# Patient Record
Sex: MALE | Race: WHITE | NOT HISPANIC OR LATINO | Employment: OTHER | URBAN - METROPOLITAN AREA
[De-identification: names, ages, dates, MRNs, and addresses within clinical notes are randomized per-mention and may not be internally consistent; named-entity substitution may affect disease eponyms.]

---

## 2017-01-03 RX ORDER — LEVOTHYROXINE SODIUM 0.05 MG/1
50 TABLET ORAL DAILY
Qty: 30 TABLET | Refills: 0 | Status: SHIPPED | OUTPATIENT
Start: 2017-01-03 | End: 2017-02-01 | Stop reason: SDUPTHER

## 2017-01-24 ENCOUNTER — HOSPITAL ENCOUNTER (OUTPATIENT)
Dept: CARDIOLOGY | Facility: HOSPITAL | Age: 82
Setting detail: RECURRING SERIES
Discharge: HOME | End: 2017-01-24

## 2017-01-24 PROCEDURE — 85610 PROTHROMBIN TIME: CPT | Performed by: INTERNAL MEDICINE

## 2017-01-24 PROCEDURE — 36416 COLLJ CAPILLARY BLOOD SPEC: CPT | Performed by: INTERNAL MEDICINE

## 2017-01-31 RX ORDER — LEVOTHYROXINE SODIUM 0.05 MG/1
TABLET ORAL
Qty: 30 TABLET | Refills: 0 | OUTPATIENT
Start: 2017-01-31

## 2017-02-01 ENCOUNTER — TELEPHONE (OUTPATIENT)
Dept: FAMILY MEDICINE CLINIC | Facility: CLINIC | Age: 82
End: 2017-02-01

## 2017-02-01 RX ORDER — LEVOTHYROXINE SODIUM 0.05 MG/1
50 TABLET ORAL DAILY
Qty: 30 TABLET | Refills: 0 | Status: SHIPPED | OUTPATIENT
Start: 2017-02-01 | End: 2017-02-28 | Stop reason: SDUPTHER

## 2017-02-01 NOTE — TELEPHONE ENCOUNTER
----- Message from Lynsey Wang sent at 2/1/2017  1:01 PM EST -----  Contact: pt  PT NEEDS A NEW RX LEVOTHYROXINE 0.05 MG 1 DAILY     PT HAS AN APPT 2/7/17    COULD YOU PLEASE CALL IN A 30 DAY SUPPLY FOR HIM      MERLE CHANEY [phone]

## 2017-02-07 ENCOUNTER — OFFICE VISIT (OUTPATIENT)
Dept: FAMILY MEDICINE CLINIC | Facility: CLINIC | Age: 82
End: 2017-02-07

## 2017-02-07 VITALS
HEIGHT: 72 IN | BODY MASS INDEX: 23.7 KG/M2 | TEMPERATURE: 98.4 F | SYSTOLIC BLOOD PRESSURE: 104 MMHG | OXYGEN SATURATION: 99 % | HEART RATE: 77 BPM | DIASTOLIC BLOOD PRESSURE: 68 MMHG | WEIGHT: 175 LBS

## 2017-02-07 DIAGNOSIS — I10 ESSENTIAL HYPERTENSION: ICD-10-CM

## 2017-02-07 DIAGNOSIS — R73.01 ELEVATED FASTING BLOOD SUGAR: ICD-10-CM

## 2017-02-07 DIAGNOSIS — I48.20 CHRONIC ATRIAL FIBRILLATION (HCC): Primary | ICD-10-CM

## 2017-02-07 DIAGNOSIS — R74.8 ELEVATED CPK: ICD-10-CM

## 2017-02-07 DIAGNOSIS — E03.9 HYPOTHYROIDISM, UNSPECIFIED TYPE: ICD-10-CM

## 2017-02-07 DIAGNOSIS — J06.9 UPPER RESPIRATORY TRACT INFECTION, UNSPECIFIED TYPE: ICD-10-CM

## 2017-02-07 LAB
ALBUMIN SERPL-MCNC: 4.4 G/DL (ref 3.5–5.2)
ALBUMIN/GLOB SERPL: 1.6 G/DL
ALP SERPL-CCNC: 63 U/L (ref 39–117)
ALT SERPL-CCNC: 26 U/L (ref 1–41)
APPEARANCE UR: CLEAR
AST SERPL-CCNC: 25 U/L (ref 1–40)
BACTERIA #/AREA URNS HPF: ABNORMAL /HPF
BASOPHILS # BLD AUTO: 0.03 10*3/MM3 (ref 0–0.2)
BASOPHILS NFR BLD AUTO: 0.3 % (ref 0–1.5)
BILIRUB SERPL-MCNC: 0.5 MG/DL (ref 0.1–1.2)
BILIRUB UR QL STRIP: NEGATIVE
BUN SERPL-MCNC: 13 MG/DL (ref 8–23)
BUN/CREAT SERPL: 11.9 (ref 7–25)
CALCIUM SERPL-MCNC: 9.3 MG/DL (ref 8.6–10.5)
CASTS URNS MICRO: ABNORMAL
CHLORIDE SERPL-SCNC: 100 MMOL/L (ref 98–107)
CO2 SERPL-SCNC: 26.7 MMOL/L (ref 22–29)
COLOR UR: YELLOW
CREAT SERPL-MCNC: 1.09 MG/DL (ref 0.76–1.27)
EOSINOPHIL # BLD AUTO: 0.17 10*3/MM3 (ref 0–0.7)
EOSINOPHIL NFR BLD AUTO: 2 % (ref 0.3–6.2)
EPI CELLS #/AREA URNS HPF: ABNORMAL /HPF
ERYTHROCYTE [DISTWIDTH] IN BLOOD BY AUTOMATED COUNT: 13.2 % (ref 11.5–14.5)
EXPIRATION DATE: NORMAL
FLUAV AG NPH QL: NEGATIVE
FLUBV AG NPH QL: NEGATIVE
GLOBULIN SER CALC-MCNC: 2.8 GM/DL
GLUCOSE SERPL-MCNC: 128 MG/DL (ref 65–99)
GLUCOSE UR QL: NEGATIVE
HBA1C MFR BLD: 5.87 % (ref 4.8–5.6)
HCT VFR BLD AUTO: 46.5 % (ref 40.4–52.2)
HGB BLD-MCNC: 15.8 G/DL (ref 13.7–17.6)
HGB UR QL STRIP: NEGATIVE
IMM GRANULOCYTES # BLD: 0.04 10*3/MM3 (ref 0–0.03)
IMM GRANULOCYTES NFR BLD: 0.5 % (ref 0–0.5)
INTERNAL CONTROL: NORMAL
KETONES UR QL STRIP: NEGATIVE
LEUKOCYTE ESTERASE UR QL STRIP: NEGATIVE
LYMPHOCYTES # BLD AUTO: 2.13 10*3/MM3 (ref 0.9–4.8)
LYMPHOCYTES NFR BLD AUTO: 24.5 % (ref 19.6–45.3)
Lab: NORMAL
MCH RBC QN AUTO: 31.2 PG (ref 27–32.7)
MCHC RBC AUTO-ENTMCNC: 34 G/DL (ref 32.6–36.4)
MCV RBC AUTO: 91.7 FL (ref 79.8–96.2)
MONOCYTES # BLD AUTO: 0.72 10*3/MM3 (ref 0.2–1.2)
MONOCYTES NFR BLD AUTO: 8.3 % (ref 5–12)
NEUTROPHILS # BLD AUTO: 5.6 10*3/MM3 (ref 1.9–8.1)
NEUTROPHILS NFR BLD AUTO: 64.4 % (ref 42.7–76)
NITRITE UR QL STRIP: NEGATIVE
PH UR STRIP: 7 [PH] (ref 5–8)
PLATELET # BLD AUTO: 225 10*3/MM3 (ref 140–500)
POTASSIUM SERPL-SCNC: 3.8 MMOL/L (ref 3.5–5.2)
PROT SERPL-MCNC: 7.2 G/DL (ref 6–8.5)
PROT UR QL STRIP: (no result)
RBC # BLD AUTO: 5.07 10*6/MM3 (ref 4.6–6)
RBC #/AREA URNS HPF: ABNORMAL /HPF
SODIUM SERPL-SCNC: 140 MMOL/L (ref 136–145)
SP GR UR: 1.02 (ref 1–1.03)
T4 FREE SERPL-MCNC: 1.67 NG/DL (ref 0.93–1.7)
TSH SERPL DL<=0.005 MIU/L-ACNC: 3.41 MIU/ML (ref 0.27–4.2)
UROBILINOGEN UR STRIP-MCNC: (no result) MG/DL
WBC # BLD AUTO: 8.69 10*3/MM3 (ref 4.5–10.7)
WBC #/AREA URNS HPF: ABNORMAL /HPF

## 2017-02-07 PROCEDURE — 99213 OFFICE O/P EST LOW 20 MIN: CPT | Performed by: GENERAL PRACTICE

## 2017-02-07 PROCEDURE — 87804 INFLUENZA ASSAY W/OPTIC: CPT | Performed by: GENERAL PRACTICE

## 2017-02-07 RX ORDER — ERYTHROMYCIN 250 MG/1
TABLET, COATED ORAL
Refills: 0 | COMMUNITY
Start: 2017-01-31 | End: 2017-10-27

## 2017-02-07 NOTE — PROGRESS NOTES
Subjective   Vish Morin is a 85 y.o. male.     Chief Complaint   Patient presents with   • Follow-up     Immediate care center    • URI       History of Present Illness patient is a 85 y.o. very nice  gentleman who is here today with a upper respiratory symptoms seen in immediate care center placed on Zithromax and Robitussin and over-the-counter cough medicine.  Patient states he is just really is dragging denies fill chills and fever no rash nonproductive cough this patient has atrial fibrillation followed by Dr. Kurtz.  gets his pro times done at their office                        The following portions of the patient's history were reviewed and updated as appropriate: allergies, current medications, past family history, past medical history, past social history, past surgical history and problem list.      Review of Systems   Respiratory:        Nonproductive cough just feels tired not much congestion but some liver no chills atrial fibrillation slow   Cardiovascular:         states his atrial fib has been slow and not a problem   Genitourinary:        No polyuria or dysuria   Musculoskeletal:        Am mild muscle aching and fatigue         Objective   Physical Exam   Constitutional:   Weight is stable at 175   HENT:   No sore throat and on exam posterior pharynx is negative   Eyes: EOM are normal. Pupils are equal, round, and reactive to light.   Neck:   No cervical adenopathy   Cardiovascular:   Atrial fibrillation 77 beats a minute   Pulmonary/Chest: Effort normal and breath sounds normal.   Could not hear any pneumonia   Musculoskeletal:   Some generalized muscle aching   Skin:   No rash Is 99         Assessment/Plan   Vish was seen today for follow-up and uri.    Diagnoses and all orders for this visit:    Chronic atrial fibrillation  -     CBC & AUTO Differential  -     Comprehensive Metabolic Panel  -     Hemoglobin A1c  -     TSH+Free T4  -     Urinalysis With Microscopic  -     POC  Influenza A / B    Hypothyroidism, unspecified type  -     CBC & AUTO Differential  -     Comprehensive Metabolic Panel  -     Hemoglobin A1c  -     TSH+Free T4  -     Urinalysis With Microscopic  -     POC Influenza A / B    Essential hypertension  -     CBC & AUTO Differential  -     Comprehensive Metabolic Panel  -     Hemoglobin A1c  -     TSH+Free T4  -     Urinalysis With Microscopic  -     POC Influenza A / B    Upper respiratory tract infection, unspecified type  -     CBC & AUTO Differential  -     Comprehensive Metabolic Panel  -     Hemoglobin A1c  -     TSH+Free T4  -     Urinalysis With Microscopic  -     POC Influenza A / B    Elevated CPK  -     CBC & AUTO Differential  -     Comprehensive Metabolic Panel  -     Hemoglobin A1c  -     TSH+Free T4  -     Urinalysis With Microscopic  -     POC Influenza A / B    Elevated fasting blood sugar  -     CBC & AUTO Differential  -     Comprehensive Metabolic Panel  -     Hemoglobin A1c  -     TSH+Free T4  -     Urinalysis With Microscopic  -     POC Influenza A / B            Clarke Mcgarry MD  2/7/2017       Very nice 85-year-old white male who is here following up on a visit to [facility] where he was given Zithromax which he has finished he was put on Mucinex and an over-the-counter cough medicine patient came in because he just was not feeling better I did explain to him that we have been seeing a virus is been lasting up to 4-5 weeks with patient's denies any fever and chills lungs were clear throat was negative is no rash no adenopathy I have drawn some labs on this patient specifically CBC and a follow-up on the thyroid which he has not had for a good while and he is on thyroid medication I told him and hopefully I would be back in the office on Thursday but I have positive we would be out until next Monday told him to go home and rest drink some fluids if she suddenly spiked a high fever and shaking chills to go to the emergency room not  immediate care center he may need a chest x-ray prior heard no pneumonia or test was negative

## 2017-02-09 ENCOUNTER — TELEPHONE (OUTPATIENT)
Dept: FAMILY MEDICINE CLINIC | Facility: CLINIC | Age: 82
End: 2017-02-09

## 2017-02-09 NOTE — TELEPHONE ENCOUNTER
Spoke with the patient his general chemistries looked good his thyroids are normal he states he has some numbness in his finger besides a little bit of fatigue he was having this may be his beta blocker I told him to take his Lopressor 50 mg 1 in the morning and a half in the evening reduce that to a half in the evening not a whole let me know if that helps any his blood sugars 128 but his hemoglobin A1c is normal I told him to

## 2017-02-21 ENCOUNTER — HOSPITAL ENCOUNTER (OUTPATIENT)
Dept: CARDIOLOGY | Facility: HOSPITAL | Age: 82
Setting detail: RECURRING SERIES
Discharge: HOME | End: 2017-02-21

## 2017-02-21 PROCEDURE — 85610 PROTHROMBIN TIME: CPT

## 2017-02-21 PROCEDURE — 36416 COLLJ CAPILLARY BLOOD SPEC: CPT

## 2017-02-27 RX ORDER — METOPROLOL TARTRATE 50 MG/1
TABLET, FILM COATED ORAL
Qty: 60 TABLET | Refills: 0 | Status: SHIPPED | OUTPATIENT
Start: 2017-02-27 | End: 2017-03-26 | Stop reason: SDUPTHER

## 2017-02-28 ENCOUNTER — HOSPITAL ENCOUNTER (OUTPATIENT)
Dept: CARDIOLOGY | Facility: HOSPITAL | Age: 82
Setting detail: RECURRING SERIES
Discharge: HOME | End: 2017-02-28

## 2017-02-28 PROCEDURE — 36416 COLLJ CAPILLARY BLOOD SPEC: CPT

## 2017-02-28 PROCEDURE — 85610 PROTHROMBIN TIME: CPT

## 2017-02-28 RX ORDER — LEVOTHYROXINE SODIUM 0.05 MG/1
TABLET ORAL
Qty: 30 TABLET | Refills: 0 | Status: SHIPPED | OUTPATIENT
Start: 2017-02-28 | End: 2017-03-29 | Stop reason: SDUPTHER

## 2017-03-14 ENCOUNTER — HOSPITAL ENCOUNTER (OUTPATIENT)
Dept: CARDIOLOGY | Facility: HOSPITAL | Age: 82
Setting detail: RECURRING SERIES
Discharge: HOME | End: 2017-03-14

## 2017-03-14 PROCEDURE — 36416 COLLJ CAPILLARY BLOOD SPEC: CPT

## 2017-03-14 PROCEDURE — 85610 PROTHROMBIN TIME: CPT

## 2017-03-17 RX ORDER — WARFARIN SODIUM 5 MG/1
TABLET ORAL
Qty: 90 TABLET | Refills: 0 | Status: SHIPPED | OUTPATIENT
Start: 2017-03-17 | End: 2017-06-13 | Stop reason: SDUPTHER

## 2017-03-27 RX ORDER — METOPROLOL TARTRATE 50 MG/1
TABLET, FILM COATED ORAL
Qty: 60 TABLET | Refills: 0 | Status: SHIPPED | OUTPATIENT
Start: 2017-03-27 | End: 2017-04-23 | Stop reason: SDUPTHER

## 2017-03-29 RX ORDER — LEVOTHYROXINE SODIUM 0.05 MG/1
TABLET ORAL
Qty: 30 TABLET | Refills: 0 | Status: SHIPPED | OUTPATIENT
Start: 2017-03-29 | End: 2017-04-24 | Stop reason: SDUPTHER

## 2017-04-11 ENCOUNTER — HOSPITAL ENCOUNTER (OUTPATIENT)
Dept: CARDIOLOGY | Facility: HOSPITAL | Age: 82
Setting detail: RECURRING SERIES
Discharge: HOME | End: 2017-04-11

## 2017-04-11 PROCEDURE — 36416 COLLJ CAPILLARY BLOOD SPEC: CPT

## 2017-04-11 PROCEDURE — 85610 PROTHROMBIN TIME: CPT

## 2017-04-24 RX ORDER — LEVOTHYROXINE SODIUM 0.05 MG/1
TABLET ORAL
Qty: 30 TABLET | Refills: 5 | Status: SHIPPED | OUTPATIENT
Start: 2017-04-24 | End: 2017-10-27 | Stop reason: SDUPTHER

## 2017-04-24 RX ORDER — METOPROLOL TARTRATE 50 MG/1
TABLET, FILM COATED ORAL
Qty: 60 TABLET | Refills: 0 | Status: SHIPPED | OUTPATIENT
Start: 2017-04-24 | End: 2017-05-22 | Stop reason: SDUPTHER

## 2017-05-09 ENCOUNTER — HOSPITAL ENCOUNTER (OUTPATIENT)
Dept: CARDIOLOGY | Facility: HOSPITAL | Age: 82
Setting detail: RECURRING SERIES
Discharge: HOME | End: 2017-05-09

## 2017-05-09 PROCEDURE — 85610 PROTHROMBIN TIME: CPT

## 2017-05-09 PROCEDURE — 36416 COLLJ CAPILLARY BLOOD SPEC: CPT

## 2017-05-23 RX ORDER — METOPROLOL TARTRATE 50 MG/1
TABLET, FILM COATED ORAL
Qty: 60 TABLET | Refills: 0 | Status: SHIPPED | OUTPATIENT
Start: 2017-05-23 | End: 2017-06-19 | Stop reason: SDUPTHER

## 2017-06-06 ENCOUNTER — HOSPITAL ENCOUNTER (OUTPATIENT)
Dept: CARDIOLOGY | Facility: HOSPITAL | Age: 82
Setting detail: RECURRING SERIES
Discharge: HOME | End: 2017-06-06

## 2017-06-06 PROCEDURE — 85610 PROTHROMBIN TIME: CPT

## 2017-06-06 PROCEDURE — 36416 COLLJ CAPILLARY BLOOD SPEC: CPT

## 2017-06-06 RX ORDER — BENAZEPRIL HYDROCHLORIDE 40 MG/1
TABLET, FILM COATED ORAL
Qty: 30 TABLET | Refills: 0 | Status: SHIPPED | OUTPATIENT
Start: 2017-06-06 | End: 2017-07-06 | Stop reason: SDUPTHER

## 2017-06-09 ENCOUNTER — OFFICE VISIT (OUTPATIENT)
Dept: CARDIOLOGY | Facility: CLINIC | Age: 82
End: 2017-06-09

## 2017-06-09 VITALS
BODY MASS INDEX: 23.84 KG/M2 | HEIGHT: 72 IN | HEART RATE: 85 BPM | WEIGHT: 176 LBS | DIASTOLIC BLOOD PRESSURE: 92 MMHG | SYSTOLIC BLOOD PRESSURE: 126 MMHG

## 2017-06-09 DIAGNOSIS — I10 ESSENTIAL (PRIMARY) HYPERTENSION: ICD-10-CM

## 2017-06-09 DIAGNOSIS — I48.20 CHRONIC ATRIAL FIBRILLATION (HCC): Primary | ICD-10-CM

## 2017-06-09 PROCEDURE — 93000 ELECTROCARDIOGRAM COMPLETE: CPT | Performed by: INTERNAL MEDICINE

## 2017-06-09 PROCEDURE — 99213 OFFICE O/P EST LOW 20 MIN: CPT | Performed by: INTERNAL MEDICINE

## 2017-06-09 NOTE — PROGRESS NOTES
Date of Office Visit: 2017  Encounter Provider: Gopal Kurtz MD  Place of Service: [facility] CARDIOLOGY  Patient Name: Vish Morin  : 1931    Subjective:     Encounter Date:2017      Patient ID: Vish Morin is a 86 y.o. male who has a cc of  pers AF.  Carrying on. 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     The patient had a good year.   No anginal chest pain,   No sig moura,   No soa,   No fainting,  No orthostasis.   No edema.   Exercise tolerance: stable. No trouble walking.     There have been no hospital admission since the last visit.     There have been no bleeding events.       Past Medical History:   Diagnosis Date   • Atrial fibrillation    • CAD (coronary artery disease)    • Constipation    • Diabetes mellitus    • MOURA (dyspnea on exertion)    • GERD (gastroesophageal reflux disease)    • High risk medication use    • Hyperlipidemia    • Hypertension    • Hypothyroidism    • IFG (impaired fasting glucose)    • Lower back pain    • Muscle pain, thigh    • Muscle weakness    • Nausea    • KENNETH (obstructive sleep apnea)    • Postural imbalance    • Sick sinus syndrome    • Sinus bradycardia    • Syncope        Social History     Social History   • Marital status: Single     Spouse name: N/A   • Number of children: N/A   • Years of education: N/A     Occupational History   • Not on file.     Social History Main Topics   • Smoking status: Former Smoker     Types: Cigars   • Smokeless tobacco: Not on file   • Alcohol use No      Comment: CAFFEINE USE   • Drug use: No   • Sexual activity: Not on file     Other Topics Concern   • Not on file     Social History Narrative       Review of Systems   Constitution: Negative for fever and night sweats.   HENT: Negative for ear pain and stridor.    Eyes: Negative for discharge and visual halos.   Cardiovascular: Negative for cyanosis.   Respiratory: Negative for hemoptysis and sputum production.   "  Hematologic/Lymphatic: Negative for adenopathy.   Skin: Negative for nail changes and unusual hair distribution.   Musculoskeletal: Negative for gout and joint swelling.   Gastrointestinal: Negative for bowel incontinence and flatus.   Genitourinary: Negative for dysuria and flank pain.   Neurological: Negative for seizures and tremors.   Psychiatric/Behavioral: Negative for altered mental status. The patient is not nervous/anxious.             Objective:     Vitals:    06/09/17 1102   BP: 126/92   Pulse: 85   Weight: 176 lb (79.8 kg)   Height: 72\" (182.9 cm)         Physical Exam   Constitutional: He is oriented to person, place, and time.   HENT:   Head: Normocephalic and atraumatic.   Eyes: Right eye exhibits no discharge. Left eye exhibits no discharge.   Neck: No JVD present. No thyromegaly present.   Cardiovascular: Normal rate.  An irregularly irregular rhythm present. Exam reveals no gallop and no friction rub.    No murmur heard.  Pulmonary/Chest: Effort normal and breath sounds normal. He has no rales.   Abdominal: Soft. Bowel sounds are normal. There is no tenderness.   Musculoskeletal: Normal range of motion. He exhibits no edema or deformity.   Neurological: He is alert and oriented to person, place, and time. He exhibits normal muscle tone.   Skin: Skin is warm and dry. No erythema.   Psychiatric: He has a normal mood and affect. His behavior is normal. Thought content normal.         ECG 12 Lead  Date/Time: 6/9/2017 12:02 PM  Performed by: LATOYA NEGRON  Authorized by: LATOYA NEGRON   Comparison: compared with previous ECG   Similar to previous ECG  Rhythm: atrial fibrillation  Clinical impression: abnormal ECG            Lab Review:       Assessment:          Diagnosis Plan   1. Chronic atrial fibrillation     2. Essential (primary) hypertension            Plan:       The CHADSVASC score is 3   Anticoagulation warf   Heart rate control bb      He is doing fine   No symptoms   Exam Ok     ECG " without change

## 2017-06-14 RX ORDER — WARFARIN SODIUM 5 MG/1
TABLET ORAL
Qty: 90 TABLET | Refills: 0 | Status: SHIPPED | OUTPATIENT
Start: 2017-06-14 | End: 2017-09-11 | Stop reason: SDUPTHER

## 2017-06-19 RX ORDER — METOPROLOL TARTRATE 50 MG/1
TABLET, FILM COATED ORAL
Qty: 60 TABLET | Refills: 5 | Status: SHIPPED | OUTPATIENT
Start: 2017-06-19 | End: 2017-12-08 | Stop reason: SDUPTHER

## 2017-07-05 ENCOUNTER — HOSPITAL ENCOUNTER (OUTPATIENT)
Dept: CARDIOLOGY | Facility: HOSPITAL | Age: 82
Setting detail: RECURRING SERIES
Discharge: HOME | End: 2017-07-05

## 2017-07-05 PROCEDURE — 85610 PROTHROMBIN TIME: CPT

## 2017-07-05 PROCEDURE — 36416 COLLJ CAPILLARY BLOOD SPEC: CPT

## 2017-07-06 RX ORDER — BENAZEPRIL HYDROCHLORIDE 40 MG/1
TABLET, FILM COATED ORAL
Qty: 30 TABLET | Refills: 2 | Status: SHIPPED | OUTPATIENT
Start: 2017-07-06 | End: 2017-09-27 | Stop reason: SDUPTHER

## 2017-07-20 RX ORDER — AMLODIPINE BESYLATE 5 MG/1
TABLET ORAL
Qty: 90 TABLET | Refills: 0 | Status: SHIPPED | OUTPATIENT
Start: 2017-07-20 | End: 2017-10-18 | Stop reason: SDUPTHER

## 2017-08-01 ENCOUNTER — HOSPITAL ENCOUNTER (OUTPATIENT)
Dept: CARDIOLOGY | Facility: HOSPITAL | Age: 82
Setting detail: RECURRING SERIES
Discharge: HOME | End: 2017-08-01

## 2017-08-01 PROCEDURE — 36416 COLLJ CAPILLARY BLOOD SPEC: CPT

## 2017-08-01 PROCEDURE — 85610 PROTHROMBIN TIME: CPT

## 2017-08-29 ENCOUNTER — HOSPITAL ENCOUNTER (OUTPATIENT)
Dept: CARDIOLOGY | Facility: HOSPITAL | Age: 82
Setting detail: RECURRING SERIES
Discharge: HOME | End: 2017-08-29

## 2017-08-29 PROCEDURE — 36416 COLLJ CAPILLARY BLOOD SPEC: CPT

## 2017-08-29 PROCEDURE — 85610 PROTHROMBIN TIME: CPT

## 2017-09-13 RX ORDER — WARFARIN SODIUM 5 MG/1
TABLET ORAL
Qty: 90 TABLET | Refills: 0 | Status: SHIPPED | OUTPATIENT
Start: 2017-09-13 | End: 2017-12-29 | Stop reason: SDUPTHER

## 2017-09-26 ENCOUNTER — HOSPITAL ENCOUNTER (OUTPATIENT)
Dept: CARDIOLOGY | Facility: HOSPITAL | Age: 82
Setting detail: RECURRING SERIES
Discharge: HOME | End: 2017-09-26

## 2017-09-26 PROCEDURE — 36416 COLLJ CAPILLARY BLOOD SPEC: CPT

## 2017-09-26 PROCEDURE — 85610 PROTHROMBIN TIME: CPT

## 2017-09-28 RX ORDER — BENAZEPRIL HYDROCHLORIDE 40 MG/1
TABLET, FILM COATED ORAL
Qty: 30 TABLET | Refills: 0 | Status: SHIPPED | OUTPATIENT
Start: 2017-09-28 | End: 2017-10-25 | Stop reason: SDUPTHER

## 2017-10-18 RX ORDER — AMLODIPINE BESYLATE 5 MG/1
TABLET ORAL
Qty: 90 TABLET | Refills: 1 | Status: SHIPPED | OUTPATIENT
Start: 2017-10-18 | End: 2018-04-23 | Stop reason: SDUPTHER

## 2017-10-24 ENCOUNTER — HOSPITAL ENCOUNTER (OUTPATIENT)
Dept: CARDIOLOGY | Facility: HOSPITAL | Age: 82
Setting detail: RECURRING SERIES
Discharge: HOME | End: 2017-10-24

## 2017-10-24 PROCEDURE — 36416 COLLJ CAPILLARY BLOOD SPEC: CPT

## 2017-10-24 PROCEDURE — 85610 PROTHROMBIN TIME: CPT

## 2017-10-25 RX ORDER — BENAZEPRIL HYDROCHLORIDE 40 MG/1
TABLET, FILM COATED ORAL
Qty: 30 TABLET | Refills: 0 | Status: SHIPPED | OUTPATIENT
Start: 2017-10-25 | End: 2017-10-27 | Stop reason: SDUPTHER

## 2017-10-27 ENCOUNTER — OFFICE VISIT (OUTPATIENT)
Dept: FAMILY MEDICINE CLINIC | Facility: CLINIC | Age: 82
End: 2017-10-27

## 2017-10-27 VITALS
DIASTOLIC BLOOD PRESSURE: 70 MMHG | WEIGHT: 176 LBS | SYSTOLIC BLOOD PRESSURE: 120 MMHG | OXYGEN SATURATION: 99 % | BODY MASS INDEX: 23.84 KG/M2 | HEIGHT: 72 IN | RESPIRATION RATE: 16 BRPM | TEMPERATURE: 97.8 F | HEART RATE: 85 BPM

## 2017-10-27 DIAGNOSIS — E78.5 HYPERLIPIDEMIA, UNSPECIFIED HYPERLIPIDEMIA TYPE: ICD-10-CM

## 2017-10-27 DIAGNOSIS — I48.20 CHRONIC ATRIAL FIBRILLATION (HCC): ICD-10-CM

## 2017-10-27 DIAGNOSIS — E03.9 ACQUIRED HYPOTHYROIDISM: Primary | ICD-10-CM

## 2017-10-27 DIAGNOSIS — R73.01 IMPAIRED FASTING GLUCOSE: ICD-10-CM

## 2017-10-27 PROCEDURE — 99214 OFFICE O/P EST MOD 30 MIN: CPT | Performed by: INTERNAL MEDICINE

## 2017-10-27 RX ORDER — BENAZEPRIL HYDROCHLORIDE 40 MG/1
40 TABLET, FILM COATED ORAL DAILY
Qty: 90 TABLET | Refills: 1 | Status: SHIPPED | OUTPATIENT
Start: 2017-10-27 | End: 2018-06-04 | Stop reason: SDUPTHER

## 2017-10-27 RX ORDER — LEVOTHYROXINE SODIUM 0.05 MG/1
50 TABLET ORAL DAILY
Qty: 90 TABLET | Refills: 3 | Status: SHIPPED | OUTPATIENT
Start: 2017-10-27 | End: 2018-10-18 | Stop reason: SDUPTHER

## 2017-10-28 LAB
ALBUMIN SERPL-MCNC: 4.2 G/DL (ref 3.5–5.2)
ALBUMIN/GLOB SERPL: 1.5 G/DL
ALP SERPL-CCNC: 64 U/L (ref 39–117)
ALT SERPL-CCNC: 21 U/L (ref 1–41)
AST SERPL-CCNC: 19 U/L (ref 1–40)
BILIRUB SERPL-MCNC: 0.6 MG/DL (ref 0.1–1.2)
BUN SERPL-MCNC: 13 MG/DL (ref 8–23)
BUN/CREAT SERPL: 12.5 (ref 7–25)
CALCIUM SERPL-MCNC: 9.5 MG/DL (ref 8.6–10.5)
CHLORIDE SERPL-SCNC: 102 MMOL/L (ref 98–107)
CHOLEST SERPL-MCNC: 115 MG/DL (ref 0–200)
CO2 SERPL-SCNC: 26 MMOL/L (ref 22–29)
CREAT SERPL-MCNC: 1.04 MG/DL (ref 0.76–1.27)
GFR SERPLBLD CREATININE-BSD FMLA CKD-EPI: 68 ML/MIN/1.73
GFR SERPLBLD CREATININE-BSD FMLA CKD-EPI: 82 ML/MIN/1.73
GLOBULIN SER CALC-MCNC: 2.8 GM/DL
GLUCOSE SERPL-MCNC: 163 MG/DL (ref 65–99)
HBA1C MFR BLD: 6 % (ref 4.8–5.6)
HDLC SERPL-MCNC: 43 MG/DL (ref 40–60)
INTERPRETATION: NORMAL
LDLC SERPL CALC-MCNC: 40 MG/DL (ref 0–100)
POTASSIUM SERPL-SCNC: 3.7 MMOL/L (ref 3.5–5.2)
PROT SERPL-MCNC: 7 G/DL (ref 6–8.5)
SODIUM SERPL-SCNC: 142 MMOL/L (ref 136–145)
T4 FREE SERPL-MCNC: 1.48 NG/DL (ref 0.93–1.7)
TRIGL SERPL-MCNC: 160 MG/DL (ref 0–150)
TSH SERPL DL<=0.005 MIU/L-ACNC: 3.29 MIU/ML (ref 0.27–4.2)
VLDLC SERPL CALC-MCNC: 32 MG/DL (ref 5–40)

## 2017-11-21 ENCOUNTER — HOSPITAL ENCOUNTER (OUTPATIENT)
Dept: CARDIOLOGY | Facility: HOSPITAL | Age: 82
Setting detail: RECURRING SERIES
Discharge: HOME | End: 2017-11-21

## 2017-11-21 PROCEDURE — 85610 PROTHROMBIN TIME: CPT

## 2017-11-21 PROCEDURE — 36416 COLLJ CAPILLARY BLOOD SPEC: CPT

## 2017-12-11 RX ORDER — METOPROLOL TARTRATE 50 MG/1
TABLET, FILM COATED ORAL
Qty: 60 TABLET | Refills: 0 | Status: SHIPPED | OUTPATIENT
Start: 2017-12-11 | End: 2017-12-29 | Stop reason: SDUPTHER

## 2017-12-19 ENCOUNTER — HOSPITAL ENCOUNTER (OUTPATIENT)
Dept: CARDIOLOGY | Facility: HOSPITAL | Age: 82
Setting detail: RECURRING SERIES
Discharge: HOME | End: 2017-12-19

## 2017-12-19 PROCEDURE — 36416 COLLJ CAPILLARY BLOOD SPEC: CPT

## 2017-12-19 PROCEDURE — 85610 PROTHROMBIN TIME: CPT

## 2017-12-29 RX ORDER — WARFARIN SODIUM 5 MG/1
TABLET ORAL
Qty: 90 TABLET | Refills: 0 | Status: SHIPPED | OUTPATIENT
Start: 2017-12-29 | End: 2018-04-09 | Stop reason: SDUPTHER

## 2017-12-29 RX ORDER — METOPROLOL TARTRATE 50 MG/1
TABLET, FILM COATED ORAL
Qty: 60 TABLET | Refills: 0 | Status: SHIPPED | OUTPATIENT
Start: 2017-12-29 | End: 2018-02-10 | Stop reason: SDUPTHER

## 2018-01-18 ENCOUNTER — HOSPITAL ENCOUNTER (OUTPATIENT)
Dept: CARDIOLOGY | Facility: HOSPITAL | Age: 83
Setting detail: RECURRING SERIES
Discharge: HOME | End: 2018-01-18

## 2018-01-18 PROCEDURE — 85610 PROTHROMBIN TIME: CPT

## 2018-01-18 PROCEDURE — 36416 COLLJ CAPILLARY BLOOD SPEC: CPT

## 2018-01-30 ENCOUNTER — HOSPITAL ENCOUNTER (OUTPATIENT)
Dept: CARDIOLOGY | Facility: HOSPITAL | Age: 83
Setting detail: RECURRING SERIES
Discharge: HOME | End: 2018-01-30

## 2018-01-30 PROCEDURE — 36416 COLLJ CAPILLARY BLOOD SPEC: CPT

## 2018-01-30 PROCEDURE — 85610 PROTHROMBIN TIME: CPT

## 2018-02-12 RX ORDER — METOPROLOL TARTRATE 50 MG/1
TABLET, FILM COATED ORAL
Qty: 60 TABLET | Refills: 0 | Status: SHIPPED | OUTPATIENT
Start: 2018-02-12 | End: 2018-03-10 | Stop reason: SDUPTHER

## 2018-02-27 ENCOUNTER — HOSPITAL ENCOUNTER (OUTPATIENT)
Dept: CARDIOLOGY | Facility: HOSPITAL | Age: 83
Setting detail: RECURRING SERIES
Discharge: HOME | End: 2018-02-27

## 2018-02-27 PROCEDURE — 36416 COLLJ CAPILLARY BLOOD SPEC: CPT

## 2018-02-27 PROCEDURE — 85610 PROTHROMBIN TIME: CPT

## 2018-03-12 RX ORDER — METOPROLOL TARTRATE 50 MG/1
TABLET, FILM COATED ORAL
Qty: 60 TABLET | Refills: 0 | Status: SHIPPED | OUTPATIENT
Start: 2018-03-12 | End: 2018-04-09 | Stop reason: SDUPTHER

## 2018-03-27 ENCOUNTER — HOSPITAL ENCOUNTER (OUTPATIENT)
Dept: CARDIOLOGY | Facility: HOSPITAL | Age: 83
Setting detail: RECURRING SERIES
Discharge: HOME | End: 2018-03-27

## 2018-03-27 PROCEDURE — 85610 PROTHROMBIN TIME: CPT

## 2018-03-27 PROCEDURE — 36416 COLLJ CAPILLARY BLOOD SPEC: CPT

## 2018-04-09 RX ORDER — WARFARIN SODIUM 5 MG/1
TABLET ORAL
Qty: 90 TABLET | Refills: 0 | Status: SHIPPED | OUTPATIENT
Start: 2018-04-09 | End: 2018-07-05 | Stop reason: SDUPTHER

## 2018-04-10 RX ORDER — METOPROLOL TARTRATE 50 MG/1
TABLET, FILM COATED ORAL
Qty: 60 TABLET | Refills: 0 | Status: SHIPPED | OUTPATIENT
Start: 2018-04-10 | End: 2018-05-13 | Stop reason: SDUPTHER

## 2018-04-23 RX ORDER — AMLODIPINE BESYLATE 5 MG/1
TABLET ORAL
Qty: 90 TABLET | Refills: 0 | Status: SHIPPED | OUTPATIENT
Start: 2018-04-23 | End: 2018-07-23 | Stop reason: SDUPTHER

## 2018-04-24 ENCOUNTER — HOSPITAL ENCOUNTER (OUTPATIENT)
Dept: CARDIOLOGY | Facility: HOSPITAL | Age: 83
Setting detail: RECURRING SERIES
Discharge: HOME | End: 2018-04-24

## 2018-04-24 PROCEDURE — 36416 COLLJ CAPILLARY BLOOD SPEC: CPT

## 2018-04-24 PROCEDURE — 85610 PROTHROMBIN TIME: CPT

## 2018-05-08 ENCOUNTER — HOSPITAL ENCOUNTER (OUTPATIENT)
Dept: CARDIOLOGY | Facility: HOSPITAL | Age: 83
Setting detail: RECURRING SERIES
Discharge: HOME | End: 2018-05-08

## 2018-05-08 PROCEDURE — 36416 COLLJ CAPILLARY BLOOD SPEC: CPT

## 2018-05-08 PROCEDURE — 85610 PROTHROMBIN TIME: CPT

## 2018-05-14 RX ORDER — METOPROLOL TARTRATE 50 MG/1
TABLET, FILM COATED ORAL
Qty: 60 TABLET | Refills: 0 | Status: SHIPPED | OUTPATIENT
Start: 2018-05-14 | End: 2018-06-25 | Stop reason: SDUPTHER

## 2018-05-16 ENCOUNTER — OFFICE VISIT (OUTPATIENT)
Dept: CARDIOLOGY | Facility: CLINIC | Age: 83
End: 2018-05-16

## 2018-05-16 VITALS
WEIGHT: 173 LBS | DIASTOLIC BLOOD PRESSURE: 88 MMHG | SYSTOLIC BLOOD PRESSURE: 122 MMHG | HEIGHT: 72 IN | BODY MASS INDEX: 23.43 KG/M2 | HEART RATE: 78 BPM

## 2018-05-16 DIAGNOSIS — I10 ESSENTIAL (PRIMARY) HYPERTENSION: ICD-10-CM

## 2018-05-16 DIAGNOSIS — I48.21 PERMANENT ATRIAL FIBRILLATION (HCC): Primary | ICD-10-CM

## 2018-05-16 PROCEDURE — 93000 ELECTROCARDIOGRAM COMPLETE: CPT | Performed by: NURSE PRACTITIONER

## 2018-05-16 PROCEDURE — 99213 OFFICE O/P EST LOW 20 MIN: CPT | Performed by: NURSE PRACTITIONER

## 2018-05-16 NOTE — PROGRESS NOTES
Date of Office Visit: 2018  Encounter Provider: JESUS Mueller  Place of Service: [facility] CARDIOLOGY  Patient Name: Vish Morin  :1931    Chief Complaint   Patient presents with   • Atrial Fibrillation   :     HPI: Vish Morin is a 86 y.o. male who is a patient of Dr. Hong with a history of permanent AF and HTN. He has been maintained on BB and warfarin for AC. He presents today for routine follow up.    He is doing good. He has had a good year, 87th birthday is Saturday. He has no chest pain, shortness of breath, edema or syncope.     No bleeding issues on warfarin.           Past Medical History:   Diagnosis Date   • Atrial fibrillation    • CAD (coronary artery disease)    • Constipation    • Diabetes mellitus    • MOURA (dyspnea on exertion)    • GERD (gastroesophageal reflux disease)    • High risk medication use    • Hyperlipidemia    • Hypertension    • Hypothyroidism    • IFG (impaired fasting glucose)    • Lower back pain    • Muscle pain, thigh    • Muscle weakness    • Nausea    • KENNETH (obstructive sleep apnea)    • Postural imbalance    • Sick sinus syndrome    • Sinus bradycardia    • Syncope        Past Surgical History:   Procedure Laterality Date   • CARDIAC CATHETERIZATION  10/10/2008    diagnostic   • COLONOSCOPY     • CORONARY ANGIOPLASTY WITH STENT PLACEMENT  2004    Drug-eluting Sirolimus.  3.5 x 33mm Cypher stent to LAD.  3.0 x 13mm Cypher stent to ROSALIA.       Social History     Social History   • Marital status: Single     Spouse name: N/A   • Number of children: N/A   • Years of education: N/A     Occupational History   • Not on file.     Social History Main Topics   • Smoking status: Former Smoker     Types: Cigars   • Smokeless tobacco: Not on file   • Alcohol use No      Comment: CAFFEINE USE   • Drug use: No   • Sexual activity: Not on file     Other Topics Concern   • Not on file     Social History Narrative   • No  "narrative on file       Family History   Problem Relation Age of Onset   • Heart disease Other        Review of Systems   Constitution: Negative for chills, fever and malaise/fatigue.   Cardiovascular: Negative for chest pain, dyspnea on exertion, leg swelling, near-syncope, orthopnea, palpitations, paroxysmal nocturnal dyspnea and syncope.   Respiratory: Negative for cough and shortness of breath.    Hematologic/Lymphatic: Bruises/bleeds easily.   Musculoskeletal: Negative for joint pain, joint swelling and myalgias.   Gastrointestinal: Negative for abdominal pain, diarrhea, melena, nausea and vomiting.   Genitourinary: Negative for frequency and hematuria.   Neurological: Negative for light-headedness, numbness, paresthesias and seizures.   Allergic/Immunologic: Negative.    All other systems reviewed and are negative.      No Known Allergies      Current Outpatient Prescriptions:   •  amLODIPine (NORVASC) 5 MG tablet, TAKE 1 TABLET BY MOUTH EVERY DAY, Disp: 90 tablet, Rfl: 0  •  benazepril (LOTENSIN) 40 MG tablet, Take 1 tablet by mouth Daily., Disp: 90 tablet, Rfl: 1  •  levothyroxine (SYNTHROID, LEVOTHROID) 50 MCG tablet, Take 1 tablet by mouth Daily., Disp: 90 tablet, Rfl: 3  •  MAGNESIUM-OXIDE 400 (241.3 MG) MG tablet tablet, TAKE 1 TABLET BY MOUTH EVERY 12 HOURS, Disp: 60 tablet, Rfl: 11  •  metoprolol tartrate (LOPRESSOR) 50 MG tablet, TAKE 1 TABLET BY MOUTH EVERY 12 HOURS, Disp: 60 tablet, Rfl: 0  •  warfarin (COUMADIN) 5 MG tablet, TAKE 1/2 TABLET BY MOUTH ON THURSDAY, TAKE 1 TABLET BY MOUTH ON ALL OTHER DAYS, Disp: 90 tablet, Rfl: 0      Objective:     Vitals:    05/16/18 1010   BP: 122/88   Pulse: 78   Weight: 78.5 kg (173 lb)   Height: 182.9 cm (72.01\")     Body mass index is 23.46 kg/m².    PHYSICAL EXAM:    Vitals Reviewed.   General Appearance: No acute distress, well developed and well nourished.   Eyes: Conjunctiva and lids: No erythema, swelling, or discharge. Sclera non-icteric.   HENT: " Atraumatic, normocephalic. External eyes, ears, and nose normal.   Respiratory: No signs of respiratory distress. Respiration rhythm and depth normal.   Clear to auscultation. No rales, crackles, rhonchi, or wheezing auscultated.   Cardiovascular:  Jugular Venous Pressure: Normal  Heart Rate and Rhythm: Irregularly, irregular. Heart Sounds: Normal S1 and S2. No S3 or S4 noted.  Murmurs: No murmurs noted. No rubs, thrills, or gallops.   Arterial Pulses:  Posterior tibialis and dorsalis pedis pulses normal.   Lower Extremities: No edema noted.  Gastrointestinal:  Abdomen soft, non-distended, non-tender.   Musculoskeletal: Normal movement of extremities  Skin and Nails: General appearance normal. No pallor, cyanosis, diaphoresis. Skin temperature normal. No clubbing of fingernails.   Psychiatric: Patient alert and oriented to person, place, and time. Speech and behavior appropriate. Normal mood and affect.       ECG 12 Lead  Date/Time: 5/16/2018 10:32 AM  Performed by: AGNIESZKA GRANADOS  Authorized by: AGNIESZKA GRANADOS   Comparison: compared with previous ECG from 6/9/2017  Similar to previous ECG  Rhythm: atrial fibrillation  BPM: 78  Clinical impression: abnormal ECG              Assessment:       Diagnosis Plan   1. Permanent atrial fibrillation     2. Essential (primary) hypertension            Plan:       1. Atrial Fibrillation and Atrial Flutter  Assessment  • The patient has permanent atrial fibrillation  • The patient's CHADS2-VASc score is 3  • A [id number] score of 2 or more is considered a high risk for a thromboembolic event  • Warfarin prescribed  • Permanent AF, rate controlled. Warfarin for AC.     Return to see Dr. Kurtz in 1 year.     Plan  • Continue in atrial fibrillation with rate control  • Continue warfarin for antithrombotic therapy, bleeding issues discussed  • Continue beta blocker for rate control    2. HTN, controlled.    As always, it has been a pleasure to participate in your patient's  care.      Sincerely,         JESUS Patel

## 2018-06-04 RX ORDER — BENAZEPRIL HYDROCHLORIDE 40 MG/1
TABLET, FILM COATED ORAL
Qty: 90 TABLET | Refills: 0 | Status: SHIPPED | OUTPATIENT
Start: 2018-06-04 | End: 2018-06-09 | Stop reason: SDUPTHER

## 2018-06-05 ENCOUNTER — HOSPITAL ENCOUNTER (OUTPATIENT)
Dept: CARDIOLOGY | Facility: HOSPITAL | Age: 83
Setting detail: RECURRING SERIES
Discharge: HOME | End: 2018-06-05

## 2018-06-05 PROCEDURE — 85610 PROTHROMBIN TIME: CPT

## 2018-06-05 PROCEDURE — 36416 COLLJ CAPILLARY BLOOD SPEC: CPT

## 2018-06-11 ENCOUNTER — TELEPHONE (OUTPATIENT)
Dept: FAMILY MEDICINE CLINIC | Facility: CLINIC | Age: 83
End: 2018-06-11

## 2018-06-11 NOTE — TELEPHONE ENCOUNTER
Sent pharmacy a fax since pt had rx  benazepril (LOTENSIN) 40 MG tablet     Qty 90 refills 0 sent on 6/4  [facility] [address]

## 2018-06-12 RX ORDER — BENAZEPRIL HYDROCHLORIDE 40 MG/1
40 TABLET, FILM COATED ORAL DAILY
Qty: 30 TABLET | Refills: 0 | Status: SHIPPED | OUTPATIENT
Start: 2018-06-12 | End: 2018-06-22 | Stop reason: SDUPTHER

## 2018-06-22 ENCOUNTER — OFFICE VISIT (OUTPATIENT)
Dept: FAMILY MEDICINE CLINIC | Facility: CLINIC | Age: 83
End: 2018-06-22

## 2018-06-22 VITALS
SYSTOLIC BLOOD PRESSURE: 120 MMHG | OXYGEN SATURATION: 98 % | BODY MASS INDEX: 23.43 KG/M2 | WEIGHT: 173 LBS | DIASTOLIC BLOOD PRESSURE: 80 MMHG | TEMPERATURE: 98 F | HEIGHT: 72 IN | HEART RATE: 83 BPM

## 2018-06-22 DIAGNOSIS — R73.01 IMPAIRED FASTING GLUCOSE: ICD-10-CM

## 2018-06-22 DIAGNOSIS — J40 BRONCHITIS: Primary | ICD-10-CM

## 2018-06-22 DIAGNOSIS — E03.9 ACQUIRED HYPOTHYROIDISM: ICD-10-CM

## 2018-06-22 DIAGNOSIS — I10 ESSENTIAL HYPERTENSION: ICD-10-CM

## 2018-06-22 DIAGNOSIS — E78.5 HYPERLIPIDEMIA, UNSPECIFIED HYPERLIPIDEMIA TYPE: ICD-10-CM

## 2018-06-22 LAB
ALBUMIN SERPL-MCNC: 4.6 G/DL (ref 3.5–5.2)
ALBUMIN/GLOB SERPL: 1.6 G/DL
ALP SERPL-CCNC: 66 U/L (ref 39–117)
ALT SERPL-CCNC: 22 U/L (ref 1–41)
AST SERPL-CCNC: 18 U/L (ref 1–40)
BILIRUB SERPL-MCNC: 0.6 MG/DL (ref 0.1–1.2)
BUN SERPL-MCNC: 15 MG/DL (ref 8–23)
BUN/CREAT SERPL: 15.2 (ref 7–25)
CALCIUM SERPL-MCNC: 9.9 MG/DL (ref 8.6–10.5)
CHLORIDE SERPL-SCNC: 97 MMOL/L (ref 98–107)
CHOLEST SERPL-MCNC: 121 MG/DL (ref 0–200)
CO2 SERPL-SCNC: 25 MMOL/L (ref 22–29)
CREAT SERPL-MCNC: 0.99 MG/DL (ref 0.76–1.27)
GFR SERPLBLD CREATININE-BSD FMLA CKD-EPI: 72 ML/MIN/1.73
GFR SERPLBLD CREATININE-BSD FMLA CKD-EPI: 87 ML/MIN/1.73
GLOBULIN SER CALC-MCNC: 2.9 GM/DL
GLUCOSE SERPL-MCNC: 128 MG/DL (ref 65–99)
HBA1C MFR BLD: 5.8 % (ref 4.8–5.6)
HDLC SERPL-MCNC: 50 MG/DL (ref 40–60)
LDLC SERPL CALC-MCNC: 56 MG/DL (ref 0–100)
MAGNESIUM SERPL-MCNC: 2.1 MG/DL (ref 1.6–2.4)
POTASSIUM SERPL-SCNC: 4.1 MMOL/L (ref 3.5–5.2)
PROT SERPL-MCNC: 7.5 G/DL (ref 6–8.5)
SODIUM SERPL-SCNC: 139 MMOL/L (ref 136–145)
T4 FREE SERPL-MCNC: 1.63 NG/DL (ref 0.93–1.7)
TRIGL SERPL-MCNC: 74 MG/DL (ref 0–150)
TSH SERPL DL<=0.005 MIU/L-ACNC: 4.81 MIU/ML (ref 0.27–4.2)
VLDLC SERPL CALC-MCNC: 14.8 MG/DL (ref 5–40)

## 2018-06-22 PROCEDURE — 99214 OFFICE O/P EST MOD 30 MIN: CPT | Performed by: INTERNAL MEDICINE

## 2018-06-22 RX ORDER — BENAZEPRIL HYDROCHLORIDE 40 MG/1
40 TABLET, FILM COATED ORAL DAILY
Qty: 90 TABLET | Refills: 1 | Status: SHIPPED | OUTPATIENT
Start: 2018-06-22 | End: 2019-01-07 | Stop reason: SDUPTHER

## 2018-06-22 RX ORDER — BENZONATATE 100 MG/1
100 CAPSULE ORAL 3 TIMES DAILY PRN
Qty: 30 CAPSULE | Refills: 1 | Status: SHIPPED | OUTPATIENT
Start: 2018-06-22 | End: 2018-07-30

## 2018-06-22 RX ORDER — DOXYCYCLINE HYCLATE 100 MG/1
100 CAPSULE ORAL 2 TIMES DAILY
Qty: 14 CAPSULE | Refills: 0 | Status: SHIPPED | OUTPATIENT
Start: 2018-06-22 | End: 2018-10-09

## 2018-06-22 NOTE — PROGRESS NOTES
Subjective chief complaint is cough for 1 week  Vish Morin is a 87 y.o. male.     History of Present Illness   Vish is here today for complaints of  Moderate intermittent cough beginning approximately one week ago after going to a convention in [place].  He has not had any associated fever or chills.  The cough is productive of some thick phlegm but it is mostly clear.  He does not feel any more short of breath than usual.  Past medical history is remarkable for some hypertension.  He is on some benazepril.  This is not caused a problem with cough in the past.  He also has atrial fibrillation.  He is on some warfarin.  His pro times are checked by his cardiologist.  He does have hypothyroidism.  He missed several doses of his thyroid while at a convention.  He has had a mild hyperglycemia in the past.  This has not risen to the level of diabetes.  His last hemoglobin A1c was approximate 6.  The following portions of the patient's history were reviewed and updated as appropriate: allergies, current medications, past medical history and problem list.    Review of Systems   Constitutional: Negative for chills and fever.   HENT: Negative for congestion, sinus pressure and sore throat.    Respiratory: Positive for cough. Negative for shortness of breath.    Cardiovascular: Negative for chest pain and leg swelling.   Gastrointestinal: Negative for diarrhea and nausea.       Objective   Physical Exam   Constitutional: He appears well-developed and well-nourished.   HENT:   Tympanic membranes are normal.  There is bilateral nasal congestion but no significant erythema.  Oral pharynx is clear.   Cardiovascular: Normal rate.    The rhythm is irregular.   Pulmonary/Chest: Effort normal.   I do hear some right lateral basilar rhonchi.   Musculoskeletal: He exhibits no edema.   Lymphadenopathy:     He has no cervical adenopathy.   Nursing note and vitals reviewed.        Assessment/Plan   Vish was seen today for  cough.    Diagnoses and all orders for this visit:    Bronchitis    Essential hypertension  -     Comprehensive Metabolic Panel  -     Magnesium    Hyperlipidemia, unspecified hyperlipidemia type  -     Lipid Panel    Impaired fasting glucose  -     Comprehensive Metabolic Panel  -     Hemoglobin A1c    Acquired hypothyroidism  -     TSH+Free T4    Other orders  -     benazepril (LOTENSIN) 40 MG tablet; Take 1 tablet by mouth Daily.  -     benzonatate (TESSALON PERLES) 100 MG capsule; Take 1 capsule by mouth 3 (Three) Times a Day As Needed for Cough.  -     doxycycline (VIBRAMYCIN) 100 MG capsule; Take 1 capsule by mouth 2 (Two) Times a Day.     Vish is here today for complaints of cough.  I believe he has little bit of bronchitis.  I am going to treat him with some Tessalon Perles and some doxycycline.  I did advise him that with his Coumadin he needs to come in next week and get his pro time checked.  He has a time in July with his cardiologist to have this checked but I want checked sooner.  We are going to check on status of his cholesterol and thyroid is well.

## 2018-06-25 RX ORDER — METOPROLOL TARTRATE 50 MG/1
TABLET, FILM COATED ORAL
Qty: 60 TABLET | Refills: 0 | Status: SHIPPED | OUTPATIENT
Start: 2018-06-25 | End: 2018-07-23 | Stop reason: SDUPTHER

## 2018-06-27 ENCOUNTER — ANTICOAGULATION VISIT (OUTPATIENT)
Dept: FAMILY MEDICINE CLINIC | Facility: CLINIC | Age: 83
End: 2018-06-27

## 2018-06-27 LAB — INR PPP: 2.3 (ref 0.9–1.1)

## 2018-06-27 PROCEDURE — 36416 COLLJ CAPILLARY BLOOD SPEC: CPT | Performed by: INTERNAL MEDICINE

## 2018-06-27 PROCEDURE — 85610 PROTHROMBIN TIME: CPT | Performed by: INTERNAL MEDICINE

## 2018-07-03 ENCOUNTER — HOSPITAL ENCOUNTER (OUTPATIENT)
Dept: CARDIOLOGY | Facility: HOSPITAL | Age: 83
Setting detail: RECURRING SERIES
Discharge: HOME | End: 2018-07-03

## 2018-07-03 PROCEDURE — 36416 COLLJ CAPILLARY BLOOD SPEC: CPT

## 2018-07-03 PROCEDURE — 85610 PROTHROMBIN TIME: CPT

## 2018-07-03 RX ORDER — LANOLIN ALCOHOL/MO/W.PET/CERES
CREAM (GRAM) TOPICAL
Qty: 60 TABLET | Refills: 5 | Status: SHIPPED | OUTPATIENT
Start: 2018-07-03 | End: 2018-12-16 | Stop reason: SDUPTHER

## 2018-07-05 RX ORDER — WARFARIN SODIUM 5 MG/1
TABLET ORAL
Qty: 90 TABLET | Refills: 0 | Status: SHIPPED | OUTPATIENT
Start: 2018-07-05 | End: 2018-10-19 | Stop reason: SDUPTHER

## 2018-07-23 RX ORDER — METOPROLOL TARTRATE 50 MG/1
TABLET, FILM COATED ORAL
Qty: 60 TABLET | Refills: 0 | Status: SHIPPED | OUTPATIENT
Start: 2018-07-23 | End: 2018-08-20 | Stop reason: SDUPTHER

## 2018-07-23 RX ORDER — AMLODIPINE BESYLATE 5 MG/1
TABLET ORAL
Qty: 90 TABLET | Refills: 0 | Status: SHIPPED | OUTPATIENT
Start: 2018-07-23 | End: 2018-10-19 | Stop reason: SDUPTHER

## 2018-07-28 ENCOUNTER — HOSPITAL ENCOUNTER (EMERGENCY)
Facility: HOSPITAL | Age: 83
Discharge: HOME | End: 2018-07-28
Attending: EMERGENCY MEDICINE | Admitting: EMERGENCY MEDICINE

## 2018-07-28 VITALS
TEMPERATURE: 97.7 F | RESPIRATION RATE: 16 BRPM | HEIGHT: 71 IN | HEART RATE: 95 BPM | BODY MASS INDEX: 24.22 KG/M2 | SYSTOLIC BLOOD PRESSURE: 175 MMHG | DIASTOLIC BLOOD PRESSURE: 98 MMHG | WEIGHT: 173 LBS | OXYGEN SATURATION: 98 %

## 2018-07-28 DIAGNOSIS — S51.811A SKIN TEAR OF RIGHT FOREARM WITHOUT COMPLICATION, INITIAL ENCOUNTER: Primary | ICD-10-CM
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
## 2018-07-28 PROCEDURE — 90471 IMMUNIZATION ADMIN: CPT | Performed by: EMERGENCY MEDICINE

## 2018-07-28 PROCEDURE — 99283 EMERGENCY DEPT VISIT LOW MDM: CPT

## 2018-07-28 PROCEDURE — 90715 TDAP VACCINE 7 YRS/> IM: CPT | Performed by: EMERGENCY MEDICINE

## 2018-07-28 PROCEDURE — 25010000002 TDAP 5-2.5-18.5 LF-MCG/0.5 SUSPENSION: Performed by: EMERGENCY MEDICINE

## 2018-07-28 RX ADMIN — TETANUS TOXOID, REDUCED DIPHTHERIA TOXOID AND ACELLULAR PERTUSSIS VACCINE, ADSORBED 0.5 ML: 5; 2.5; 8; 8; 2.5 SUSPENSION INTRAMUSCULAR at 20:28

## 2018-07-30 ENCOUNTER — EPISODE CHANGES (OUTPATIENT)
Dept: CASE MANAGEMENT | Facility: OTHER | Age: 83
End: 2018-07-30

## 2018-07-30 ENCOUNTER — OFFICE VISIT (OUTPATIENT)
Dept: FAMILY MEDICINE CLINIC | Facility: CLINIC | Age: 83
End: 2018-07-30

## 2018-07-30 VITALS
HEIGHT: 71 IN | OXYGEN SATURATION: 97 % | HEART RATE: 87 BPM | SYSTOLIC BLOOD PRESSURE: 122 MMHG | DIASTOLIC BLOOD PRESSURE: 70 MMHG | TEMPERATURE: 98.4 F | WEIGHT: 172.6 LBS | BODY MASS INDEX: 24.16 KG/M2

## 2018-07-30 DIAGNOSIS — S41.101D OPEN WOUND OF RIGHT UPPER ARM, SUBSEQUENT ENCOUNTER: Primary | ICD-10-CM

## 2018-07-30 PROCEDURE — 99213 OFFICE O/P EST LOW 20 MIN: CPT | Performed by: FAMILY MEDICINE

## 2018-07-30 RX ORDER — MAGNESIUM OXIDE 400 MG/1
1 TABLET ORAL EVERY 12 HOURS
Refills: 5 | COMMUNITY
Start: 2018-07-18 | End: 2018-07-30 | Stop reason: DRUGHIGH

## 2018-07-30 NOTE — PROGRESS NOTES
HPI  Vish Morin is a 87 y.o. male who is here for follow up of open wound on right forearm.  Patient apparently fell 2 days ago and was seen in the emergency room where wound was cleansed and dressed.  Also had tetanus immunization.  Unable to see wound himself but his daughter lives with him and has changed the dressing once.  Patient is on warfarin therapy for atrial fibrillation but has not had excessive bleeding.      Review of Systems   Skin: Positive for wound.   All other systems reviewed and are negative.        Past Medical History:   Diagnosis Date   • Atrial fibrillation (CMS/HCC)    • CAD (coronary artery disease)    • Constipation    • Diabetes mellitus (CMS/HCC)    • MOURA (dyspnea on exertion)    • GERD (gastroesophageal reflux disease)    • High risk medication use    • Hyperlipidemia    • Hypertension    • Hypothyroidism    • IFG (impaired fasting glucose)    • Lower back pain    • Muscle pain, thigh    • Muscle weakness    • Nausea    • KENNETH (obstructive sleep apnea)    • Postural imbalance    • Sick sinus syndrome (CMS/HCC)    • Sinus bradycardia    • Syncope        Past Surgical History:   Procedure Laterality Date   • CARDIAC CATHETERIZATION  10/10/2008    diagnostic   • COLONOSCOPY     • CORONARY ANGIOPLASTY WITH STENT PLACEMENT  12/17/2004    Drug-eluting Sirolimus.  3.5 x 33mm Cypher stent to LAD.  3.0 x 13mm Cypher stent to ROSALIA.       Family History   Problem Relation Age of Onset   • Heart disease Other        Social History     Social History   • Marital status: Single     Spouse name: N/A   • Number of children: N/A   • Years of education: N/A     Occupational History   • Not on file.     Social History Main Topics   • Smoking status: Former Smoker     Types: Cigars   • Smokeless tobacco: Not on file   • Alcohol use No      Comment: CAFFEINE USE   • Drug use: No   • Sexual activity: Not on file     Other Topics Concern   • Not on file     Social History Narrative   • No narrative on  file         Physical Exam   Constitutional: He appears well-developed and well-nourished.   HENT:   Head: Normocephalic.   Nose: Nose normal.   Mouth/Throat: Oropharynx is clear and moist.   Eyes: Pupils are equal, round, and reactive to light.   Skin: Skin is warm and dry.        Psychiatric: He has a normal mood and affect. His behavior is normal. Judgment and thought content normal.   Vitals reviewed.        Assessment/Plan    Vish was seen today for fall, arm injury and abrasion.    Diagnoses and all orders for this visit:    Open wound of right upper arm, subsequent encounter      Patient here for follow-up of open wounds on the right upper extremity.  Was able to pull skin flap back over one wound and applied 3 Steri-Strips.  2 other wounds close to the elbow open with some crusting.  Redressed by MA and recommend changing dressing tomorrow by his daughter.  Patient is unable to see wounds well or care for himself.  Will recheck in 2 days to verify healing.  Patient had planned on trip to [place] at the end of the week?    This note includes information entered using a voice recognition dictation system.  Though reviewed, some nonsensible errors may remain.

## 2018-07-31 ENCOUNTER — HOSPITAL ENCOUNTER (OUTPATIENT)
Dept: CARDIOLOGY | Facility: HOSPITAL | Age: 83
Setting detail: RECURRING SERIES
Discharge: HOME | End: 2018-07-31

## 2018-07-31 PROCEDURE — 85610 PROTHROMBIN TIME: CPT

## 2018-07-31 PROCEDURE — 36416 COLLJ CAPILLARY BLOOD SPEC: CPT

## 2018-08-02 ENCOUNTER — OFFICE VISIT (OUTPATIENT)
Dept: FAMILY MEDICINE CLINIC | Facility: CLINIC | Age: 83
End: 2018-08-02

## 2018-08-02 VITALS
WEIGHT: 171 LBS | BODY MASS INDEX: 23.94 KG/M2 | OXYGEN SATURATION: 99 % | DIASTOLIC BLOOD PRESSURE: 80 MMHG | HEART RATE: 72 BPM | HEIGHT: 71 IN | TEMPERATURE: 98 F | SYSTOLIC BLOOD PRESSURE: 118 MMHG

## 2018-08-02 DIAGNOSIS — S51.811D SKIN TEAR OF RIGHT FOREARM WITHOUT COMPLICATION, SUBSEQUENT ENCOUNTER: Primary | ICD-10-CM

## 2018-08-02 PROCEDURE — 99213 OFFICE O/P EST LOW 20 MIN: CPT | Performed by: INTERNAL MEDICINE

## 2018-08-02 NOTE — PROGRESS NOTES
Subjective chief complaint is follow-up for skin tear  Vish Morin is a 87 y.o. male.     History of Present Illness   Vish is here today for follow-up.  He did have a skin tear requiring an emergency room visit and then a follow-up office visit here.  He seems to be doing well.  The wounds appear to be approximated fairly well.  The Steri-Strips are still present on the lower wound.  The upper wound has some granulation tissue that would likely scab if he would leave it open to air.  His blood pressure was elevated at the urgency room but is been okay for 2 visits here.  The following portions of the patient's history were reviewed and updated as appropriate: allergies, current medications, past medical history and problem list.    Review of Systems   Skin: Negative for color change and rash.       Objective   Physical Exam   Skin:   There are 2 separate skin tears on his right forearm.  The lower skin tears well approximated with Steri-Strips.  The upper skin tear has some healing granulation tissue.  There is no surrounding cellulitis.         Assessment/Plan   Vish was seen today for follow-up.    Diagnoses and all orders for this visit:    Skin tear of right forearm without complication, subsequent encounter    Vish is here today for follow-up.  The skin tears appear to be well approximated.  The upper skin tear appears to be granulating.  I have advised him he may leave this open to air.  He may shower as he normally would and pat the area dry.

## 2018-08-20 RX ORDER — METOPROLOL TARTRATE 50 MG/1
TABLET, FILM COATED ORAL
Qty: 60 TABLET | Refills: 5 | Status: SHIPPED | OUTPATIENT
Start: 2018-08-20 | End: 2019-01-07 | Stop reason: SDUPTHER

## 2018-08-28 ENCOUNTER — HOSPITAL ENCOUNTER (OUTPATIENT)
Dept: CARDIOLOGY | Facility: HOSPITAL | Age: 83
Setting detail: RECURRING SERIES
Discharge: HOME | End: 2018-08-28

## 2018-08-28 PROCEDURE — 36416 COLLJ CAPILLARY BLOOD SPEC: CPT

## 2018-08-28 PROCEDURE — 85610 PROTHROMBIN TIME: CPT

## 2018-09-11 ENCOUNTER — HOSPITAL ENCOUNTER (OUTPATIENT)
Dept: CARDIOLOGY | Facility: HOSPITAL | Age: 83
Setting detail: RECURRING SERIES
Discharge: HOME | End: 2018-09-11

## 2018-09-11 PROCEDURE — 36416 COLLJ CAPILLARY BLOOD SPEC: CPT

## 2018-09-11 PROCEDURE — 85610 PROTHROMBIN TIME: CPT

## 2018-10-08 ENCOUNTER — EPISODE CHANGES (OUTPATIENT)
Dept: CASE MANAGEMENT | Facility: OTHER | Age: 83
End: 2018-10-08

## 2018-10-09 ENCOUNTER — ANTICOAGULATION VISIT (OUTPATIENT)
Dept: PHARMACY | Facility: HOSPITAL | Age: 83
End: 2018-10-09

## 2018-10-09 DIAGNOSIS — I48.21 PERMANENT ATRIAL FIBRILLATION (HCC): ICD-10-CM

## 2018-10-09 LAB
INR PPP: 2.2 (ref 0.91–1.09)
PROTHROMBIN TIME: 26.3 SECONDS (ref 10–13.8)

## 2018-10-09 PROCEDURE — 85610 PROTHROMBIN TIME: CPT

## 2018-10-09 PROCEDURE — 36416 COLLJ CAPILLARY BLOOD SPEC: CPT

## 2018-10-09 PROCEDURE — G0463 HOSPITAL OUTPT CLINIC VISIT: HCPCS | Performed by: PHARMACIST

## 2018-10-09 RX ORDER — CALCIUM CARBONATE 200(500)MG
1 TABLET,CHEWABLE ORAL EVERY 6 HOURS PRN
Status: ON HOLD | COMMUNITY
End: 2020-01-01

## 2018-10-09 NOTE — PROGRESS NOTES
Anticoagulation Clinic Progress Note  Anticoagulation Summary  As of 10/9/2018    INR goal:   2.0-3.0   TTR:   100.0 % (3.1 mo)   Today's INR:   2.2   Warfarin maintenance plan:   2.5 mg on Tue, Fri; 5 mg all other days   Weekly warfarin total:   30 mg   Plan last modified:   Lyly Vaz RPH (10/9/2018)   Next INR check:   2018   Priority:   Maintenance   Target end date:   Indefinite    Indications    Atrial fibrillation (CMS/HCC) [I48.91]             Anticoagulation Episode Summary     INR check location:       Preferred lab:       Send INR reminders to:    [facility]    Comments:         Anticoagulation Care Providers     Provider Role Specialty Phone number    Gopal Kurtz MD Referring Cardiology [phone]    Lyly Vaz RPH Responsible Pharmacy             Drug interactions: No changes in medications  Diet: Consistent    Clinic Interview:  Patient Findings     Negatives:   Signs/symptoms of thrombosis, Signs/symptoms of bleeding,   Laboratory test error suspected, Change in health, Change in alcohol use,   Change in activity, Upcoming invasive procedure, Emergency department   visit, Upcoming dental procedure, Missed doses, Extra doses, Change in   medications, Change in diet/appetite, Hospital admission, Bruising, Other   complaints      Clinical Outcomes     Negatives:   Major bleeding event, Thromboembolic event,   Anticoagulation-related hospital admission, Anticoagulation-related ED   visit, Anticoagulation-related fatality        Education:  Vish Morin is a new start in [facility]. We discussed the followin) Warfarin's indication, mechanism, and dosing  2) Enforced the importance of taking warfarin as instructed and at the same time every day, preferably in the evening so that we can make dose adjustments more easily following subsequent clinic visits  3) What he should do about a missed dose; pts can take missed doses within  about 12 hours of their usual scheduled dose, but he was instructed on the importance of not doubling up on doses unless told to do so by the [facility]  4) Explained possible side effects of warfarin therapy, including increased risk of bleeding, s/sx of bleeding and s/sx of any additional clots/PE/CVA.   5) Discussed monitoring of warfarin, the INR, goal INR range, and the frequency of monitoring  6) Reviewed drug/food/tobacco/EtOH interactions and provided written information covering these topics in more detail, explaining that green, leafy vegetables interact most heavily with warfarin  7) Instructed the pt not to take or discontinue any medications without informing his physician/pharmacist and reminded him to inform us of any dietary changes, as well  8) Explained that he would be coming into the clinic more frequently in these first few weeks of therapy as we try to adjust his dose and achieve a therapeutic INR x 2 consecutive readings. Once that is achieved, patient will follow up in clinic every 4 weeks, on average.    He stated no problems with transportation or scheduling clinic appts in this manner. he expressed understanding of the information provided and has no additional questions at this time.    Vish Morin was presented with a copy of the Patients Rights and Responsibilities. he expressed verbal consent and agreement to receive care in the [facility] under the current collaborative care agreement with [facility].       INR History:  Previous INR data from [facility] is recorded in [facility] and scanned into the patient's chart in [facility]. These results have been analyzed and reviewed.      Plan:  1. INR is Therapeutic today- see above in Anticoagulation Summary.   Will instruct Vish CARMEN Patience to Continue their warfarin regimen- see above in Anticoagulation Summary.  2. Follow up in 1 month  3. Patient declines warfarin  refills.  4. Verbal and written information provided. Patient expresses understanding and has no further questions at this time.    Lyly Vaz [facility]

## 2018-10-18 RX ORDER — LEVOTHYROXINE SODIUM 0.05 MG/1
TABLET ORAL
Qty: 90 TABLET | Refills: 0 | Status: SHIPPED | OUTPATIENT
Start: 2018-10-18 | End: 2019-01-07 | Stop reason: SDUPTHER

## 2018-10-19 RX ORDER — WARFARIN SODIUM 5 MG/1
TABLET ORAL
Qty: 90 TABLET | Refills: 0 | Status: SHIPPED | OUTPATIENT
Start: 2018-10-19 | End: 2019-01-26 | Stop reason: SDUPTHER

## 2018-10-19 RX ORDER — AMLODIPINE BESYLATE 5 MG/1
TABLET ORAL
Qty: 90 TABLET | Refills: 0 | Status: SHIPPED | OUTPATIENT
Start: 2018-10-19 | End: 2019-01-07 | Stop reason: SDUPTHER

## 2018-11-06 ENCOUNTER — ANTICOAGULATION VISIT (OUTPATIENT)
Dept: PHARMACY | Facility: HOSPITAL | Age: 83
End: 2018-11-06

## 2018-11-06 DIAGNOSIS — I48.21 PERMANENT ATRIAL FIBRILLATION (HCC): ICD-10-CM

## 2018-11-06 LAB
INR PPP: 2.4 (ref 0.91–1.09)
PROTHROMBIN TIME: 29.4 SECONDS (ref 10–13.8)

## 2018-11-06 PROCEDURE — 36416 COLLJ CAPILLARY BLOOD SPEC: CPT

## 2018-11-06 PROCEDURE — 85610 PROTHROMBIN TIME: CPT

## 2018-11-06 NOTE — PROGRESS NOTES
Anticoagulation Clinic Progress Note    Anticoagulation Summary  As of 11/6/2018    INR goal:   2.0-3.0   TTR:   100.0 % (2.6 wk)   Today's INR:   2.4   Warfarin maintenance plan:   2.5 mg on Tue, Fri; 5 mg all other days   Weekly warfarin total:   30 mg   No change documented:   Oksana Cummins   Plan last modified:   Lyly Vaz RPH (10/9/2018)   Next INR check:   12/4/2018   Priority:   Maintenance   Target end date:   Indefinite    Indications    Atrial fibrillation (CMS/HCC) [I48.91]             Anticoagulation Episode Summary     INR check location:       Preferred lab:       Send INR reminders [facility] [place]    Comments:         Anticoagulation Care Providers     Provider Role Specialty Phone number    Gopal Kurtz MD Referring Cardiology [phone]    Lyly Vaz RPH Responsible Pharmacy           Drug interactions: has remained unchanged.  Diet: has remained unchanged.    Clinic Interview:  Patient Findings     Negatives:   Signs/symptoms of thrombosis, Signs/symptoms of bleeding,   Laboratory test error suspected, Change in health, Change in alcohol use,   Change in activity, Upcoming invasive procedure, Emergency department   visit, Upcoming dental procedure, Missed doses, Extra doses, Change in   medications, Change in diet/appetite, Hospital admission, Bruising, Other   complaints      Clinical Outcomes     Negatives:   Major bleeding event, Thromboembolic event,   Anticoagulation-related hospital admission, Anticoagulation-related ED   visit, Anticoagulation-related fatality        INR History:  Anticoagulation Monitoring 6/27/2018 10/9/2018 11/6/2018   INR 2.30 2.2 2.4   INR Date 6/27/2018 10/9/2018 11/6/2018   INR Goal 2.0-3.0 2.0-3.0 2.0-3.0   Trend - Down Same   Last Week Total 0 mg 32.5 mg 30 mg   Next Week Total 32.5 mg 30 mg 30 mg   Sun 5 mg 5 mg 5 mg   Mon 5 mg 5 mg 5 mg   Tue 2.5 mg 2.5 mg 2.5 mg   Wed 5 mg 5 mg 5 mg   Thu 5 mg 5 mg 5 mg   Fri 5 mg 2.5  mg 2.5 mg   Sat 5 mg 5 mg 5 mg   Visit Report - - -   Some recent data might be hidden       Plan:  1. INR is therapeutic today- see above in Anticoagulation Summary.   Will instruct Vish Morin to continue their warfarin regimen- see above in Anticoagulation Summary.  2. Follow up in 4 weeks.  3. Patient declines warfarin refills.  4. Verbal and written information provided. Patient expresses understanding and has no further questions at this time.    Oksana Cummins

## 2018-12-04 ENCOUNTER — ANTICOAGULATION VISIT (OUTPATIENT)
Dept: PHARMACY | Facility: HOSPITAL | Age: 83
End: 2018-12-04

## 2018-12-04 LAB
INR PPP: 2.6 (ref 0.91–1.09)
PROTHROMBIN TIME: 30.9 SECONDS (ref 10–13.8)

## 2018-12-04 PROCEDURE — 85610 PROTHROMBIN TIME: CPT

## 2018-12-04 PROCEDURE — 36416 COLLJ CAPILLARY BLOOD SPEC: CPT

## 2018-12-17 RX ORDER — MAGNESIUM OXIDE 400 MG/1
TABLET ORAL
Qty: 60 TABLET | Refills: 5 | Status: SHIPPED | OUTPATIENT
Start: 2018-12-17 | End: 2019-07-06 | Stop reason: SDUPTHER

## 2019-01-01 ENCOUNTER — ANTICOAGULATION VISIT (OUTPATIENT)
Dept: PHARMACY | Facility: HOSPITAL | Age: 84
End: 2019-01-01

## 2019-01-01 DIAGNOSIS — I48.21 PERMANENT ATRIAL FIBRILLATION (HCC): ICD-10-CM

## 2019-01-01 LAB
INR PPP: 2.7 (ref 0.91–1.09)
PROTHROMBIN TIME: 32.7 SECONDS (ref 10–13.8)

## 2019-01-01 PROCEDURE — 36416 COLLJ CAPILLARY BLOOD SPEC: CPT

## 2019-01-01 PROCEDURE — 85610 PROTHROMBIN TIME: CPT

## 2019-01-03 ENCOUNTER — ANTICOAGULATION VISIT (OUTPATIENT)
Dept: PHARMACY | Facility: HOSPITAL | Age: 84
End: 2019-01-03

## 2019-01-03 LAB
INR PPP: 2.1 (ref 0.91–1.09)
PROTHROMBIN TIME: 25.3 SECONDS (ref 10–13.8)

## 2019-01-03 PROCEDURE — 85610 PROTHROMBIN TIME: CPT

## 2019-01-03 PROCEDURE — 36416 COLLJ CAPILLARY BLOOD SPEC: CPT

## 2019-01-03 NOTE — PROGRESS NOTES
Anticoagulation Clinic Progress Note    Anticoagulation Summary  As of 1/3/2019    INR goal:   2.0-3.0   TTR:   100.0 % (2.5 mo)   INR used for dosin.1 (1/3/2019)   Warfarin maintenance plan:   2.5 mg on Tue, Fri; 5 mg all other days   Weekly warfarin total:   30 mg   No change documented:   Tricia Quintana   Plan last modified:   Lyly Vaz RPH (10/9/2018)   Next INR check:   2019   Priority:   Maintenance   Target end date:   Indefinite    Indications    Atrial fibrillation (CMS/[facility]) [I48.91]             Anticoagulation Episode Summary     INR check location:       Preferred lab:       Send INR reminders to:    [facility]    Comments:         Anticoagulation Care Providers     Provider Role Specialty Phone number    Gopal Kurtz MD Referring Cardiology [phone]    Lyly Vaz RPH Responsible Pharmacy [phone]          Clinic Interview:  Patient Findings     Negatives:   Signs/symptoms of thrombosis, Signs/symptoms of bleeding,   Laboratory test error suspected, Change in health, Change in alcohol use,   Change in activity, Upcoming invasive procedure, Emergency department   visit, Upcoming dental procedure, Missed doses, Extra doses, Change in   medications, Change in diet/appetite, Hospital admission, Bruising, Other   complaints      Clinical Outcomes     Negatives:   Major bleeding event, Thromboembolic event,   Anticoagulation-related hospital admission, Anticoagulation-related ED   visit, Anticoagulation-related fatality        INR History:  Anticoagulation Monitoring 2018 2018 1/3/2019   INR 2.4 2.6 2.1   INR Date 2018 2018 1/3/2019   INR Goal 2.0-3.0 2.0-3.0 2.0-3.0   Trend Same Same Same   Last Week Total 30 mg 30 mg 30 mg   Next Week Total 30 mg 30 mg 30 mg   Sun 5 mg 5 mg 5 mg   Mon 5 mg 5 mg 5 mg   Tue 2.5 mg 2.5 mg 2.5 mg   Wed 5 mg 5 mg 5 mg   Thu 5 mg 5 mg 5 mg   Fri 2.5 mg 2.5 mg 2.5 mg   Sat 5 mg 5 mg 5 mg   Visit Report - -  -   Some recent data might be hidden       Plan:  1. INR is therapeutic today- see above in Anticoagulation Summary.   Will instruct Vish Morin to continue their warfarin regimen- see above in Anticoagulation Summary.  2. Follow up in 4 weeks.  3. Patient declines warfarin refills.  4. Verbal and written information provided. Patient expresses understanding and has no further questions at this time.    Tricia Quintana

## 2019-01-07 ENCOUNTER — HOSPITAL ENCOUNTER (OUTPATIENT)
Dept: GENERAL RADIOLOGY | Facility: HOSPITAL | Age: 84
Discharge: HOME | End: 2019-01-07
Attending: INTERNAL MEDICINE | Admitting: INTERNAL MEDICINE

## 2019-01-07 ENCOUNTER — OFFICE VISIT (OUTPATIENT)
Dept: FAMILY MEDICINE CLINIC | Facility: CLINIC | Age: 84
End: 2019-01-07

## 2019-01-07 VITALS
HEART RATE: 83 BPM | TEMPERATURE: 97.3 F | SYSTOLIC BLOOD PRESSURE: 110 MMHG | BODY MASS INDEX: 24.22 KG/M2 | OXYGEN SATURATION: 98 % | DIASTOLIC BLOOD PRESSURE: 70 MMHG | HEIGHT: 71 IN | WEIGHT: 173 LBS

## 2019-01-07 DIAGNOSIS — I10 ESSENTIAL (PRIMARY) HYPERTENSION: ICD-10-CM

## 2019-01-07 DIAGNOSIS — E03.9 ACQUIRED HYPOTHYROIDISM: ICD-10-CM

## 2019-01-07 DIAGNOSIS — M54.59 ACUTE MECHANICAL LOW BACK PAIN, DURATION < 6 WEEKS: Primary | ICD-10-CM

## 2019-01-07 DIAGNOSIS — R73.01 IMPAIRED FASTING GLUCOSE: ICD-10-CM

## 2019-01-07 DIAGNOSIS — M54.59 ACUTE MECHANICAL LOW BACK PAIN, DURATION < 6 WEEKS: ICD-10-CM

## 2019-01-07 PROCEDURE — 72110 X-RAY EXAM L-2 SPINE 4/>VWS: CPT

## 2019-01-07 PROCEDURE — 99214 OFFICE O/P EST MOD 30 MIN: CPT | Performed by: INTERNAL MEDICINE

## 2019-01-07 RX ORDER — INFLUENZA A VIRUS A/MICHIGAN/45/2015 X-275 (H1N1) ANTIGEN (FORMALDEHYDE INACTIVATED), INFLUENZA A VIRUS A/SINGAPORE/INFIMH-16-0019/2016 IVR-186 (H3N2) ANTIGEN (FORMALDEHYDE INACTIVATED), AND INFLUENZA B VIRUS B/MARYLAND/15/2016 BX-69A (A B/COLORADO/6/2017-LIKE VIRUS) ANTIGEN (FORMALDEHYDE INACTIVATED) 60; 60; 60 UG/.5ML; UG/.5ML; UG/.5ML
INJECTION, SUSPENSION INTRAMUSCULAR
Refills: 0 | COMMUNITY
Start: 2018-10-09 | End: 2019-01-07

## 2019-01-07 RX ORDER — LEVOTHYROXINE SODIUM 0.05 MG/1
50 TABLET ORAL DAILY
Qty: 90 TABLET | Refills: 1 | Status: SHIPPED | OUTPATIENT
Start: 2019-01-07 | End: 2019-07-18 | Stop reason: SDUPTHER

## 2019-01-07 RX ORDER — AMLODIPINE BESYLATE 5 MG/1
5 TABLET ORAL DAILY
Qty: 90 TABLET | Refills: 1 | Status: SHIPPED | OUTPATIENT
Start: 2019-01-07 | End: 2019-07-25 | Stop reason: SDUPTHER

## 2019-01-07 RX ORDER — METOPROLOL TARTRATE 50 MG/1
50 TABLET, FILM COATED ORAL EVERY 12 HOURS
Qty: 180 TABLET | Refills: 1 | Status: SHIPPED | OUTPATIENT
Start: 2019-01-07 | End: 2019-08-22 | Stop reason: SDUPTHER

## 2019-01-07 RX ORDER — BENAZEPRIL HYDROCHLORIDE 40 MG/1
40 TABLET, FILM COATED ORAL DAILY
Qty: 90 TABLET | Refills: 1 | Status: SHIPPED | OUTPATIENT
Start: 2019-01-07 | End: 2019-09-04 | Stop reason: SDUPTHER

## 2019-01-07 NOTE — PROGRESS NOTES
Subjective  Vish Morin is a 87 y.o. male.  Complaint is back pain    History of Present Illness   Vish is here today for complaints of back pain.  This is located in the lumbar spine region.  It began approximately one week ago.  It is mild to moderate in intensity.  He has not taken anything for it.  There is no associated skin rash or radiation of the pain.  He is not having any dysuria or hematuria.  He does have a history of hypertension.  He is on both amlodipine and benazepril.  He also has some hypothyroidism.  His last TSH was slightly elevated.  Past medical history is remarkable for some glucose intolerance that has not risen to the level of diabetes.  The following portions of the patient's history were reviewed and updated as appropriate: allergies, current medications, past medical history and problem list.    Review of Systems   Constitutional: Negative for chills and fever.   Gastrointestinal:        Been no incontinence of stool.   Genitourinary: Negative for urinary incontinence.   Musculoskeletal: Positive for back pain.   Skin: Negative for rash.   Neurological: Negative for numbness.       Objective   Physical Exam   Constitutional: He appears well-developed and well-nourished.   Cardiovascular: Normal rate and regular rhythm.   Pulmonary/Chest: Effort normal and breath sounds normal.   Abdominal: Soft. Bowel sounds are normal. He exhibits no distension and no mass. There is no tenderness. There is no guarding.   Musculoskeletal:   There is no point spinal tenderness.  He does have some tenderness to the lumbar paraspinous muscles on the left.   Nursing note and vitals reviewed.        Assessment/Plan   Vish was seen today for back pain.    Diagnoses and all orders for this visit:    Acute mechanical low back pain, duration < 6 weeks  -     XR Spine Lumbar 4+ View; Future    Impaired fasting glucose  -     Comprehensive Metabolic Panel  -     Hemoglobin A1c    Essential (primary)  hypertension  -     Comprehensive Metabolic Panel    Acquired hypothyroidism  -     TSH+Free T4  -     T3, Free    Other orders  -     benazepril (LOTENSIN) 40 MG tablet; Take 1 tablet by mouth Daily.  -     levothyroxine (SYNTHROID, LEVOTHROID) 50 MCG tablet; Take 1 tablet by mouth Daily.  -     metoprolol tartrate (LOPRESSOR) 50 MG tablet; Take 1 tablet by mouth Every 12 (Twelve) Hours.  -     amLODIPine (NORVASC) 5 MG tablet; Take 1 tablet by mouth Daily.     Vish is here today for back pain.  He is on some warfarin therefore treating this is not going to be easy.  Right now his level of pain is not such that he wants to take anything.  I did advise that he could use extra strength Tylenol the pain flares back up.  We are going to check on status of his labs.

## 2019-01-08 LAB
ALBUMIN SERPL-MCNC: 4.4 G/DL (ref 3.5–5.2)
ALBUMIN/GLOB SERPL: 1.4 G/DL
ALP SERPL-CCNC: 70 U/L (ref 39–117)
ALT SERPL-CCNC: 22 U/L (ref 1–41)
AST SERPL-CCNC: 21 U/L (ref 1–40)
BILIRUB SERPL-MCNC: 0.6 MG/DL (ref 0.1–1.2)
BUN SERPL-MCNC: 12 MG/DL (ref 8–23)
BUN/CREAT SERPL: 11.7 (ref 7–25)
CALCIUM SERPL-MCNC: 9.7 MG/DL (ref 8.6–10.5)
CHLORIDE SERPL-SCNC: 102 MMOL/L (ref 98–107)
CO2 SERPL-SCNC: 24.5 MMOL/L (ref 22–29)
CREAT SERPL-MCNC: 1.03 MG/DL (ref 0.76–1.27)
GLOBULIN SER CALC-MCNC: 3.2 GM/DL
GLUCOSE SERPL-MCNC: 96 MG/DL (ref 65–99)
HBA1C MFR BLD: 5.6 % (ref 4.8–5.6)
POTASSIUM SERPL-SCNC: 3.6 MMOL/L (ref 3.5–5.2)
PROT SERPL-MCNC: 7.6 G/DL (ref 6–8.5)
SODIUM SERPL-SCNC: 141 MMOL/L (ref 136–145)
T3FREE SERPL-MCNC: 2.9 PG/ML (ref 2–4.4)
T4 FREE SERPL-MCNC: 1.62 NG/DL (ref 0.93–1.7)
TSH SERPL DL<=0.005 MIU/L-ACNC: 2.76 MIU/ML (ref 0.27–4.2)

## 2019-01-28 RX ORDER — WARFARIN SODIUM 5 MG/1
TABLET ORAL
Qty: 90 TABLET | Refills: 0 | Status: SHIPPED | OUTPATIENT
Start: 2019-01-28 | End: 2019-04-24 | Stop reason: SDUPTHER

## 2019-01-29 ENCOUNTER — ANTICOAGULATION VISIT (OUTPATIENT)
Dept: PHARMACY | Facility: HOSPITAL | Age: 84
End: 2019-01-29

## 2019-01-29 LAB
INR PPP: 2.4 (ref 0.91–1.09)
PROTHROMBIN TIME: 28.6 SECONDS (ref 10–13.8)

## 2019-01-29 PROCEDURE — 36416 COLLJ CAPILLARY BLOOD SPEC: CPT

## 2019-01-29 PROCEDURE — 85610 PROTHROMBIN TIME: CPT

## 2019-01-29 NOTE — PROGRESS NOTES
Anticoagulation Clinic Progress Note    Anticoagulation Summary  As of 2019    INR goal:   2.0-3.0   TTR:   100.0 % (3.4 mo)   INR used for dosin.4 (2019)   Warfarin maintenance plan:   2.5 mg on Tue, Fri; 5 mg all other days   Weekly warfarin total:   30 mg   No change documented:   Tricia Quintana   Plan last modified:   Lyly Vaz RPH (10/9/2018)   Next INR check:   2019   Priority:   Maintenance   Target end date:   Indefinite    Indications    Atrial fibrillation (CMS/[facility]) [I48.91]             Anticoagulation Episode Summary     INR check location:       Preferred lab:       Send INR reminders to:    [facility]    Comments:         Anticoagulation Care Providers     Provider Role Specialty Phone number    Gopal Kurtz MD Referring Cardiology [phone]    Lyly Vaz RPH Responsible Pharmacy [phone]          Clinic Interview:  Patient Findings     Negatives:   Signs/symptoms of thrombosis, Signs/symptoms of bleeding,   Laboratory test error suspected, Change in health, Change in alcohol use,   Change in activity, Upcoming invasive procedure, Emergency department   visit, Upcoming dental procedure, Missed doses, Extra doses, Change in   medications, Change in diet/appetite, Hospital admission, Bruising, Other   complaints      Clinical Outcomes     Negatives:   Major bleeding event, Thromboembolic event,   Anticoagulation-related hospital admission, Anticoagulation-related ED   visit, Anticoagulation-related fatality        INR History:  Anticoagulation Monitoring 2018 1/3/2019 2019   INR 2.6 2.1 2.4   INR Date 2018 1/3/2019 2019   INR Goal 2.0-3.0 2.0-3.0 2.0-3.0   Trend Same Same Same   Last Week Total 30 mg 30 mg 30 mg   Next Week Total 30 mg 30 mg 30 mg   Sun 5 mg 5 mg 5 mg   Mon 5 mg 5 mg 5 mg   Tue 2.5 mg 2.5 mg 2.5 mg   Wed 5 mg 5 mg 5 mg   Thu 5 mg 5 mg 5 mg   Fri 2.5 mg 2.5 mg 2.5 mg   Sat 5 mg 5 mg 5 mg   Visit Report -  - -   Some recent data might be hidden       Plan:  1. INR is therapeutic today- see above in Anticoagulation Summary.   Will instruct Vish Morin to continue their warfarin regimen- see above in Anticoagulation Summary.  2. Follow up in 4 weeks.  3. Patient declines warfarin refills.  4. Verbal and written information provided. Patient expresses understanding and has no further questions at this time.    Tricia Quintana

## 2019-02-26 ENCOUNTER — ANTICOAGULATION VISIT (OUTPATIENT)
Dept: PHARMACY | Facility: HOSPITAL | Age: 84
End: 2019-02-26

## 2019-02-26 LAB
INR PPP: 2.3 (ref 0.91–1.09)
PROTHROMBIN TIME: 28 SECONDS (ref 10–13.8)

## 2019-02-26 PROCEDURE — 85610 PROTHROMBIN TIME: CPT

## 2019-02-26 PROCEDURE — 36416 COLLJ CAPILLARY BLOOD SPEC: CPT

## 2019-02-26 NOTE — PROGRESS NOTES
Anticoagulation Clinic Progress Note    Anticoagulation Summary  As of 2019    INR goal:   2.0-3.0   TTR:   100.0 % (4.3 mo)   INR used for dosin.3 (2019)   Warfarin maintenance plan:   2.5 mg on Tue, Fri; 5 mg all other days   Weekly warfarin total:   30 mg   No change documented:   Tricia Quintana   Plan last modified:   Lyly Vaz RPH (10/9/2018)   Next INR check:   3/26/2019   Priority:   Maintenance   Target end date:   Indefinite    Indications    Atrial fibrillation (CMS/[facility]) [I48.91]             Anticoagulation Episode Summary     INR check location:       Preferred lab:       Send INR reminders to:    [facility]    Comments:         Anticoagulation Care Providers     Provider Role Specialty Phone number    Gopal Kurtz MD Referring Cardiology [phone]    Lyly Vaz RPH Responsible Pharmacy [phone]          Clinic Interview:  Patient Findings     Negatives:   Signs/symptoms of thrombosis, Signs/symptoms of bleeding,   Laboratory test error suspected, Change in health, Change in alcohol use,   Change in activity, Upcoming invasive procedure, Emergency department   visit, Upcoming dental procedure, Missed doses, Extra doses, Change in   medications, Change in diet/appetite, Hospital admission, Bruising, Other   complaints      Clinical Outcomes     Negatives:   Major bleeding event, Thromboembolic event,   Anticoagulation-related hospital admission, Anticoagulation-related ED   visit, Anticoagulation-related fatality        INR History:  Anticoagulation Monitoring 1/3/2019 2019 2019   INR 2.1 2.4 2.3   INR Date 1/3/2019 2019 2019   INR Goal 2.0-3.0 2.0-3.0 2.0-3.0   Trend Same Same Same   Last Week Total 30 mg 30 mg 30 mg   Next Week Total 30 mg 30 mg 30 mg   Sun 5 mg 5 mg 5 mg   Mon 5 mg 5 mg 5 mg   Tue 2.5 mg 2.5 mg 2.5 mg   Wed 5 mg 5 mg 5 mg   Thu 5 mg 5 mg 5 mg   Fri 2.5 mg 2.5 mg 2.5 mg   Sat 5 mg 5 mg 5 mg   Visit Report -  - -   Some recent data might be hidden       Plan:  1. INR is therapeutic today- see above in Anticoagulation Summary.   Will instruct Vish Morin to continue their warfarin regimen- see above in Anticoagulation Summary.  2. Follow up in 4 weeks.  3. Patient declines warfarin refills.  4. Verbal and written information provided. Patient expresses understanding and has no further questions at this time.    Tricia Quintana

## 2019-03-26 ENCOUNTER — ANTICOAGULATION VISIT (OUTPATIENT)
Dept: PHARMACY | Facility: HOSPITAL | Age: 84
End: 2019-03-26

## 2019-03-26 LAB
INR PPP: 2.2 (ref 0.91–1.09)
PROTHROMBIN TIME: 25.9 SECONDS (ref 10–13.8)

## 2019-03-26 PROCEDURE — 85610 PROTHROMBIN TIME: CPT

## 2019-03-26 PROCEDURE — 36416 COLLJ CAPILLARY BLOOD SPEC: CPT

## 2019-03-26 NOTE — PROGRESS NOTES
Anticoagulation Clinic Progress Note    Anticoagulation Summary  As of 3/26/2019    INR goal:   2.0-3.0   TTR:   100.0 % (5.3 mo)   INR used for dosin.2 (3/26/2019)   Warfarin maintenance plan:   2.5 mg every Tue, Fri; 5 mg all other days   Weekly warfarin total:   30 mg   No change documented:   Montse Green   Plan last modified:   Lyly Vaz RPH (10/9/2018)   Next INR check:   2019   Priority:   Maintenance   Target end date:   Indefinite    Indications    Atrial fibrillation (CMS/[facility]) [I48.91]             Anticoagulation Episode Summary     INR check location:       Preferred lab:       Send INR reminders to:    [facility] [place]    Comments:         Anticoagulation Care Providers     Provider Role Specialty Phone number    Gopal Kurtz MD Referring Cardiology [phone]    Lyly Vaz RPH Responsible Pharmacy [phone]          Clinic Interview:  Patient Findings     Negatives:   Signs/symptoms of thrombosis, Signs/symptoms of bleeding,   Laboratory test error suspected, Change in health, Change in alcohol use,   Change in activity, Upcoming invasive procedure, Emergency department   visit, Upcoming dental procedure, Missed doses, Extra doses, Change in   medications, Change in diet/appetite, Hospital admission, Bruising, Other   complaints      Clinical Outcomes     Negatives:   Major bleeding event, Thromboembolic event,   Anticoagulation-related hospital admission, Anticoagulation-related ED   visit, Anticoagulation-related fatality        INR History:  Anticoagulation Monitoring 2019 2019 3/26/2019   INR 2.4 2.3 2.2   INR Date 2019 2019 3/26/2019   INR Goal 2.0-3.0 2.0-3.0 2.0-3.0   Trend Same Same Same   Last Week Total 30 mg 30 mg 30 mg   Next Week Total 30 mg 30 mg 30 mg   Sun 5 mg 5 mg 5 mg   Mon 5 mg 5 mg 5 mg   Tue 2.5 mg 2.5 mg 2.5 mg   Wed 5 mg 5 mg 5 mg   Thu 5 mg 5 mg 5 mg   Fri 2.5 mg 2.5 mg 2.5 mg   Sat 5 mg 5 mg 5 mg    Visit Report - - -   Some recent data might be hidden       Plan:  1. INR is therapeutic today- see above in Anticoagulation Summary.   Will instruct Vish Morin to continue their warfarin regimen- see above in Anticoagulation Summary.  2. Follow up in 4 weeks.  3. Patient declines warfarin refills.  4. Verbal and written information provided. Patient expresses understanding and has no further questions at this time.    Montse Green

## 2019-04-02 ENCOUNTER — OFFICE VISIT (OUTPATIENT)
Dept: FAMILY MEDICINE CLINIC | Facility: CLINIC | Age: 84
End: 2019-04-02

## 2019-04-02 VITALS
HEIGHT: 71 IN | TEMPERATURE: 97.4 F | WEIGHT: 171.4 LBS | OXYGEN SATURATION: 95 % | DIASTOLIC BLOOD PRESSURE: 82 MMHG | SYSTOLIC BLOOD PRESSURE: 144 MMHG | HEART RATE: 72 BPM | RESPIRATION RATE: 16 BRPM | BODY MASS INDEX: 24 KG/M2

## 2019-04-02 DIAGNOSIS — J40 BRONCHITIS: Primary | ICD-10-CM

## 2019-04-02 PROCEDURE — 99213 OFFICE O/P EST LOW 20 MIN: CPT | Performed by: FAMILY MEDICINE

## 2019-04-02 RX ORDER — LORATADINE 10 MG/1
10 TABLET ORAL DAILY
Qty: 30 TABLET | Refills: 5 | Status: ON HOLD | OUTPATIENT
Start: 2019-04-02 | End: 2020-01-01

## 2019-04-02 NOTE — PROGRESS NOTES
HPI  Vish Morin is a 87 y.o. male who is here for history of cough productive of white sputum.  Was told by Dr. Mcgarry in the past he has allergies.  Gets monthly protimes which have remained therapeutic.  Denies any fever chills shortness of air.      Review of Systems   Constitutional: Negative for chills, diaphoresis and fever.   Respiratory: Positive for cough. Negative for shortness of breath and wheezing.    Cardiovascular: Negative for chest pain.   Musculoskeletal: Negative for arthralgias and myalgias.   All other systems reviewed and are negative.        Past Medical History:   Diagnosis Date   • Atrial fibrillation (CMS/HCC)    • CAD (coronary artery disease)    • Constipation    • Diabetes mellitus (CMS/HCC)    • MOURA (dyspnea on exertion)    • GERD (gastroesophageal reflux disease)    • High risk medication use    • Hyperlipidemia    • Hypertension    • Hypothyroidism    • IFG (impaired fasting glucose)    • Lower back pain    • Muscle pain, thigh    • Muscle weakness    • Nausea    • KENNETH (obstructive sleep apnea)    • Postural imbalance    • Sick sinus syndrome (CMS/HCC)    • Sinus bradycardia    • Syncope        Past Surgical History:   Procedure Laterality Date   • CARDIAC CATHETERIZATION  10/10/2008    diagnostic   • COLONOSCOPY     • CORONARY ANGIOPLASTY WITH STENT PLACEMENT  12/17/2004    Drug-eluting Sirolimus.  3.5 x 33mm Cypher stent to LAD.  3.0 x 13mm Cypher stent to ROSALIA.       Family History   Problem Relation Age of Onset   • Heart disease Other        Social History     Socioeconomic History   • Marital status: Single     Spouse name: Not on file   • Number of children: Not on file   • Years of education: Not on file   • Highest education level: Not on file   Tobacco Use   • Smoking status: Former Smoker     Types: Cigars   • Smokeless tobacco: Never Used   Substance and Sexual Activity   • Alcohol use: No     Comment: CAFFEINE USE   • Drug use: No   • Sexual activity: No          Physical Exam   Constitutional: He is oriented to person, place, and time. He appears well-developed and well-nourished. No distress.   HENT:   Right Ear: Tympanic membrane and ear canal normal.   Left Ear: Tympanic membrane and ear canal normal.   Nose: Nose normal.   Mouth/Throat: Oropharynx is clear and moist. No oropharyngeal exudate.   Eyes: Conjunctivae are normal. Pupils are equal, round, and reactive to light.   Neck: Normal range of motion.   Cardiovascular: Normal rate.   Pulmonary/Chest: Effort normal and breath sounds normal. No respiratory distress. He has no wheezes. He has no rales.   Musculoskeletal: He exhibits no edema or deformity.   Lymphadenopathy:     He has no cervical adenopathy.   Neurological: He is alert and oriented to person, place, and time. Coordination normal.   Skin: Skin is warm and dry.   Psychiatric: He has a normal mood and affect. His behavior is normal. Judgment and thought content normal.   Nursing note and vitals reviewed.        Assessment/Plan    Vish was seen today for cough.    Diagnoses and all orders for this visit:    Bronchitis  -     loratadine (CLARITIN) 10 MG tablet; Take 1 tablet by mouth Daily.      Patient presents with a cough most likely allergic etiology as discussed.  Also possible viral illness.  There is no evidence of bronchitis or pneumonia on exam.  Will give Claritin for allergies and otherwise recommend symptomatic therapy.  If develops fever chills discolored sputum or other symptoms call for possible antibiotic therapy etc.    This note includes information entered using a voice recognition dictation system.  Though reviewed, some nonsensible errors may remain.

## 2019-04-08 ENCOUNTER — OFFICE VISIT (OUTPATIENT)
Dept: FAMILY MEDICINE CLINIC | Facility: CLINIC | Age: 84
End: 2019-04-08

## 2019-04-08 VITALS
HEIGHT: 71 IN | BODY MASS INDEX: 23.49 KG/M2 | TEMPERATURE: 97.4 F | HEART RATE: 87 BPM | WEIGHT: 167.8 LBS | SYSTOLIC BLOOD PRESSURE: 100 MMHG | DIASTOLIC BLOOD PRESSURE: 80 MMHG | OXYGEN SATURATION: 98 %

## 2019-04-08 DIAGNOSIS — J40 BRONCHITIS: ICD-10-CM

## 2019-04-08 DIAGNOSIS — R05.9 COUGH: Primary | ICD-10-CM

## 2019-04-08 PROCEDURE — 99214 OFFICE O/P EST MOD 30 MIN: CPT | Performed by: INTERNAL MEDICINE

## 2019-04-08 RX ORDER — DOXYCYCLINE HYCLATE 100 MG/1
100 CAPSULE ORAL 2 TIMES DAILY
Qty: 14 CAPSULE | Refills: 0 | Status: SHIPPED | OUTPATIENT
Start: 2019-04-08 | End: 2019-04-26

## 2019-04-08 RX ORDER — BENZONATATE 100 MG/1
100 CAPSULE ORAL 3 TIMES DAILY
Qty: 30 CAPSULE | Refills: 1 | Status: SHIPPED | OUTPATIENT
Start: 2019-04-08 | End: 2019-09-25

## 2019-04-08 NOTE — PROGRESS NOTES
Subjective Chief complaint is cough  Vish Morin is a 87 y.o. male.     History of Present Illness   Vish is here today for an intermittent nonproductive cough lasting approximately 1 week.  He did see Dr. Rod 1 week ago and was given Claritin which has not helped.  He is not necessarily any more short of breath than usual.  Does have a prior history of atrial fibrillation.  He is on warfarin.  There is no associated fever or chills.  The following portions of the patient's history were reviewed and updated as appropriate: allergies, current medications, past family history, past medical history, past social history, past surgical history and problem list.    Review of Systems   Constitutional: Positive for fatigue. Negative for chills and fever.   HENT: Positive for congestion and rhinorrhea.    Respiratory: Positive for cough. Negative for shortness of breath.        Objective   Physical Exam   Constitutional: He appears well-developed and well-nourished. No distress.   HENT:   Tympanic membranes are normal.  There is some bilateral nasal congestion.  Oropharynx is clear.   Neck: No JVD present.   Cardiovascular: Normal rate.   Pulmonary/Chest: Effort normal.   There are a few right basilar rhonchi.   Nursing note and vitals reviewed.        Assessment/Plan   Vish was seen today for cough.    Diagnoses and all orders for this visit:    Cough  -     XR Chest PA & Lateral; Future    Bronchitis  -     XR Chest PA & Lateral; Future    Other orders  -     benzonatate (TESSALON) 100 MG capsule; Take 1 capsule by mouth 3 (Three) Times a Day.  -     doxycycline (VIBRAMYCIN) 100 MG capsule; Take 1 capsule by mouth 2 (Two) Times a Day.      Vish is here today for continuing cough.  I do hear some right basilar rhonchi.  I am going to treat him with some doxycycline and Tessalon Perles.  I have ordered a chest x-ray but he says he feels too weak to go for today.

## 2019-04-10 ENCOUNTER — HOSPITAL ENCOUNTER (OUTPATIENT)
Dept: GENERAL RADIOLOGY | Facility: HOSPITAL | Age: 84
Discharge: HOME | End: 2019-04-10
Admitting: INTERNAL MEDICINE

## 2019-04-10 ENCOUNTER — ANTICOAGULATION VISIT (OUTPATIENT)
Dept: PHARMACY | Facility: HOSPITAL | Age: 84
End: 2019-04-10

## 2019-04-10 DIAGNOSIS — R05.9 COUGH: ICD-10-CM

## 2019-04-10 DIAGNOSIS — J40 BRONCHITIS: ICD-10-CM

## 2019-04-10 LAB
INR PPP: 2.7 (ref 0.91–1.09)
PROTHROMBIN TIME: 32.5 SECONDS (ref 10–13.8)

## 2019-04-10 PROCEDURE — 71046 X-RAY EXAM CHEST 2 VIEWS: CPT

## 2019-04-10 PROCEDURE — 36416 COLLJ CAPILLARY BLOOD SPEC: CPT

## 2019-04-10 PROCEDURE — 85610 PROTHROMBIN TIME: CPT

## 2019-04-10 NOTE — PROGRESS NOTES
Anticoagulation Clinic Progress Note    Anticoagulation Summary  As of 4/10/2019    INR goal:   2.0-3.0   TTR:   100.0 % (5.8 mo)   INR used for dosin.7 (4/10/2019)   Warfarin maintenance plan:   2.5 mg every Tue, Fri; 5 mg all other days   Weekly warfarin total:   30 mg   No change documented:   Tricia Quintana   Plan last modified:   Lyly Vaz RPH (10/9/2018)   Next INR check:   4/15/2019   Priority:   Maintenance   Target end date:   Indefinite    Indications    Atrial fibrillation (CMS/[facility]) [I48.91]             Anticoagulation Episode Summary     INR check location:       Preferred lab:       Send INR reminders to:    [facility]    Comments:         Anticoagulation Care Providers     Provider Role Specialty Phone number    Gopal Kurtz MD Referring Cardiology [phone]    Lyly Vaz RPH Responsible Pharmacy [phone]          Clinic Interview:  Patient Findings     Negatives:   Signs/symptoms of thrombosis, Signs/symptoms of bleeding,   Laboratory test error suspected, Change in health, Change in alcohol use,   Change in activity, Upcoming invasive procedure, Emergency department   visit, Upcoming dental procedure, Missed doses, Extra doses, Change in   medications, Change in diet/appetite, Hospital admission, Bruising, Other   complaints      Clinical Outcomes     Negatives:   Major bleeding event, Thromboembolic event,   Anticoagulation-related hospital admission, Anticoagulation-related ED   visit, Anticoagulation-related fatality        INR History:  Anticoagulation Monitoring 2019 3/26/2019 4/10/2019   INR 2.3 2.2 2.7   INR Date 2019 3/26/2019 4/10/2019   INR Goal 2.0-3.0 2.0-3.0 2.0-3.0   Trend Same Same Same   Last Week Total 30 mg 30 mg 30 mg   Next Week Total 30 mg 30 mg 30 mg   Sun 5 mg 5 mg 5 mg   Mon 5 mg 5 mg -   Tue 2.5 mg 2.5 mg -   Wed 5 mg 5 mg 5 mg   Thu 5 mg 5 mg 5 mg   Fri 2.5 mg 2.5 mg 2.5 mg   Sat 5 mg 5 mg 5 mg   Visit Report - - -    Some recent data might be hidden       Plan:  1. INR is therapeutic today- see above in Anticoagulation Summary.   Will instruct Vish MORALES Steveradha to continue their warfarin regimen- see above in Anticoagulation Summary.  2. Follow up On Monday.  3. Patient Declines warfarin refills.  4. Verbal and written information provided. Patient expresses understanding and has no further questions at this time.    Tricia Quintana

## 2019-04-15 ENCOUNTER — ANTICOAGULATION VISIT (OUTPATIENT)
Dept: PHARMACY | Facility: HOSPITAL | Age: 84
End: 2019-04-15

## 2019-04-15 LAB
INR PPP: 2.4 (ref 0.91–1.09)
PROTHROMBIN TIME: 28.9 SECONDS (ref 10–13.8)

## 2019-04-15 PROCEDURE — 85610 PROTHROMBIN TIME: CPT

## 2019-04-15 PROCEDURE — 36416 COLLJ CAPILLARY BLOOD SPEC: CPT

## 2019-04-15 NOTE — PROGRESS NOTES
Anticoagulation Clinic Progress Note    Anticoagulation Summary  As of 4/15/2019    INR goal:   2.0-3.0   TTR:   100.0 % (5.9 mo)   INR used for dosin.4 (4/15/2019)   Warfarin maintenance plan:   2.5 mg every Tue, Fri; 5 mg all other days   Weekly warfarin total:   30 mg   No change documented:   Hannah Reese RPH   Plan last modified:   Lyly Vaz RPH (10/9/2018)   Next INR check:   2019   Priority:   Maintenance   Target end date:   Indefinite    Indications    Atrial fibrillation (CMS/[facility]) [I48.91]             Anticoagulation Episode Summary     INR check location:       Preferred lab:       Send INR reminders to:    [facility]    Comments:         Anticoagulation Care Providers     Provider Role Specialty Phone number    Gopal Kurtz MD Referring Cardiology [phone]    Lyly Vaz RPH Responsible Pharmacy [phone]          Clinic Interview:  Patient Findings     Negatives:   Signs/symptoms of thrombosis, Signs/symptoms of bleeding,   Laboratory test error suspected, Change in health, Change in alcohol use,   Change in activity, Upcoming invasive procedure, Emergency department   visit, Upcoming dental procedure, Missed doses, Extra doses, Change in   medications, Change in diet/appetite, Hospital admission, Bruising, Other   complaints    Comments:   Finished antibiotic for upper respiratory infection      Clinical Outcomes     Negatives:   Major bleeding event, Thromboembolic event,   Anticoagulation-related hospital admission, Anticoagulation-related ED   visit, Anticoagulation-related fatality    Comments:   Finished antibiotic for upper respiratory infection        INR History:  Anticoagulation Monitoring 3/26/2019 4/10/2019 4/15/2019   INR 2.2 2.7 2.4   INR Date 3/26/2019 4/10/2019 4/15/2019   INR Goal 2.0-3.0 2.0-3.0 2.0-3.0   Trend Same Same Same   Last Week Total 30 mg 30 mg 30 mg   Next Week Total 30 mg 30 mg 30 mg   Sun 5 mg 5 mg 5 mg   Mon 5 mg - 5  mg   Tue 2.5 mg - 2.5 mg   Wed 5 mg 5 mg 5 mg   Thu 5 mg 5 mg 5 mg   Fri 2.5 mg 2.5 mg 2.5 mg   Sat 5 mg 5 mg 5 mg   Visit Report - - -   Some recent data might be hidden       Plan:  1. INR is therapeutic today- see above in Anticoagulation Summary.   Will instruct Vish MORALES Patience to continue their warfarin regimen- see above in Anticoagulation Summary.  2. Follow up in 4 weeks.  3. Patient declines warfarin refills.  4. Verbal and written information provided. Patient expresses understanding and has no further questions at this time.    Hannah Reese, [facility]

## 2019-04-26 RX ORDER — WARFARIN SODIUM 5 MG/1
TABLET ORAL
Qty: 90 TABLET | Refills: 0 | Status: SHIPPED | OUTPATIENT
Start: 2019-04-26 | End: 2019-07-21 | Stop reason: SDUPTHER

## 2019-05-14 ENCOUNTER — ANTICOAGULATION VISIT (OUTPATIENT)
Dept: PHARMACY | Facility: HOSPITAL | Age: 84
End: 2019-05-14

## 2019-05-14 LAB
INR PPP: 2.7 (ref 0.91–1.09)
PROTHROMBIN TIME: 32.9 SECONDS (ref 10–13.8)

## 2019-05-14 PROCEDURE — 36416 COLLJ CAPILLARY BLOOD SPEC: CPT

## 2019-05-14 PROCEDURE — 85610 PROTHROMBIN TIME: CPT

## 2019-05-14 NOTE — PROGRESS NOTES
Anticoagulation Clinic Progress Note    Anticoagulation Summary  As of 2019    INR goal:   2.0-3.0   TTR:   100.0 % (6.9 mo)   INR used for dosin.7 (2019)   Warfarin maintenance plan:   2.5 mg every Tue, Fri; 5 mg all other days   Weekly warfarin total:   30 mg   No change documented:   Tricia Quintana   Plan last modified:   Lyly Vaz RPH (10/9/2018)   Next INR check:   2019   Priority:   Maintenance   Target end date:   Indefinite    Indications    Atrial fibrillation (CMS/[facility]) [I48.91]             Anticoagulation Episode Summary     INR check location:       Preferred lab:       Send INR reminders to:    [facility]    Comments:         Anticoagulation Care Providers     Provider Role Specialty Phone number    Gopal Kurtz MD Referring Cardiology [phone]    Lyly Vaz RPH Responsible Pharmacy [phone]          Clinic Interview:  Patient Findings     Negatives:   Signs/symptoms of thrombosis, Signs/symptoms of bleeding,   Laboratory test error suspected, Change in health, Change in alcohol use,   Change in activity, Upcoming invasive procedure, Emergency department   visit, Upcoming dental procedure, Missed doses, Extra doses, Change in   medications, Change in diet/appetite, Hospital admission, Bruising, Other   complaints      Clinical Outcomes     Negatives:   Major bleeding event, Thromboembolic event,   Anticoagulation-related hospital admission, Anticoagulation-related ED   visit, Anticoagulation-related fatality        INR History:  Anticoagulation Monitoring 4/10/2019 4/15/2019 2019   INR 2.7 2.4 2.7   INR Date 4/10/2019 4/15/2019 2019   INR Goal 2.0-3.0 2.0-3.0 2.0-3.0   Trend Same Same Same   Last Week Total 30 mg 30 mg 30 mg   Next Week Total 30 mg 30 mg 30 mg   Sun 5 mg 5 mg 5 mg   Mon - 5 mg 5 mg   Tue - 2.5 mg 2.5 mg   Wed 5 mg 5 mg 5 mg   Thu 5 mg 5 mg 5 mg   Fri 2.5 mg 2.5 mg 2.5 mg   Sat 5 mg 5 mg 5 mg   Visit Report - - -    Some recent data might be hidden       Plan:  1. INR is therapeutic today- see above in Anticoagulation Summary.   Will instruct Vish MORALES Patience to continue their warfarin regimen- see above in Anticoagulation Summary.  2. Follow up in 4 weeks.  3. Patient declines warfarin refills.  4. Verbal and written information provided. Patient expresses understanding and has no further questions at this time.    Tricia Quintana

## 2019-05-22 ENCOUNTER — OFFICE VISIT (OUTPATIENT)
Dept: CARDIOLOGY | Facility: CLINIC | Age: 84
End: 2019-05-22

## 2019-05-22 VITALS
DIASTOLIC BLOOD PRESSURE: 80 MMHG | WEIGHT: 165 LBS | HEART RATE: 78 BPM | HEIGHT: 72 IN | BODY MASS INDEX: 22.35 KG/M2 | SYSTOLIC BLOOD PRESSURE: 146 MMHG

## 2019-05-22 DIAGNOSIS — I48.21 PERMANENT ATRIAL FIBRILLATION (HCC): ICD-10-CM

## 2019-05-22 DIAGNOSIS — I10 ESSENTIAL (PRIMARY) HYPERTENSION: Primary | ICD-10-CM

## 2019-05-22 PROCEDURE — 93000 ELECTROCARDIOGRAM COMPLETE: CPT | Performed by: INTERNAL MEDICINE

## 2019-05-22 PROCEDURE — 99214 OFFICE O/P EST MOD 30 MIN: CPT | Performed by: INTERNAL MEDICINE

## 2019-05-22 NOTE — PROGRESS NOTES
Date of Office Visit: 2019  Encounter Provider: Gopal Kurtz MD  Place of Service: [facility] CARDIOLOGY  Patient Name: Vish Morin  : 1931    Subjective:     Encounter Date:2019      Patient ID: Vish Morin is a 88 y.o. male who has a cc of perm AF here for fu     The patient had a good year.   No anginal chest pain,   No sig moura,   No soa,   No fainting,  He does have orthostasis. He has to get up slowly.   No edema.   Exercise tolerance: he is sedentary. He does go shopping at [employer].     There have been no hospital admission since the last visit.     There have been no bleeding events.       Past Medical History:   Diagnosis Date   • Atrial fibrillation (CMS/HCC)    • CAD (coronary artery disease)    • Constipation    • Diabetes mellitus (CMS/HCC)    • MOURA (dyspnea on exertion)    • GERD (gastroesophageal reflux disease)    • High risk medication use    • Hyperlipidemia    • Hypertension    • Hypothyroidism    • IFG (impaired fasting glucose)    • Lower back pain    • Muscle pain, thigh    • Muscle weakness    • Nausea    • KENNETH (obstructive sleep apnea)    • Postural imbalance    • Sick sinus syndrome (CMS/HCC)    • Sinus bradycardia    • Syncope        Social History     Socioeconomic History   • Marital status: Single     Spouse name: Not on file   • Number of children: Not on file   • Years of education: Not on file   • Highest education level: Not on file   Tobacco Use   • Smoking status: Former Smoker     Types: Cigars   • Smokeless tobacco: Never Used   Substance and Sexual Activity   • Alcohol use: No     Comment: CAFFEINE USE   • Drug use: No   • Sexual activity: No       Review of Systems   Constitution: Negative for fever and night sweats.   HENT: Negative for ear pain and stridor.    Eyes: Negative for discharge and visual halos.   Cardiovascular: Negative for cyanosis.   Respiratory: Negative for hemoptysis and sputum production.   "  Hematologic/Lymphatic: Negative for adenopathy.   Skin: Negative for nail changes and unusual hair distribution.   Musculoskeletal: Negative for gout and joint swelling.   Gastrointestinal: Negative for bowel incontinence and flatus.   Genitourinary: Negative for dysuria and flank pain.   Neurological: Negative for seizures and tremors.   Psychiatric/Behavioral: Negative for altered mental status. The patient is not nervous/anxious.             Objective:     Vitals:    05/22/19 0942   BP: 146/80   BP Location: Right arm   Patient Position: Sitting   Cuff Size: Adult   Pulse: 78   Weight: 74.8 kg (165 lb)   Height: 181.6 cm (71.5\")         Physical Exam   Constitutional: He is oriented to person, place, and time.   HENT:   Head: Normocephalic and atraumatic.   Eyes: Right eye exhibits no discharge. Left eye exhibits no discharge.   Neck: No JVD present. No thyromegaly present.   Cardiovascular: Normal rate. An irregularly irregular rhythm present. Exam reveals no gallop and no friction rub.   No murmur heard.  Pulmonary/Chest: Effort normal and breath sounds normal. He has no rales.   Abdominal: Soft. Bowel sounds are normal. There is no tenderness.   Musculoskeletal: Normal range of motion. He exhibits no edema or deformity.   Neurological: He is alert and oriented to person, place, and time. He exhibits normal muscle tone.   Skin: Skin is warm and dry. No erythema.   Psychiatric: He has a normal mood and affect. His behavior is normal. Thought content normal.         ECG 12 Lead  Date/Time: 5/22/2019 9:59 AM  Performed by: Gopal Kurtz MD  Authorized by: Gopal Kurtz MD   Comparison: compared with previous ECG   Rhythm: atrial fibrillation    Clinical impression: abnormal EKG            Lab Review:       Assessment:          Diagnosis Plan   1. Essential (primary) hypertension     2. Permanent atrial fibrillation (CMS/HCC)            Plan:         Atrial Fibrillation and Atrial Flutter  Assessment  • The " patient has permanent atrial fibrillation  • This is non-valvular in etiology  • The patient's CHADS2-VASc score is 3  • A [id number] score of 2 or more is considered a high risk for a thromboembolic event    Plan  • Continue in atrial fibrillation with rate control  • Continue warfarin for antithrombotic therapy, bleeding issues discussed  • Continue beta blocker for rate control    I think he checks out ok    In the future, I would have a low threshold to reduce BP meds. He is ok today but given orthostasis, I fear more potential harm from low BP than high bp./

## 2019-06-11 ENCOUNTER — ANTICOAGULATION VISIT (OUTPATIENT)
Dept: PHARMACY | Facility: HOSPITAL | Age: 84
End: 2019-06-11

## 2019-06-11 DIAGNOSIS — I48.21 PERMANENT ATRIAL FIBRILLATION (HCC): ICD-10-CM

## 2019-06-11 LAB
INR PPP: 2.6 (ref 0.91–1.09)
PROTHROMBIN TIME: 31.6 SECONDS (ref 10–13.8)

## 2019-06-11 PROCEDURE — 36416 COLLJ CAPILLARY BLOOD SPEC: CPT

## 2019-06-11 PROCEDURE — 85610 PROTHROMBIN TIME: CPT

## 2019-06-11 NOTE — PROGRESS NOTES
Anticoagulation Clinic Progress Note    Anticoagulation Summary  As of 2019    INR goal:   2.0-3.0   TTR:   100.0 % (7.8 mo)   INR used for dosin.6 (2019)   Warfarin maintenance plan:   2.5 mg every Tue, Fri; 5 mg all other days   Weekly warfarin total:   30 mg   No change documented:   Montse Green   Plan last modified:   Lyly Vaz RPH (10/9/2018)   Next INR check:   2019   Priority:   Maintenance   Target end date:   Indefinite    Indications    Permanent atrial fibrillation (CMS/HCC) [I48.2]             Anticoagulation Episode Summary     INR check location:       Preferred lab:       Send INR reminders to:    STEVAN MONTIEL [facility]    Comments:         Anticoagulation Care Providers     Provider Role Specialty Phone number    Gopal Kurtz MD Referring Cardiology [phone]    Lyly Vaz RPH Responsible Pharmacy [phone]          Clinic Interview:  Patient Findings     Negatives:   Signs/symptoms of thrombosis, Signs/symptoms of bleeding,   Laboratory test error suspected, Change in health, Change in alcohol use,   Change in activity, Upcoming invasive procedure, Emergency department   visit, Upcoming dental procedure, Missed doses, Extra doses, Change in   medications, Change in diet/appetite, Hospital admission, Bruising, Other   complaints      Clinical Outcomes     Negatives:   Major bleeding event, Thromboembolic event,   Anticoagulation-related hospital admission, Anticoagulation-related ED   visit, Anticoagulation-related fatality        INR History:  Anticoagulation Monitoring 4/15/2019 2019 2019   INR 2.4 2.7 2.6   INR Date 4/15/2019 2019 2019   INR Goal 2.0-3.0 2.0-3.0 2.0-3.0   Trend Same Same Same   Last Week Total 30 mg 30 mg 30 mg   Next Week Total 30 mg 30 mg 30 mg   Sun 5 mg 5 mg 5 mg   Mon 5 mg 5 mg 5 mg   Tue 2.5 mg 2.5 mg 2.5 mg   Wed 5 mg 5 mg 5 mg   Thu 5 mg 5 mg 5 mg   Fri 2.5 mg 2.5 mg 2.5 mg   Sat 5 mg 5 mg 5  mg   Visit Report - - -   Some recent data might be hidden       Plan:  1. INR is therapeutic today- see above in Anticoagulation Summary.   Will instruct Vish Morin to continue their warfarin regimen- see above in Anticoagulation Summary.  2. Follow up in 3 weeks.  3. Patient declines warfarin refills.  4. Verbal and written information provided. Patient expresses understanding and has no further questions at this time.    Montse Green

## 2019-07-02 ENCOUNTER — ANTICOAGULATION VISIT (OUTPATIENT)
Dept: PHARMACY | Facility: HOSPITAL | Age: 84
End: 2019-07-02

## 2019-07-02 DIAGNOSIS — I48.21 PERMANENT ATRIAL FIBRILLATION (HCC): ICD-10-CM

## 2019-07-02 LAB
INR PPP: 2.3 (ref 0.91–1.09)
PROTHROMBIN TIME: 27.6 SECONDS (ref 10–13.8)

## 2019-07-02 PROCEDURE — 36416 COLLJ CAPILLARY BLOOD SPEC: CPT

## 2019-07-02 PROCEDURE — 85610 PROTHROMBIN TIME: CPT

## 2019-07-02 NOTE — PROGRESS NOTES
Anticoagulation Clinic Progress Note    Anticoagulation Summary  As of 2019    INR goal:   2.0-3.0   TTR:   100.0 % (8.5 mo)   INR used for dosin.3 (2019)   Warfarin maintenance plan:   2.5 mg every Tue, Fri; 5 mg all other days   Weekly warfarin total:   30 mg   No change documented:   Montse Green   Plan last modified:   Lyly Vaz RPH (10/9/2018)   Next INR check:   2019   Priority:   Maintenance   Target end date:   Indefinite    Indications    Permanent atrial fibrillation (CMS/HCC) [I48.2]             Anticoagulation Episode Summary     INR check location:       Preferred lab:       Send INR reminders to:    [facility] [place]    Comments:         Anticoagulation Care Providers     Provider Role Specialty Phone number    Gopal Kurtz MD Referring Cardiology [phone]    Lyly Vaz RPH Responsible Pharmacy [phone]          Clinic Interview:  Patient Findings     Negatives:   Signs/symptoms of thrombosis, Signs/symptoms of bleeding,   Laboratory test error suspected, Change in health, Change in alcohol use,   Change in activity, Upcoming invasive procedure, Emergency department   visit, Upcoming dental procedure, Missed doses, Extra doses, Change in   medications, Change in diet/appetite, Hospital admission, Bruising, Other   complaints      Clinical Outcomes     Negatives:   Major bleeding event, Thromboembolic event,   Anticoagulation-related hospital admission, Anticoagulation-related ED   visit, Anticoagulation-related fatality        INR History:  Anticoagulation Monitoring 2019   INR 2.7 2.6 2.3   INR Date 2019   INR Goal 2.0-3.0 2.0-3.0 2.0-3.0   Trend Same Same Same   Last Week Total 30 mg 30 mg 30 mg   Next Week Total 30 mg 30 mg 30 mg   Sun 5 mg 5 mg 5 mg   Mon 5 mg 5 mg 5 mg   Tue 2.5 mg 2.5 mg 2.5 mg   Wed 5 mg 5 mg 5 mg   Thu 5 mg 5 mg 5 mg   Fri 2.5 mg 2.5 mg 2.5 mg   Sat 5 mg 5 mg 5 mg    Visit Report - - -   Some recent data might be hidden       Plan:  1. INR is therapeutic today- see above in Anticoagulation Summary.   Will instruct Vish Morin to continue their warfarin regimen- see above in Anticoagulation Summary.  2. Follow up in 4 weeks.  3. Patient declines warfarin refills.  4. Verbal and written information provided. Patient expresses understanding and has no further questions at this time.    Montse Green

## 2019-07-08 RX ORDER — MAGNESIUM OXIDE 400 MG/1
TABLET ORAL
Qty: 60 TABLET | Refills: 5 | Status: SHIPPED | OUTPATIENT
Start: 2019-07-08 | End: 2020-01-01

## 2019-07-18 RX ORDER — LEVOTHYROXINE SODIUM 0.05 MG/1
TABLET ORAL
Qty: 90 TABLET | Refills: 1 | Status: SHIPPED | OUTPATIENT
Start: 2019-07-18 | End: 2020-01-01

## 2019-07-22 RX ORDER — WARFARIN SODIUM 5 MG/1
TABLET ORAL
Qty: 90 TABLET | Refills: 0 | Status: SHIPPED | OUTPATIENT
Start: 2019-07-22 | End: 2019-10-18 | Stop reason: SDUPTHER

## 2019-07-25 RX ORDER — AMLODIPINE BESYLATE 5 MG/1
TABLET ORAL
Qty: 90 TABLET | Refills: 1 | Status: SHIPPED | OUTPATIENT
Start: 2019-07-25 | End: 2019-10-23

## 2019-07-30 ENCOUNTER — ANTICOAGULATION VISIT (OUTPATIENT)
Dept: PHARMACY | Facility: HOSPITAL | Age: 84
End: 2019-07-30

## 2019-07-30 DIAGNOSIS — I48.21 PERMANENT ATRIAL FIBRILLATION (HCC): ICD-10-CM

## 2019-07-30 LAB
INR PPP: 3 (ref 0.91–1.09)
PROTHROMBIN TIME: 35.9 SECONDS (ref 10–13.8)

## 2019-07-30 PROCEDURE — 85610 PROTHROMBIN TIME: CPT

## 2019-07-30 PROCEDURE — 36416 COLLJ CAPILLARY BLOOD SPEC: CPT

## 2019-07-30 NOTE — PROGRESS NOTES
Anticoagulation Clinic Progress Note    Anticoagulation Summary  As of 7/30/2019    INR goal:   2.0-3.0   TTR:   100.0 % (9.5 mo)   INR used for dosing:   3.0 (7/30/2019)   Warfarin maintenance plan:   2.5 mg every Tue, Fri; 5 mg all other days   Weekly warfarin total:   30 mg   No change documented:   Tricia Quintana   Plan last modified:   Lyly Vaz RPH (10/9/2018)   Next INR check:   8/27/2019   Priority:   Maintenance   Target end date:   Indefinite    Indications    Permanent atrial fibrillation (CMS/[facility]) [I48.2]             Anticoagulation Episode Summary     INR check location:       Preferred lab:       Send INR reminders to:    [facility]    Comments:         Anticoagulation Care Providers     Provider Role Specialty Phone number    Gopal Kurtz MD Referring Cardiology [phone]    Lyly Vaz RPH Responsible Pharmacy [phone]          Clinic Interview:  Patient Findings     Negatives:   Signs/symptoms of thrombosis, Signs/symptoms of bleeding,   Laboratory test error suspected, Change in health, Change in alcohol use,   Change in activity, Upcoming invasive procedure, Emergency department   visit, Upcoming dental procedure, Missed doses, Extra doses, Change in   medications, Change in diet/appetite, Hospital admission, Bruising, Other   complaints      Clinical Outcomes     Negatives:   Major bleeding event, Thromboembolic event,   Anticoagulation-related hospital admission, Anticoagulation-related ED   visit, Anticoagulation-related fatality        INR History:  Anticoagulation Monitoring 6/11/2019 7/2/2019 7/30/2019   INR 2.6 2.3 3.0   INR Date 6/11/2019 7/2/2019 7/30/2019   INR Goal 2.0-3.0 2.0-3.0 2.0-3.0   Trend Same Same Same   Last Week Total 30 mg 30 mg 30 mg   Next Week Total 30 mg 30 mg 30 mg   Sun 5 mg 5 mg 5 mg   Mon 5 mg 5 mg 5 mg   Tue 2.5 mg 2.5 mg 2.5 mg   Wed 5 mg 5 mg 5 mg   Thu 5 mg 5 mg 5 mg   Fri 2.5 mg 2.5 mg 2.5 mg   Sat 5 mg 5 mg 5 mg    Visit Report - - -   Some recent data might be hidden       Plan:  1. INR is therapeutic today- see above in Anticoagulation Summary.   Will instruct Vish Morin to continue their warfarin regimen- see above in Anticoagulation Summary.  2. Follow up in *** weeks.  3. Patient {DECLINES/DESIRES:60204} warfarin refills.  4. Verbal and written information provided. Patient expresses understanding and has no further questions at this time.    Tricia Quintana

## 2019-07-31 NOTE — PROGRESS NOTES
Anticoagulation Clinic Progress Note    Anticoagulation Summary  As of 7/30/2019    INR goal:   2.0-3.0   TTR:   100.0 % (9.5 mo)   INR used for dosing:   3.0 (7/30/2019)   Warfarin maintenance plan:   2.5 mg every Tue, Fri; 5 mg all other days   Weekly warfarin total:   30 mg   No change documented:   Tricia Quintana   Plan last modified:   Lyly Vaz RPH (10/9/2018)   Next INR check:   8/27/2019   Priority:   Maintenance   Target end date:   Indefinite    Indications    Permanent atrial fibrillation (CMS/[facility]) [I48.2]             Anticoagulation Episode Summary     INR check location:       Preferred lab:       Send INR reminders to:    [facility]    Comments:         Anticoagulation Care Providers     Provider Role Specialty Phone number    Gopal Kurtz MD Referring Cardiology [phone]    Lyly Vaz RPH Responsible Pharmacy [phone]          Clinic Interview:  Patient Findings     Negatives:   Signs/symptoms of thrombosis, Signs/symptoms of bleeding,   Laboratory test error suspected, Change in health, Change in alcohol use,   Change in activity, Upcoming invasive procedure, Emergency department   visit, Upcoming dental procedure, Missed doses, Extra doses, Change in   medications, Change in diet/appetite, Hospital admission, Bruising, Other   complaints      Clinical Outcomes     Negatives:   Major bleeding event, Thromboembolic event,   Anticoagulation-related hospital admission, Anticoagulation-related ED   visit, Anticoagulation-related fatality        INR History:  Anticoagulation Monitoring 6/11/2019 7/2/2019 7/30/2019   INR 2.6 2.3 3.0   INR Date 6/11/2019 7/2/2019 7/30/2019   INR Goal 2.0-3.0 2.0-3.0 2.0-3.0   Trend Same Same Same   Last Week Total 30 mg 30 mg 30 mg   Next Week Total 30 mg 30 mg 30 mg   Sun 5 mg 5 mg 5 mg   Mon 5 mg 5 mg 5 mg   Tue 2.5 mg 2.5 mg 2.5 mg   Wed 5 mg 5 mg 5 mg   Thu 5 mg 5 mg 5 mg   Fri 2.5 mg 2.5 mg 2.5 mg   Sat 5 mg 5 mg 5 mg    Visit Report - - -   Some recent data might be hidden       Plan:  1. INR is therapeutic today- see above in Anticoagulation Summary.   Will instruct Vish Morin to continue their warfarin regimen- see above in Anticoagulation Summary.  2. Follow up in 4 weeks.  3. Patient declines warfarin refills.  4. Verbal and written information provided. Patient expresses understanding and has no further questions at this time.    Tricia Quintana

## 2019-08-22 RX ORDER — METOPROLOL TARTRATE 50 MG/1
TABLET, FILM COATED ORAL
Qty: 180 TABLET | Refills: 1 | Status: SHIPPED | OUTPATIENT
Start: 2019-08-22 | End: 2020-01-01 | Stop reason: SDUPTHER

## 2019-08-27 ENCOUNTER — ANTICOAGULATION VISIT (OUTPATIENT)
Dept: PHARMACY | Facility: HOSPITAL | Age: 84
End: 2019-08-27

## 2019-08-27 DIAGNOSIS — I48.21 PERMANENT ATRIAL FIBRILLATION (HCC): ICD-10-CM

## 2019-08-27 LAB
INR PPP: 2.9 (ref 0.91–1.09)
PROTHROMBIN TIME: 34.2 SECONDS (ref 10–13.8)

## 2019-08-27 PROCEDURE — 36416 COLLJ CAPILLARY BLOOD SPEC: CPT

## 2019-08-27 PROCEDURE — 85610 PROTHROMBIN TIME: CPT

## 2019-08-27 NOTE — PROGRESS NOTES
Anticoagulation Clinic Progress Note    Anticoagulation Summary  As of 2019    INR goal:   2.0-3.0   TTR:   100.0 % (10.4 mo)   INR used for dosin.9 (2019)   Warfarin maintenance plan:   2.5 mg every Tue, Fri; 5 mg all other days   Weekly warfarin total:   30 mg   No change documented:   Oksana Cummins   Plan last modified:   Lyly Vaz RPH (10/9/2018)   Next INR check:   2019   Priority:   Maintenance   Target end date:   Indefinite    Indications    Permanent atrial fibrillation (CMS/HCC) [I48.2]             Anticoagulation Episode Summary     INR check location:       Preferred lab:       Send INR reminders to:    [facility] [place]    Comments:         Anticoagulation Care Providers     Provider Role Specialty Phone number    Gopal Kurtz MD Referring Cardiology [phone]    Lyly Vaz RPH Responsible Pharmacy [phone]          Clinic Interview:  Patient Findings     Negatives:   Signs/symptoms of thrombosis, Signs/symptoms of bleeding,   Laboratory test error suspected, Change in health, Change in alcohol use,   Change in activity, Upcoming invasive procedure, Emergency department   visit, Upcoming dental procedure, Missed doses, Extra doses, Change in   medications, Change in diet/appetite, Hospital admission, Bruising, Other   complaints      Clinical Outcomes     Negatives:   Major bleeding event, Thromboembolic event,   Anticoagulation-related hospital admission, Anticoagulation-related ED   visit, Anticoagulation-related fatality        INR History:  Anticoagulation Monitoring 2019   INR 2.3 3.0 2.9   INR Date 2019   INR Goal 2.0-3.0 2.0-3.0 2.0-3.0   Trend Same Same Same   Last Week Total 30 mg 30 mg 30 mg   Next Week Total 30 mg 30 mg 30 mg   Sun 5 mg 5 mg 5 mg   Mon 5 mg 5 mg 5 mg   Tue 2.5 mg 2.5 mg 2.5 mg   Wed 5 mg 5 mg 5 mg   Thu 5 mg 5 mg 5 mg   Fri 2.5 mg 2.5 mg 2.5 mg   Sat 5 mg 5 mg 5 mg    Visit Report - - -   Some recent data might be hidden       Plan:  1. INR is therapeutic today- see above in Anticoagulation Summary.   Will instruct Vish CARMEN Patience to continue their warfarin regimen- see above in Anticoagulation Summary.  2. Follow up in 4 weeks.  3. Patient declines warfarin refills.  4. Verbal and written information provided. Patient expresses understanding and has no further questions at this time.    Oksana Cummins

## 2019-09-04 RX ORDER — BENAZEPRIL HYDROCHLORIDE 40 MG/1
TABLET, FILM COATED ORAL
Qty: 90 TABLET | Refills: 1 | Status: SHIPPED | OUTPATIENT
Start: 2019-09-04 | End: 2020-01-01 | Stop reason: SDUPTHER

## 2019-09-24 ENCOUNTER — ANTICOAGULATION VISIT (OUTPATIENT)
Dept: PHARMACY | Facility: HOSPITAL | Age: 84
End: 2019-09-24

## 2019-09-24 DIAGNOSIS — I48.21 PERMANENT ATRIAL FIBRILLATION (HCC): ICD-10-CM

## 2019-09-24 LAB
INR PPP: 2.2 (ref 0.91–1.09)
PROTHROMBIN TIME: 26.6 SECONDS (ref 10–13.8)

## 2019-09-24 PROCEDURE — 85610 PROTHROMBIN TIME: CPT

## 2019-09-24 PROCEDURE — 36416 COLLJ CAPILLARY BLOOD SPEC: CPT

## 2019-09-24 NOTE — PROGRESS NOTES
Anticoagulation Clinic Progress Note    Anticoagulation Summary  As of 2019    INR goal:   2.0-3.0   TTR:   100.0 % (11.3 mo)   INR used for dosin.2 (2019)   Warfarin maintenance plan:   2.5 mg every Tue, Fri; 5 mg all other days   Weekly warfarin total:   30 mg   No change documented:   Oksana Cummins   Plan last modified:   Lyly Vaz RPH (10/9/2018)   Next INR check:   10/22/2019   Priority:   Maintenance   Target end date:   Indefinite    Indications    Permanent atrial fibrillation (CMS/HCC) [I48.2]             Anticoagulation Episode Summary     INR check location:       Preferred lab:       Send INR reminders to:    [facility] [place]    Comments:         Anticoagulation Care Providers     Provider Role Specialty Phone number    Gopal Kurtz MD Referring Cardiology [phone]    Lyly Vaz RPH Responsible Pharmacy [phone]          Clinic Interview:  Patient Findings     Negatives:   Signs/symptoms of thrombosis, Signs/symptoms of bleeding,   Laboratory test error suspected, Change in health, Change in alcohol use,   Change in activity, Upcoming invasive procedure, Emergency department   visit, Upcoming dental procedure, Missed doses, Extra doses, Change in   medications, Change in diet/appetite, Hospital admission, Bruising, Other   complaints      Clinical Outcomes     Negatives:   Major bleeding event, Thromboembolic event,   Anticoagulation-related hospital admission, Anticoagulation-related ED   visit, Anticoagulation-related fatality        INR History:  Anticoagulation Monitoring 2019   INR 3.0 2.9 2.2   INR Date 2019   INR Goal 2.0-3.0 2.0-3.0 2.0-3.0   Trend Same Same Same   Last Week Total 30 mg 30 mg 30 mg   Next Week Total 30 mg 30 mg 30 mg   Sun 5 mg 5 mg 5 mg   Mon 5 mg 5 mg 5 mg   Tue 2.5 mg 2.5 mg 2.5 mg   Wed 5 mg 5 mg 5 mg   Thu 5 mg 5 mg 5 mg   Fri 2.5 mg 2.5 mg 2.5 mg   Sat 5 mg 5 mg 5 mg    Visit Report - - -   Some recent data might be hidden       Plan:  1. INR is therapeutic today- see above in Anticoagulation Summary.   Will instruct Vish CARMEN Patience to continue their warfarin regimen- see above in Anticoagulation Summary.  2. Follow up in 4 weeks.  3. Patient declines warfarin refills.  4. Verbal and written information provided. Patient expresses understanding and has no further questions at this time.    Oksana Cummins

## 2019-09-25 ENCOUNTER — OFFICE VISIT (OUTPATIENT)
Dept: FAMILY MEDICINE CLINIC | Facility: CLINIC | Age: 84
End: 2019-09-25

## 2019-09-25 VITALS
WEIGHT: 164.6 LBS | OXYGEN SATURATION: 98 % | HEART RATE: 66 BPM | BODY MASS INDEX: 22.29 KG/M2 | SYSTOLIC BLOOD PRESSURE: 120 MMHG | TEMPERATURE: 97.7 F | DIASTOLIC BLOOD PRESSURE: 80 MMHG | HEIGHT: 72 IN

## 2019-09-25 DIAGNOSIS — E78.5 HYPERLIPIDEMIA, UNSPECIFIED HYPERLIPIDEMIA TYPE: ICD-10-CM

## 2019-09-25 DIAGNOSIS — E03.9 ACQUIRED HYPOTHYROIDISM: ICD-10-CM

## 2019-09-25 DIAGNOSIS — R42 DIZZINESS: ICD-10-CM

## 2019-09-25 DIAGNOSIS — R20.0 NUMBNESS AND TINGLING IN LEFT ARM: ICD-10-CM

## 2019-09-25 DIAGNOSIS — R20.2 NUMBNESS AND TINGLING IN LEFT ARM: ICD-10-CM

## 2019-09-25 DIAGNOSIS — I48.21 PERMANENT ATRIAL FIBRILLATION (HCC): ICD-10-CM

## 2019-09-25 DIAGNOSIS — I10 ESSENTIAL HYPERTENSION: Primary | ICD-10-CM

## 2019-09-25 PROCEDURE — 99214 OFFICE O/P EST MOD 30 MIN: CPT | Performed by: INTERNAL MEDICINE

## 2019-09-25 NOTE — PROGRESS NOTES
Subjective Chief complaint is checkup on blood pressure  Vish Morin is a 88 y.o. male.     History of Present Illness   Vish is here today for checkup.  At his last visit with his cardiologist he was complaining of some lightheadedness.  His cardiologist thought he might be on too much blood pressure medicine and that is why the patient is here today.  He does take metoprolol for his atrial fibrillation and his rate is controlled.  He is on some warfarin as his anticoagulant.  He has not had a CBC checked in some time.  His blood pressure medicines include amlodipine and benazepril in addition to the above-mentioned metoprolol.  He does report that he gets lightheaded when he goes from sitting to standing.  He has not fallen.  Past history is remarkable for hypothyroidism.  He also has had some hyperlipidemia.  He is not currently on any cholesterol medication.  88 years of age I am not sure he really needs it.  The following portions of the patient's history were reviewed and updated as appropriate: allergies, current medications, past family history, past medical history, past social history, past surgical history and problem list.    Review of Systems   HENT: Negative for nosebleeds.    Respiratory: Negative for chest tightness and shortness of breath.    Cardiovascular: Negative for chest pain and leg swelling.   Gastrointestinal: Negative for anal bleeding.   Genitourinary: Negative for hematuria.   Neurological: Positive for dizziness and light-headedness.       Objective   Physical Exam   Constitutional: He appears well-developed and well-nourished.   Neck: No thyromegaly present.   Cardiovascular:   The rhythm is irregular.  The rate is normal.  Blood pressure to my exam is 108/60.   Pulmonary/Chest: Effort normal and breath sounds normal. He has no wheezes. He has no rales.   Musculoskeletal: He exhibits no edema.   Nursing note and vitals reviewed.        Assessment/Plan   Vish was seen today for  follow-up.    Diagnoses and all orders for this visit:    Essential hypertension  -     Comprehensive Metabolic Panel    Permanent atrial fibrillation (CMS/HCC)    Dizziness  -     CBC & Differential    Numbness and tingling in left arm    Hyperlipidemia, unspecified hyperlipidemia type  -     Lipid Panel    Acquired hypothyroidism  -     TSH+Free T4      Vish is here today for his blood pressure.  It is on the low side.  I am going to have him take only one half of his 5 mg amlodipine.  I am going to see him back in 1 month.  If the blood pressure remains low then we will discontinue this.  We may also consider lowering his benazepril dose at that time.  I am going to check a CBC as well as some additional laboratories here today.

## 2019-09-26 LAB
ALBUMIN SERPL-MCNC: 4.5 G/DL (ref 3.5–5.2)
ALBUMIN/GLOB SERPL: 1.7 G/DL
ALP SERPL-CCNC: 79 U/L (ref 39–117)
ALT SERPL-CCNC: 19 U/L (ref 1–41)
AST SERPL-CCNC: 18 U/L (ref 1–40)
BASOPHILS # BLD AUTO: 0.11 10*3/MM3 (ref 0–0.2)
BASOPHILS NFR BLD AUTO: 1.4 % (ref 0–1.5)
BILIRUB SERPL-MCNC: 0.7 MG/DL (ref 0.2–1.2)
BUN SERPL-MCNC: 11 MG/DL (ref 8–23)
BUN/CREAT SERPL: 10.8 (ref 7–25)
CALCIUM SERPL-MCNC: 9.3 MG/DL (ref 8.6–10.5)
CHLORIDE SERPL-SCNC: 103 MMOL/L (ref 98–107)
CHOLEST SERPL-MCNC: 121 MG/DL (ref 0–200)
CO2 SERPL-SCNC: 26.5 MMOL/L (ref 22–29)
CREAT SERPL-MCNC: 1.02 MG/DL (ref 0.76–1.27)
EOSINOPHIL # BLD AUTO: 0.18 10*3/MM3 (ref 0–0.4)
EOSINOPHIL NFR BLD AUTO: 2.3 % (ref 0.3–6.2)
ERYTHROCYTE [DISTWIDTH] IN BLOOD BY AUTOMATED COUNT: 13.4 % (ref 12.3–15.4)
GLOBULIN SER CALC-MCNC: 2.6 GM/DL
GLUCOSE SERPL-MCNC: 122 MG/DL (ref 65–99)
HCT VFR BLD AUTO: 47.7 % (ref 37.5–51)
HDLC SERPL-MCNC: 51 MG/DL (ref 40–60)
HGB BLD-MCNC: 16 G/DL (ref 13–17.7)
IMM GRANULOCYTES # BLD AUTO: 0.03 10*3/MM3 (ref 0–0.05)
IMM GRANULOCYTES NFR BLD AUTO: 0.4 % (ref 0–0.5)
LDLC SERPL CALC-MCNC: 55 MG/DL (ref 0–100)
LYMPHOCYTES # BLD AUTO: 2.1 10*3/MM3 (ref 0.7–3.1)
LYMPHOCYTES NFR BLD AUTO: 27.3 % (ref 19.6–45.3)
MCH RBC QN AUTO: 29.6 PG (ref 26.6–33)
MCHC RBC AUTO-ENTMCNC: 33.5 G/DL (ref 31.5–35.7)
MCV RBC AUTO: 88.3 FL (ref 79–97)
MONOCYTES # BLD AUTO: 0.64 10*3/MM3 (ref 0.1–0.9)
MONOCYTES NFR BLD AUTO: 8.3 % (ref 5–12)
NEUTROPHILS # BLD AUTO: 4.63 10*3/MM3 (ref 1.7–7)
NEUTROPHILS NFR BLD AUTO: 60.3 % (ref 42.7–76)
NRBC BLD AUTO-RTO: 0 /100 WBC (ref 0–0.2)
PLATELET # BLD AUTO: 216 10*3/MM3 (ref 140–450)
POTASSIUM SERPL-SCNC: 4.1 MMOL/L (ref 3.5–5.2)
PROT SERPL-MCNC: 7.1 G/DL (ref 6–8.5)
RBC # BLD AUTO: 5.4 10*6/MM3 (ref 4.14–5.8)
SODIUM SERPL-SCNC: 140 MMOL/L (ref 136–145)
T4 FREE SERPL-MCNC: 1.67 NG/DL (ref 0.93–1.7)
TRIGL SERPL-MCNC: 75 MG/DL (ref 0–150)
TSH SERPL DL<=0.005 MIU/L-ACNC: 3.76 UIU/ML (ref 0.27–4.2)
VLDLC SERPL CALC-MCNC: 15 MG/DL
WBC # BLD AUTO: 7.69 10*3/MM3 (ref 3.4–10.8)

## 2019-10-18 RX ORDER — WARFARIN SODIUM 5 MG/1
TABLET ORAL
Qty: 80 TABLET | Refills: 0 | Status: SHIPPED | OUTPATIENT
Start: 2019-10-18 | End: 2020-01-01

## 2019-10-22 ENCOUNTER — ANTICOAGULATION VISIT (OUTPATIENT)
Dept: PHARMACY | Facility: HOSPITAL | Age: 84
End: 2019-10-22

## 2019-10-22 DIAGNOSIS — I48.21 PERMANENT ATRIAL FIBRILLATION (HCC): ICD-10-CM

## 2019-10-22 LAB
INR PPP: 2.9 (ref 0.91–1.09)
PROTHROMBIN TIME: 34.6 SECONDS (ref 10–13.8)

## 2019-10-22 PROCEDURE — 36416 COLLJ CAPILLARY BLOOD SPEC: CPT

## 2019-10-22 PROCEDURE — 85610 PROTHROMBIN TIME: CPT

## 2019-10-22 NOTE — PROGRESS NOTES
Anticoagulation Clinic Progress Note    Anticoagulation Summary  As of 10/22/2019    INR goal:   2.0-3.0   TTR:   100.0 % (1 y)   INR used for dosin.9 (10/22/2019)   Warfarin maintenance plan:   2.5 mg every Tue, Fri; 5 mg all other days   Weekly warfarin total:   30 mg   No change documented:   Montse Green   Plan last modified:   Lyly Vaz RPH (10/9/2018)   Next INR check:   2019   Priority:   Maintenance   Target end date:   Indefinite    Indications    Permanent atrial fibrillation [I48.21]             Anticoagulation Episode Summary     INR check location:       Preferred lab:       Send INR reminders to:    [facility]    Comments:         Anticoagulation Care Providers     Provider Role Specialty Phone number    Gopal Kurtz MD Referring Cardiology [phone]    Lyly Vaz RPH Responsible Pharmacy [phone]          Clinic Interview:  Patient Findings     Positives:   Change in medications    Negatives:   Signs/symptoms of thrombosis, Signs/symptoms of bleeding,   Laboratory test error suspected, Change in health, Change in alcohol use,   Change in activity, Upcoming invasive procedure, Emergency department   visit, Upcoming dental procedure, Missed doses, Extra doses, Change in   diet/appetite, Hospital admission, Bruising, Other complaints      Clinical Outcomes     Negatives:   Major bleeding event, Thromboembolic event,   Anticoagulation-related hospital admission, Anticoagulation-related ED   visit, Anticoagulation-related fatality        INR History:  Anticoagulation Monitoring 2019 2019 10/22/2019   INR 2.9 2.2 2.9   INR Date 2019 2019 10/22/2019   INR Goal 2.0-3.0 2.0-3.0 2.0-3.0   Trend Same Same Same   Last Week Total 30 mg 30 mg 30 mg   Next Week Total 30 mg 30 mg 30 mg   Sun 5 mg 5 mg 5 mg   Mon 5 mg 5 mg 5 mg   Tue 2.5 mg 2.5 mg 2.5 mg   Wed 5 mg 5 mg 5 mg   Thu 5 mg 5 mg 5 mg   Fri 2.5 mg 2.5 mg 2.5 mg   Sat 5 mg  5 mg 5 mg   Visit Report - - -   Some recent data might be hidden       Plan:  1. INR is therapeutic today- see above in Anticoagulation Summary.   Will instruct Vish Morin to continue their warfarin regimen- see above in Anticoagulation Summary.  2. Follow up in 4 weeks.  3. Patient declines warfarin refills.  4. Verbal and written information provided. Patient expresses understanding and has no further questions at this time.    Montse Green

## 2019-10-23 ENCOUNTER — OFFICE VISIT (OUTPATIENT)
Dept: FAMILY MEDICINE CLINIC | Facility: CLINIC | Age: 84
End: 2019-10-23

## 2019-10-23 VITALS
TEMPERATURE: 97.5 F | SYSTOLIC BLOOD PRESSURE: 130 MMHG | OXYGEN SATURATION: 98 % | DIASTOLIC BLOOD PRESSURE: 70 MMHG | BODY MASS INDEX: 22.46 KG/M2 | HEART RATE: 83 BPM | WEIGHT: 165.8 LBS | HEIGHT: 72 IN

## 2019-10-23 DIAGNOSIS — I10 ESSENTIAL HYPERTENSION: Primary | ICD-10-CM

## 2019-10-23 PROCEDURE — 99213 OFFICE O/P EST LOW 20 MIN: CPT | Performed by: INTERNAL MEDICINE

## 2019-10-23 NOTE — PROGRESS NOTES
Subjective Complaint is follow-up on blood pressure  Vish Morin is a 88 y.o. male.     History of Present Illness   Vish is here today for follow-up on his blood pressure.  At his last visit we did decrease his amlodipine to 2.5 mg daily.  His lightheaded episodes have been a little bit better.  He still has them intermittently.  The following portions of the patient's history were reviewed and updated as appropriate: allergies, current medications, past family history, past medical history, past social history, past surgical history and problem list.    Review of Systems   Respiratory: Negative for chest tightness and shortness of breath.    Neurological: Positive for light-headedness.       Objective   Physical Exam   Cardiovascular: Normal rate.   Blood pressure my exam is 116/58         Assessment/Plan   Vish was seen today for hypertension.    Diagnoses and all orders for this visit:    Essential hypertension    Vish is here today for follow-up.  I am going to discontinue his amlodipine.  He will see me back in 2 months.

## 2019-11-19 ENCOUNTER — ANTICOAGULATION VISIT (OUTPATIENT)
Dept: PHARMACY | Facility: HOSPITAL | Age: 84
End: 2019-11-19

## 2019-11-19 DIAGNOSIS — I48.21 PERMANENT ATRIAL FIBRILLATION (HCC): ICD-10-CM

## 2019-11-19 LAB
INR PPP: 2.6 (ref 0.91–1.09)
PROTHROMBIN TIME: 31.1 SECONDS (ref 10–13.8)

## 2019-11-19 PROCEDURE — 36416 COLLJ CAPILLARY BLOOD SPEC: CPT

## 2019-11-19 PROCEDURE — 85610 PROTHROMBIN TIME: CPT

## 2019-11-19 NOTE — PROGRESS NOTES
I have supervised and reviewed the notes, assessments, and/or procedures performed by Moise Cordoba, PharmD Candidate. The documented assessment and plan were developed cooperatively. I concur with his documentation of this patient.    Deja Camacho [facility]

## 2019-11-19 NOTE — PROGRESS NOTES
Anticoagulation Clinic Progress Note    Anticoagulation Summary  As of 2019    INR goal:   2.0-3.0   TTR:   100.0 % (1.1 y)   INR used for dosin.6 (2019)   Warfarin maintenance plan:   2.5 mg every Tue, Fri; 5 mg all other days   Weekly warfarin total:   30 mg   No change documented:   Michael Cordoba, Pharmacy Intern   Plan last modified:   Lyly Vaz RPH (10/9/2018)   Next INR check:   2019   Priority:   Maintenance   Target end date:   Indefinite    Indications    Permanent atrial fibrillation [I48.21]             Anticoagulation Episode Summary     INR check location:       Preferred lab:       Send INR reminders to:    STEVAN [facility]TI[facility]    Comments:         Anticoagulation Care Providers     Provider Role Specialty Phone number    Gopal Kurtz MD Referring Cardiology [phone]    Lyly Vaz RPH Responsible Pharmacy [phone]          Clinic Interview:  Patient Findings     Positives:   Change in medications    Negatives:   Signs/symptoms of thrombosis, Signs/symptoms of bleeding,   Laboratory test error suspected, Change in health, Change in alcohol use,   Change in activity, Upcoming invasive procedure, Emergency department   visit, Upcoming dental procedure, Missed doses, Extra doses, Change in   diet/appetite, Hospital admission, Bruising, Other complaints    Comments:   Taken off amlodipine      Clinical Outcomes     Negatives:   Major bleeding event, Thromboembolic event,   Anticoagulation-related hospital admission, Anticoagulation-related ED   visit, Anticoagulation-related fatality    Comments:   Taken off amlodipine        INR History:  Anticoagulation Monitoring 2019 10/22/2019 2019   INR 2.2 2.9 2.6   INR Date 2019 10/22/2019 2019   INR Goal 2.0-3.0 2.0-3.0 2.0-3.0   Trend Same Same Same   Last Week Total 30 mg 30 mg 30 mg   Next Week Total 30 mg 30 mg 30 mg   Sun 5 mg 5 mg 5 mg   Mon 5 mg 5 mg 5 mg   Tue 2.5 mg 2.5 mg  2.5 mg   Wed 5 mg 5 mg 5 mg   Thu 5 mg 5 mg 5 mg   Fri 2.5 mg 2.5 mg 2.5 mg   Sat 5 mg 5 mg 5 mg   Visit Report - - -   Some recent data might be hidden       Plan:  1. INR is Therapeutic today- see above in Anticoagulation Summary.  Will instruct Vish Morin to Continue their warfarin regimen- see above in Anticoagulation Summary.  2. Follow up in 1 month  3. Patient declines warfarin refills.  4. Verbal and written information provided. Patient expresses understanding and has no further questions at this time.    Michael Cordoba, Pharmacy Intern

## 2019-12-17 NOTE — PROGRESS NOTES
Anticoagulation Clinic Progress Note    Anticoagulation Summary  As of 2019    INR goal:   2.0-3.0   TTR:   100.0 % (1.2 y)   INR used for dosin.7 (2019)   Warfarin maintenance plan:   2.5 mg every Tue, Fri; 5 mg all other days   Weekly warfarin total:   30 mg   No change documented:   Oksana Cummins   Plan last modified:   Lyly Vaz RPH (10/9/2018)   Next INR check:   2020   Priority:   Maintenance   Target end date:   Indefinite    Indications    Permanent atrial fibrillation [I48.21]             Anticoagulation Episode Summary     INR check location:       Preferred lab:       [facility]    Comments:         Anticoagulation Care Providers     Provider Role Specialty Phone number    Gopal Kurtz MD Referring Cardiology [phone]    Lyly Vaz [facility] Responsible Pharmacy [phone]          Clinic Interview:      INR History:  Anticoagulation Monitoring 10/22/2019 2019 2019   INR 2.9 2.6 2.7   INR Date 10/22/2019 2019 2019   INR Goal 2.0-3.0 2.0-3.0 2.0-3.0   Trend Same Same Same   Last Week Total 30 mg 30 mg 30 mg   Next Week Total 30 mg 30 mg 30 mg   Sun 5 mg 5 mg 5 mg   Mon 5 mg 5 mg 5 mg   Tue 2.5 mg 2.5 mg 2.5 mg   Wed 5 mg 5 mg 5 mg   Thu 5 mg 5 mg 5 mg   Fri 2.5 mg 2.5 mg 2.5 mg   Sat 5 mg 5 mg 5 mg   Visit Report - - -   Some recent data might be hidden       Plan:  1. INR is therapeutic today- see above in Anticoagulation Summary.   Will instruct Vish MORALES Patience to continue their warfarin regimen- see above in Anticoagulation Summary.  2. Follow up in 5 weeks.  3. Patient declines warfarin refills.  4. Verbal and written information provided. Patient expresses understanding and has no further questions at this time.    Oksana Cummins

## 2020-01-01 ENCOUNTER — APPOINTMENT (OUTPATIENT)
Dept: GENERAL RADIOLOGY | Facility: HOSPITAL | Age: 85
End: 2020-01-01

## 2020-01-01 ENCOUNTER — ANESTHESIA EVENT (OUTPATIENT)
Dept: PERIOP | Facility: HOSPITAL | Age: 85
End: 2020-01-01

## 2020-01-01 ENCOUNTER — ANTICOAGULATION VISIT (OUTPATIENT)
Dept: PHARMACY | Facility: HOSPITAL | Age: 85
End: 2020-01-01

## 2020-01-01 ENCOUNTER — EPISODE CHANGES (OUTPATIENT)
Dept: CASE MANAGEMENT | Facility: OTHER | Age: 85
End: 2020-01-01

## 2020-01-01 ENCOUNTER — PATIENT OUTREACH (OUTPATIENT)
Dept: CASE MANAGEMENT | Facility: OTHER | Age: 85
End: 2020-01-01

## 2020-01-01 ENCOUNTER — HOSPITAL ENCOUNTER (INPATIENT)
Facility: HOSPITAL | Age: 85
LOS: 10 days | Discharge: NURSING, REHAB OR LONG-TERM CARE | End: 2020-10-12
Attending: EMERGENCY MEDICINE | Admitting: ORTHOPAEDIC SURGERY

## 2020-01-01 ENCOUNTER — TELEPHONE (OUTPATIENT)
Dept: PHARMACY | Facility: HOSPITAL | Age: 85
End: 2020-01-01

## 2020-01-01 ENCOUNTER — APPOINTMENT (OUTPATIENT)
Dept: CT IMAGING | Facility: HOSPITAL | Age: 85
End: 2020-01-01

## 2020-01-01 ENCOUNTER — APPOINTMENT (OUTPATIENT)
Dept: CARDIOLOGY | Facility: HOSPITAL | Age: 85
End: 2020-01-01

## 2020-01-01 ENCOUNTER — OFFICE VISIT (OUTPATIENT)
Dept: WOUND CARE | Facility: HOSPITAL | Age: 85
End: 2020-01-01

## 2020-01-01 ENCOUNTER — HOSPITAL ENCOUNTER (INPATIENT)
Facility: HOSPITAL | Age: 85
LOS: 1 days | Discharge: NURSING, REHAB OR LONG-TERM CARE | End: 2020-11-04
Attending: EMERGENCY MEDICINE | Admitting: HOSPITALIST

## 2020-01-01 ENCOUNTER — HOSPITAL ENCOUNTER (EMERGENCY)
Facility: HOSPITAL | Age: 85
Discharge: HOME | End: 2020-03-17
Attending: EMERGENCY MEDICINE | Admitting: EMERGENCY MEDICINE

## 2020-01-01 ENCOUNTER — OFFICE VISIT (OUTPATIENT)
Dept: FAMILY MEDICINE CLINIC | Facility: CLINIC | Age: 85
End: 2020-01-01

## 2020-01-01 ENCOUNTER — HOSPITAL ENCOUNTER (INPATIENT)
Facility: HOSPITAL | Age: 85
LOS: 4 days | End: 2020-11-28
Attending: HOSPITALIST | Admitting: HOSPITALIST

## 2020-01-01 ENCOUNTER — TELEPHONE (OUTPATIENT)
Dept: FAMILY MEDICINE CLINIC | Facility: CLINIC | Age: 85
End: 2020-01-01

## 2020-01-01 ENCOUNTER — LAB REQUISITION (OUTPATIENT)
Dept: LAB | Facility: HOSPITAL | Age: 85
End: 2020-01-01

## 2020-01-01 ENCOUNTER — HOSPITAL ENCOUNTER (INPATIENT)
Facility: HOSPITAL | Age: 85
LOS: 7 days | Discharge: HOSPICE | End: 2020-11-24
Attending: EMERGENCY MEDICINE | Admitting: HOSPITALIST

## 2020-01-01 ENCOUNTER — ANESTHESIA (OUTPATIENT)
Dept: PERIOP | Facility: HOSPITAL | Age: 85
End: 2020-01-01

## 2020-01-01 ENCOUNTER — READMISSION MANAGEMENT (OUTPATIENT)
Dept: CALL CENTER | Facility: HOSPITAL | Age: 85
End: 2020-01-01

## 2020-01-01 ENCOUNTER — HOSPITAL ENCOUNTER (EMERGENCY)
Facility: HOSPITAL | Age: 85
Discharge: HOME | End: 2020-07-09
Attending: EMERGENCY MEDICINE | Admitting: EMERGENCY MEDICINE

## 2020-01-01 ENCOUNTER — OFFICE VISIT (OUTPATIENT)
Dept: CARDIOLOGY | Facility: CLINIC | Age: 85
End: 2020-01-01

## 2020-01-01 VITALS
BODY MASS INDEX: 22.73 KG/M2 | WEIGHT: 167.8 LBS | SYSTOLIC BLOOD PRESSURE: 122 MMHG | OXYGEN SATURATION: 98 % | DIASTOLIC BLOOD PRESSURE: 80 MMHG | TEMPERATURE: 97.5 F | HEIGHT: 72 IN | HEART RATE: 81 BPM

## 2020-01-01 VITALS
OXYGEN SATURATION: 98 % | WEIGHT: 150 LBS | TEMPERATURE: 99.2 F | SYSTOLIC BLOOD PRESSURE: 120 MMHG | DIASTOLIC BLOOD PRESSURE: 80 MMHG | HEIGHT: 72 IN | HEART RATE: 75 BPM | BODY MASS INDEX: 20.32 KG/M2

## 2020-01-01 VITALS
TEMPERATURE: 97.5 F | HEIGHT: 71 IN | SYSTOLIC BLOOD PRESSURE: 120 MMHG | HEART RATE: 75 BPM | DIASTOLIC BLOOD PRESSURE: 80 MMHG | RESPIRATION RATE: 20 BRPM | BODY MASS INDEX: 21.31 KG/M2 | WEIGHT: 152.2 LBS | OXYGEN SATURATION: 99 %

## 2020-01-01 VITALS
HEART RATE: 78 BPM | RESPIRATION RATE: 20 BRPM | BODY MASS INDEX: 21.7 KG/M2 | SYSTOLIC BLOOD PRESSURE: 161 MMHG | HEIGHT: 71 IN | TEMPERATURE: 98.7 F | OXYGEN SATURATION: 100 % | WEIGHT: 155 LBS | DIASTOLIC BLOOD PRESSURE: 91 MMHG

## 2020-01-01 VITALS
BODY MASS INDEX: 21.25 KG/M2 | RESPIRATION RATE: 20 BRPM | WEIGHT: 151.8 LBS | TEMPERATURE: 96.9 F | OXYGEN SATURATION: 98 % | DIASTOLIC BLOOD PRESSURE: 88 MMHG | HEIGHT: 71 IN | SYSTOLIC BLOOD PRESSURE: 120 MMHG | HEART RATE: 80 BPM

## 2020-01-01 VITALS
BODY MASS INDEX: 20.2 KG/M2 | SYSTOLIC BLOOD PRESSURE: 124 MMHG | WEIGHT: 141.09 LBS | HEART RATE: 60 BPM | HEIGHT: 70 IN | RESPIRATION RATE: 16 BRPM | TEMPERATURE: 98.2 F | DIASTOLIC BLOOD PRESSURE: 71 MMHG | OXYGEN SATURATION: 98 %

## 2020-01-01 VITALS
SYSTOLIC BLOOD PRESSURE: 193 MMHG | WEIGHT: 155 LBS | DIASTOLIC BLOOD PRESSURE: 118 MMHG | OXYGEN SATURATION: 90 % | HEIGHT: 71 IN | RESPIRATION RATE: 18 BRPM | HEART RATE: 74 BPM | BODY MASS INDEX: 21.7 KG/M2 | TEMPERATURE: 97.3 F

## 2020-01-01 VITALS
RESPIRATION RATE: 18 BRPM | BODY MASS INDEX: 20.41 KG/M2 | HEART RATE: 77 BPM | OXYGEN SATURATION: 96 % | TEMPERATURE: 97.9 F | WEIGHT: 159 LBS | SYSTOLIC BLOOD PRESSURE: 175 MMHG | DIASTOLIC BLOOD PRESSURE: 96 MMHG | HEIGHT: 74 IN

## 2020-01-01 VITALS
TEMPERATURE: 98 F | SYSTOLIC BLOOD PRESSURE: 103 MMHG | OXYGEN SATURATION: 94 % | WEIGHT: 140.43 LBS | HEART RATE: 102 BPM | BODY MASS INDEX: 19.66 KG/M2 | HEIGHT: 71 IN | DIASTOLIC BLOOD PRESSURE: 57 MMHG | RESPIRATION RATE: 14 BRPM

## 2020-01-01 VITALS — WEIGHT: 150 LBS | HEIGHT: 72 IN | BODY MASS INDEX: 20.32 KG/M2

## 2020-01-01 VITALS — TEMPERATURE: 99.4 F

## 2020-01-01 DIAGNOSIS — I48.21 PERMANENT ATRIAL FIBRILLATION (HCC): ICD-10-CM

## 2020-01-01 DIAGNOSIS — I48.21 PERMANENT ATRIAL FIBRILLATION (HCC): Primary | ICD-10-CM

## 2020-01-01 DIAGNOSIS — I10 ESSENTIAL (PRIMARY) HYPERTENSION: Primary | ICD-10-CM

## 2020-01-01 DIAGNOSIS — S40.022A CONTUSION OF LEFT UPPER EXTREMITY, INITIAL ENCOUNTER: ICD-10-CM
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
## 2020-01-01 DIAGNOSIS — E03.9 ACQUIRED HYPOTHYROIDISM: ICD-10-CM

## 2020-01-01 DIAGNOSIS — I10 ESSENTIAL HYPERTENSION: ICD-10-CM

## 2020-01-01 DIAGNOSIS — E83.42 HYPOMAGNESEMIA: ICD-10-CM

## 2020-01-01 DIAGNOSIS — S72.001A CLOSED DISPLACED FRACTURE OF RIGHT FEMORAL NECK (HCC): Primary | ICD-10-CM

## 2020-01-01 DIAGNOSIS — Z79.01 ANTICOAGULATED: ICD-10-CM

## 2020-01-01 DIAGNOSIS — Z96.641 STATUS POST RIGHT HIP REPLACEMENT: ICD-10-CM

## 2020-01-01 DIAGNOSIS — S71.001A OPEN WOUND OF RIGHT HIP, INITIAL ENCOUNTER: Primary | ICD-10-CM

## 2020-01-01 DIAGNOSIS — S72.001A CLOSED FRACTURE OF RIGHT HIP, INITIAL ENCOUNTER (HCC): ICD-10-CM

## 2020-01-01 DIAGNOSIS — E78.5 HYPERLIPIDEMIA, UNSPECIFIED HYPERLIPIDEMIA TYPE: ICD-10-CM

## 2020-01-01 DIAGNOSIS — Z00.00 INITIAL MEDICARE ANNUAL WELLNESS VISIT: Primary | ICD-10-CM

## 2020-01-01 DIAGNOSIS — S51.011A SKIN TEAR OF RIGHT ELBOW WITHOUT COMPLICATION, INITIAL ENCOUNTER: ICD-10-CM

## 2020-01-01 DIAGNOSIS — R42 DIZZINESS: ICD-10-CM

## 2020-01-01 DIAGNOSIS — R73.01 IMPAIRED FASTING GLUCOSE: ICD-10-CM

## 2020-01-01 DIAGNOSIS — Z00.00 ROUTINE GENERAL MEDICAL EXAMINATION AT A HEALTH CARE FACILITY: ICD-10-CM

## 2020-01-01 DIAGNOSIS — R42 EPISODE OF DIZZINESS: Primary | ICD-10-CM

## 2020-01-01 DIAGNOSIS — I10 ELEVATED BLOOD PRESSURE READING IN OFFICE WITH DIAGNOSIS OF HYPERTENSION: ICD-10-CM

## 2020-01-01 DIAGNOSIS — S00.93XA CONTUSION OF HEAD, UNSPECIFIED PART OF HEAD, INITIAL ENCOUNTER: Primary | ICD-10-CM

## 2020-01-01 DIAGNOSIS — I10 ESSENTIAL HYPERTENSION: Primary | ICD-10-CM

## 2020-01-01 DIAGNOSIS — T07.XXXA MULTIPLE ABRASIONS: ICD-10-CM
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
## 2020-01-01 DIAGNOSIS — S06.5XAA SDH (SUBDURAL HEMATOMA) (HCC): Primary | ICD-10-CM
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
## 2020-01-01 DIAGNOSIS — I10 ESSENTIAL (PRIMARY) HYPERTENSION: ICD-10-CM

## 2020-01-01 DIAGNOSIS — S01.81XA FACIAL LACERATION, INITIAL ENCOUNTER: ICD-10-CM

## 2020-01-01 DIAGNOSIS — R77.8 ELEVATED TROPONIN: ICD-10-CM

## 2020-01-01 DIAGNOSIS — Z79.01 CHRONIC ANTICOAGULATION: ICD-10-CM

## 2020-01-01 LAB
ABO GROUP BLD: NORMAL
ALBUMIN SERPL-MCNC: 2.9 G/DL (ref 3.5–5.2)
ALBUMIN SERPL-MCNC: 3.1 G/DL (ref 3.5–5.2)
ALBUMIN SERPL-MCNC: 3.2 G/DL (ref 3.5–5.2)
ALBUMIN SERPL-MCNC: 3.3 G/DL (ref 3.5–5.2)
ALBUMIN SERPL-MCNC: 3.3 G/DL (ref 3.5–5.2)
ALBUMIN SERPL-MCNC: 3.4 G/DL (ref 3.5–5.2)
ALBUMIN SERPL-MCNC: 3.5 G/DL (ref 3.5–5.2)
ALBUMIN SERPL-MCNC: 3.5 G/DL (ref 3.5–5.2)
ALBUMIN SERPL-MCNC: 3.7 G/DL (ref 3.5–5.2)
ALBUMIN SERPL-MCNC: 4.1 G/DL (ref 3.5–5.2)
ALBUMIN SERPL-MCNC: 4.4 G/DL (ref 3.5–5.2)
ALBUMIN SERPL-MCNC: 4.4 G/DL (ref 3.5–5.2)
ALBUMIN/GLOB SERPL: 1 G/DL
ALBUMIN/GLOB SERPL: 1.1 G/DL
ALBUMIN/GLOB SERPL: 1.2 G/DL
ALBUMIN/GLOB SERPL: 1.2 G/DL
ALBUMIN/GLOB SERPL: 1.3 G/DL
ALBUMIN/GLOB SERPL: 1.6 G/DL
ALBUMIN/GLOB SERPL: 1.9 G/DL
ALP SERPL-CCNC: 104 U/L (ref 39–117)
ALP SERPL-CCNC: 105 U/L (ref 39–117)
ALP SERPL-CCNC: 109 U/L (ref 39–117)
ALP SERPL-CCNC: 110 U/L (ref 39–117)
ALP SERPL-CCNC: 110 U/L (ref 39–117)
ALP SERPL-CCNC: 114 U/L (ref 39–117)
ALP SERPL-CCNC: 137 U/L (ref 39–117)
ALP SERPL-CCNC: 62 U/L (ref 39–117)
ALP SERPL-CCNC: 77 U/L (ref 39–117)
ALP SERPL-CCNC: 77 U/L (ref 39–117)
ALP SERPL-CCNC: 82 U/L (ref 39–117)
ALP SERPL-CCNC: 94 U/L (ref 39–117)
ALT SERPL W P-5'-P-CCNC: 21 U/L (ref 1–41)
ALT SERPL W P-5'-P-CCNC: 25 U/L (ref 1–41)
ALT SERPL W P-5'-P-CCNC: 25 U/L (ref 1–41)
ALT SERPL W P-5'-P-CCNC: 26 U/L (ref 1–41)
ALT SERPL W P-5'-P-CCNC: 27 U/L (ref 1–41)
ALT SERPL W P-5'-P-CCNC: 28 U/L (ref 1–41)
ALT SERPL W P-5'-P-CCNC: 30 U/L (ref 1–41)
ALT SERPL W P-5'-P-CCNC: 31 U/L (ref 1–41)
ALT SERPL W P-5'-P-CCNC: 31 U/L (ref 1–41)
ALT SERPL W P-5'-P-CCNC: 45 U/L (ref 1–41)
ALT SERPL-CCNC: 21 U/L (ref 1–41)
ALT SERPL-CCNC: 21 U/L (ref 1–41)
ANION GAP SERPL CALCULATED.3IONS-SCNC: 10 MMOL/L (ref 5–15)
ANION GAP SERPL CALCULATED.3IONS-SCNC: 11.3 MMOL/L (ref 5–15)
ANION GAP SERPL CALCULATED.3IONS-SCNC: 13.3 MMOL/L (ref 5–15)
ANION GAP SERPL CALCULATED.3IONS-SCNC: 14.6 MMOL/L (ref 5–15)
ANION GAP SERPL CALCULATED.3IONS-SCNC: 5.3 MMOL/L (ref 5–15)
ANION GAP SERPL CALCULATED.3IONS-SCNC: 7.1 MMOL/L (ref 5–15)
ANION GAP SERPL CALCULATED.3IONS-SCNC: 7.2 MMOL/L (ref 5–15)
ANION GAP SERPL CALCULATED.3IONS-SCNC: 7.9 MMOL/L (ref 5–15)
ANION GAP SERPL CALCULATED.3IONS-SCNC: 8.1 MMOL/L (ref 5–15)
ANION GAP SERPL CALCULATED.3IONS-SCNC: 8.3 MMOL/L (ref 5–15)
ANION GAP SERPL CALCULATED.3IONS-SCNC: 8.5 MMOL/L (ref 5–15)
ANION GAP SERPL CALCULATED.3IONS-SCNC: 9 MMOL/L (ref 5–15)
ANION GAP SERPL CALCULATED.3IONS-SCNC: 9.2 MMOL/L (ref 5–15)
ANION GAP SERPL CALCULATED.3IONS-SCNC: 9.4 MMOL/L (ref 5–15)
ANION GAP SERPL CALCULATED.3IONS-SCNC: 9.8 MMOL/L (ref 5–15)
APTT PPP: 40.1 SECONDS (ref 22.7–35.4)
AST SERPL-CCNC: 20 U/L (ref 1–40)
AST SERPL-CCNC: 22 U/L (ref 1–40)
AST SERPL-CCNC: 23 U/L (ref 1–40)
AST SERPL-CCNC: 28 U/L (ref 1–40)
AST SERPL-CCNC: 35 U/L (ref 1–40)
AST SERPL-CCNC: 38 U/L (ref 1–40)
AST SERPL-CCNC: 51 U/L (ref 1–40)
AST SERPL-CCNC: 52 U/L (ref 1–40)
AST SERPL-CCNC: 55 U/L (ref 1–40)
AST SERPL-CCNC: 72 U/L (ref 1–40)
B PARAPERT DNA SPEC QL NAA+PROBE: NOT DETECTED
B PERT DNA SPEC QL NAA+PROBE: NOT DETECTED
BACTERIA SPEC AEROBE CULT: ABNORMAL
BACTERIA SPEC AEROBE CULT: NO GROWTH
BACTERIA UR QL AUTO: ABNORMAL /HPF
BACTERIA UR QL AUTO: ABNORMAL /HPF
BASOPHILS # BLD AUTO: 0.03 10*3/MM3 (ref 0–0.2)
BASOPHILS # BLD AUTO: 0.04 10*3/MM3 (ref 0–0.2)
BASOPHILS # BLD AUTO: 0.05 10*3/MM3 (ref 0–0.2)
BASOPHILS # BLD AUTO: 0.06 10*3/MM3 (ref 0–0.2)
BASOPHILS # BLD AUTO: 0.07 10*3/MM3 (ref 0–0.2)
BASOPHILS # BLD AUTO: 0.08 10*3/MM3 (ref 0–0.2)
BASOPHILS # BLD AUTO: 0.09 10*3/MM3 (ref 0–0.2)
BASOPHILS # BLD AUTO: 0.1 10*3/MM3 (ref 0–0.2)
BASOPHILS NFR BLD AUTO: 0.2 % (ref 0–1.5)
BASOPHILS NFR BLD AUTO: 0.3 % (ref 0–1.5)
BASOPHILS NFR BLD AUTO: 0.4 % (ref 0–1.5)
BASOPHILS NFR BLD AUTO: 0.5 % (ref 0–1.5)
BASOPHILS NFR BLD AUTO: 0.7 % (ref 0–1.5)
BASOPHILS NFR BLD AUTO: 0.9 % (ref 0–1.5)
BASOPHILS NFR BLD AUTO: 1 % (ref 0–1.5)
BASOPHILS NFR BLD AUTO: 1.1 % (ref 0–1.5)
BH BB BLOOD EXPIRATION DATE: NORMAL
BH BB BLOOD TYPE BARCODE: 600
BH BB BLOOD TYPE BARCODE: 6200
BH BB DISPENSE STATUS: NORMAL
BH BB PRODUCT CODE: NORMAL
BH BB UNIT NUMBER: NORMAL
BH CV LOWER VASCULAR LEFT COMMON FEMORAL AUGMENT: NORMAL
BH CV LOWER VASCULAR LEFT COMMON FEMORAL COMPETENT: NORMAL
BH CV LOWER VASCULAR LEFT COMMON FEMORAL COMPRESS: NORMAL
BH CV LOWER VASCULAR LEFT COMMON FEMORAL PHASIC: NORMAL
BH CV LOWER VASCULAR LEFT COMMON FEMORAL SPONT: NORMAL
BH CV LOWER VASCULAR LEFT DISTAL FEMORAL COMPRESS: NORMAL
BH CV LOWER VASCULAR LEFT GASTRONEMIUS COMPRESS: NORMAL
BH CV LOWER VASCULAR LEFT GREATER SAPH AK COMPRESS: NORMAL
BH CV LOWER VASCULAR LEFT GREATER SAPH BK COMPRESS: NORMAL
BH CV LOWER VASCULAR LEFT LESSER SAPH COMPRESS: NORMAL
BH CV LOWER VASCULAR LEFT MID FEMORAL AUGMENT: NORMAL
BH CV LOWER VASCULAR LEFT MID FEMORAL COMPETENT: NORMAL
BH CV LOWER VASCULAR LEFT MID FEMORAL COMPRESS: NORMAL
BH CV LOWER VASCULAR LEFT MID FEMORAL PHASIC: NORMAL
BH CV LOWER VASCULAR LEFT MID FEMORAL SPONT: NORMAL
BH CV LOWER VASCULAR LEFT PERONEAL COMPRESS: NORMAL
BH CV LOWER VASCULAR LEFT POPLITEAL AUGMENT: NORMAL
BH CV LOWER VASCULAR LEFT POPLITEAL COMPETENT: NORMAL
BH CV LOWER VASCULAR LEFT POPLITEAL COMPRESS: NORMAL
BH CV LOWER VASCULAR LEFT POPLITEAL PHASIC: NORMAL
BH CV LOWER VASCULAR LEFT POPLITEAL SPONT: NORMAL
BH CV LOWER VASCULAR LEFT POSTERIOR TIBIAL COMPRESS: NORMAL
BH CV LOWER VASCULAR LEFT PROFUNDA FEMORAL COMPRESS: NORMAL
BH CV LOWER VASCULAR LEFT PROXIMAL FEMORAL COMPRESS: NORMAL
BH CV LOWER VASCULAR LEFT SAPHENOFEMORAL JUNCTION COMPRESS: NORMAL
BH CV LOWER VASCULAR RIGHT COMMON FEMORAL AUGMENT: NORMAL
BH CV LOWER VASCULAR RIGHT COMMON FEMORAL COMPETENT: NORMAL
BH CV LOWER VASCULAR RIGHT COMMON FEMORAL COMPRESS: NORMAL
BH CV LOWER VASCULAR RIGHT COMMON FEMORAL PHASIC: NORMAL
BH CV LOWER VASCULAR RIGHT COMMON FEMORAL SPONT: NORMAL
BH CV LOWER VASCULAR RIGHT DISTAL FEMORAL COMPRESS: NORMAL
BH CV LOWER VASCULAR RIGHT GASTRONEMIUS COMPRESS: NORMAL
BH CV LOWER VASCULAR RIGHT GREATER SAPH AK COMPRESS: NORMAL
BH CV LOWER VASCULAR RIGHT GREATER SAPH BK COMPRESS: NORMAL
BH CV LOWER VASCULAR RIGHT LESSER SAPH COMPRESS: NORMAL
BH CV LOWER VASCULAR RIGHT MID FEMORAL AUGMENT: NORMAL
BH CV LOWER VASCULAR RIGHT MID FEMORAL COMPETENT: NORMAL
BH CV LOWER VASCULAR RIGHT MID FEMORAL COMPRESS: NORMAL
BH CV LOWER VASCULAR RIGHT MID FEMORAL PHASIC: NORMAL
BH CV LOWER VASCULAR RIGHT MID FEMORAL SPONT: NORMAL
BH CV LOWER VASCULAR RIGHT PERONEAL COMPRESS: NORMAL
BH CV LOWER VASCULAR RIGHT POPLITEAL AUGMENT: NORMAL
BH CV LOWER VASCULAR RIGHT POPLITEAL COMPETENT: NORMAL
BH CV LOWER VASCULAR RIGHT POPLITEAL COMPRESS: NORMAL
BH CV LOWER VASCULAR RIGHT POPLITEAL PHASIC: NORMAL
BH CV LOWER VASCULAR RIGHT POPLITEAL SPONT: NORMAL
BH CV LOWER VASCULAR RIGHT POSTERIOR TIBIAL COMPRESS: NORMAL
BH CV LOWER VASCULAR RIGHT PROFUNDA FEMORAL COMPRESS: NORMAL
BH CV LOWER VASCULAR RIGHT PROXIMAL FEMORAL COMPRESS: NORMAL
BH CV LOWER VASCULAR RIGHT SAPHENOFEMORAL JUNCTION COMPRESS: NORMAL
BILIRUB SERPL-MCNC: 0.4 MG/DL (ref 0–1.2)
BILIRUB SERPL-MCNC: 0.6 MG/DL (ref 0.2–1.2)
BILIRUB SERPL-MCNC: 0.6 MG/DL (ref 0–1.2)
BILIRUB SERPL-MCNC: 0.7 MG/DL (ref 0–1.2)
BILIRUB SERPL-MCNC: 0.8 MG/DL (ref 0.2–1.2)
BILIRUB SERPL-MCNC: 0.9 MG/DL (ref 0–1.2)
BILIRUB SERPL-MCNC: 1.1 MG/DL (ref 0–1.2)
BILIRUB SERPL-MCNC: 1.2 MG/DL (ref 0–1.2)
BILIRUB SERPL-MCNC: 1.2 MG/DL (ref 0–1.2)
BILIRUB SERPL-MCNC: 1.3 MG/DL (ref 0–1.2)
BILIRUB SERPL-MCNC: 1.5 MG/DL (ref 0–1.2)
BILIRUB SERPL-MCNC: 1.5 MG/DL (ref 0–1.2)
BILIRUB UR QL STRIP: NEGATIVE
BLD GP AB SCN SERPL QL: NEGATIVE
BUN SERPL-MCNC: 10 MG/DL (ref 8–23)
BUN SERPL-MCNC: 10 MG/DL (ref 8–23)
BUN SERPL-MCNC: 11 MG/DL (ref 8–23)
BUN SERPL-MCNC: 12 MG/DL (ref 8–23)
BUN SERPL-MCNC: 13 MG/DL (ref 8–23)
BUN SERPL-MCNC: 14 MG/DL (ref 8–23)
BUN SERPL-MCNC: 14 MG/DL (ref 8–23)
BUN SERPL-MCNC: 15 MG/DL (ref 8–23)
BUN SERPL-MCNC: 15 MG/DL (ref 8–23)
BUN SERPL-MCNC: 16 MG/DL (ref 8–23)
BUN SERPL-MCNC: 17 MG/DL (ref 8–23)
BUN SERPL-MCNC: 17 MG/DL (ref 8–23)
BUN SERPL-MCNC: 23 MG/DL (ref 8–23)
BUN SERPL-MCNC: 24 MG/DL (ref 8–23)
BUN SERPL-MCNC: 27 MG/DL (ref 8–23)
BUN SERPL-MCNC: 8 MG/DL (ref 8–23)
BUN SERPL-MCNC: 9 MG/DL (ref 8–23)
BUN/CREAT SERPL: 10.8 (ref 7–25)
BUN/CREAT SERPL: 12.2 (ref 7–25)
BUN/CREAT SERPL: 12.5 (ref 7–25)
BUN/CREAT SERPL: 12.8 (ref 7–25)
BUN/CREAT SERPL: 13.3 (ref 7–25)
BUN/CREAT SERPL: 13.5 (ref 7–25)
BUN/CREAT SERPL: 15.5 (ref 7–25)
BUN/CREAT SERPL: 15.5 (ref 7–25)
BUN/CREAT SERPL: 16.3 (ref 7–25)
BUN/CREAT SERPL: 16.5 (ref 7–25)
BUN/CREAT SERPL: 17.8 (ref 7–25)
BUN/CREAT SERPL: 20.3 (ref 7–25)
BUN/CREAT SERPL: 21.4 (ref 7–25)
BUN/CREAT SERPL: 21.5 (ref 7–25)
BUN/CREAT SERPL: 25.8 (ref 7–25)
BUN/CREAT SERPL: 26 (ref 7–25)
BUN/CREAT SERPL: 27.3 (ref 7–25)
BUN/CREAT SERPL: 28.4 (ref 7–25)
BUN/CREAT SERPL: 29.8 (ref 7–25)
C PNEUM DNA NPH QL NAA+NON-PROBE: NOT DETECTED
CALCIUM SERPL-MCNC: 9 MG/DL (ref 8.6–10.5)
CALCIUM SERPL-MCNC: 9.6 MG/DL (ref 8.6–10.5)
CALCIUM SPEC-SCNC: 8.1 MG/DL (ref 8.6–10.5)
CALCIUM SPEC-SCNC: 8.2 MG/DL (ref 8.6–10.5)
CALCIUM SPEC-SCNC: 8.4 MG/DL (ref 8.6–10.5)
CALCIUM SPEC-SCNC: 8.4 MG/DL (ref 8.6–10.5)
CALCIUM SPEC-SCNC: 8.6 MG/DL (ref 8.6–10.5)
CALCIUM SPEC-SCNC: 8.6 MG/DL (ref 8.6–10.5)
CALCIUM SPEC-SCNC: 8.7 MG/DL (ref 8.6–10.5)
CALCIUM SPEC-SCNC: 8.8 MG/DL (ref 8.6–10.5)
CALCIUM SPEC-SCNC: 8.9 MG/DL (ref 8.6–10.5)
CALCIUM SPEC-SCNC: 9 MG/DL (ref 8.6–10.5)
CALCIUM SPEC-SCNC: 9.2 MG/DL (ref 8.6–10.5)
CALCIUM SPEC-SCNC: 9.4 MG/DL (ref 8.6–10.5)
CHLORIDE SERPL-SCNC: 100 MMOL/L (ref 98–107)
CHLORIDE SERPL-SCNC: 102 MMOL/L (ref 98–107)
CHLORIDE SERPL-SCNC: 102 MMOL/L (ref 98–107)
CHLORIDE SERPL-SCNC: 103 MMOL/L (ref 98–107)
CHLORIDE SERPL-SCNC: 104 MMOL/L (ref 98–107)
CHLORIDE SERPL-SCNC: 105 MMOL/L (ref 98–107)
CHLORIDE SERPL-SCNC: 106 MMOL/L (ref 98–107)
CHLORIDE SERPL-SCNC: 106 MMOL/L (ref 98–107)
CHLORIDE SERPL-SCNC: 107 MMOL/L (ref 98–107)
CHLORIDE SERPL-SCNC: 107 MMOL/L (ref 98–107)
CHLORIDE SERPL-SCNC: 110 MMOL/L (ref 98–107)
CHLORIDE SERPL-SCNC: 99 MMOL/L (ref 98–107)
CHOLEST SERPL-MCNC: 105 MG/DL (ref 0–200)
CLARITY UR: ABNORMAL
CLARITY UR: ABNORMAL
CLARITY UR: CLEAR
CO2 SERPL-SCNC: 16.4 MMOL/L (ref 22–29)
CO2 SERPL-SCNC: 17.7 MMOL/L (ref 22–29)
CO2 SERPL-SCNC: 20.7 MMOL/L (ref 22–29)
CO2 SERPL-SCNC: 22.2 MMOL/L (ref 22–29)
CO2 SERPL-SCNC: 22.9 MMOL/L (ref 22–29)
CO2 SERPL-SCNC: 23.6 MMOL/L (ref 22–29)
CO2 SERPL-SCNC: 24.1 MMOL/L (ref 22–29)
CO2 SERPL-SCNC: 24.6 MMOL/L (ref 22–29)
CO2 SERPL-SCNC: 24.7 MMOL/L (ref 22–29)
CO2 SERPL-SCNC: 24.8 MMOL/L (ref 22–29)
CO2 SERPL-SCNC: 25.5 MMOL/L (ref 22–29)
CO2 SERPL-SCNC: 25.6 MMOL/L (ref 22–29)
CO2 SERPL-SCNC: 25.8 MMOL/L (ref 22–29)
CO2 SERPL-SCNC: 25.9 MMOL/L (ref 22–29)
CO2 SERPL-SCNC: 26 MMOL/L (ref 22–29)
CO2 SERPL-SCNC: 26.6 MMOL/L (ref 22–29)
CO2 SERPL-SCNC: 26.7 MMOL/L (ref 22–29)
CO2 SERPL-SCNC: 27.9 MMOL/L (ref 22–29)
CO2 SERPL-SCNC: 28 MMOL/L (ref 22–29)
COLOR UR: ABNORMAL
COLOR UR: YELLOW
COLOR UR: YELLOW
CREAT SERPL-MCNC: 0.57 MG/DL (ref 0.76–1.27)
CREAT SERPL-MCNC: 0.58 MG/DL (ref 0.76–1.27)
CREAT SERPL-MCNC: 0.59 MG/DL (ref 0.76–1.27)
CREAT SERPL-MCNC: 0.62 MG/DL (ref 0.76–1.27)
CREAT SERPL-MCNC: 0.65 MG/DL (ref 0.76–1.27)
CREAT SERPL-MCNC: 0.7 MG/DL (ref 0.76–1.27)
CREAT SERPL-MCNC: 0.71 MG/DL (ref 0.76–1.27)
CREAT SERPL-MCNC: 0.73 MG/DL (ref 0.76–1.27)
CREAT SERPL-MCNC: 0.74 MG/DL (ref 0.76–1.27)
CREAT SERPL-MCNC: 0.75 MG/DL (ref 0.76–1.27)
CREAT SERPL-MCNC: 0.8 MG/DL (ref 0.76–1.27)
CREAT SERPL-MCNC: 0.81 MG/DL (ref 0.76–1.27)
CREAT SERPL-MCNC: 0.85 MG/DL (ref 0.76–1.27)
CREAT SERPL-MCNC: 0.88 MG/DL (ref 0.76–1.27)
CREAT SERPL-MCNC: 0.89 MG/DL (ref 0.76–1.27)
CREAT SERPL-MCNC: 0.98 MG/DL (ref 0.76–1.27)
CREAT SERPL-MCNC: 1.04 MG/DL (ref 0.76–1.27)
CREAT SERPL-MCNC: 1.04 MG/DL (ref 0.76–1.27)
CREAT SERPL-MCNC: 1.17 MG/DL (ref 0.76–1.27)
CRP SERPL-MCNC: 0.28 MG/DL (ref 0–0.5)
CRP SERPL-MCNC: 0.46 MG/DL (ref 0–0.5)
DEPRECATED RDW RBC AUTO: 39.6 FL (ref 37–54)
DEPRECATED RDW RBC AUTO: 40 FL (ref 37–54)
DEPRECATED RDW RBC AUTO: 40 FL (ref 37–54)
DEPRECATED RDW RBC AUTO: 40.1 FL (ref 37–54)
DEPRECATED RDW RBC AUTO: 40.9 FL (ref 37–54)
DEPRECATED RDW RBC AUTO: 41.5 FL (ref 37–54)
DEPRECATED RDW RBC AUTO: 43.6 FL (ref 37–54)
DEPRECATED RDW RBC AUTO: 43.6 FL (ref 37–54)
DEPRECATED RDW RBC AUTO: 44 FL (ref 37–54)
DEPRECATED RDW RBC AUTO: 44.1 FL (ref 37–54)
DEPRECATED RDW RBC AUTO: 44.4 FL (ref 37–54)
DEPRECATED RDW RBC AUTO: 44.5 FL (ref 37–54)
DEPRECATED RDW RBC AUTO: 45.4 FL (ref 37–54)
DEPRECATED RDW RBC AUTO: 45.5 FL (ref 37–54)
DEPRECATED RDW RBC AUTO: 45.6 FL (ref 37–54)
DEPRECATED RDW RBC AUTO: 46.2 FL (ref 37–54)
DEPRECATED RDW RBC AUTO: 46.3 FL (ref 37–54)
DEPRECATED RDW RBC AUTO: 46.6 FL (ref 37–54)
DEPRECATED RDW RBC AUTO: 47.2 FL (ref 37–54)
EOSINOPHIL # BLD AUTO: 0.02 10*3/MM3 (ref 0–0.4)
EOSINOPHIL # BLD AUTO: 0.05 10*3/MM3 (ref 0–0.4)
EOSINOPHIL # BLD AUTO: 0.06 10*3/MM3 (ref 0–0.4)
EOSINOPHIL # BLD AUTO: 0.08 10*3/MM3 (ref 0–0.4)
EOSINOPHIL # BLD AUTO: 0.11 10*3/MM3 (ref 0–0.4)
EOSINOPHIL # BLD AUTO: 0.13 10*3/MM3 (ref 0–0.4)
EOSINOPHIL # BLD AUTO: 0.14 10*3/MM3 (ref 0–0.4)
EOSINOPHIL # BLD AUTO: 0.16 10*3/MM3 (ref 0–0.4)
EOSINOPHIL # BLD AUTO: 0.18 10*3/MM3 (ref 0–0.4)
EOSINOPHIL # BLD AUTO: 0.18 10*3/MM3 (ref 0–0.4)
EOSINOPHIL # BLD AUTO: 0.19 10*3/MM3 (ref 0–0.4)
EOSINOPHIL # BLD AUTO: 0.23 10*3/MM3 (ref 0–0.4)
EOSINOPHIL # BLD AUTO: 0.25 10*3/MM3 (ref 0–0.4)
EOSINOPHIL # BLD AUTO: 0.25 10*3/MM3 (ref 0–0.4)
EOSINOPHIL # BLD AUTO: 0.27 10*3/MM3 (ref 0–0.4)
EOSINOPHIL # BLD AUTO: 0.27 10*3/MM3 (ref 0–0.4)
EOSINOPHIL # BLD AUTO: 0.28 10*3/MM3 (ref 0–0.4)
EOSINOPHIL # BLD AUTO: 0.28 10*3/MM3 (ref 0–0.4)
EOSINOPHIL # BLD AUTO: 0.29 10*3/MM3 (ref 0–0.4)
EOSINOPHIL NFR BLD AUTO: 0.1 % (ref 0.3–6.2)
EOSINOPHIL NFR BLD AUTO: 0.4 % (ref 0.3–6.2)
EOSINOPHIL NFR BLD AUTO: 0.5 % (ref 0.3–6.2)
EOSINOPHIL NFR BLD AUTO: 0.8 % (ref 0.3–6.2)
EOSINOPHIL NFR BLD AUTO: 0.9 % (ref 0.3–6.2)
EOSINOPHIL NFR BLD AUTO: 1 % (ref 0.3–6.2)
EOSINOPHIL NFR BLD AUTO: 2 % (ref 0.3–6.2)
EOSINOPHIL NFR BLD AUTO: 2.1 % (ref 0.3–6.2)
EOSINOPHIL NFR BLD AUTO: 2.2 % (ref 0.3–6.2)
EOSINOPHIL NFR BLD AUTO: 2.2 % (ref 0.3–6.2)
EOSINOPHIL NFR BLD AUTO: 2.3 % (ref 0.3–6.2)
EOSINOPHIL NFR BLD AUTO: 2.4 % (ref 0.3–6.2)
EOSINOPHIL NFR BLD AUTO: 2.4 % (ref 0.3–6.2)
EOSINOPHIL NFR BLD AUTO: 2.5 % (ref 0.3–6.2)
EOSINOPHIL NFR BLD AUTO: 3.1 % (ref 0.3–6.2)
EOSINOPHIL NFR BLD AUTO: 3.1 % (ref 0.3–6.2)
EOSINOPHIL NFR BLD AUTO: 3.3 % (ref 0.3–6.2)
EOSINOPHIL NFR BLD AUTO: 3.5 % (ref 0.3–6.2)
EOSINOPHIL NFR BLD AUTO: 3.7 % (ref 0.3–6.2)
ERYTHROCYTE [DISTWIDTH] IN BLOOD BY AUTOMATED COUNT: 12.6 % (ref 12.3–15.4)
ERYTHROCYTE [DISTWIDTH] IN BLOOD BY AUTOMATED COUNT: 12.7 % (ref 12.3–15.4)
ERYTHROCYTE [DISTWIDTH] IN BLOOD BY AUTOMATED COUNT: 12.9 % (ref 12.3–15.4)
ERYTHROCYTE [DISTWIDTH] IN BLOOD BY AUTOMATED COUNT: 12.9 % (ref 12.3–15.4)
ERYTHROCYTE [DISTWIDTH] IN BLOOD BY AUTOMATED COUNT: 13 % (ref 12.3–15.4)
ERYTHROCYTE [DISTWIDTH] IN BLOOD BY AUTOMATED COUNT: 13.1 % (ref 12.3–15.4)
ERYTHROCYTE [DISTWIDTH] IN BLOOD BY AUTOMATED COUNT: 13.1 % (ref 12.3–15.4)
ERYTHROCYTE [DISTWIDTH] IN BLOOD BY AUTOMATED COUNT: 13.4 % (ref 12.3–15.4)
ERYTHROCYTE [DISTWIDTH] IN BLOOD BY AUTOMATED COUNT: 13.5 % (ref 12.3–15.4)
ERYTHROCYTE [DISTWIDTH] IN BLOOD BY AUTOMATED COUNT: 13.5 % (ref 12.3–15.4)
ERYTHROCYTE [DISTWIDTH] IN BLOOD BY AUTOMATED COUNT: 13.6 % (ref 12.3–15.4)
ERYTHROCYTE [DISTWIDTH] IN BLOOD BY AUTOMATED COUNT: 13.7 % (ref 12.3–15.4)
ERYTHROCYTE [DISTWIDTH] IN BLOOD BY AUTOMATED COUNT: 13.8 % (ref 12.3–15.4)
ERYTHROCYTE [DISTWIDTH] IN BLOOD BY AUTOMATED COUNT: 13.8 % (ref 12.3–15.4)
ERYTHROCYTE [DISTWIDTH] IN BLOOD BY AUTOMATED COUNT: 13.9 % (ref 12.3–15.4)
ERYTHROCYTE [DISTWIDTH] IN BLOOD BY AUTOMATED COUNT: 14 % (ref 12.3–15.4)
ERYTHROCYTE [DISTWIDTH] IN BLOOD BY AUTOMATED COUNT: 14.3 % (ref 12.3–15.4)
FLUAV H1 2009 PAND RNA NPH QL NAA+PROBE: NOT DETECTED
FLUAV H1 HA GENE NPH QL NAA+PROBE: NOT DETECTED
FLUAV H3 RNA NPH QL NAA+PROBE: NOT DETECTED
FLUAV SUBTYP SPEC NAA+PROBE: NOT DETECTED
FLUBV RNA ISLT QL NAA+PROBE: NOT DETECTED
GFR SERPL CREATININE-BSD FRML MDRD: 100 ML/MIN/1.73
GFR SERPL CREATININE-BSD FRML MDRD: 101 ML/MIN/1.73
GFR SERPL CREATININE-BSD FRML MDRD: 104 ML/MIN/1.73
GFR SERPL CREATININE-BSD FRML MDRD: 106 ML/MIN/1.73
GFR SERPL CREATININE-BSD FRML MDRD: 116 ML/MIN/1.73
GFR SERPL CREATININE-BSD FRML MDRD: 122 ML/MIN/1.73
GFR SERPL CREATININE-BSD FRML MDRD: 129 ML/MIN/1.73
GFR SERPL CREATININE-BSD FRML MDRD: 132 ML/MIN/1.73
GFR SERPL CREATININE-BSD FRML MDRD: 135 ML/MIN/1.73
GFR SERPL CREATININE-BSD FRML MDRD: 67 ML/MIN/1.73
GFR SERPL CREATININE-BSD FRML MDRD: 67 ML/MIN/1.73
GFR SERPL CREATININE-BSD FRML MDRD: 72 ML/MIN/1.73
GFR SERPL CREATININE-BSD FRML MDRD: 82 ML/MIN/1.73
GFR SERPL CREATININE-BSD FRML MDRD: 85 ML/MIN/1.73
GFR SERPL CREATININE-BSD FRML MDRD: 90 ML/MIN/1.73
GFR SERPL CREATININE-BSD FRML MDRD: 91 ML/MIN/1.73
GFR SERPL CREATININE-BSD FRML MDRD: 98 ML/MIN/1.73
GLOBULIN SER CALC-MCNC: 2.3 GM/DL
GLOBULIN SER CALC-MCNC: 2.7 GM/DL
GLOBULIN UR ELPH-MCNC: 2.8 GM/DL
GLOBULIN UR ELPH-MCNC: 2.9 GM/DL
GLOBULIN UR ELPH-MCNC: 2.9 GM/DL
GLOBULIN UR ELPH-MCNC: 3 GM/DL
GLOBULIN UR ELPH-MCNC: 3.1 GM/DL
GLOBULIN UR ELPH-MCNC: 3.2 GM/DL
GLOBULIN UR ELPH-MCNC: 3.4 GM/DL
GLUCOSE BLDC GLUCOMTR-MCNC: 103 MG/DL (ref 70–130)
GLUCOSE BLDC GLUCOMTR-MCNC: 111 MG/DL (ref 70–130)
GLUCOSE BLDC GLUCOMTR-MCNC: 117 MG/DL (ref 70–130)
GLUCOSE BLDC GLUCOMTR-MCNC: 143 MG/DL (ref 70–130)
GLUCOSE BLDC GLUCOMTR-MCNC: 99 MG/DL (ref 70–130)
GLUCOSE SERPL-MCNC: 103 MG/DL (ref 65–99)
GLUCOSE SERPL-MCNC: 104 MG/DL (ref 65–99)
GLUCOSE SERPL-MCNC: 105 MG/DL (ref 65–99)
GLUCOSE SERPL-MCNC: 105 MG/DL (ref 65–99)
GLUCOSE SERPL-MCNC: 107 MG/DL (ref 65–99)
GLUCOSE SERPL-MCNC: 108 MG/DL (ref 65–99)
GLUCOSE SERPL-MCNC: 109 MG/DL (ref 65–99)
GLUCOSE SERPL-MCNC: 110 MG/DL (ref 65–99)
GLUCOSE SERPL-MCNC: 110 MG/DL (ref 65–99)
GLUCOSE SERPL-MCNC: 118 MG/DL (ref 65–99)
GLUCOSE SERPL-MCNC: 126 MG/DL (ref 65–99)
GLUCOSE SERPL-MCNC: 127 MG/DL (ref 65–99)
GLUCOSE SERPL-MCNC: 127 MG/DL (ref 65–99)
GLUCOSE SERPL-MCNC: 129 MG/DL (ref 65–99)
GLUCOSE SERPL-MCNC: 132 MG/DL (ref 65–99)
GLUCOSE SERPL-MCNC: 154 MG/DL (ref 65–99)
GLUCOSE SERPL-MCNC: 89 MG/DL (ref 65–99)
GLUCOSE SERPL-MCNC: 94 MG/DL (ref 65–99)
GLUCOSE SERPL-MCNC: 99 MG/DL (ref 65–99)
GLUCOSE UR STRIP-MCNC: NEGATIVE MG/DL
GRAM STN SPEC: ABNORMAL
GRAM STN SPEC: ABNORMAL
HADV DNA SPEC NAA+PROBE: NOT DETECTED
HBA1C MFR BLD: 5.8 % (ref 4.8–5.6)
HBA1C MFR BLD: 6.3 % (ref 4.8–5.6)
HCOV 229E RNA SPEC QL NAA+PROBE: NOT DETECTED
HCOV HKU1 RNA SPEC QL NAA+PROBE: NOT DETECTED
HCOV NL63 RNA SPEC QL NAA+PROBE: NOT DETECTED
HCOV OC43 RNA SPEC QL NAA+PROBE: NOT DETECTED
HCT VFR BLD AUTO: 35 % (ref 37.5–51)
HCT VFR BLD AUTO: 35.4 % (ref 37.5–51)
HCT VFR BLD AUTO: 37.5 % (ref 37.5–51)
HCT VFR BLD AUTO: 37.6 % (ref 37.5–51)
HCT VFR BLD AUTO: 37.6 % (ref 37.5–51)
HCT VFR BLD AUTO: 37.9 % (ref 37.5–51)
HCT VFR BLD AUTO: 38.3 % (ref 37.5–51)
HCT VFR BLD AUTO: 38.4 % (ref 37.5–51)
HCT VFR BLD AUTO: 38.4 % (ref 37.5–51)
HCT VFR BLD AUTO: 38.7 % (ref 37.5–51)
HCT VFR BLD AUTO: 38.9 % (ref 37.5–51)
HCT VFR BLD AUTO: 39.2 % (ref 37.5–51)
HCT VFR BLD AUTO: 40.3 % (ref 37.5–51)
HCT VFR BLD AUTO: 40.7 % (ref 37.5–51)
HCT VFR BLD AUTO: 42.1 % (ref 37.5–51)
HCT VFR BLD AUTO: 44 % (ref 37.5–51)
HCT VFR BLD AUTO: 44.4 % (ref 37.5–51)
HCT VFR BLD AUTO: 45.9 % (ref 37.5–51)
HCT VFR BLD AUTO: 46 % (ref 37.5–51)
HCT VFR BLD AUTO: 48.2 % (ref 37.5–51)
HDLC SERPL-MCNC: 51 MG/DL (ref 40–60)
HGB BLD-MCNC: 12.1 G/DL (ref 13–17.7)
HGB BLD-MCNC: 12.4 G/DL (ref 13–17.7)
HGB BLD-MCNC: 12.4 G/DL (ref 13–17.7)
HGB BLD-MCNC: 12.7 G/DL (ref 13–17.7)
HGB BLD-MCNC: 12.8 G/DL (ref 13–17.7)
HGB BLD-MCNC: 13.2 G/DL (ref 13–17.7)
HGB BLD-MCNC: 13.3 G/DL (ref 13–17.7)
HGB BLD-MCNC: 13.3 G/DL (ref 13–17.7)
HGB BLD-MCNC: 13.4 G/DL (ref 13–17.7)
HGB BLD-MCNC: 13.5 G/DL (ref 13–17.7)
HGB BLD-MCNC: 13.7 G/DL (ref 13–17.7)
HGB BLD-MCNC: 13.8 G/DL (ref 13–17.7)
HGB BLD-MCNC: 13.9 G/DL (ref 13–17.7)
HGB BLD-MCNC: 14.2 G/DL (ref 13–17.7)
HGB BLD-MCNC: 14.6 G/DL (ref 13–17.7)
HGB BLD-MCNC: 14.6 G/DL (ref 13–17.7)
HGB BLD-MCNC: 15.4 G/DL (ref 13–17.7)
HGB BLD-MCNC: 15.5 G/DL (ref 13–17.7)
HGB BLD-MCNC: 15.9 G/DL (ref 13–17.7)
HGB BLD-MCNC: 16.4 G/DL (ref 13–17.7)
HGB UR QL STRIP.AUTO: ABNORMAL
HGB UR QL STRIP.AUTO: ABNORMAL
HGB UR QL STRIP.AUTO: NEGATIVE
HMPV RNA NPH QL NAA+NON-PROBE: NOT DETECTED
HOLD SPECIMEN: NORMAL
HOLD SPECIMEN: NORMAL
HPIV1 RNA SPEC QL NAA+PROBE: NOT DETECTED
HPIV2 RNA SPEC QL NAA+PROBE: NOT DETECTED
HPIV3 RNA NPH QL NAA+PROBE: NOT DETECTED
HPIV4 P GENE NPH QL NAA+PROBE: NOT DETECTED
HYALINE CASTS UR QL AUTO: ABNORMAL /LPF
HYALINE CASTS UR QL AUTO: ABNORMAL /LPF
IMM GRANULOCYTES # BLD AUTO: 0.02 10*3/MM3 (ref 0–0.05)
IMM GRANULOCYTES # BLD AUTO: 0.03 10*3/MM3 (ref 0–0.05)
IMM GRANULOCYTES # BLD AUTO: 0.03 10*3/MM3 (ref 0–0.05)
IMM GRANULOCYTES # BLD AUTO: 0.04 10*3/MM3 (ref 0–0.05)
IMM GRANULOCYTES # BLD AUTO: 0.05 10*3/MM3 (ref 0–0.05)
IMM GRANULOCYTES # BLD AUTO: 0.07 10*3/MM3 (ref 0–0.05)
IMM GRANULOCYTES # BLD AUTO: 0.13 10*3/MM3 (ref 0–0.05)
IMM GRANULOCYTES # BLD AUTO: 0.18 10*3/MM3 (ref 0–0.05)
IMM GRANULOCYTES # BLD AUTO: 0.19 10*3/MM3 (ref 0–0.05)
IMM GRANULOCYTES # BLD AUTO: 0.19 10*3/MM3 (ref 0–0.05)
IMM GRANULOCYTES # BLD AUTO: 0.26 10*3/MM3 (ref 0–0.05)
IMM GRANULOCYTES # BLD AUTO: 0.29 10*3/MM3 (ref 0–0.05)
IMM GRANULOCYTES NFR BLD AUTO: 0.2 % (ref 0–0.5)
IMM GRANULOCYTES NFR BLD AUTO: 0.3 % (ref 0–0.5)
IMM GRANULOCYTES NFR BLD AUTO: 0.3 % (ref 0–0.5)
IMM GRANULOCYTES NFR BLD AUTO: 0.4 % (ref 0–0.5)
IMM GRANULOCYTES NFR BLD AUTO: 0.5 % (ref 0–0.5)
IMM GRANULOCYTES NFR BLD AUTO: 0.5 % (ref 0–0.5)
IMM GRANULOCYTES NFR BLD AUTO: 0.6 % (ref 0–0.5)
IMM GRANULOCYTES NFR BLD AUTO: 0.6 % (ref 0–0.5)
IMM GRANULOCYTES NFR BLD AUTO: 0.8 % (ref 0–0.5)
IMM GRANULOCYTES NFR BLD AUTO: 1 % (ref 0–0.5)
IMM GRANULOCYTES NFR BLD AUTO: 1.1 % (ref 0–0.5)
IMM GRANULOCYTES NFR BLD AUTO: 1.2 % (ref 0–0.5)
IMM GRANULOCYTES NFR BLD AUTO: 1.4 % (ref 0–0.5)
IMM GRANULOCYTES NFR BLD AUTO: 2.2 % (ref 0–0.5)
IMM GRANULOCYTES NFR BLD AUTO: 2.3 % (ref 0–0.5)
INR PPP: 1.37 (ref 0.9–1.1)
INR PPP: 1.39 (ref 0.9–1.1)
INR PPP: 1.48 (ref 0.9–1.1)
INR PPP: 1.5 (ref 0.91–1.09)
INR PPP: 1.5 (ref 0.9–1.1)
INR PPP: 1.55 (ref 0.9–1.1)
INR PPP: 1.59 (ref 0.9–1.1)
INR PPP: 1.67
INR PPP: 1.67 (ref 0.9–1.1)
INR PPP: 1.67 (ref 0.9–1.1)
INR PPP: 1.8 (ref 0.91–1.09)
INR PPP: 1.8 (ref 0.91–1.09)
INR PPP: 1.86 (ref 0.9–1.1)
INR PPP: 1.9 (ref 0.91–1.09)
INR PPP: 1.9 (ref 0.91–1.09)
INR PPP: 1.91 (ref 0.9–1.1)
INR PPP: 1.95 (ref 0.9–1.1)
INR PPP: 2 (ref 0.91–1.09)
INR PPP: 2.01 (ref 0.9–1.1)
INR PPP: 2.04
INR PPP: 2.04 (ref 0.9–1.1)
INR PPP: 2.07 (ref 0.9–1.1)
INR PPP: 2.08 (ref 0.9–1.1)
INR PPP: 2.11 (ref 0.9–1.1)
INR PPP: 2.13 (ref 0.9–1.1)
INR PPP: 2.16 (ref 0.9–1.1)
INR PPP: 2.2 (ref 0.9–1.1)
INR PPP: 2.26 (ref 0.9–1.1)
INR PPP: 2.3 (ref 0.91–1.09)
INR PPP: 2.4 (ref 0.91–1.09)
INR PPP: 2.6
INR PPP: 2.61 (ref 0.9–1.1)
INR PPP: 2.64 (ref 0.9–1.1)
INR PPP: 2.7 (ref 0.91–1.09)
INR PPP: 3.6 (ref 0.91–1.09)
INR PPP: 4.2 (ref 0.91–1.09)
INR PPP: 4.2 (ref 0.91–1.09)
KETONES UR QL STRIP: ABNORMAL
KETONES UR QL STRIP: NEGATIVE
KETONES UR QL STRIP: NEGATIVE
LDLC SERPL CALC-MCNC: 43 MG/DL (ref 0–100)
LDLC/HDLC SERPL: 0.88 {RATIO}
LEUKOCYTE ESTERASE UR QL STRIP.AUTO: NEGATIVE
LYMPHOCYTES # BLD AUTO: 1.08 10*3/MM3 (ref 0.7–3.1)
LYMPHOCYTES # BLD AUTO: 1.34 10*3/MM3 (ref 0.7–3.1)
LYMPHOCYTES # BLD AUTO: 1.43 10*3/MM3 (ref 0.7–3.1)
LYMPHOCYTES # BLD AUTO: 1.45 10*3/MM3 (ref 0.7–3.1)
LYMPHOCYTES # BLD AUTO: 1.46 10*3/MM3 (ref 0.7–3.1)
LYMPHOCYTES # BLD AUTO: 1.54 10*3/MM3 (ref 0.7–3.1)
LYMPHOCYTES # BLD AUTO: 1.54 10*3/MM3 (ref 0.7–3.1)
LYMPHOCYTES # BLD AUTO: 1.62 10*3/MM3 (ref 0.7–3.1)
LYMPHOCYTES # BLD AUTO: 1.63 10*3/MM3 (ref 0.7–3.1)
LYMPHOCYTES # BLD AUTO: 1.72 10*3/MM3 (ref 0.7–3.1)
LYMPHOCYTES # BLD AUTO: 1.78 10*3/MM3 (ref 0.7–3.1)
LYMPHOCYTES # BLD AUTO: 1.78 10*3/MM3 (ref 0.7–3.1)
LYMPHOCYTES # BLD AUTO: 1.89 10*3/MM3 (ref 0.7–3.1)
LYMPHOCYTES # BLD AUTO: 1.89 10*3/MM3 (ref 0.7–3.1)
LYMPHOCYTES # BLD AUTO: 1.91 10*3/MM3 (ref 0.7–3.1)
LYMPHOCYTES # BLD AUTO: 1.94 10*3/MM3 (ref 0.7–3.1)
LYMPHOCYTES # BLD AUTO: 2 10*3/MM3 (ref 0.7–3.1)
LYMPHOCYTES # BLD AUTO: 2.01 10*3/MM3 (ref 0.7–3.1)
LYMPHOCYTES # BLD AUTO: 2.19 10*3/MM3 (ref 0.7–3.1)
LYMPHOCYTES NFR BLD AUTO: 11.7 % (ref 19.6–45.3)
LYMPHOCYTES NFR BLD AUTO: 12.1 % (ref 19.6–45.3)
LYMPHOCYTES NFR BLD AUTO: 14.2 % (ref 19.6–45.3)
LYMPHOCYTES NFR BLD AUTO: 14.4 % (ref 19.6–45.3)
LYMPHOCYTES NFR BLD AUTO: 15 % (ref 19.6–45.3)
LYMPHOCYTES NFR BLD AUTO: 15.2 % (ref 19.6–45.3)
LYMPHOCYTES NFR BLD AUTO: 15.2 % (ref 19.6–45.3)
LYMPHOCYTES NFR BLD AUTO: 15.5 % (ref 19.6–45.3)
LYMPHOCYTES NFR BLD AUTO: 16 % (ref 19.6–45.3)
LYMPHOCYTES NFR BLD AUTO: 16.3 % (ref 19.6–45.3)
LYMPHOCYTES NFR BLD AUTO: 18.4 % (ref 19.6–45.3)
LYMPHOCYTES NFR BLD AUTO: 18.9 % (ref 19.6–45.3)
LYMPHOCYTES NFR BLD AUTO: 20.4 % (ref 19.6–45.3)
LYMPHOCYTES NFR BLD AUTO: 21.3 % (ref 19.6–45.3)
LYMPHOCYTES NFR BLD AUTO: 21.5 % (ref 19.6–45.3)
LYMPHOCYTES NFR BLD AUTO: 22.3 % (ref 19.6–45.3)
LYMPHOCYTES NFR BLD AUTO: 22.6 % (ref 19.6–45.3)
LYMPHOCYTES NFR BLD AUTO: 23.1 % (ref 19.6–45.3)
LYMPHOCYTES NFR BLD AUTO: 23.5 % (ref 19.6–45.3)
M PNEUMO IGG SER IA-ACNC: NOT DETECTED
MAGNESIUM SERPL-MCNC: 2.1 MG/DL (ref 1.6–2.4)
MAGNESIUM SERPL-MCNC: 2.2 MG/DL (ref 1.6–2.4)
MAGNESIUM SERPL-MCNC: 2.3 MG/DL (ref 1.6–2.4)
MAGNESIUM SERPL-MCNC: 2.4 MG/DL (ref 1.6–2.4)
MCH RBC QN AUTO: 30.2 PG (ref 26.6–33)
MCH RBC QN AUTO: 30.2 PG (ref 26.6–33)
MCH RBC QN AUTO: 30.3 PG (ref 26.6–33)
MCH RBC QN AUTO: 30.4 PG (ref 26.6–33)
MCH RBC QN AUTO: 30.5 PG (ref 26.6–33)
MCH RBC QN AUTO: 30.5 PG (ref 26.6–33)
MCH RBC QN AUTO: 30.6 PG (ref 26.6–33)
MCH RBC QN AUTO: 30.7 PG (ref 26.6–33)
MCH RBC QN AUTO: 30.8 PG (ref 26.6–33)
MCH RBC QN AUTO: 30.8 PG (ref 26.6–33)
MCH RBC QN AUTO: 30.9 PG (ref 26.6–33)
MCH RBC QN AUTO: 30.9 PG (ref 26.6–33)
MCH RBC QN AUTO: 31.1 PG (ref 26.6–33)
MCH RBC QN AUTO: 31.1 PG (ref 26.6–33)
MCH RBC QN AUTO: 31.5 PG (ref 26.6–33)
MCH RBC QN AUTO: 31.6 PG (ref 26.6–33)
MCHC RBC AUTO-ENTMCNC: 32.9 G/DL (ref 31.5–35.7)
MCHC RBC AUTO-ENTMCNC: 33 G/DL (ref 31.5–35.7)
MCHC RBC AUTO-ENTMCNC: 33.5 G/DL (ref 31.5–35.7)
MCHC RBC AUTO-ENTMCNC: 33.7 G/DL (ref 31.5–35.7)
MCHC RBC AUTO-ENTMCNC: 33.8 G/DL (ref 31.5–35.7)
MCHC RBC AUTO-ENTMCNC: 34 G/DL (ref 31.5–35.7)
MCHC RBC AUTO-ENTMCNC: 34 G/DL (ref 31.5–35.7)
MCHC RBC AUTO-ENTMCNC: 34.4 G/DL (ref 31.5–35.7)
MCHC RBC AUTO-ENTMCNC: 34.5 G/DL (ref 31.5–35.7)
MCHC RBC AUTO-ENTMCNC: 34.6 G/DL (ref 31.5–35.7)
MCHC RBC AUTO-ENTMCNC: 34.6 G/DL (ref 31.5–35.7)
MCHC RBC AUTO-ENTMCNC: 34.7 G/DL (ref 31.5–35.7)
MCHC RBC AUTO-ENTMCNC: 34.7 G/DL (ref 31.5–35.7)
MCHC RBC AUTO-ENTMCNC: 34.9 G/DL (ref 31.5–35.7)
MCHC RBC AUTO-ENTMCNC: 34.9 G/DL (ref 31.5–35.7)
MCHC RBC AUTO-ENTMCNC: 35 G/DL (ref 31.5–35.7)
MCHC RBC AUTO-ENTMCNC: 35 G/DL (ref 31.5–35.7)
MCHC RBC AUTO-ENTMCNC: 35.5 G/DL (ref 31.5–35.7)
MCHC RBC AUTO-ENTMCNC: 35.7 G/DL (ref 31.5–35.7)
MCHC RBC AUTO-ENTMCNC: 36 G/DL (ref 31.5–35.7)
MCV RBC AUTO: 85.3 FL (ref 79–97)
MCV RBC AUTO: 85.6 FL (ref 79–97)
MCV RBC AUTO: 86.6 FL (ref 79–97)
MCV RBC AUTO: 87.2 FL (ref 79–97)
MCV RBC AUTO: 87.7 FL (ref 79–97)
MCV RBC AUTO: 87.7 FL (ref 79–97)
MCV RBC AUTO: 88.6 FL (ref 79–97)
MCV RBC AUTO: 88.7 FL (ref 79–97)
MCV RBC AUTO: 88.7 FL (ref 79–97)
MCV RBC AUTO: 88.8 FL (ref 79–97)
MCV RBC AUTO: 89.3 FL (ref 79–97)
MCV RBC AUTO: 89.5 FL (ref 79–97)
MCV RBC AUTO: 90.4 FL (ref 79–97)
MCV RBC AUTO: 90.5 FL (ref 79–97)
MCV RBC AUTO: 90.7 FL (ref 79–97)
MCV RBC AUTO: 91.5 FL (ref 79–97)
MCV RBC AUTO: 91.5 FL (ref 79–97)
MCV RBC AUTO: 91.6 FL (ref 79–97)
MCV RBC AUTO: 91.7 FL (ref 79–97)
MCV RBC AUTO: 91.7 FL (ref 79–97)
MONOCYTES # BLD AUTO: 0.58 10*3/MM3 (ref 0.1–0.9)
MONOCYTES # BLD AUTO: 0.6 10*3/MM3 (ref 0.1–0.9)
MONOCYTES # BLD AUTO: 0.63 10*3/MM3 (ref 0.1–0.9)
MONOCYTES # BLD AUTO: 0.66 10*3/MM3 (ref 0.1–0.9)
MONOCYTES # BLD AUTO: 0.7 10*3/MM3 (ref 0.1–0.9)
MONOCYTES # BLD AUTO: 0.7 10*3/MM3 (ref 0.1–0.9)
MONOCYTES # BLD AUTO: 0.71 10*3/MM3 (ref 0.1–0.9)
MONOCYTES # BLD AUTO: 0.81 10*3/MM3 (ref 0.1–0.9)
MONOCYTES # BLD AUTO: 0.84 10*3/MM3 (ref 0.1–0.9)
MONOCYTES # BLD AUTO: 0.9 10*3/MM3 (ref 0.1–0.9)
MONOCYTES # BLD AUTO: 0.92 10*3/MM3 (ref 0.1–0.9)
MONOCYTES # BLD AUTO: 0.95 10*3/MM3 (ref 0.1–0.9)
MONOCYTES # BLD AUTO: 0.95 10*3/MM3 (ref 0.1–0.9)
MONOCYTES # BLD AUTO: 0.98 10*3/MM3 (ref 0.1–0.9)
MONOCYTES # BLD AUTO: 1.16 10*3/MM3 (ref 0.1–0.9)
MONOCYTES # BLD AUTO: 1.18 10*3/MM3 (ref 0.1–0.9)
MONOCYTES # BLD AUTO: 1.23 10*3/MM3 (ref 0.1–0.9)
MONOCYTES # BLD AUTO: 1.53 10*3/MM3 (ref 0.1–0.9)
MONOCYTES # BLD AUTO: 1.8 10*3/MM3 (ref 0.1–0.9)
MONOCYTES NFR BLD AUTO: 10.2 % (ref 5–12)
MONOCYTES NFR BLD AUTO: 10.5 % (ref 5–12)
MONOCYTES NFR BLD AUTO: 10.5 % (ref 5–12)
MONOCYTES NFR BLD AUTO: 10.6 % (ref 5–12)
MONOCYTES NFR BLD AUTO: 10.9 % (ref 5–12)
MONOCYTES NFR BLD AUTO: 7.2 % (ref 5–12)
MONOCYTES NFR BLD AUTO: 7.2 % (ref 5–12)
MONOCYTES NFR BLD AUTO: 7.8 % (ref 5–12)
MONOCYTES NFR BLD AUTO: 8.3 % (ref 5–12)
MONOCYTES NFR BLD AUTO: 8.9 % (ref 5–12)
MONOCYTES NFR BLD AUTO: 9 % (ref 5–12)
MONOCYTES NFR BLD AUTO: 9.1 % (ref 5–12)
MONOCYTES NFR BLD AUTO: 9.2 % (ref 5–12)
MONOCYTES NFR BLD AUTO: 9.5 % (ref 5–12)
MONOCYTES NFR BLD AUTO: 9.6 % (ref 5–12)
MONOCYTES NFR BLD AUTO: 9.6 % (ref 5–12)
MONOCYTES NFR BLD AUTO: 9.8 % (ref 5–12)
MONOCYTES NFR BLD AUTO: 9.9 % (ref 5–12)
MONOCYTES NFR BLD AUTO: 9.9 % (ref 5–12)
NEUTROPHILS # BLD AUTO: 5.29 10*3/MM3 (ref 1.7–7)
NEUTROPHILS NFR BLD AUTO: 10.28 10*3/MM3 (ref 1.7–7)
NEUTROPHILS NFR BLD AUTO: 11.43 10*3/MM3 (ref 1.7–7)
NEUTROPHILS NFR BLD AUTO: 13.05 10*3/MM3 (ref 1.7–7)
NEUTROPHILS NFR BLD AUTO: 4.24 10*3/MM3 (ref 1.7–7)
NEUTROPHILS NFR BLD AUTO: 4.46 10*3/MM3 (ref 1.7–7)
NEUTROPHILS NFR BLD AUTO: 4.63 10*3/MM3 (ref 1.7–7)
NEUTROPHILS NFR BLD AUTO: 4.91 10*3/MM3 (ref 1.7–7)
NEUTROPHILS NFR BLD AUTO: 5.01 10*3/MM3 (ref 1.7–7)
NEUTROPHILS NFR BLD AUTO: 5.01 10*3/MM3 (ref 1.7–7)
NEUTROPHILS NFR BLD AUTO: 5.48 10*3/MM3 (ref 1.7–7)
NEUTROPHILS NFR BLD AUTO: 5.67 10*3/MM3 (ref 1.7–7)
NEUTROPHILS NFR BLD AUTO: 5.98 10*3/MM3 (ref 1.7–7)
NEUTROPHILS NFR BLD AUTO: 6.21 10*3/MM3 (ref 1.7–7)
NEUTROPHILS NFR BLD AUTO: 6.33 10*3/MM3 (ref 1.7–7)
NEUTROPHILS NFR BLD AUTO: 63 % (ref 42.7–76)
NEUTROPHILS NFR BLD AUTO: 63.6 % (ref 42.7–76)
NEUTROPHILS NFR BLD AUTO: 64.6 % (ref 42.7–76)
NEUTROPHILS NFR BLD AUTO: 64.8 % (ref 42.7–76)
NEUTROPHILS NFR BLD AUTO: 64.9 % (ref 42.7–76)
NEUTROPHILS NFR BLD AUTO: 65.4 % (ref 42.7–76)
NEUTROPHILS NFR BLD AUTO: 65.7 % (ref 42.7–76)
NEUTROPHILS NFR BLD AUTO: 65.9 % (ref 42.7–76)
NEUTROPHILS NFR BLD AUTO: 67.6 % (ref 42.7–76)
NEUTROPHILS NFR BLD AUTO: 68.3 % (ref 42.7–76)
NEUTROPHILS NFR BLD AUTO: 70.6 % (ref 42.7–76)
NEUTROPHILS NFR BLD AUTO: 71.9 % (ref 42.7–76)
NEUTROPHILS NFR BLD AUTO: 71.9 % (ref 42.7–76)
NEUTROPHILS NFR BLD AUTO: 73 % (ref 42.7–76)
NEUTROPHILS NFR BLD AUTO: 73.5 % (ref 42.7–76)
NEUTROPHILS NFR BLD AUTO: 73.9 % (ref 42.7–76)
NEUTROPHILS NFR BLD AUTO: 75.1 % (ref 42.7–76)
NEUTROPHILS NFR BLD AUTO: 76.1 % (ref 42.7–76)
NEUTROPHILS NFR BLD AUTO: 76.4 % (ref 42.7–76)
NEUTROPHILS NFR BLD AUTO: 8.28 10*3/MM3 (ref 1.7–7)
NEUTROPHILS NFR BLD AUTO: 8.73 10*3/MM3 (ref 1.7–7)
NEUTROPHILS NFR BLD AUTO: 9.48 10*3/MM3 (ref 1.7–7)
NEUTROPHILS NFR BLD AUTO: 9.92 10*3/MM3 (ref 1.7–7)
NITRITE UR QL STRIP: NEGATIVE
NRBC BLD AUTO-RTO: 0 /100 WBC (ref 0–0.2)
NRBC BLD AUTO-RTO: 0.1 /100 WBC (ref 0–0.2)
NT-PROBNP SERPL-MCNC: 1176 PG/ML (ref 0–1800)
PH UR STRIP.AUTO: 6 [PH] (ref 5–8)
PH UR STRIP.AUTO: 7.5 [PH] (ref 5–8)
PH UR STRIP.AUTO: 8.5 [PH] (ref 5–8)
PLATELET # BLD AUTO: 123 10*3/MM3 (ref 140–450)
PLATELET # BLD AUTO: 152 10*3/MM3 (ref 140–450)
PLATELET # BLD AUTO: 166 10*3/MM3 (ref 140–450)
PLATELET # BLD AUTO: 170 10*3/MM3 (ref 140–450)
PLATELET # BLD AUTO: 175 10*3/MM3 (ref 140–450)
PLATELET # BLD AUTO: 183 10*3/MM3 (ref 140–450)
PLATELET # BLD AUTO: 184 10*3/MM3 (ref 140–450)
PLATELET # BLD AUTO: 201 10*3/MM3 (ref 140–450)
PLATELET # BLD AUTO: 201 10*3/MM3 (ref 140–450)
PLATELET # BLD AUTO: 222 10*3/MM3 (ref 140–450)
PLATELET # BLD AUTO: 225 10*3/MM3 (ref 140–450)
PLATELET # BLD AUTO: 225 10*3/MM3 (ref 140–450)
PLATELET # BLD AUTO: 227 10*3/MM3 (ref 140–450)
PLATELET # BLD AUTO: 228 10*3/MM3 (ref 140–450)
PLATELET # BLD AUTO: 229 10*3/MM3 (ref 140–450)
PLATELET # BLD AUTO: 233 10*3/MM3 (ref 140–450)
PLATELET # BLD AUTO: 239 10*3/MM3 (ref 140–450)
PLATELET # BLD AUTO: 250 10*3/MM3 (ref 140–450)
PLATELET # BLD AUTO: 252 10*3/MM3 (ref 140–450)
PLATELET # BLD AUTO: 258 10*3/MM3 (ref 140–450)
PMV BLD AUTO: 10 FL (ref 6–12)
PMV BLD AUTO: 10.1 FL (ref 6–12)
PMV BLD AUTO: 10.4 FL (ref 6–12)
PMV BLD AUTO: 10.8 FL (ref 6–12)
PMV BLD AUTO: 11 FL (ref 6–12)
PMV BLD AUTO: 11.3 FL (ref 6–12)
PMV BLD AUTO: 11.4 FL (ref 6–12)
PMV BLD AUTO: 11.4 FL (ref 6–12)
PMV BLD AUTO: 11.7 FL (ref 6–12)
PMV BLD AUTO: 11.9 FL (ref 6–12)
PMV BLD AUTO: 9.6 FL (ref 6–12)
PMV BLD AUTO: 9.7 FL (ref 6–12)
PMV BLD AUTO: 9.8 FL (ref 6–12)
PMV BLD AUTO: 9.8 FL (ref 6–12)
PMV BLD AUTO: 9.9 FL (ref 6–12)
POTASSIUM SERPL-SCNC: 3.2 MMOL/L (ref 3.5–5.2)
POTASSIUM SERPL-SCNC: 3.4 MMOL/L (ref 3.5–5.2)
POTASSIUM SERPL-SCNC: 3.5 MMOL/L (ref 3.5–5.2)
POTASSIUM SERPL-SCNC: 3.6 MMOL/L (ref 3.5–5.2)
POTASSIUM SERPL-SCNC: 3.7 MMOL/L (ref 3.5–5.2)
POTASSIUM SERPL-SCNC: 3.8 MMOL/L (ref 3.5–5.2)
POTASSIUM SERPL-SCNC: 3.8 MMOL/L (ref 3.5–5.2)
POTASSIUM SERPL-SCNC: 3.9 MMOL/L (ref 3.5–5.2)
POTASSIUM SERPL-SCNC: 3.9 MMOL/L (ref 3.5–5.2)
POTASSIUM SERPL-SCNC: 4 MMOL/L (ref 3.5–5.2)
POTASSIUM SERPL-SCNC: 4.2 MMOL/L (ref 3.5–5.2)
POTASSIUM SERPL-SCNC: 4.3 MMOL/L (ref 3.5–5.2)
PROCALCITONIN SERPL-MCNC: 0.09 NG/ML (ref 0–0.25)
PROT SERPL-MCNC: 5.7 G/DL (ref 6–8.5)
PROT SERPL-MCNC: 6 G/DL (ref 6–8.5)
PROT SERPL-MCNC: 6.2 G/DL (ref 6–8.5)
PROT SERPL-MCNC: 6.3 G/DL (ref 6–8.5)
PROT SERPL-MCNC: 6.5 G/DL (ref 6–8.5)
PROT SERPL-MCNC: 6.5 G/DL (ref 6–8.5)
PROT SERPL-MCNC: 6.6 G/DL (ref 6–8.5)
PROT SERPL-MCNC: 6.7 G/DL (ref 6–8.5)
PROT SERPL-MCNC: 6.7 G/DL (ref 6–8.5)
PROT SERPL-MCNC: 6.9 G/DL (ref 6–8.5)
PROT SERPL-MCNC: 7.1 G/DL (ref 6–8.5)
PROT SERPL-MCNC: 7.2 G/DL (ref 6–8.5)
PROT UR QL STRIP: ABNORMAL
PROT UR QL STRIP: ABNORMAL
PROT UR QL STRIP: NEGATIVE
PROTHROMBIN TIME: 16.6 SECONDS (ref 11.7–14.2)
PROTHROMBIN TIME: 16.8 SECONDS (ref 11.7–14.2)
PROTHROMBIN TIME: 17.6 SECONDS (ref 11.7–14.2)
PROTHROMBIN TIME: 17.9 SECONDS (ref 11.7–14.2)
PROTHROMBIN TIME: 18.1 SECONDS (ref 10–13.8)
PROTHROMBIN TIME: 18.2 SECONDS (ref 11.7–14.2)
PROTHROMBIN TIME: 18.6 SECONDS (ref 11.7–14.2)
PROTHROMBIN TIME: 19.3 SECONDS (ref 11.7–14.2)
PROTHROMBIN TIME: 19.3 SECONDS (ref 11.7–14.2)
PROTHROMBIN TIME: 20.9 SECONDS (ref 11.7–14.2)
PROTHROMBIN TIME: 21.3 SECONDS (ref 10–13.8)
PROTHROMBIN TIME: 21.4 SECONDS (ref 11.7–14.2)
PROTHROMBIN TIME: 21.8 SECONDS (ref 11.7–14.2)
PROTHROMBIN TIME: 22 SECONDS (ref 10–13.8)
PROTHROMBIN TIME: 22.3 SECONDS (ref 10–13.8)
PROTHROMBIN TIME: 22.3 SECONDS (ref 11.7–14.2)
PROTHROMBIN TIME: 22.5 SECONDS (ref 11.7–14.2)
PROTHROMBIN TIME: 22.7 SECONDS (ref 11.7–14.2)
PROTHROMBIN TIME: 22.8 SECONDS (ref 11.7–14.2)
PROTHROMBIN TIME: 23.1 SECONDS (ref 10–13.8)
PROTHROMBIN TIME: 23.2 SECONDS (ref 11.7–14.2)
PROTHROMBIN TIME: 23.4 SECONDS (ref 11.7–14.2)
PROTHROMBIN TIME: 23.5 SECONDS (ref 11.7–14.2)
PROTHROMBIN TIME: 23.8 SECONDS (ref 11.7–14.2)
PROTHROMBIN TIME: 24.1 SECONDS (ref 10–13.8)
PROTHROMBIN TIME: 24.3 SECONDS (ref 11.7–14.2)
PROTHROMBIN TIME: 27.1 SECONDS (ref 11.7–14.2)
PROTHROMBIN TIME: 27.4 SECONDS (ref 11.7–14.2)
PROTHROMBIN TIME: 27.9 SECONDS (ref 10–13.8)
PROTHROMBIN TIME: 29.3 SECONDS (ref 10–13.8)
PROTHROMBIN TIME: 32.2 SECONDS (ref 10–13.8)
PROTHROMBIN TIME: 43.3 SECONDS (ref 10–13.8)
PROTHROMBIN TIME: 50.2 SECONDS (ref 10–13.8)
PROTHROMBIN TIME: 50.8 SECONDS (ref 10–13.8)
QT INTERVAL: 392 MS
QT INTERVAL: 406 MS
RBC # BLD AUTO: 3.91 10*6/MM3 (ref 4.14–5.8)
RBC # BLD AUTO: 3.99 10*6/MM3 (ref 4.14–5.8)
RBC # BLD AUTO: 4.1 10*6/MM3 (ref 4.14–5.8)
RBC # BLD AUTO: 4.14 10*6/MM3 (ref 4.14–5.8)
RBC # BLD AUTO: 4.21 10*6/MM3 (ref 4.14–5.8)
RBC # BLD AUTO: 4.25 10*6/MM3 (ref 4.14–5.8)
RBC # BLD AUTO: 4.32 10*6/MM3 (ref 4.14–5.8)
RBC # BLD AUTO: 4.33 10*6/MM3 (ref 4.14–5.8)
RBC # BLD AUTO: 4.36 10*6/MM3 (ref 4.14–5.8)
RBC # BLD AUTO: 4.38 10*6/MM3 (ref 4.14–5.8)
RBC # BLD AUTO: 4.4 10*6/MM3 (ref 4.14–5.8)
RBC # BLD AUTO: 4.46 10*6/MM3 (ref 4.14–5.8)
RBC # BLD AUTO: 4.5 10*6/MM3 (ref 4.14–5.8)
RBC # BLD AUTO: 4.64 10*6/MM3 (ref 4.14–5.8)
RBC # BLD AUTO: 4.8 10*6/MM3 (ref 4.14–5.8)
RBC # BLD AUTO: 4.84 10*6/MM3 (ref 4.14–5.8)
RBC # BLD AUTO: 5.06 10*6/MM3 (ref 4.14–5.8)
RBC # BLD AUTO: 5.08 10*6/MM3 (ref 4.14–5.8)
RBC # BLD AUTO: 5.19 10*6/MM3 (ref 4.14–5.8)
RBC # BLD AUTO: 5.27 10*6/MM3 (ref 4.14–5.8)
RBC # UR: ABNORMAL /HPF
RBC # UR: ABNORMAL /HPF
REF LAB TEST METHOD: ABNORMAL
REF LAB TEST METHOD: ABNORMAL
RH BLD: NEGATIVE
RHINOVIRUS RNA SPEC NAA+PROBE: NOT DETECTED
RSV RNA NPH QL NAA+NON-PROBE: NOT DETECTED
SARS-COV-2 RNA NPH QL NAA+NON-PROBE: NOT DETECTED
SODIUM SERPL-SCNC: 133 MMOL/L (ref 136–145)
SODIUM SERPL-SCNC: 133 MMOL/L (ref 136–145)
SODIUM SERPL-SCNC: 134 MMOL/L (ref 136–145)
SODIUM SERPL-SCNC: 135 MMOL/L (ref 136–145)
SODIUM SERPL-SCNC: 135 MMOL/L (ref 136–145)
SODIUM SERPL-SCNC: 136 MMOL/L (ref 136–145)
SODIUM SERPL-SCNC: 136 MMOL/L (ref 136–145)
SODIUM SERPL-SCNC: 138 MMOL/L (ref 136–145)
SODIUM SERPL-SCNC: 139 MMOL/L (ref 136–145)
SODIUM SERPL-SCNC: 140 MMOL/L (ref 136–145)
SODIUM SERPL-SCNC: 141 MMOL/L (ref 136–145)
SODIUM SERPL-SCNC: 142 MMOL/L (ref 136–145)
SP GR UR STRIP: 1.01 (ref 1–1.03)
SP GR UR STRIP: 1.02 (ref 1–1.03)
SP GR UR STRIP: >=1.03 (ref 1–1.03)
SQUAMOUS #/AREA URNS HPF: ABNORMAL /HPF
SQUAMOUS #/AREA URNS HPF: ABNORMAL /HPF
T&S EXPIRATION DATE: NORMAL
T3FREE SERPL-MCNC: 2.7 PG/ML (ref 2–4.4)
T4 FREE SERPL-MCNC: 1.62 NG/DL (ref 0.93–1.7)
T4 FREE SERPL-MCNC: 1.65 NG/DL (ref 0.93–1.7)
TRIGL SERPL-MCNC: 45 MG/DL (ref 0–150)
TROPONIN T SERPL-MCNC: 0.04 NG/ML (ref 0–0.03)
TROPONIN T SERPL-MCNC: 0.04 NG/ML (ref 0–0.03)
TROPONIN T SERPL-MCNC: 0.05 NG/ML (ref 0–0.03)
TROPONIN T SERPL-MCNC: <0.01 NG/ML (ref 0–0.03)
TSH SERPL DL<=0.005 MIU/L-ACNC: 2.34 UIU/ML (ref 0.27–4.2)
TSH SERPL DL<=0.005 MIU/L-ACNC: 4.58 UIU/ML (ref 0.27–4.2)
TSH SERPL DL<=0.05 MIU/L-ACNC: 3.99 UIU/ML (ref 0.27–4.2)
UNIT  ABO: NORMAL
UNIT  RH: NORMAL
UROBILINOGEN UR QL STRIP: ABNORMAL
UROBILINOGEN UR QL STRIP: ABNORMAL
UROBILINOGEN UR QL STRIP: NORMAL
VLDLC SERPL-MCNC: 11 MG/DL (ref 5–40)
WBC # BLD AUTO: 11.51 10*3/MM3 (ref 3.4–10.8)
WBC # BLD AUTO: 11.88 10*3/MM3 (ref 3.4–10.8)
WBC # BLD AUTO: 13.16 10*3/MM3 (ref 3.4–10.8)
WBC # BLD AUTO: 13.21 10*3/MM3 (ref 3.4–10.8)
WBC # BLD AUTO: 13.5 10*3/MM3 (ref 3.4–10.8)
WBC # BLD AUTO: 14.34 10*3/MM3 (ref 3.4–10.8)
WBC # BLD AUTO: 15.46 10*3/MM3 (ref 3.4–10.8)
WBC # BLD AUTO: 17.08 10*3/MM3 (ref 3.4–10.8)
WBC # BLD AUTO: 6.66 10*3/MM3 (ref 3.4–10.8)
WBC # BLD AUTO: 6.73 10*3/MM3 (ref 3.4–10.8)
WBC # BLD AUTO: 6.9 10*3/MM3 (ref 3.4–10.8)
WBC # BLD AUTO: 7.15 10*3/MM3 (ref 3.4–10.8)
WBC # BLD AUTO: 7.62 10*3/MM3 (ref 3.4–10.8)
WBC # BLD AUTO: 7.66 10*3/MM3 (ref 3.4–10.8)
WBC # BLD AUTO: 8.04 10*3/MM3 (ref 3.4–10.8)
WBC # BLD AUTO: 8.44 10*3/MM3 (ref 3.4–10.8)
WBC # BLD AUTO: 8.76 10*3/MM3 (ref 3.4–10.8)
WBC # BLD AUTO: 8.79 10*3/MM3 (ref 3.4–10.8)
WBC # BLD AUTO: 9.01 10*3/MM3 (ref 3.4–10.8)
WBC # BLD AUTO: 9.35 10*3/MM3 (ref 3.4–10.8)
WBC UR QL AUTO: ABNORMAL /HPF
WBC UR QL AUTO: ABNORMAL /HPF
WHOLE BLOOD HOLD SPECIMEN: NORMAL
WHOLE BLOOD HOLD SPECIMEN: NORMAL

## 2020-01-01 PROCEDURE — 99441 PR PHYS/QHP TELEPHONE EVALUATION 5-10 MIN: CPT | Performed by: NURSE PRACTITIONER

## 2020-01-01 PROCEDURE — 36415 COLL VENOUS BLD VENIPUNCTURE: CPT | Performed by: NURSE PRACTITIONER

## 2020-01-01 PROCEDURE — 85610 PROTHROMBIN TIME: CPT | Performed by: HOSPITALIST

## 2020-01-01 PROCEDURE — 73501 X-RAY EXAM HIP UNI 1 VIEW: CPT

## 2020-01-01 PROCEDURE — 80053 COMPREHEN METABOLIC PANEL: CPT | Performed by: EMERGENCY MEDICINE

## 2020-01-01 PROCEDURE — 85025 COMPLETE CBC W/AUTO DIFF WBC: CPT | Performed by: HOSPITALIST

## 2020-01-01 PROCEDURE — 80048 BASIC METABOLIC PNL TOTAL CA: CPT | Performed by: HOSPITALIST

## 2020-01-01 PROCEDURE — 25010000002 MORPHINE PER 10 MG: Performed by: HOSPITALIST

## 2020-01-01 PROCEDURE — 85610 PROTHROMBIN TIME: CPT | Performed by: ORTHOPAEDIC SURGERY

## 2020-01-01 PROCEDURE — 85027 COMPLETE CBC AUTOMATED: CPT | Performed by: HOSPITALIST

## 2020-01-01 PROCEDURE — 99223 1ST HOSP IP/OBS HIGH 75: CPT | Performed by: PSYCHIATRY & NEUROLOGY

## 2020-01-01 PROCEDURE — 99231 SBSQ HOSP IP/OBS SF/LOW 25: CPT | Performed by: PSYCHIATRY & NEUROLOGY

## 2020-01-01 PROCEDURE — 70450 CT HEAD/BRAIN W/O DYE: CPT

## 2020-01-01 PROCEDURE — 25010000002 LORAZEPAM PER 2 MG: Performed by: HOSPITALIST

## 2020-01-01 PROCEDURE — 86901 BLOOD TYPING SEROLOGIC RH(D): CPT | Performed by: EMERGENCY MEDICINE

## 2020-01-01 PROCEDURE — 25010000002 CLONIDINE PER 1 MG: Performed by: ORTHOPAEDIC SURGERY

## 2020-01-01 PROCEDURE — G0378 HOSPITAL OBSERVATION PER HR: HCPCS

## 2020-01-01 PROCEDURE — 25010000002 ROPIVACAINE PER 1 MG: Performed by: ORTHOPAEDIC SURGERY

## 2020-01-01 PROCEDURE — 93005 ELECTROCARDIOGRAM TRACING: CPT | Performed by: EMERGENCY MEDICINE

## 2020-01-01 PROCEDURE — G0463 HOSPITAL OUTPT CLINIC VISIT: HCPCS

## 2020-01-01 PROCEDURE — 25010000002 NEOSTIGMINE PER 0.5 MG: Performed by: ANESTHESIOLOGY

## 2020-01-01 PROCEDURE — 84145 PROCALCITONIN (PCT): CPT | Performed by: HOSPITALIST

## 2020-01-01 PROCEDURE — 93005 ELECTROCARDIOGRAM TRACING: CPT | Performed by: PHYSICIAN ASSISTANT

## 2020-01-01 PROCEDURE — 73560 X-RAY EXAM OF KNEE 1 OR 2: CPT

## 2020-01-01 PROCEDURE — 86850 RBC ANTIBODY SCREEN: CPT | Performed by: EMERGENCY MEDICINE

## 2020-01-01 PROCEDURE — 36416 COLLJ CAPILLARY BLOOD SPEC: CPT

## 2020-01-01 PROCEDURE — 97110 THERAPEUTIC EXERCISES: CPT

## 2020-01-01 PROCEDURE — 99223 1ST HOSP IP/OBS HIGH 75: CPT | Performed by: INTERNAL MEDICINE

## 2020-01-01 PROCEDURE — 25010000002 KETOROLAC TROMETHAMINE PER 15 MG: Performed by: ORTHOPAEDIC SURGERY

## 2020-01-01 PROCEDURE — 87086 URINE CULTURE/COLONY COUNT: CPT | Performed by: HOSPITALIST

## 2020-01-01 PROCEDURE — G0009 ADMIN PNEUMOCOCCAL VACCINE: HCPCS | Performed by: INTERNAL MEDICINE

## 2020-01-01 PROCEDURE — 83036 HEMOGLOBIN GLYCOSYLATED A1C: CPT | Performed by: HOSPITALIST

## 2020-01-01 PROCEDURE — 99284 EMERGENCY DEPT VISIT MOD MDM: CPT

## 2020-01-01 PROCEDURE — 93005 ELECTROCARDIOGRAM TRACING: CPT | Performed by: HOSPITALIST

## 2020-01-01 PROCEDURE — 25010000002 DEXAMETHASONE PER 1 MG: Performed by: NURSE ANESTHETIST, CERTIFIED REGISTERED

## 2020-01-01 PROCEDURE — 80053 COMPREHEN METABOLIC PANEL: CPT | Performed by: HOSPITALIST

## 2020-01-01 PROCEDURE — 84443 ASSAY THYROID STIM HORMONE: CPT | Performed by: HOSPITALIST

## 2020-01-01 PROCEDURE — 93010 ELECTROCARDIOGRAM REPORT: CPT | Performed by: INTERNAL MEDICINE

## 2020-01-01 PROCEDURE — 85730 THROMBOPLASTIN TIME PARTIAL: CPT | Performed by: HOSPITALIST

## 2020-01-01 PROCEDURE — 85610 PROTHROMBIN TIME: CPT

## 2020-01-01 PROCEDURE — 92526 ORAL FUNCTION THERAPY: CPT

## 2020-01-01 PROCEDURE — 83880 ASSAY OF NATRIURETIC PEPTIDE: CPT | Performed by: HOSPITALIST

## 2020-01-01 PROCEDURE — 73060 X-RAY EXAM OF HUMERUS: CPT

## 2020-01-01 PROCEDURE — 36415 COLL VENOUS BLD VENIPUNCTURE: CPT

## 2020-01-01 PROCEDURE — 84484 ASSAY OF TROPONIN QUANT: CPT | Performed by: PHYSICIAN ASSISTANT

## 2020-01-01 PROCEDURE — 97166 OT EVAL MOD COMPLEX 45 MIN: CPT

## 2020-01-01 PROCEDURE — 36415 COLL VENOUS BLD VENIPUNCTURE: CPT | Performed by: ORTHOPAEDIC SURGERY

## 2020-01-01 PROCEDURE — 99232 SBSQ HOSP IP/OBS MODERATE 35: CPT | Performed by: NURSE PRACTITIONER

## 2020-01-01 PROCEDURE — 36415 COLL VENOUS BLD VENIPUNCTURE: CPT | Performed by: HOSPITALIST

## 2020-01-01 PROCEDURE — 76000 FLUOROSCOPY <1 HR PHYS/QHP: CPT

## 2020-01-01 PROCEDURE — 99232 SBSQ HOSP IP/OBS MODERATE 35: CPT | Performed by: INTERNAL MEDICINE

## 2020-01-01 PROCEDURE — 81001 URINALYSIS AUTO W/SCOPE: CPT | Performed by: ORTHOPAEDIC SURGERY

## 2020-01-01 PROCEDURE — P9017 PLASMA 1 DONOR FRZ W/IN 8 HR: HCPCS

## 2020-01-01 PROCEDURE — 85610 PROTHROMBIN TIME: CPT | Performed by: PHYSICIAN ASSISTANT

## 2020-01-01 PROCEDURE — 84132 ASSAY OF SERUM POTASSIUM: CPT | Performed by: HOSPITALIST

## 2020-01-01 PROCEDURE — 80061 LIPID PANEL: CPT | Performed by: HOSPITALIST

## 2020-01-01 PROCEDURE — 86927 PLASMA FRESH FROZEN: CPT

## 2020-01-01 PROCEDURE — 81001 URINALYSIS AUTO W/SCOPE: CPT | Performed by: PHYSICIAN ASSISTANT

## 2020-01-01 PROCEDURE — 87077 CULTURE AEROBIC IDENTIFY: CPT | Performed by: NURSE PRACTITIONER

## 2020-01-01 PROCEDURE — 87070 CULTURE OTHR SPECIMN AEROBIC: CPT | Performed by: NURSE PRACTITIONER

## 2020-01-01 PROCEDURE — 73030 X-RAY EXAM OF SHOULDER: CPT

## 2020-01-01 PROCEDURE — 81003 URINALYSIS AUTO W/O SCOPE: CPT | Performed by: EMERGENCY MEDICINE

## 2020-01-01 PROCEDURE — 85025 COMPLETE CBC W/AUTO DIFF WBC: CPT | Performed by: EMERGENCY MEDICINE

## 2020-01-01 PROCEDURE — 85610 PROTHROMBIN TIME: CPT | Performed by: EMERGENCY MEDICINE

## 2020-01-01 PROCEDURE — 25010000003 CEFAZOLIN IN DEXTROSE 2-4 GM/100ML-% SOLUTION: Performed by: ORTHOPAEDIC SURGERY

## 2020-01-01 PROCEDURE — 87205 SMEAR GRAM STAIN: CPT | Performed by: NURSE PRACTITIONER

## 2020-01-01 PROCEDURE — 0SR904A REPLACEMENT OF RIGHT HIP JOINT WITH CERAMIC ON POLYETHYLENE SYNTHETIC SUBSTITUTE, UNCEMENTED, OPEN APPROACH: ICD-10-PCS | Performed by: ORTHOPAEDIC SURGERY

## 2020-01-01 PROCEDURE — 86140 C-REACTIVE PROTEIN: CPT | Performed by: HOSPITALIST

## 2020-01-01 PROCEDURE — 97162 PT EVAL MOD COMPLEX 30 MIN: CPT | Performed by: PHYSICAL THERAPIST

## 2020-01-01 PROCEDURE — 87186 SC STD MICRODIL/AGAR DIL: CPT | Performed by: NURSE PRACTITIONER

## 2020-01-01 PROCEDURE — 25010000002 PHENYLEPHRINE PER 1 ML: Performed by: NURSE ANESTHETIST, CERTIFIED REGISTERED

## 2020-01-01 PROCEDURE — 99213 OFFICE O/P EST LOW 20 MIN: CPT | Performed by: INTERNAL MEDICINE

## 2020-01-01 PROCEDURE — 85025 COMPLETE CBC W/AUTO DIFF WBC: CPT | Performed by: NURSE PRACTITIONER

## 2020-01-01 PROCEDURE — 97162 PT EVAL MOD COMPLEX 30 MIN: CPT

## 2020-01-01 PROCEDURE — 0JQ13ZZ REPAIR FACE SUBCUTANEOUS TISSUE AND FASCIA, PERCUTANEOUS APPROACH: ICD-10-PCS | Performed by: EMERGENCY MEDICINE

## 2020-01-01 PROCEDURE — 73502 X-RAY EXAM HIP UNI 2-3 VIEWS: CPT

## 2020-01-01 PROCEDURE — 25010000002 FENTANYL CITRATE (PF) 100 MCG/2ML SOLUTION: Performed by: NURSE ANESTHETIST, CERTIFIED REGISTERED

## 2020-01-01 PROCEDURE — 92610 EVALUATE SWALLOWING FUNCTION: CPT

## 2020-01-01 PROCEDURE — 25010000002 PROPOFOL 10 MG/ML EMULSION: Performed by: NURSE ANESTHETIST, CERTIFIED REGISTERED

## 2020-01-01 PROCEDURE — 83735 ASSAY OF MAGNESIUM: CPT | Performed by: HOSPITALIST

## 2020-01-01 PROCEDURE — 82962 GLUCOSE BLOOD TEST: CPT

## 2020-01-01 PROCEDURE — 86900 BLOOD TYPING SEROLOGIC ABO: CPT | Performed by: EMERGENCY MEDICINE

## 2020-01-01 PROCEDURE — 99285 EMERGENCY DEPT VISIT HI MDM: CPT

## 2020-01-01 PROCEDURE — 97110 THERAPEUTIC EXERCISES: CPT | Performed by: PHYSICAL THERAPIST

## 2020-01-01 PROCEDURE — C1776 JOINT DEVICE (IMPLANTABLE): HCPCS | Performed by: ORTHOPAEDIC SURGERY

## 2020-01-01 PROCEDURE — 93970 EXTREMITY STUDY: CPT

## 2020-01-01 PROCEDURE — G0438 PPPS, INITIAL VISIT: HCPCS | Performed by: INTERNAL MEDICINE

## 2020-01-01 PROCEDURE — 25010000002 HALOPERIDOL LACTATE PER 5 MG: Performed by: NURSE PRACTITIONER

## 2020-01-01 PROCEDURE — 25010000002 MORPHINE PER 10 MG: Performed by: NURSE PRACTITIONER

## 2020-01-01 PROCEDURE — 71045 X-RAY EXAM CHEST 1 VIEW: CPT

## 2020-01-01 PROCEDURE — 85610 PROTHROMBIN TIME: CPT | Performed by: NURSE PRACTITIONER

## 2020-01-01 PROCEDURE — 80048 BASIC METABOLIC PNL TOTAL CA: CPT | Performed by: NURSE PRACTITIONER

## 2020-01-01 PROCEDURE — 99232 SBSQ HOSP IP/OBS MODERATE 35: CPT | Performed by: PHYSICIAN ASSISTANT

## 2020-01-01 PROCEDURE — 0HQ1XZZ REPAIR FACE SKIN, EXTERNAL APPROACH: ICD-10-PCS | Performed by: EMERGENCY MEDICINE

## 2020-01-01 PROCEDURE — 36430 TRANSFUSION BLD/BLD COMPNT: CPT

## 2020-01-01 PROCEDURE — 0202U NFCT DS 22 TRGT SARS-COV-2: CPT | Performed by: PHYSICIAN ASSISTANT

## 2020-01-01 PROCEDURE — 84484 ASSAY OF TROPONIN QUANT: CPT | Performed by: HOSPITALIST

## 2020-01-01 PROCEDURE — 84484 ASSAY OF TROPONIN QUANT: CPT | Performed by: EMERGENCY MEDICINE

## 2020-01-01 PROCEDURE — 90732 PPSV23 VACC 2 YRS+ SUBQ/IM: CPT | Performed by: INTERNAL MEDICINE

## 2020-01-01 PROCEDURE — 80053 COMPREHEN METABOLIC PANEL: CPT | Performed by: PHYSICIAN ASSISTANT

## 2020-01-01 PROCEDURE — 85025 COMPLETE CBC W/AUTO DIFF WBC: CPT | Performed by: ORTHOPAEDIC SURGERY

## 2020-01-01 PROCEDURE — P9612 CATHETERIZE FOR URINE SPEC: HCPCS

## 2020-01-01 PROCEDURE — 85025 COMPLETE CBC W/AUTO DIFF WBC: CPT | Performed by: PHYSICIAN ASSISTANT

## 2020-01-01 PROCEDURE — 0202U NFCT DS 22 TRGT SARS-COV-2: CPT | Performed by: EMERGENCY MEDICINE

## 2020-01-01 PROCEDURE — 72125 CT NECK SPINE W/O DYE: CPT

## 2020-01-01 PROCEDURE — 97535 SELF CARE MNGMENT TRAINING: CPT

## 2020-01-01 PROCEDURE — 99221 1ST HOSP IP/OBS SF/LOW 40: CPT | Performed by: PHYSICIAN ASSISTANT

## 2020-01-01 DEVICE — PINNACLE POROCOAT ACETABULAR SHELL SECTOR II 54MM OD
Type: IMPLANTABLE DEVICE | Site: HIP | Status: FUNCTIONAL
Brand: PINNACLE POROCOAT

## 2020-01-01 DEVICE — ACTIS DUOFIX HIP PROSTHESIS (FEMORAL STEM 12/14 TAPER CEMENTLESS SIZE 6 HIGH COLLAR)  CE
Type: IMPLANTABLE DEVICE | Site: HIP | Status: FUNCTIONAL
Brand: ACTIS

## 2020-01-01 DEVICE — BIOLOX DELTA CERAMIC FEMORAL HEAD +1.5 36MM DIA 12/14 TAPER
Type: IMPLANTABLE DEVICE | Site: HIP | Status: FUNCTIONAL
Brand: BIOLOX DELTA

## 2020-01-01 DEVICE — SUT FW #2 W/TPR NDL 1/2 CIR 38IN 97CM 26.5MM BLU: Type: IMPLANTABLE DEVICE | Site: HIP | Status: FUNCTIONAL

## 2020-01-01 DEVICE — PINNACLE HIP SOLUTIONS ALTRX POLYETHYLENE ACETABULAR LINER NEUTRAL 36MM ID 54MM OD
Type: IMPLANTABLE DEVICE | Site: HIP | Status: FUNCTIONAL
Brand: PINNACLE ALTRX

## 2020-01-01 DEVICE — TOTL HIP COP DEPUY 9641334: Type: IMPLANTABLE DEVICE | Site: HIP | Status: FUNCTIONAL

## 2020-01-01 RX ORDER — MORPHINE SULFATE 2 MG/ML
2 INJECTION, SOLUTION INTRAMUSCULAR; INTRAVENOUS
Status: DISCONTINUED | OUTPATIENT
Start: 2020-01-01 | End: 2020-01-01 | Stop reason: HOSPADM

## 2020-01-01 RX ORDER — ACETAMINOPHEN 325 MG/1
650 TABLET ORAL EVERY 4 HOURS PRN
Status: DISCONTINUED | OUTPATIENT
Start: 2020-01-01 | End: 2020-01-01 | Stop reason: SDUPTHER

## 2020-01-01 RX ORDER — DIPHENHYDRAMINE HCL 25 MG
25 CAPSULE ORAL DAILY
COMMUNITY
End: 2020-01-01 | Stop reason: HOSPADM

## 2020-01-01 RX ORDER — ACETAMINOPHEN 325 MG/1
650 TABLET ORAL EVERY 4 HOURS PRN
Start: 2020-01-01 | End: 2020-01-01 | Stop reason: HOSPADM

## 2020-01-01 RX ORDER — MORPHINE SULFATE 4 MG/ML
4 INJECTION, SOLUTION INTRAMUSCULAR; INTRAVENOUS
Status: CANCELLED | OUTPATIENT
Start: 2020-01-01 | End: 2020-11-29

## 2020-01-01 RX ORDER — POTASSIUM CHLORIDE 1.5 G/1.77G
20 POWDER, FOR SOLUTION ORAL DAILY
Status: DISCONTINUED | OUTPATIENT
Start: 2020-01-01 | End: 2020-01-01

## 2020-01-01 RX ORDER — WARFARIN SODIUM 2 MG/1
2 TABLET ORAL
Status: COMPLETED | OUTPATIENT
Start: 2020-01-01 | End: 2020-01-01

## 2020-01-01 RX ORDER — ACETAMINOPHEN 325 MG/1
650 TABLET ORAL EVERY 4 HOURS PRN
Status: DISCONTINUED | OUTPATIENT
Start: 2020-01-01 | End: 2020-01-01 | Stop reason: HOSPADM

## 2020-01-01 RX ORDER — PROMETHAZINE HYDROCHLORIDE 25 MG/1
25 SUPPOSITORY RECTAL ONCE AS NEEDED
Status: DISCONTINUED | OUTPATIENT
Start: 2020-01-01 | End: 2020-01-01 | Stop reason: HOSPADM

## 2020-01-01 RX ORDER — MORPHINE SULFATE 2 MG/ML
2 INJECTION, SOLUTION INTRAMUSCULAR; INTRAVENOUS
Status: CANCELLED | OUTPATIENT
Start: 2020-01-01 | End: 2020-11-29

## 2020-01-01 RX ORDER — FENTANYL CITRATE 50 UG/ML
50 INJECTION, SOLUTION INTRAMUSCULAR; INTRAVENOUS
Status: DISCONTINUED | OUTPATIENT
Start: 2020-01-01 | End: 2020-01-01 | Stop reason: HOSPADM

## 2020-01-01 RX ORDER — SODIUM CHLORIDE, SODIUM LACTATE, POTASSIUM CHLORIDE, CALCIUM CHLORIDE 600; 310; 30; 20 MG/100ML; MG/100ML; MG/100ML; MG/100ML
9 INJECTION, SOLUTION INTRAVENOUS CONTINUOUS
Status: DISCONTINUED | OUTPATIENT
Start: 2020-01-01 | End: 2020-01-01

## 2020-01-01 RX ORDER — POLYETHYLENE GLYCOL 3350 17 G/17G
17 POWDER, FOR SOLUTION ORAL 3 TIMES DAILY
Status: ON HOLD
Start: 2020-01-01 | End: 2020-01-01

## 2020-01-01 RX ORDER — LORAZEPAM 2 MG/ML
1 CONCENTRATE ORAL
Status: CANCELLED | OUTPATIENT
Start: 2020-01-01 | End: 2020-11-29

## 2020-01-01 RX ORDER — MAGNESIUM OXIDE 400 MG/1
TABLET ORAL
Qty: 60 TABLET | Refills: 5 | Status: SHIPPED | OUTPATIENT
Start: 2020-01-01 | End: 2020-01-01

## 2020-01-01 RX ORDER — SODIUM CHLORIDE 9 MG/ML
75 INJECTION, SOLUTION INTRAVENOUS CONTINUOUS
Status: DISCONTINUED | OUTPATIENT
Start: 2020-01-01 | End: 2020-01-01 | Stop reason: SDUPTHER

## 2020-01-01 RX ORDER — WARFARIN SODIUM 2.5 MG/1
2.5 TABLET ORAL
Status: DISCONTINUED | OUTPATIENT
Start: 2020-01-01 | End: 2020-01-01

## 2020-01-01 RX ORDER — MORPHINE SULFATE 20 MG/ML
10 SOLUTION ORAL
Status: DISCONTINUED | OUTPATIENT
Start: 2020-01-01 | End: 2020-01-01 | Stop reason: HOSPADM

## 2020-01-01 RX ORDER — MECLIZINE HYDROCHLORIDE 25 MG/1
25 TABLET ORAL 3 TIMES DAILY PRN
Status: ON HOLD | COMMUNITY
End: 2020-01-01

## 2020-01-01 RX ORDER — MIRTAZAPINE 15 MG/1
15 TABLET, FILM COATED ORAL NIGHTLY
Status: DISCONTINUED | OUTPATIENT
Start: 2020-01-01 | End: 2020-01-01

## 2020-01-01 RX ORDER — DIPHENHYDRAMINE HCL 25 MG
25 CAPSULE ORAL EVERY 6 HOURS PRN
Status: CANCELLED | OUTPATIENT
Start: 2020-01-01

## 2020-01-01 RX ORDER — LIDOCAINE HYDROCHLORIDE AND EPINEPHRINE 10; 10 MG/ML; UG/ML
10 INJECTION, SOLUTION INFILTRATION; PERINEURAL ONCE
Status: COMPLETED | OUTPATIENT
Start: 2020-01-01 | End: 2020-01-01

## 2020-01-01 RX ORDER — MIRTAZAPINE 15 MG/1
15 TABLET, FILM COATED ORAL NIGHTLY
Status: ON HOLD | COMMUNITY
End: 2020-01-01

## 2020-01-01 RX ORDER — SODIUM CHLORIDE 0.9 % (FLUSH) 0.9 %
10 SYRINGE (ML) INJECTION AS NEEDED
Status: DISCONTINUED | OUTPATIENT
Start: 2020-01-01 | End: 2020-01-01 | Stop reason: HOSPADM

## 2020-01-01 RX ORDER — LORAZEPAM 2 MG/ML
0.5 INJECTION INTRAMUSCULAR
Status: CANCELLED | OUTPATIENT
Start: 2020-01-01 | End: 2020-11-29

## 2020-01-01 RX ORDER — POLYETHYLENE GLYCOL 3350 17 G/17G
17 POWDER, FOR SOLUTION ORAL DAILY
Status: DISCONTINUED | OUTPATIENT
Start: 2020-01-01 | End: 2020-01-01

## 2020-01-01 RX ORDER — MORPHINE SULFATE 4 MG/ML
4 INJECTION, SOLUTION INTRAMUSCULAR; INTRAVENOUS
Status: DISCONTINUED | OUTPATIENT
Start: 2020-01-01 | End: 2020-01-01 | Stop reason: HOSPADM

## 2020-01-01 RX ORDER — BENAZEPRIL HYDROCHLORIDE 40 MG/1
40 TABLET, FILM COATED ORAL DAILY
Qty: 90 TABLET | Refills: 1 | Status: SHIPPED | OUTPATIENT
Start: 2020-01-01 | End: 2020-01-01

## 2020-01-01 RX ORDER — ACETAMINOPHEN 325 MG/1
650 TABLET ORAL EVERY 4 HOURS PRN
Start: 2020-01-01 | End: 2020-01-01

## 2020-01-01 RX ORDER — LORAZEPAM 2 MG/ML
2 INJECTION INTRAMUSCULAR
Status: CANCELLED | OUTPATIENT
Start: 2020-01-01 | End: 2020-11-29

## 2020-01-01 RX ORDER — MAGNESIUM HYDROXIDE 1200 MG/15ML
LIQUID ORAL AS NEEDED
Status: DISCONTINUED | OUTPATIENT
Start: 2020-01-01 | End: 2020-01-01 | Stop reason: HOSPADM

## 2020-01-01 RX ORDER — LIDOCAINE HYDROCHLORIDE 20 MG/ML
INJECTION, SOLUTION INFILTRATION; PERINEURAL AS NEEDED
Status: DISCONTINUED | OUTPATIENT
Start: 2020-01-01 | End: 2020-01-01 | Stop reason: SURG

## 2020-01-01 RX ORDER — MORPHINE SULFATE 20 MG/ML
5 SOLUTION ORAL
Status: DISCONTINUED | OUTPATIENT
Start: 2020-01-01 | End: 2020-01-01 | Stop reason: HOSPADM

## 2020-01-01 RX ORDER — WARFARIN SODIUM 5 MG/1
TABLET ORAL
Qty: 80 TABLET | Refills: 0 | Status: SHIPPED | OUTPATIENT
Start: 2020-01-01 | End: 2020-01-01

## 2020-01-01 RX ORDER — SODIUM CHLORIDE 0.9 % (FLUSH) 0.9 %
3 SYRINGE (ML) INJECTION EVERY 12 HOURS SCHEDULED
Status: DISCONTINUED | OUTPATIENT
Start: 2020-01-01 | End: 2020-01-01 | Stop reason: HOSPADM

## 2020-01-01 RX ORDER — PROMETHAZINE HYDROCHLORIDE 25 MG/1
6.25 TABLET ORAL EVERY 4 HOURS PRN
Status: DISCONTINUED | OUTPATIENT
Start: 2020-01-01 | End: 2020-01-01 | Stop reason: HOSPADM

## 2020-01-01 RX ORDER — WARFARIN SODIUM 5 MG/1
5 TABLET ORAL
Status: DISCONTINUED | OUTPATIENT
Start: 2020-01-01 | End: 2020-01-01

## 2020-01-01 RX ORDER — DIPHENHYDRAMINE HCL 25 MG
25 CAPSULE ORAL
Status: DISCONTINUED | OUTPATIENT
Start: 2020-01-01 | End: 2020-01-01 | Stop reason: HOSPADM

## 2020-01-01 RX ORDER — MAGNESIUM OXIDE 400 MG/1
400 TABLET ORAL EVERY 12 HOURS
Status: DISCONTINUED | OUTPATIENT
Start: 2020-01-01 | End: 2020-01-01 | Stop reason: HOSPADM

## 2020-01-01 RX ORDER — LEVOTHYROXINE SODIUM 0.05 MG/1
50 TABLET ORAL DAILY
Status: DISCONTINUED | OUTPATIENT
Start: 2020-01-01 | End: 2020-01-01

## 2020-01-01 RX ORDER — LORAZEPAM 2 MG/ML
2 INJECTION INTRAMUSCULAR
Status: DISCONTINUED | OUTPATIENT
Start: 2020-01-01 | End: 2020-01-01 | Stop reason: HOSPADM

## 2020-01-01 RX ORDER — POTASSIUM CHLORIDE 750 MG/1
40 CAPSULE, EXTENDED RELEASE ORAL AS NEEDED
Status: DISCONTINUED | OUTPATIENT
Start: 2020-01-01 | End: 2020-01-01 | Stop reason: HOSPADM

## 2020-01-01 RX ORDER — TRAMADOL HYDROCHLORIDE 50 MG/1
50 TABLET ORAL EVERY 12 HOURS
Status: DISCONTINUED | OUTPATIENT
Start: 2020-01-01 | End: 2020-01-01

## 2020-01-01 RX ORDER — LORAZEPAM 2 MG/ML
1 CONCENTRATE ORAL
Status: DISCONTINUED | OUTPATIENT
Start: 2020-01-01 | End: 2020-01-01 | Stop reason: HOSPADM

## 2020-01-01 RX ORDER — DIPHENHYDRAMINE HYDROCHLORIDE 50 MG/ML
25 INJECTION INTRAMUSCULAR; INTRAVENOUS EVERY 6 HOURS PRN
Status: DISCONTINUED | OUTPATIENT
Start: 2020-01-01 | End: 2020-01-01 | Stop reason: HOSPADM

## 2020-01-01 RX ORDER — ACETAMINOPHEN 325 MG/1
650 TABLET ORAL EVERY 4 HOURS PRN
Status: CANCELLED | OUTPATIENT
Start: 2020-01-01

## 2020-01-01 RX ORDER — CLONIDINE HYDROCHLORIDE 0.1 MG/1
0.1 TABLET ORAL ONCE
Status: COMPLETED | OUTPATIENT
Start: 2020-01-01 | End: 2020-01-01

## 2020-01-01 RX ORDER — HYDROCODONE BITARTRATE AND ACETAMINOPHEN 7.5; 325 MG/1; MG/1
1 TABLET ORAL ONCE AS NEEDED
Status: DISCONTINUED | OUTPATIENT
Start: 2020-01-01 | End: 2020-01-01 | Stop reason: HOSPADM

## 2020-01-01 RX ORDER — DIPHENOXYLATE HYDROCHLORIDE AND ATROPINE SULFATE 2.5; .025 MG/1; MG/1
1 TABLET ORAL
Status: DISCONTINUED | OUTPATIENT
Start: 2020-01-01 | End: 2020-01-01 | Stop reason: HOSPADM

## 2020-01-01 RX ORDER — WARFARIN SODIUM 5 MG/1
5 TABLET ORAL
Status: DISCONTINUED | OUTPATIENT
Start: 2020-01-01 | End: 2020-01-01 | Stop reason: HOSPADM

## 2020-01-01 RX ORDER — DIPHENOXYLATE HYDROCHLORIDE AND ATROPINE SULFATE 2.5; .025 MG/1; MG/1
1 TABLET ORAL
Status: CANCELLED | OUTPATIENT
Start: 2020-01-01

## 2020-01-01 RX ORDER — DIPHENHYDRAMINE HYDROCHLORIDE 50 MG/ML
12.5 INJECTION INTRAMUSCULAR; INTRAVENOUS
Status: DISCONTINUED | OUTPATIENT
Start: 2020-01-01 | End: 2020-01-01 | Stop reason: HOSPADM

## 2020-01-01 RX ORDER — AMLODIPINE BESYLATE 5 MG/1
5 TABLET ORAL
Status: DISCONTINUED | OUTPATIENT
Start: 2020-01-01 | End: 2020-01-01 | Stop reason: HOSPADM

## 2020-01-01 RX ORDER — HALOPERIDOL 2 MG/ML
2 SOLUTION ORAL EVERY 4 HOURS PRN
Status: DISCONTINUED | OUTPATIENT
Start: 2020-01-01 | End: 2020-01-01 | Stop reason: HOSPADM

## 2020-01-01 RX ORDER — LORAZEPAM 2 MG/ML
1 INJECTION INTRAMUSCULAR
Status: CANCELLED | OUTPATIENT
Start: 2020-01-01 | End: 2020-11-29

## 2020-01-01 RX ORDER — PROMETHAZINE HYDROCHLORIDE 25 MG/1
25 TABLET ORAL ONCE AS NEEDED
Status: DISCONTINUED | OUTPATIENT
Start: 2020-01-01 | End: 2020-01-01 | Stop reason: HOSPADM

## 2020-01-01 RX ORDER — MORPHINE SULFATE 2 MG/ML
4 INJECTION, SOLUTION INTRAMUSCULAR; INTRAVENOUS
Status: DISCONTINUED | OUTPATIENT
Start: 2020-01-01 | End: 2020-01-01 | Stop reason: HOSPADM

## 2020-01-01 RX ORDER — MECLIZINE HYDROCHLORIDE 25 MG/1
TABLET ORAL
Qty: 30 TABLET | Refills: 0 | Status: SHIPPED | OUTPATIENT
Start: 2020-01-01 | End: 2020-01-01

## 2020-01-01 RX ORDER — PROMETHAZINE HYDROCHLORIDE 6.25 MG/5ML
6.25 SYRUP ORAL EVERY 4 HOURS PRN
Status: DISCONTINUED | OUTPATIENT
Start: 2020-01-01 | End: 2020-01-01 | Stop reason: HOSPADM

## 2020-01-01 RX ORDER — ACETAMINOPHEN 160 MG/5ML
650 SOLUTION ORAL EVERY 4 HOURS PRN
Status: CANCELLED | OUTPATIENT
Start: 2020-01-01

## 2020-01-01 RX ORDER — ONDANSETRON 2 MG/ML
4 INJECTION INTRAMUSCULAR; INTRAVENOUS EVERY 6 HOURS PRN
Status: DISCONTINUED | OUTPATIENT
Start: 2020-01-01 | End: 2020-01-01 | Stop reason: HOSPADM

## 2020-01-01 RX ORDER — MORPHINE SULFATE 20 MG/ML
5 SOLUTION ORAL
Status: CANCELLED | OUTPATIENT
Start: 2020-01-01 | End: 2020-11-29

## 2020-01-01 RX ORDER — ACETAMINOPHEN 500 MG
1000 TABLET ORAL EVERY 8 HOURS
Status: DISCONTINUED | OUTPATIENT
Start: 2020-01-01 | End: 2020-01-01 | Stop reason: HOSPADM

## 2020-01-01 RX ORDER — LEVOTHYROXINE SODIUM 0.05 MG/1
TABLET ORAL
Qty: 90 TABLET | Refills: 1 | Status: SHIPPED | OUTPATIENT
Start: 2020-01-01 | End: 2020-01-01

## 2020-01-01 RX ORDER — AMLODIPINE BESYLATE 5 MG/1
5 TABLET ORAL DAILY
Qty: 30 TABLET | Refills: 0 | Status: SHIPPED | OUTPATIENT
Start: 2020-01-01 | End: 2020-01-01

## 2020-01-01 RX ORDER — SODIUM CHLORIDE 0.9 % (FLUSH) 0.9 %
3-10 SYRINGE (ML) INJECTION AS NEEDED
Status: DISCONTINUED | OUTPATIENT
Start: 2020-01-01 | End: 2020-01-01 | Stop reason: HOSPADM

## 2020-01-01 RX ORDER — WARFARIN SODIUM 4 MG/1
4 TABLET ORAL
Status: ON HOLD | COMMUNITY
End: 2020-01-01

## 2020-01-01 RX ORDER — HALOPERIDOL 5 MG/ML
1 INJECTION INTRAMUSCULAR ONCE
Status: COMPLETED | OUTPATIENT
Start: 2020-01-01 | End: 2020-01-01

## 2020-01-01 RX ORDER — AMOXICILLIN AND CLAVULANATE POTASSIUM 875; 125 MG/1; MG/1
1 TABLET, FILM COATED ORAL EVERY 12 HOURS SCHEDULED
Status: DISCONTINUED | OUTPATIENT
Start: 2020-01-01 | End: 2020-01-01 | Stop reason: HOSPADM

## 2020-01-01 RX ORDER — FAMOTIDINE 20 MG/1
20 TABLET, FILM COATED ORAL
Status: DISCONTINUED | OUTPATIENT
Start: 2020-01-01 | End: 2020-01-01 | Stop reason: HOSPADM

## 2020-01-01 RX ORDER — CALCIUM CARBONATE 200(500)MG
2 TABLET,CHEWABLE ORAL 3 TIMES DAILY PRN
Status: DISCONTINUED | OUTPATIENT
Start: 2020-01-01 | End: 2020-01-01 | Stop reason: HOSPADM

## 2020-01-01 RX ORDER — WARFARIN SODIUM 7.5 MG/1
7.5 TABLET ORAL
Status: COMPLETED | OUTPATIENT
Start: 2020-01-01 | End: 2020-01-01

## 2020-01-01 RX ORDER — EPHEDRINE SULFATE 50 MG/ML
5 INJECTION, SOLUTION INTRAVENOUS ONCE AS NEEDED
Status: DISCONTINUED | OUTPATIENT
Start: 2020-01-01 | End: 2020-01-01 | Stop reason: HOSPADM

## 2020-01-01 RX ORDER — BENAZEPRIL HYDROCHLORIDE 40 MG/1
TABLET, FILM COATED ORAL
Qty: 90 TABLET | Refills: 1 | Status: SHIPPED | OUTPATIENT
Start: 2020-01-01 | End: 2020-01-01 | Stop reason: HOSPADM

## 2020-01-01 RX ORDER — DIPHENHYDRAMINE HYDROCHLORIDE 50 MG/ML
25 INJECTION INTRAMUSCULAR; INTRAVENOUS EVERY 6 HOURS PRN
Status: CANCELLED | OUTPATIENT
Start: 2020-01-01

## 2020-01-01 RX ORDER — CALCIUM CARBONATE 200(500)MG
1 TABLET,CHEWABLE ORAL EVERY 6 HOURS PRN
Status: DISCONTINUED | OUTPATIENT
Start: 2020-01-01 | End: 2020-01-01

## 2020-01-01 RX ORDER — AMLODIPINE BESYLATE 5 MG/1
5 TABLET ORAL ONCE
Status: COMPLETED | OUTPATIENT
Start: 2020-01-01 | End: 2020-01-01

## 2020-01-01 RX ORDER — TRAMADOL HYDROCHLORIDE 50 MG/1
50 TABLET ORAL EVERY 6 HOURS PRN
Status: DISCONTINUED | OUTPATIENT
Start: 2020-01-01 | End: 2020-01-01

## 2020-01-01 RX ORDER — METOPROLOL TARTRATE 50 MG/1
50 TABLET, FILM COATED ORAL EVERY 12 HOURS
Status: DISCONTINUED | OUTPATIENT
Start: 2020-01-01 | End: 2020-01-01 | Stop reason: HOSPADM

## 2020-01-01 RX ORDER — POLYETHYLENE GLYCOL 3350 17 G/17G
17 POWDER, FOR SOLUTION ORAL 3 TIMES DAILY
Status: DISCONTINUED | OUTPATIENT
Start: 2020-01-01 | End: 2020-01-01 | Stop reason: HOSPADM

## 2020-01-01 RX ORDER — OXYCODONE AND ACETAMINOPHEN 7.5; 325 MG/1; MG/1
1 TABLET ORAL ONCE AS NEEDED
Status: DISCONTINUED | OUTPATIENT
Start: 2020-01-01 | End: 2020-01-01 | Stop reason: HOSPADM

## 2020-01-01 RX ORDER — AMLODIPINE BESYLATE 5 MG/1
5 TABLET ORAL
Status: DISCONTINUED | OUTPATIENT
Start: 2020-01-01 | End: 2020-01-01

## 2020-01-01 RX ORDER — LORAZEPAM 2 MG/ML
2 CONCENTRATE ORAL
Status: DISCONTINUED | OUTPATIENT
Start: 2020-01-01 | End: 2020-01-01 | Stop reason: HOSPADM

## 2020-01-01 RX ORDER — METOPROLOL TARTRATE 50 MG/1
50 TABLET, FILM COATED ORAL EVERY 12 HOURS SCHEDULED
Status: DISCONTINUED | OUTPATIENT
Start: 2020-01-01 | End: 2020-01-01

## 2020-01-01 RX ORDER — MORPHINE SULFATE 2 MG/ML
2 INJECTION, SOLUTION INTRAMUSCULAR; INTRAVENOUS
Status: DISCONTINUED | OUTPATIENT
Start: 2020-01-01 | End: 2020-01-01

## 2020-01-01 RX ORDER — DOCUSATE SODIUM 100 MG/1
100 CAPSULE, LIQUID FILLED ORAL 2 TIMES DAILY
Status: DISCONTINUED | OUTPATIENT
Start: 2020-01-01 | End: 2020-01-01 | Stop reason: HOSPADM

## 2020-01-01 RX ORDER — TRANEXAMIC ACID 100 MG/ML
INJECTION, SOLUTION INTRAVENOUS AS NEEDED
Status: DISCONTINUED | OUTPATIENT
Start: 2020-01-01 | End: 2020-01-01 | Stop reason: SURG

## 2020-01-01 RX ORDER — PROMETHAZINE HYDROCHLORIDE 25 MG/1
6.25 TABLET ORAL EVERY 4 HOURS PRN
Status: CANCELLED | OUTPATIENT
Start: 2020-01-01

## 2020-01-01 RX ORDER — WARFARIN SODIUM 7.5 MG/1
7.5 TABLET ORAL
Status: DISCONTINUED | OUTPATIENT
Start: 2020-01-01 | End: 2020-01-01

## 2020-01-01 RX ORDER — FAMOTIDINE 10 MG/ML
20 INJECTION, SOLUTION INTRAVENOUS ONCE
Status: COMPLETED | OUTPATIENT
Start: 2020-01-01 | End: 2020-01-01

## 2020-01-01 RX ORDER — LISINOPRIL 40 MG/1
40 TABLET ORAL
Status: DISCONTINUED | OUTPATIENT
Start: 2020-01-01 | End: 2020-01-01 | Stop reason: HOSPADM

## 2020-01-01 RX ORDER — HALOPERIDOL 5 MG/ML
2 INJECTION INTRAMUSCULAR EVERY 4 HOURS PRN
Status: CANCELLED | OUTPATIENT
Start: 2020-01-01

## 2020-01-01 RX ORDER — SODIUM CHLORIDE 0.9 % (FLUSH) 0.9 %
10 SYRINGE (ML) INJECTION AS NEEDED
Status: DISCONTINUED | OUTPATIENT
Start: 2020-01-01 | End: 2020-01-01

## 2020-01-01 RX ORDER — POTASSIUM CHLORIDE 1.5 G/1.77G
40 POWDER, FOR SOLUTION ORAL ONCE
Status: COMPLETED | OUTPATIENT
Start: 2020-01-01 | End: 2020-01-01

## 2020-01-01 RX ORDER — HALOPERIDOL 2 MG/ML
2 SOLUTION ORAL EVERY 4 HOURS PRN
Status: CANCELLED | OUTPATIENT
Start: 2020-01-01

## 2020-01-01 RX ORDER — ACETAMINOPHEN 500 MG
1000 TABLET ORAL ONCE
Status: DISCONTINUED | OUTPATIENT
Start: 2020-01-01 | End: 2020-01-01 | Stop reason: HOSPADM

## 2020-01-01 RX ORDER — ROCURONIUM BROMIDE 10 MG/ML
INJECTION, SOLUTION INTRAVENOUS AS NEEDED
Status: DISCONTINUED | OUTPATIENT
Start: 2020-01-01 | End: 2020-01-01 | Stop reason: SURG

## 2020-01-01 RX ORDER — DIPHENHYDRAMINE HCL 25 MG
25 CAPSULE ORAL EVERY 6 HOURS PRN
Status: DISCONTINUED | OUTPATIENT
Start: 2020-01-01 | End: 2020-01-01 | Stop reason: HOSPADM

## 2020-01-01 RX ORDER — PROMETHAZINE HYDROCHLORIDE 12.5 MG/1
6.25 SUPPOSITORY RECTAL EVERY 4 HOURS PRN
Status: DISCONTINUED | OUTPATIENT
Start: 2020-01-01 | End: 2020-01-01 | Stop reason: HOSPADM

## 2020-01-01 RX ORDER — OXYCODONE HCL 10 MG/1
10 TABLET, FILM COATED, EXTENDED RELEASE ORAL ONCE
Status: COMPLETED | OUTPATIENT
Start: 2020-01-01 | End: 2020-01-01

## 2020-01-01 RX ORDER — ACETAMINOPHEN 650 MG/1
650 SUPPOSITORY RECTAL EVERY 4 HOURS PRN
Status: CANCELLED | OUTPATIENT
Start: 2020-01-01

## 2020-01-01 RX ORDER — HYDROMORPHONE HYDROCHLORIDE 1 MG/ML
0.5 INJECTION, SOLUTION INTRAMUSCULAR; INTRAVENOUS; SUBCUTANEOUS
Status: DISCONTINUED | OUTPATIENT
Start: 2020-01-01 | End: 2020-01-01 | Stop reason: HOSPADM

## 2020-01-01 RX ORDER — LORAZEPAM 2 MG/ML
2 CONCENTRATE ORAL
Status: CANCELLED | OUTPATIENT
Start: 2020-01-01 | End: 2020-11-29

## 2020-01-01 RX ORDER — WARFARIN SODIUM 2.5 MG/1
2.5 TABLET ORAL
Status: DISCONTINUED | OUTPATIENT
Start: 2020-01-01 | End: 2020-01-01 | Stop reason: HOSPADM

## 2020-01-01 RX ORDER — LORAZEPAM 2 MG/ML
1 INJECTION INTRAMUSCULAR
Status: DISCONTINUED | OUTPATIENT
Start: 2020-01-01 | End: 2020-01-01 | Stop reason: HOSPADM

## 2020-01-01 RX ORDER — ACETAMINOPHEN 160 MG/5ML
650 SOLUTION ORAL EVERY 4 HOURS PRN
Status: DISCONTINUED | OUTPATIENT
Start: 2020-01-01 | End: 2020-01-01 | Stop reason: HOSPADM

## 2020-01-01 RX ORDER — LABETALOL HYDROCHLORIDE 5 MG/ML
5 INJECTION, SOLUTION INTRAVENOUS
Status: DISCONTINUED | OUTPATIENT
Start: 2020-01-01 | End: 2020-01-01 | Stop reason: HOSPADM

## 2020-01-01 RX ORDER — HYDROCODONE BITARTRATE AND ACETAMINOPHEN 7.5; 325 MG/1; MG/1
1 TABLET ORAL EVERY 4 HOURS PRN
Status: DISCONTINUED | OUTPATIENT
Start: 2020-01-01 | End: 2020-01-01

## 2020-01-01 RX ORDER — HALOPERIDOL 5 MG/ML
2 INJECTION INTRAMUSCULAR
Status: DISCONTINUED | OUTPATIENT
Start: 2020-01-01 | End: 2020-01-01

## 2020-01-01 RX ORDER — DEXAMETHASONE SODIUM PHOSPHATE 10 MG/ML
INJECTION INTRAMUSCULAR; INTRAVENOUS AS NEEDED
Status: DISCONTINUED | OUTPATIENT
Start: 2020-01-01 | End: 2020-01-01 | Stop reason: SURG

## 2020-01-01 RX ORDER — FENTANYL CITRATE 50 UG/ML
INJECTION, SOLUTION INTRAMUSCULAR; INTRAVENOUS AS NEEDED
Status: DISCONTINUED | OUTPATIENT
Start: 2020-01-01 | End: 2020-01-01 | Stop reason: SURG

## 2020-01-01 RX ORDER — SODIUM CHLORIDE 9 MG/ML
75 INJECTION, SOLUTION INTRAVENOUS CONTINUOUS
Status: DISCONTINUED | OUTPATIENT
Start: 2020-01-01 | End: 2020-01-01

## 2020-01-01 RX ORDER — HALOPERIDOL 1 MG/1
2 TABLET ORAL EVERY 4 HOURS PRN
Status: DISCONTINUED | OUTPATIENT
Start: 2020-01-01 | End: 2020-01-01 | Stop reason: HOSPADM

## 2020-01-01 RX ORDER — SODIUM CHLORIDE 0.9 % (FLUSH) 0.9 %
10 SYRINGE (ML) INJECTION EVERY 12 HOURS SCHEDULED
Status: DISCONTINUED | OUTPATIENT
Start: 2020-01-01 | End: 2020-01-01 | Stop reason: HOSPADM

## 2020-01-01 RX ORDER — ACETAMINOPHEN 325 MG/1
650 TABLET ORAL NIGHTLY PRN
Status: ON HOLD | COMMUNITY
End: 2020-01-01

## 2020-01-01 RX ORDER — LORAZEPAM 2 MG/ML
0.5 CONCENTRATE ORAL
Status: DISCONTINUED | OUTPATIENT
Start: 2020-01-01 | End: 2020-01-01 | Stop reason: HOSPADM

## 2020-01-01 RX ORDER — POTASSIUM CHLORIDE 750 MG/1
40 TABLET, FILM COATED, EXTENDED RELEASE ORAL ONCE
Status: COMPLETED | OUTPATIENT
Start: 2020-01-01 | End: 2020-01-01

## 2020-01-01 RX ORDER — LEVOTHYROXINE SODIUM 0.05 MG/1
50 TABLET ORAL DAILY
Status: ON HOLD | COMMUNITY
End: 2020-01-01

## 2020-01-01 RX ORDER — LEVOTHYROXINE SODIUM 0.05 MG/1
50 TABLET ORAL
Status: DISCONTINUED | OUTPATIENT
Start: 2020-01-01 | End: 2020-01-01 | Stop reason: HOSPADM

## 2020-01-01 RX ORDER — POTASSIUM CHLORIDE 1.5 G/1.77G
40 POWDER, FOR SOLUTION ORAL AS NEEDED
Status: DISCONTINUED | OUTPATIENT
Start: 2020-01-01 | End: 2020-01-01 | Stop reason: HOSPADM

## 2020-01-01 RX ORDER — METOPROLOL TARTRATE 50 MG/1
50 TABLET, FILM COATED ORAL 2 TIMES DAILY
Status: ON HOLD | COMMUNITY
End: 2020-01-01

## 2020-01-01 RX ORDER — GLYCOPYRROLATE 0.2 MG/ML
INJECTION INTRAMUSCULAR; INTRAVENOUS AS NEEDED
Status: DISCONTINUED | OUTPATIENT
Start: 2020-01-01 | End: 2020-01-01 | Stop reason: SURG

## 2020-01-01 RX ORDER — LORAZEPAM 2 MG/ML
0.5 INJECTION INTRAMUSCULAR
Status: DISCONTINUED | OUTPATIENT
Start: 2020-01-01 | End: 2020-01-01 | Stop reason: HOSPADM

## 2020-01-01 RX ORDER — AMLODIPINE BESYLATE 2.5 MG/1
2.5 TABLET ORAL
Status: DISCONTINUED | OUTPATIENT
Start: 2020-01-01 | End: 2020-01-01

## 2020-01-01 RX ORDER — HALOPERIDOL 5 MG/ML
2 INJECTION INTRAMUSCULAR EVERY 4 HOURS PRN
Status: DISCONTINUED | OUTPATIENT
Start: 2020-01-01 | End: 2020-01-01 | Stop reason: HOSPADM

## 2020-01-01 RX ORDER — CEFAZOLIN SODIUM 2 G/100ML
2 INJECTION, SOLUTION INTRAVENOUS ONCE
Status: COMPLETED | OUTPATIENT
Start: 2020-01-01 | End: 2020-01-01

## 2020-01-01 RX ORDER — LIDOCAINE HYDROCHLORIDE 10 MG/ML
0.5 INJECTION, SOLUTION EPIDURAL; INFILTRATION; INTRACAUDAL; PERINEURAL ONCE AS NEEDED
Status: DISCONTINUED | OUTPATIENT
Start: 2020-01-01 | End: 2020-01-01 | Stop reason: HOSPADM

## 2020-01-01 RX ORDER — CEFAZOLIN SODIUM 2 G/100ML
2 INJECTION, SOLUTION INTRAVENOUS EVERY 8 HOURS
Status: COMPLETED | OUTPATIENT
Start: 2020-01-01 | End: 2020-01-01

## 2020-01-01 RX ORDER — MIDAZOLAM HYDROCHLORIDE 1 MG/ML
1 INJECTION INTRAMUSCULAR; INTRAVENOUS
Status: DISCONTINUED | OUTPATIENT
Start: 2020-01-01 | End: 2020-01-01 | Stop reason: HOSPADM

## 2020-01-01 RX ORDER — EPHEDRINE SULFATE 50 MG/ML
INJECTION, SOLUTION INTRAVENOUS AS NEEDED
Status: DISCONTINUED | OUTPATIENT
Start: 2020-01-01 | End: 2020-01-01 | Stop reason: SURG

## 2020-01-01 RX ORDER — LIDOCAINE HYDROCHLORIDE AND EPINEPHRINE 10; 10 MG/ML; UG/ML
10 INJECTION, SOLUTION INFILTRATION; PERINEURAL ONCE
Status: DISCONTINUED | OUTPATIENT
Start: 2020-01-01 | End: 2020-01-01

## 2020-01-01 RX ORDER — ASPIRIN 81 MG/1
81 TABLET ORAL EVERY 12 HOURS SCHEDULED
Status: DISCONTINUED | OUTPATIENT
Start: 2020-01-01 | End: 2020-01-01

## 2020-01-01 RX ORDER — POTASSIUM CHLORIDE 1.5 G/1.77G
20 POWDER, FOR SOLUTION ORAL 2 TIMES DAILY
Status: DISCONTINUED | OUTPATIENT
Start: 2020-01-01 | End: 2020-01-01

## 2020-01-01 RX ORDER — HALOPERIDOL 1 MG/1
2 TABLET ORAL EVERY 4 HOURS PRN
Status: CANCELLED | OUTPATIENT
Start: 2020-01-01

## 2020-01-01 RX ORDER — AMOXICILLIN AND CLAVULANATE POTASSIUM 875; 125 MG/1; MG/1
1 TABLET, FILM COATED ORAL 2 TIMES DAILY
Status: ON HOLD | COMMUNITY
End: 2020-01-01

## 2020-01-01 RX ORDER — PROMETHAZINE HYDROCHLORIDE 6.25 MG/5ML
6.25 SYRUP ORAL EVERY 4 HOURS PRN
Status: CANCELLED | OUTPATIENT
Start: 2020-01-01

## 2020-01-01 RX ORDER — MORPHINE SULFATE 20 MG/ML
10 SOLUTION ORAL
Status: CANCELLED | OUTPATIENT
Start: 2020-01-01 | End: 2020-11-29

## 2020-01-01 RX ORDER — ACETAMINOPHEN 650 MG/1
650 SUPPOSITORY RECTAL EVERY 4 HOURS PRN
Status: DISCONTINUED | OUTPATIENT
Start: 2020-01-01 | End: 2020-01-01 | Stop reason: HOSPADM

## 2020-01-01 RX ORDER — HYDRALAZINE HYDROCHLORIDE 20 MG/ML
5 INJECTION INTRAMUSCULAR; INTRAVENOUS
Status: DISCONTINUED | OUTPATIENT
Start: 2020-01-01 | End: 2020-01-01 | Stop reason: HOSPADM

## 2020-01-01 RX ORDER — WARFARIN SODIUM 5 MG/1
TABLET ORAL
Qty: 75 TABLET | Refills: 1 | Status: SHIPPED | OUTPATIENT
Start: 2020-01-01 | End: 2020-01-01

## 2020-01-01 RX ORDER — PROMETHAZINE HYDROCHLORIDE 12.5 MG/1
6.25 SUPPOSITORY RECTAL EVERY 4 HOURS PRN
Status: CANCELLED | OUTPATIENT
Start: 2020-01-01

## 2020-01-01 RX ORDER — ONDANSETRON 2 MG/ML
4 INJECTION INTRAMUSCULAR; INTRAVENOUS ONCE AS NEEDED
Status: DISCONTINUED | OUTPATIENT
Start: 2020-01-01 | End: 2020-01-01 | Stop reason: HOSPADM

## 2020-01-01 RX ORDER — BISACODYL 5 MG/1
10 TABLET, DELAYED RELEASE ORAL DAILY PRN
Status: DISCONTINUED | OUTPATIENT
Start: 2020-01-01 | End: 2020-01-01 | Stop reason: HOSPADM

## 2020-01-01 RX ORDER — HYDROMORPHONE HYDROCHLORIDE 1 MG/ML
0.5 INJECTION, SOLUTION INTRAMUSCULAR; INTRAVENOUS; SUBCUTANEOUS
Status: CANCELLED | OUTPATIENT
Start: 2020-01-01 | End: 2020-11-29

## 2020-01-01 RX ORDER — PSEUDOEPHEDRINE HCL 30 MG
100 TABLET ORAL 2 TIMES DAILY
Start: 2020-01-01 | End: 2020-01-01

## 2020-01-01 RX ORDER — TRAMADOL HYDROCHLORIDE 50 MG/1
50 TABLET ORAL EVERY 8 HOURS PRN
Status: DISCONTINUED | OUTPATIENT
Start: 2020-01-01 | End: 2020-01-01 | Stop reason: HOSPADM

## 2020-01-01 RX ORDER — FLUMAZENIL 0.1 MG/ML
0.2 INJECTION INTRAVENOUS AS NEEDED
Status: DISCONTINUED | OUTPATIENT
Start: 2020-01-01 | End: 2020-01-01 | Stop reason: HOSPADM

## 2020-01-01 RX ORDER — PANTOPRAZOLE SODIUM 40 MG/1
40 TABLET, DELAYED RELEASE ORAL
Status: DISCONTINUED | OUTPATIENT
Start: 2020-01-01 | End: 2020-01-01

## 2020-01-01 RX ORDER — NALOXONE HCL 0.4 MG/ML
0.2 VIAL (ML) INJECTION AS NEEDED
Status: DISCONTINUED | OUTPATIENT
Start: 2020-01-01 | End: 2020-01-01 | Stop reason: HOSPADM

## 2020-01-01 RX ORDER — METOPROLOL TARTRATE 50 MG/1
TABLET, FILM COATED ORAL
Qty: 180 TABLET | Refills: 1 | Status: SHIPPED | OUTPATIENT
Start: 2020-01-01 | End: 2020-01-01

## 2020-01-01 RX ORDER — HYDROMORPHONE HYDROCHLORIDE 1 MG/ML
0.25 INJECTION, SOLUTION INTRAMUSCULAR; INTRAVENOUS; SUBCUTANEOUS
Status: DISCONTINUED | OUTPATIENT
Start: 2020-01-01 | End: 2020-01-01 | Stop reason: HOSPADM

## 2020-01-01 RX ORDER — LORAZEPAM 2 MG/ML
0.5 CONCENTRATE ORAL
Status: CANCELLED | OUTPATIENT
Start: 2020-01-01 | End: 2020-11-29

## 2020-01-01 RX ORDER — AMOXICILLIN AND CLAVULANATE POTASSIUM 875; 125 MG/1; MG/1
1 TABLET, FILM COATED ORAL EVERY 12 HOURS SCHEDULED
Qty: 11 TABLET | Refills: 0
Start: 2020-01-01 | End: 2020-01-01

## 2020-01-01 RX ORDER — PROPOFOL 10 MG/ML
VIAL (ML) INTRAVENOUS AS NEEDED
Status: DISCONTINUED | OUTPATIENT
Start: 2020-01-01 | End: 2020-01-01 | Stop reason: SURG

## 2020-01-01 RX ORDER — AMLODIPINE BESYLATE 5 MG/1
5 TABLET ORAL
Status: ON HOLD
Start: 2020-01-01 | End: 2020-01-01

## 2020-01-01 RX ORDER — METOPROLOL TARTRATE 50 MG/1
50 TABLET, FILM COATED ORAL EVERY 12 HOURS
Qty: 180 TABLET | Refills: 1 | Status: SHIPPED | OUTPATIENT
Start: 2020-01-01 | End: 2020-01-01

## 2020-01-01 RX ADMIN — SODIUM CHLORIDE, PRESERVATIVE FREE 10 ML: 5 INJECTION INTRAVENOUS at 03:10

## 2020-01-01 RX ADMIN — LIDOCAINE HYDROCHLORIDE,EPINEPHRINE BITARTRATE 10 ML: 10; .01 INJECTION, SOLUTION INFILTRATION; PERINEURAL at 11:54

## 2020-01-01 RX ADMIN — BISACODYL 10 MG: 5 TABLET ORAL at 09:42

## 2020-01-01 RX ADMIN — ANTACID TABLETS 2 TABLET: 500 TABLET, CHEWABLE ORAL at 00:23

## 2020-01-01 RX ADMIN — LEVOTHYROXINE SODIUM 50 MCG: 50 TABLET ORAL at 17:56

## 2020-01-01 RX ADMIN — MORPHINE SULFATE 2 MG: 2 INJECTION, SOLUTION INTRAMUSCULAR; INTRAVENOUS at 01:03

## 2020-01-01 RX ADMIN — FENTANYL CITRATE 25 MCG: 50 INJECTION INTRAMUSCULAR; INTRAVENOUS at 16:25

## 2020-01-01 RX ADMIN — SODIUM CHLORIDE, PRESERVATIVE FREE 10 ML: 5 INJECTION INTRAVENOUS at 21:37

## 2020-01-01 RX ADMIN — GLYCOPYRROLATE 0.2 MG: 0.2 INJECTION INTRAMUSCULAR; INTRAVENOUS at 16:12

## 2020-01-01 RX ADMIN — POTASSIUM CHLORIDE 40 MEQ: 1.5 FOR SOLUTION ORAL at 14:54

## 2020-01-01 RX ADMIN — MORPHINE SULFATE 2 MG: 2 INJECTION, SOLUTION INTRAMUSCULAR; INTRAVENOUS at 04:22

## 2020-01-01 RX ADMIN — MORPHINE SULFATE 2 MG: 2 INJECTION, SOLUTION INTRAMUSCULAR; INTRAVENOUS at 00:25

## 2020-01-01 RX ADMIN — POLYETHYLENE GLYCOL 3350 17 G: 17 POWDER, FOR SOLUTION ORAL at 11:14

## 2020-01-01 RX ADMIN — DOCUSATE SODIUM 100 MG: 100 CAPSULE, LIQUID FILLED ORAL at 20:03

## 2020-01-01 RX ADMIN — CEFAZOLIN SODIUM 2 G: 2 INJECTION, SOLUTION INTRAVENOUS at 22:27

## 2020-01-01 RX ADMIN — LEVOTHYROXINE SODIUM 50 MCG: 50 TABLET ORAL at 06:11

## 2020-01-01 RX ADMIN — SODIUM CHLORIDE 75 ML/HR: 9 INJECTION, SOLUTION INTRAVENOUS at 02:39

## 2020-01-01 RX ADMIN — CLONIDINE HYDROCHLORIDE 0.1 MG: 0.1 TABLET ORAL at 14:48

## 2020-01-01 RX ADMIN — COLLAGENASE SANTYL: 250 OINTMENT TOPICAL at 15:02

## 2020-01-01 RX ADMIN — LORAZEPAM 1 MG: 2 INJECTION INTRAMUSCULAR; INTRAVENOUS at 16:18

## 2020-01-01 RX ADMIN — AMLODIPINE BESYLATE 5 MG: 5 TABLET ORAL at 10:03

## 2020-01-01 RX ADMIN — LEVOTHYROXINE SODIUM 50 MCG: 50 TABLET ORAL at 08:30

## 2020-01-01 RX ADMIN — LISINOPRIL 40 MG: 40 TABLET ORAL at 09:36

## 2020-01-01 RX ADMIN — MAGNESIUM OXIDE 400 MG (241.3 MG MAGNESIUM) TABLET 400 MG: TABLET at 23:07

## 2020-01-01 RX ADMIN — MORPHINE SULFATE 2 MG: 2 INJECTION, SOLUTION INTRAMUSCULAR; INTRAVENOUS at 04:15

## 2020-01-01 RX ADMIN — ANTACID TABLETS 2 TABLET: 500 TABLET, CHEWABLE ORAL at 18:14

## 2020-01-01 RX ADMIN — LORAZEPAM 0.5 MG: 2 INJECTION INTRAMUSCULAR; INTRAVENOUS at 08:39

## 2020-01-01 RX ADMIN — SODIUM CHLORIDE, PRESERVATIVE FREE 10 ML: 5 INJECTION INTRAVENOUS at 23:30

## 2020-01-01 RX ADMIN — POTASSIUM CHLORIDE 20 MEQ: 1.5 POWDER, FOR SOLUTION ORAL at 09:11

## 2020-01-01 RX ADMIN — AMOXICILLIN AND CLAVULANATE POTASSIUM 1 TABLET: 875; 125 TABLET, FILM COATED ORAL at 08:59

## 2020-01-01 RX ADMIN — LISINOPRIL 40 MG: 40 TABLET ORAL at 09:38

## 2020-01-01 RX ADMIN — MIRTAZAPINE 15 MG: 15 TABLET, FILM COATED ORAL at 22:38

## 2020-01-01 RX ADMIN — LORAZEPAM 1 MG: 2 INJECTION INTRAMUSCULAR; INTRAVENOUS at 00:47

## 2020-01-01 RX ADMIN — MORPHINE SULFATE 2 MG: 2 INJECTION, SOLUTION INTRAMUSCULAR; INTRAVENOUS at 04:50

## 2020-01-01 RX ADMIN — AMLODIPINE BESYLATE 5 MG: 5 TABLET ORAL at 08:56

## 2020-01-01 RX ADMIN — FAMOTIDINE 20 MG: 20 TABLET, FILM COATED ORAL at 08:30

## 2020-01-01 RX ADMIN — BISACODYL 10 MG: 5 TABLET ORAL at 08:30

## 2020-01-01 RX ADMIN — ACETAMINOPHEN 1000 MG: 500 TABLET, FILM COATED ORAL at 02:33

## 2020-01-01 RX ADMIN — FAMOTIDINE 20 MG: 20 TABLET, FILM COATED ORAL at 09:46

## 2020-01-01 RX ADMIN — SODIUM CHLORIDE, PRESERVATIVE FREE 10 ML: 5 INJECTION INTRAVENOUS at 21:00

## 2020-01-01 RX ADMIN — FAMOTIDINE 20 MG: 20 TABLET, FILM COATED ORAL at 09:36

## 2020-01-01 RX ADMIN — LEVOTHYROXINE SODIUM 50 MCG: 50 TABLET ORAL at 06:30

## 2020-01-01 RX ADMIN — Medication 400 MG: at 14:30

## 2020-01-01 RX ADMIN — METOPROLOL TARTRATE 50 MG: 50 TABLET, FILM COATED ORAL at 09:48

## 2020-01-01 RX ADMIN — METOPROLOL TARTRATE 50 MG: 25 TABLET, FILM COATED ORAL at 00:25

## 2020-01-01 RX ADMIN — AMLODIPINE BESYLATE 5 MG: 5 TABLET ORAL at 09:36

## 2020-01-01 RX ADMIN — AMOXICILLIN AND CLAVULANATE POTASSIUM 1 TABLET: 875; 125 TABLET, FILM COATED ORAL at 11:50

## 2020-01-01 RX ADMIN — METOPROLOL TARTRATE 50 MG: 50 TABLET, FILM COATED ORAL at 17:47

## 2020-01-01 RX ADMIN — LORAZEPAM 0.5 MG: 2 INJECTION INTRAMUSCULAR; INTRAVENOUS at 01:03

## 2020-01-01 RX ADMIN — DOCUSATE SODIUM 100 MG: 100 CAPSULE, LIQUID FILLED ORAL at 20:17

## 2020-01-01 RX ADMIN — POTASSIUM CHLORIDE 40 MEQ: 750 TABLET, EXTENDED RELEASE ORAL at 11:50

## 2020-01-01 RX ADMIN — WARFARIN 2.5 MG: 2.5 TABLET ORAL at 17:25

## 2020-01-01 RX ADMIN — METOPROLOL TARTRATE 50 MG: 25 TABLET, FILM COATED ORAL at 08:07

## 2020-01-01 RX ADMIN — LORAZEPAM 1 MG: 2 INJECTION INTRAMUSCULAR; INTRAVENOUS at 00:40

## 2020-01-01 RX ADMIN — LORAZEPAM 0.5 MG: 2 INJECTION INTRAMUSCULAR; INTRAVENOUS at 22:38

## 2020-01-01 RX ADMIN — MORPHINE SULFATE 2 MG: 2 INJECTION, SOLUTION INTRAMUSCULAR; INTRAVENOUS at 09:01

## 2020-01-01 RX ADMIN — CEFAZOLIN SODIUM 2 G: 2 INJECTION, SOLUTION INTRAVENOUS at 06:04

## 2020-01-01 RX ADMIN — SODIUM CHLORIDE, PRESERVATIVE FREE 10 ML: 5 INJECTION INTRAVENOUS at 03:11

## 2020-01-01 RX ADMIN — LEVOTHYROXINE SODIUM 50 MCG: 50 TABLET ORAL at 07:07

## 2020-01-01 RX ADMIN — LISINOPRIL 40 MG: 40 TABLET ORAL at 08:07

## 2020-01-01 RX ADMIN — SODIUM CHLORIDE 100 ML/HR: 9 INJECTION, SOLUTION INTRAVENOUS at 20:02

## 2020-01-01 RX ADMIN — WARFARIN 5 MG: 5 TABLET ORAL at 18:03

## 2020-01-01 RX ADMIN — WARFARIN 9 MG: 6 TABLET ORAL at 17:48

## 2020-01-01 RX ADMIN — METOPROLOL TARTRATE 50 MG: 50 TABLET, FILM COATED ORAL at 21:07

## 2020-01-01 RX ADMIN — FAMOTIDINE 20 MG: 10 INJECTION, SOLUTION INTRAVENOUS at 13:11

## 2020-01-01 RX ADMIN — MAGNESIUM OXIDE 400 MG (241.3 MG MAGNESIUM) TABLET 400 MG: TABLET at 21:07

## 2020-01-01 RX ADMIN — AMLODIPINE BESYLATE 5 MG: 5 TABLET ORAL at 16:16

## 2020-01-01 RX ADMIN — MAGNESIUM OXIDE 400 MG (241.3 MG MAGNESIUM) TABLET 400 MG: TABLET at 22:38

## 2020-01-01 RX ADMIN — MORPHINE SULFATE 2 MG: 2 INJECTION, SOLUTION INTRAMUSCULAR; INTRAVENOUS at 09:03

## 2020-01-01 RX ADMIN — Medication 400 MG: at 08:49

## 2020-01-01 RX ADMIN — OXYCODONE HYDROCHLORIDE 10 MG: 10 TABLET, FILM COATED, EXTENDED RELEASE ORAL at 13:09

## 2020-01-01 RX ADMIN — ACETAMINOPHEN 1000 MG: 500 TABLET, FILM COATED ORAL at 18:21

## 2020-01-01 RX ADMIN — MIRTAZAPINE 15 MG: 15 TABLET, FILM COATED ORAL at 20:16

## 2020-01-01 RX ADMIN — Medication 400 MG: at 00:27

## 2020-01-01 RX ADMIN — DOCUSATE SODIUM 100 MG: 100 CAPSULE, LIQUID FILLED ORAL at 08:07

## 2020-01-01 RX ADMIN — MORPHINE SULFATE 2 MG: 2 INJECTION, SOLUTION INTRAMUSCULAR; INTRAVENOUS at 16:50

## 2020-01-01 RX ADMIN — METOPROLOL TARTRATE 50 MG: 50 TABLET, FILM COATED ORAL at 20:16

## 2020-01-01 RX ADMIN — LORAZEPAM 0.5 MG: 2 INJECTION INTRAMUSCULAR; INTRAVENOUS at 04:51

## 2020-01-01 RX ADMIN — SODIUM CHLORIDE 75 ML/HR: 9 INJECTION, SOLUTION INTRAVENOUS at 23:29

## 2020-01-01 RX ADMIN — LISINOPRIL 40 MG: 40 TABLET ORAL at 11:06

## 2020-01-01 RX ADMIN — FAMOTIDINE 20 MG: 20 TABLET, FILM COATED ORAL at 18:03

## 2020-01-01 RX ADMIN — METOPROLOL TARTRATE 50 MG: 50 TABLET, FILM COATED ORAL at 06:15

## 2020-01-01 RX ADMIN — CEFAZOLIN SODIUM 2 G: 2 INJECTION, SOLUTION INTRAVENOUS at 14:27

## 2020-01-01 RX ADMIN — LORAZEPAM 0.5 MG: 2 INJECTION INTRAMUSCULAR; INTRAVENOUS at 21:14

## 2020-01-01 RX ADMIN — FAMOTIDINE 20 MG: 20 TABLET, FILM COATED ORAL at 08:07

## 2020-01-01 RX ADMIN — LORAZEPAM 1 MG: 2 INJECTION INTRAMUSCULAR; INTRAVENOUS at 20:54

## 2020-01-01 RX ADMIN — POTASSIUM CHLORIDE 40 MEQ: 1.5 FOR SOLUTION ORAL at 08:30

## 2020-01-01 RX ADMIN — HYDROCODONE BITARTRATE AND ACETAMINOPHEN 1 TABLET: 7.5; 325 TABLET ORAL at 09:11

## 2020-01-01 RX ADMIN — Medication 400 MG: at 09:38

## 2020-01-01 RX ADMIN — Medication 400 MG: at 20:03

## 2020-01-01 RX ADMIN — LORAZEPAM 1 MG: 2 INJECTION INTRAMUSCULAR; INTRAVENOUS at 12:35

## 2020-01-01 RX ADMIN — ACETAMINOPHEN 1000 MG: 500 TABLET, FILM COATED ORAL at 18:59

## 2020-01-01 RX ADMIN — LORAZEPAM 1 MG: 2 INJECTION INTRAMUSCULAR; INTRAVENOUS at 04:44

## 2020-01-01 RX ADMIN — SODIUM CHLORIDE, PRESERVATIVE FREE 10 ML: 5 INJECTION INTRAVENOUS at 20:45

## 2020-01-01 RX ADMIN — MAGNESIUM OXIDE 400 MG (241.3 MG MAGNESIUM) TABLET 400 MG: TABLET at 20:40

## 2020-01-01 RX ADMIN — LORAZEPAM 1 MG: 2 INJECTION INTRAMUSCULAR; INTRAVENOUS at 00:25

## 2020-01-01 RX ADMIN — FENTANYL CITRATE 50 MCG: 50 INJECTION INTRAMUSCULAR; INTRAVENOUS at 14:32

## 2020-01-01 RX ADMIN — METOPROLOL TARTRATE 50 MG: 25 TABLET, FILM COATED ORAL at 14:30

## 2020-01-01 RX ADMIN — POTASSIUM CHLORIDE 40 MEQ: 1.5 POWDER, FOR SOLUTION ORAL at 17:49

## 2020-01-01 RX ADMIN — SODIUM CHLORIDE, PRESERVATIVE FREE 10 ML: 5 INJECTION INTRAVENOUS at 09:03

## 2020-01-01 RX ADMIN — LEVOTHYROXINE SODIUM 50 MCG: 50 TABLET ORAL at 09:38

## 2020-01-01 RX ADMIN — ACETAMINOPHEN 1000 MG: 500 TABLET, FILM COATED ORAL at 11:14

## 2020-01-01 RX ADMIN — MAGNESIUM OXIDE 400 MG (241.3 MG MAGNESIUM) TABLET 400 MG: TABLET at 20:16

## 2020-01-01 RX ADMIN — METOPROLOL TARTRATE 50 MG: 25 TABLET, FILM COATED ORAL at 20:45

## 2020-01-01 RX ADMIN — AMLODIPINE BESYLATE 5 MG: 5 TABLET ORAL at 09:03

## 2020-01-01 RX ADMIN — MAGNESIUM OXIDE 400 MG (241.3 MG MAGNESIUM) TABLET 400 MG: TABLET at 08:56

## 2020-01-01 RX ADMIN — METOPROLOL TARTRATE 50 MG: 25 TABLET, FILM COATED ORAL at 23:12

## 2020-01-01 RX ADMIN — SODIUM CHLORIDE, POTASSIUM CHLORIDE, SODIUM LACTATE AND CALCIUM CHLORIDE 9 ML/HR: 600; 310; 30; 20 INJECTION, SOLUTION INTRAVENOUS at 13:12

## 2020-01-01 RX ADMIN — MORPHINE SULFATE 2 MG: 2 INJECTION, SOLUTION INTRAMUSCULAR; INTRAVENOUS at 21:06

## 2020-01-01 RX ADMIN — FAMOTIDINE 20 MG: 20 TABLET, FILM COATED ORAL at 06:35

## 2020-01-01 RX ADMIN — ACETAMINOPHEN 1000 MG: 500 TABLET, FILM COATED ORAL at 03:37

## 2020-01-01 RX ADMIN — LORAZEPAM 1 MG: 2 INJECTION INTRAMUSCULAR; INTRAVENOUS at 16:41

## 2020-01-01 RX ADMIN — SODIUM CHLORIDE, PRESERVATIVE FREE 10 ML: 5 INJECTION INTRAVENOUS at 21:23

## 2020-01-01 RX ADMIN — FAMOTIDINE 20 MG: 20 TABLET, FILM COATED ORAL at 06:47

## 2020-01-01 RX ADMIN — DOCUSATE SODIUM 100 MG: 100 CAPSULE, LIQUID FILLED ORAL at 08:49

## 2020-01-01 RX ADMIN — LEVOTHYROXINE SODIUM 50 MCG: 50 TABLET ORAL at 06:34

## 2020-01-01 RX ADMIN — MORPHINE SULFATE 2 MG: 2 INJECTION, SOLUTION INTRAMUSCULAR; INTRAVENOUS at 00:40

## 2020-01-01 RX ADMIN — SODIUM CHLORIDE 50 ML/HR: 9 INJECTION, SOLUTION INTRAVENOUS at 21:36

## 2020-01-01 RX ADMIN — LORAZEPAM 0.5 MG: 2 INJECTION INTRAMUSCULAR; INTRAVENOUS at 09:02

## 2020-01-01 RX ADMIN — SODIUM CHLORIDE, PRESERVATIVE FREE 10 ML: 5 INJECTION INTRAVENOUS at 09:00

## 2020-01-01 RX ADMIN — Medication 400 MG: at 08:07

## 2020-01-01 RX ADMIN — MORPHINE SULFATE 2 MG: 2 INJECTION, SOLUTION INTRAMUSCULAR; INTRAVENOUS at 13:15

## 2020-01-01 RX ADMIN — POTASSIUM CHLORIDE 40 MEQ: 1.5 FOR SOLUTION ORAL at 10:59

## 2020-01-01 RX ADMIN — MAGNESIUM OXIDE 400 MG (241.3 MG MAGNESIUM) TABLET 400 MG: TABLET at 10:03

## 2020-01-01 RX ADMIN — LORAZEPAM 1 MG: 2 INJECTION INTRAMUSCULAR; INTRAVENOUS at 16:33

## 2020-01-01 RX ADMIN — Medication 400 MG: at 09:36

## 2020-01-01 RX ADMIN — POLYETHYLENE GLYCOL 3350 17 G: 17 POWDER, FOR SOLUTION ORAL at 08:07

## 2020-01-01 RX ADMIN — LORAZEPAM 0.5 MG: 2 INJECTION INTRAMUSCULAR; INTRAVENOUS at 00:29

## 2020-01-01 RX ADMIN — METOPROLOL TARTRATE 50 MG: 25 TABLET, FILM COATED ORAL at 08:49

## 2020-01-01 RX ADMIN — GLYCOPYRROLATE 0.4 MG: 0.2 INJECTION, SOLUTION INTRAMUSCULAR; INTRAVITREAL at 16:33

## 2020-01-01 RX ADMIN — ACETAMINOPHEN 650 MG: 325 TABLET, FILM COATED ORAL at 16:58

## 2020-01-01 RX ADMIN — LIDOCAINE HYDROCHLORIDE AND EPINEPHRINE 10 ML: 10; 10 INJECTION, SOLUTION INFILTRATION; PERINEURAL at 11:51

## 2020-01-01 RX ADMIN — ACETAMINOPHEN 1000 MG: 500 TABLET, FILM COATED ORAL at 03:10

## 2020-01-01 RX ADMIN — LORAZEPAM 1 MG: 2 INJECTION INTRAMUSCULAR; INTRAVENOUS at 12:34

## 2020-01-01 RX ADMIN — METOPROLOL TARTRATE 50 MG: 25 TABLET, FILM COATED ORAL at 13:03

## 2020-01-01 RX ADMIN — ACETAMINOPHEN 1000 MG: 500 TABLET, FILM COATED ORAL at 04:24

## 2020-01-01 RX ADMIN — LORAZEPAM 1 MG: 2 INJECTION INTRAMUSCULAR; INTRAVENOUS at 04:15

## 2020-01-01 RX ADMIN — LORAZEPAM 1 MG: 2 INJECTION INTRAMUSCULAR; INTRAVENOUS at 12:39

## 2020-01-01 RX ADMIN — GLYCOPYRROLATE 0.4 MG: 0.2 INJECTION, SOLUTION INTRAMUSCULAR; INTRAVITREAL at 08:42

## 2020-01-01 RX ADMIN — LIDOCAINE HYDROCHLORIDE 100 MG: 20 INJECTION, SOLUTION INFILTRATION; PERINEURAL at 14:33

## 2020-01-01 RX ADMIN — DOCUSATE SODIUM 100 MG: 100 CAPSULE, LIQUID FILLED ORAL at 09:38

## 2020-01-01 RX ADMIN — FAMOTIDINE 20 MG: 20 TABLET, FILM COATED ORAL at 09:37

## 2020-01-01 RX ADMIN — ACETAMINOPHEN 1000 MG: 500 TABLET, FILM COATED ORAL at 03:06

## 2020-01-01 RX ADMIN — DOCUSATE SODIUM 100 MG: 100 CAPSULE, LIQUID FILLED ORAL at 21:22

## 2020-01-01 RX ADMIN — PANTOPRAZOLE SODIUM 40 MG: 40 TABLET, DELAYED RELEASE ORAL at 06:19

## 2020-01-01 RX ADMIN — Medication 400 MG: at 20:17

## 2020-01-01 RX ADMIN — MORPHINE SULFATE 2 MG: 2 INJECTION, SOLUTION INTRAMUSCULAR; INTRAVENOUS at 20:56

## 2020-01-01 RX ADMIN — FENTANYL CITRATE 25 MCG: 50 INJECTION INTRAMUSCULAR; INTRAVENOUS at 16:22

## 2020-01-01 RX ADMIN — LORAZEPAM 1 MG: 2 INJECTION INTRAMUSCULAR; INTRAVENOUS at 08:42

## 2020-01-01 RX ADMIN — LORAZEPAM 0.5 MG: 2 INJECTION INTRAMUSCULAR; INTRAVENOUS at 13:15

## 2020-01-01 RX ADMIN — WARFARIN 2 MG: 2 TABLET ORAL at 17:55

## 2020-01-01 RX ADMIN — Medication 400 MG: at 20:45

## 2020-01-01 RX ADMIN — WARFARIN 7.5 MG: 7.5 TABLET ORAL at 18:08

## 2020-01-01 RX ADMIN — POTASSIUM CHLORIDE 20 MEQ: 1.5 POWDER, FOR SOLUTION ORAL at 17:34

## 2020-01-01 RX ADMIN — WARFARIN 2.5 MG: 2.5 TABLET ORAL at 17:19

## 2020-01-01 RX ADMIN — POTASSIUM CHLORIDE 20 MEQ: 1.5 POWDER, FOR SOLUTION ORAL at 10:03

## 2020-01-01 RX ADMIN — AMLODIPINE BESYLATE 5 MG: 5 TABLET ORAL at 23:06

## 2020-01-01 RX ADMIN — LEVOTHYROXINE SODIUM 50 MCG: 50 TABLET ORAL at 06:42

## 2020-01-01 RX ADMIN — AMLODIPINE BESYLATE 5 MG: 5 TABLET ORAL at 08:59

## 2020-01-01 RX ADMIN — MORPHINE SULFATE 4 MG: 4 INJECTION, SOLUTION INTRAMUSCULAR; INTRAVENOUS at 00:47

## 2020-01-01 RX ADMIN — POTASSIUM CHLORIDE 40 MEQ: 1.5 FOR SOLUTION ORAL at 18:09

## 2020-01-01 RX ADMIN — ANTACID TABLETS 2 TABLET: 500 TABLET, CHEWABLE ORAL at 08:42

## 2020-01-01 RX ADMIN — TRAMADOL HYDROCHLORIDE 50 MG: 50 TABLET, FILM COATED ORAL at 06:35

## 2020-01-01 RX ADMIN — LISINOPRIL 40 MG: 40 TABLET ORAL at 08:30

## 2020-01-01 RX ADMIN — POLYETHYLENE GLYCOL 3350 17 G: 17 POWDER, FOR SOLUTION ORAL at 08:56

## 2020-01-01 RX ADMIN — MORPHINE SULFATE 2 MG: 2 INJECTION, SOLUTION INTRAMUSCULAR; INTRAVENOUS at 00:29

## 2020-01-01 RX ADMIN — AMOXICILLIN AND CLAVULANATE POTASSIUM 1 TABLET: 875; 125 TABLET, FILM COATED ORAL at 20:44

## 2020-01-01 RX ADMIN — MORPHINE SULFATE 2 MG: 2 INJECTION, SOLUTION INTRAMUSCULAR; INTRAVENOUS at 08:39

## 2020-01-01 RX ADMIN — LORAZEPAM 0.5 MG: 2 INJECTION INTRAMUSCULAR; INTRAVENOUS at 12:50

## 2020-01-01 RX ADMIN — TRAMADOL HYDROCHLORIDE 50 MG: 50 TABLET, FILM COATED ORAL at 20:48

## 2020-01-01 RX ADMIN — PROPOFOL 100 MG: 10 INJECTION, EMULSION INTRAVENOUS at 14:33

## 2020-01-01 RX ADMIN — METOPROLOL TARTRATE 50 MG: 25 TABLET, FILM COATED ORAL at 09:36

## 2020-01-01 RX ADMIN — ACETAMINOPHEN 1000 MG: 500 TABLET, FILM COATED ORAL at 18:10

## 2020-01-01 RX ADMIN — ASPIRIN 81 MG: 81 TABLET, COATED ORAL at 09:10

## 2020-01-01 RX ADMIN — FAMOTIDINE 20 MG: 20 TABLET, FILM COATED ORAL at 06:34

## 2020-01-01 RX ADMIN — AMLODIPINE BESYLATE 2.5 MG: 2.5 TABLET ORAL at 14:54

## 2020-01-01 RX ADMIN — FAMOTIDINE 20 MG: 20 TABLET, FILM COATED ORAL at 18:09

## 2020-01-01 RX ADMIN — METOPROLOL TARTRATE 50 MG: 25 TABLET, FILM COATED ORAL at 20:17

## 2020-01-01 RX ADMIN — ACETAMINOPHEN 1000 MG: 500 TABLET, FILM COATED ORAL at 18:34

## 2020-01-01 RX ADMIN — Medication 400 MG: at 00:34

## 2020-01-01 RX ADMIN — LEVOTHYROXINE SODIUM 50 MCG: 50 TABLET ORAL at 06:00

## 2020-01-01 RX ADMIN — SODIUM CHLORIDE, PRESERVATIVE FREE 10 ML: 5 INJECTION INTRAVENOUS at 09:04

## 2020-01-01 RX ADMIN — MORPHINE SULFATE 2 MG: 2 INJECTION, SOLUTION INTRAMUSCULAR; INTRAVENOUS at 16:41

## 2020-01-01 RX ADMIN — LEVOTHYROXINE SODIUM 50 MCG: 50 TABLET ORAL at 05:53

## 2020-01-01 RX ADMIN — METOPROLOL TARTRATE 50 MG: 50 TABLET, FILM COATED ORAL at 17:10

## 2020-01-01 RX ADMIN — ACETAMINOPHEN 1000 MG: 500 TABLET, FILM COATED ORAL at 10:59

## 2020-01-01 RX ADMIN — LISINOPRIL 40 MG: 40 TABLET ORAL at 09:46

## 2020-01-01 RX ADMIN — ACETAMINOPHEN 1000 MG: 500 TABLET, FILM COATED ORAL at 18:03

## 2020-01-01 RX ADMIN — POLYETHYLENE GLYCOL 3350 17 G: 17 POWDER, FOR SOLUTION ORAL at 17:25

## 2020-01-01 RX ADMIN — LEVOTHYROXINE SODIUM 50 MCG: 50 TABLET ORAL at 06:03

## 2020-01-01 RX ADMIN — METOPROLOL TARTRATE 50 MG: 25 TABLET, FILM COATED ORAL at 20:03

## 2020-01-01 RX ADMIN — ACETAMINOPHEN 1000 MG: 500 TABLET, FILM COATED ORAL at 18:06

## 2020-01-01 RX ADMIN — AMLODIPINE BESYLATE 2.5 MG: 2.5 TABLET ORAL at 08:32

## 2020-01-01 RX ADMIN — Medication 400 MG: at 01:52

## 2020-01-01 RX ADMIN — WARFARIN 5 MG: 5 TABLET ORAL at 18:09

## 2020-01-01 RX ADMIN — PANTOPRAZOLE SODIUM 40 MG: 40 TABLET, DELAYED RELEASE ORAL at 06:42

## 2020-01-01 RX ADMIN — MORPHINE SULFATE 4 MG: 4 INJECTION, SOLUTION INTRAMUSCULAR; INTRAVENOUS at 20:15

## 2020-01-01 RX ADMIN — ACETAMINOPHEN 1000 MG: 500 TABLET, FILM COATED ORAL at 11:07

## 2020-01-01 RX ADMIN — MORPHINE SULFATE 2 MG: 2 INJECTION, SOLUTION INTRAMUSCULAR; INTRAVENOUS at 12:39

## 2020-01-01 RX ADMIN — LEVOTHYROXINE SODIUM 50 MCG: 50 TABLET ORAL at 06:19

## 2020-01-01 RX ADMIN — GLYCOPYRROLATE 0.2 MG: 0.2 INJECTION INTRAMUSCULAR; INTRAVENOUS at 16:20

## 2020-01-01 RX ADMIN — ACETAMINOPHEN 1000 MG: 500 TABLET, FILM COATED ORAL at 11:06

## 2020-01-01 RX ADMIN — ACETAMINOPHEN 1000 MG: 500 TABLET, FILM COATED ORAL at 12:37

## 2020-01-01 RX ADMIN — ACETAMINOPHEN 650 MG: 325 TABLET, FILM COATED ORAL at 21:09

## 2020-01-01 RX ADMIN — MORPHINE SULFATE 2 MG: 2 INJECTION, SOLUTION INTRAMUSCULAR; INTRAVENOUS at 21:15

## 2020-01-01 RX ADMIN — SODIUM CHLORIDE 75 ML/HR: 9 INJECTION, SOLUTION INTRAVENOUS at 17:27

## 2020-01-01 RX ADMIN — LORAZEPAM 1 MG: 2 INJECTION INTRAMUSCULAR; INTRAVENOUS at 20:15

## 2020-01-01 RX ADMIN — METOPROLOL TARTRATE 50 MG: 25 TABLET, FILM COATED ORAL at 21:09

## 2020-01-01 RX ADMIN — MORPHINE SULFATE 2 MG: 2 INJECTION, SOLUTION INTRAMUSCULAR; INTRAVENOUS at 04:43

## 2020-01-01 RX ADMIN — DOCUSATE SODIUM 100 MG: 100 CAPSULE, LIQUID FILLED ORAL at 21:35

## 2020-01-01 RX ADMIN — POTASSIUM CHLORIDE 20 MEQ: 1.5 POWDER, FOR SOLUTION ORAL at 20:40

## 2020-01-01 RX ADMIN — LORAZEPAM 1 MG: 2 INJECTION INTRAMUSCULAR; INTRAVENOUS at 20:33

## 2020-01-01 RX ADMIN — LEVOTHYROXINE SODIUM 50 MCG: 50 TABLET ORAL at 06:01

## 2020-01-01 RX ADMIN — MORPHINE SULFATE 4 MG: 4 INJECTION, SOLUTION INTRAMUSCULAR; INTRAVENOUS at 12:35

## 2020-01-01 RX ADMIN — TRANEXAMIC ACID 1000 MG: 100 INJECTION, SOLUTION INTRAVENOUS at 14:38

## 2020-01-01 RX ADMIN — METOPROLOL TARTRATE 50 MG: 50 TABLET, FILM COATED ORAL at 23:06

## 2020-01-01 RX ADMIN — ASPIRIN 81 MG: 81 TABLET, COATED ORAL at 21:22

## 2020-01-01 RX ADMIN — POLYETHYLENE GLYCOL 3350 17 G: 17 POWDER, FOR SOLUTION ORAL at 10:03

## 2020-01-01 RX ADMIN — METOPROLOL TARTRATE 50 MG: 50 TABLET, FILM COATED ORAL at 09:11

## 2020-01-01 RX ADMIN — MAGNESIUM OXIDE 400 MG (241.3 MG MAGNESIUM) TABLET 400 MG: TABLET at 09:48

## 2020-01-01 RX ADMIN — FAMOTIDINE 20 MG: 20 TABLET, FILM COATED ORAL at 17:19

## 2020-01-01 RX ADMIN — MORPHINE SULFATE 2 MG: 2 INJECTION, SOLUTION INTRAMUSCULAR; INTRAVENOUS at 12:50

## 2020-01-01 RX ADMIN — NEOSTIGMINE METHYLSULFATE 2 MG: 1 INJECTION INTRAMUSCULAR; INTRAVENOUS; SUBCUTANEOUS at 16:12

## 2020-01-01 RX ADMIN — METOPROLOL TARTRATE 50 MG: 50 TABLET, FILM COATED ORAL at 22:38

## 2020-01-01 RX ADMIN — ACETAMINOPHEN 1000 MG: 500 TABLET, FILM COATED ORAL at 02:52

## 2020-01-01 RX ADMIN — ANTACID TABLETS 2 TABLET: 500 TABLET, CHEWABLE ORAL at 15:02

## 2020-01-01 RX ADMIN — FAMOTIDINE 20 MG: 20 TABLET, FILM COATED ORAL at 18:32

## 2020-01-01 RX ADMIN — MORPHINE SULFATE 2 MG: 2 INJECTION, SOLUTION INTRAMUSCULAR; INTRAVENOUS at 16:18

## 2020-01-01 RX ADMIN — HALOPERIDOL LACTATE 1 MG: 5 INJECTION, SOLUTION INTRAMUSCULAR at 21:33

## 2020-01-01 RX ADMIN — GLYCOPYRROLATE 0.4 MG: 0.2 INJECTION, SOLUTION INTRAMUSCULAR; INTRAVITREAL at 00:46

## 2020-01-01 RX ADMIN — LEVOTHYROXINE SODIUM 50 MCG: 50 TABLET ORAL at 06:47

## 2020-01-01 RX ADMIN — LEVOTHYROXINE SODIUM 50 MCG: 50 TABLET ORAL at 06:07

## 2020-01-01 RX ADMIN — SODIUM CHLORIDE, PRESERVATIVE FREE 10 ML: 5 INJECTION INTRAVENOUS at 09:22

## 2020-01-01 RX ADMIN — AMLODIPINE BESYLATE 5 MG: 5 TABLET ORAL at 08:07

## 2020-01-01 RX ADMIN — DOCUSATE SODIUM 100 MG: 100 CAPSULE, LIQUID FILLED ORAL at 08:30

## 2020-01-01 RX ADMIN — DOCUSATE SODIUM 100 MG: 100 CAPSULE, LIQUID FILLED ORAL at 20:48

## 2020-01-01 RX ADMIN — MORPHINE SULFATE 4 MG: 4 INJECTION, SOLUTION INTRAMUSCULAR; INTRAVENOUS at 16:33

## 2020-01-01 RX ADMIN — METOPROLOL TARTRATE 50 MG: 50 TABLET, FILM COATED ORAL at 06:30

## 2020-01-01 RX ADMIN — SODIUM CHLORIDE, PRESERVATIVE FREE 10 ML: 5 INJECTION INTRAVENOUS at 07:02

## 2020-01-01 RX ADMIN — LISINOPRIL 40 MG: 40 TABLET ORAL at 08:49

## 2020-01-01 RX ADMIN — SODIUM CHLORIDE, PRESERVATIVE FREE 10 ML: 5 INJECTION INTRAVENOUS at 08:30

## 2020-01-01 RX ADMIN — DOCUSATE SODIUM 100 MG: 100 CAPSULE, LIQUID FILLED ORAL at 11:06

## 2020-01-01 RX ADMIN — AMLODIPINE BESYLATE 5 MG: 5 TABLET ORAL at 10:49

## 2020-01-01 RX ADMIN — METOPROLOL TARTRATE 50 MG: 50 TABLET, FILM COATED ORAL at 06:07

## 2020-01-01 RX ADMIN — Medication 400 MG: at 21:09

## 2020-01-01 RX ADMIN — METOPROLOL TARTRATE 50 MG: 25 TABLET, FILM COATED ORAL at 00:34

## 2020-01-01 RX ADMIN — LEVOTHYROXINE SODIUM 50 MCG: 50 TABLET ORAL at 06:15

## 2020-01-01 RX ADMIN — POLYETHYLENE GLYCOL 3350 17 G: 17 POWDER, FOR SOLUTION ORAL at 09:11

## 2020-01-01 RX ADMIN — ACETAMINOPHEN 1000 MG: 500 TABLET, FILM COATED ORAL at 12:02

## 2020-01-01 RX ADMIN — METOPROLOL TARTRATE 50 MG: 50 TABLET, FILM COATED ORAL at 08:56

## 2020-01-01 RX ADMIN — METOPROLOL TARTRATE 50 MG: 50 TABLET, FILM COATED ORAL at 20:40

## 2020-01-01 RX ADMIN — LORAZEPAM 1 MG: 2 INJECTION INTRAMUSCULAR; INTRAVENOUS at 09:01

## 2020-01-01 RX ADMIN — AMLODIPINE BESYLATE 2.5 MG: 2.5 TABLET ORAL at 08:49

## 2020-01-01 RX ADMIN — PANTOPRAZOLE SODIUM 40 MG: 40 TABLET, DELAYED RELEASE ORAL at 05:53

## 2020-01-01 RX ADMIN — MORPHINE SULFATE 2 MG: 2 INJECTION, SOLUTION INTRAMUSCULAR; INTRAVENOUS at 09:13

## 2020-01-01 RX ADMIN — LISINOPRIL 40 MG: 40 TABLET ORAL at 09:10

## 2020-01-01 RX ADMIN — Medication 400 MG: at 13:27

## 2020-01-01 RX ADMIN — MIRTAZAPINE 15 MG: 15 TABLET, FILM COATED ORAL at 23:07

## 2020-01-01 RX ADMIN — MORPHINE SULFATE 4 MG: 4 INJECTION, SOLUTION INTRAMUSCULAR; INTRAVENOUS at 08:42

## 2020-01-01 RX ADMIN — Medication 400 MG: at 13:03

## 2020-01-01 RX ADMIN — LEVOTHYROXINE SODIUM 50 MCG: 50 TABLET ORAL at 05:54

## 2020-01-01 RX ADMIN — SODIUM CHLORIDE, PRESERVATIVE FREE 10 ML: 5 INJECTION INTRAVENOUS at 09:46

## 2020-01-01 RX ADMIN — LORAZEPAM 0.5 MG: 2 INJECTION INTRAMUSCULAR; INTRAVENOUS at 04:21

## 2020-01-01 RX ADMIN — LORAZEPAM 0.5 MG: 2 INJECTION INTRAMUSCULAR; INTRAVENOUS at 17:52

## 2020-01-01 RX ADMIN — LORAZEPAM 0.5 MG: 2 INJECTION INTRAMUSCULAR; INTRAVENOUS at 16:50

## 2020-01-01 RX ADMIN — SODIUM CHLORIDE, PRESERVATIVE FREE 10 ML: 5 INJECTION INTRAVENOUS at 10:33

## 2020-01-01 RX ADMIN — MORPHINE SULFATE 2 MG: 2 INJECTION, SOLUTION INTRAMUSCULAR; INTRAVENOUS at 00:23

## 2020-01-01 RX ADMIN — Medication 400 MG: at 03:11

## 2020-01-01 RX ADMIN — DEXAMETHASONE SODIUM PHOSPHATE 8 MG: 10 INJECTION INTRAMUSCULAR; INTRAVENOUS at 14:39

## 2020-01-01 RX ADMIN — AMLODIPINE BESYLATE 5 MG: 5 TABLET ORAL at 09:48

## 2020-01-01 RX ADMIN — ACETAMINOPHEN 1000 MG: 500 TABLET, FILM COATED ORAL at 13:03

## 2020-01-01 RX ADMIN — SODIUM CHLORIDE, PRESERVATIVE FREE 10 ML: 5 INJECTION INTRAVENOUS at 20:11

## 2020-01-01 RX ADMIN — GLYCOPYRROLATE 0.4 MG: 0.2 INJECTION, SOLUTION INTRAMUSCULAR; INTRAVITREAL at 20:15

## 2020-01-01 RX ADMIN — SODIUM CHLORIDE, PRESERVATIVE FREE 10 ML: 5 INJECTION INTRAVENOUS at 11:14

## 2020-01-01 RX ADMIN — METOPROLOL TARTRATE 50 MG: 50 TABLET, FILM COATED ORAL at 10:03

## 2020-01-01 RX ADMIN — METOPROLOL TARTRATE 50 MG: 25 TABLET, FILM COATED ORAL at 01:52

## 2020-01-01 RX ADMIN — LORAZEPAM 0.5 MG: 2 INJECTION INTRAMUSCULAR; INTRAVENOUS at 21:07

## 2020-01-01 RX ADMIN — WARFARIN 5 MG: 5 TABLET ORAL at 18:59

## 2020-01-01 RX ADMIN — FENTANYL CITRATE 25 MCG: 50 INJECTION INTRAMUSCULAR; INTRAVENOUS at 16:21

## 2020-01-01 RX ADMIN — SODIUM CHLORIDE, PRESERVATIVE FREE 10 ML: 5 INJECTION INTRAVENOUS at 08:07

## 2020-01-01 RX ADMIN — ROCURONIUM BROMIDE 40 MG: 10 INJECTION INTRAVENOUS at 14:33

## 2020-01-01 RX ADMIN — ACETAMINOPHEN 1000 MG: 500 TABLET, FILM COATED ORAL at 18:33

## 2020-01-01 RX ADMIN — METOPROLOL TARTRATE 50 MG: 25 TABLET, FILM COATED ORAL at 13:27

## 2020-01-01 RX ADMIN — METOPROLOL TARTRATE 50 MG: 50 TABLET, FILM COATED ORAL at 18:07

## 2020-01-01 RX ADMIN — FAMOTIDINE 20 MG: 20 TABLET, FILM COATED ORAL at 16:58

## 2020-01-01 RX ADMIN — METOPROLOL TARTRATE 50 MG: 25 TABLET, FILM COATED ORAL at 08:30

## 2020-01-01 RX ADMIN — METOPROLOL TARTRATE 50 MG: 25 TABLET, FILM COATED ORAL at 13:09

## 2020-01-01 RX ADMIN — MORPHINE SULFATE 2 MG: 2 INJECTION, SOLUTION INTRAMUSCULAR; INTRAVENOUS at 12:33

## 2020-01-01 RX ADMIN — MORPHINE SULFATE 2 MG: 2 INJECTION, SOLUTION INTRAMUSCULAR; INTRAVENOUS at 17:53

## 2020-01-01 RX ADMIN — SODIUM CHLORIDE, PRESERVATIVE FREE 10 ML: 5 INJECTION INTRAVENOUS at 20:21

## 2020-01-01 RX ADMIN — Medication 400 MG: at 08:30

## 2020-01-01 RX ADMIN — MAGNESIUM OXIDE 400 MG (241.3 MG MAGNESIUM) TABLET 400 MG: TABLET at 09:11

## 2020-01-01 RX ADMIN — EPHEDRINE SULFATE 10 MG: 50 INJECTION INTRAVENOUS at 14:56

## 2020-01-01 RX ADMIN — METOPROLOL TARTRATE 50 MG: 25 TABLET, FILM COATED ORAL at 00:23

## 2020-01-01 RX ADMIN — GLYCOPYRROLATE 0.4 MG: 0.2 INJECTION, SOLUTION INTRAMUSCULAR; INTRAVITREAL at 12:34

## 2020-01-01 RX ADMIN — FENTANYL CITRATE 50 MCG: 50 INJECTION INTRAMUSCULAR; INTRAVENOUS at 15:15

## 2020-01-01 RX ADMIN — PANTOPRAZOLE SODIUM 40 MG: 40 TABLET, DELAYED RELEASE ORAL at 06:45

## 2020-01-01 RX ADMIN — MORPHINE SULFATE 2 MG: 2 INJECTION, SOLUTION INTRAMUSCULAR; INTRAVENOUS at 20:54

## 2020-01-01 RX ADMIN — TRAMADOL HYDROCHLORIDE 50 MG: 50 TABLET, FILM COATED ORAL at 18:32

## 2020-01-01 RX ADMIN — METOPROLOL TARTRATE 50 MG: 25 TABLET, FILM COATED ORAL at 09:38

## 2020-01-01 RX ADMIN — Medication 400 MG: at 08:08

## 2020-01-01 RX ADMIN — POLYETHYLENE GLYCOL 3350 17 G: 17 POWDER, FOR SOLUTION ORAL at 08:49

## 2020-01-01 RX ADMIN — LORAZEPAM 0.5 MG: 2 INJECTION INTRAMUSCULAR; INTRAVENOUS at 09:13

## 2020-01-01 RX ADMIN — TRAMADOL HYDROCHLORIDE 50 MG: 50 TABLET, FILM COATED ORAL at 21:35

## 2020-01-01 RX ADMIN — PHENYLEPHRINE HYDROCHLORIDE 100 MCG: 10 INJECTION INTRAVENOUS at 15:02

## 2020-01-01 RX ADMIN — MORPHINE SULFATE 2 MG: 2 INJECTION, SOLUTION INTRAMUSCULAR; INTRAVENOUS at 20:33

## 2020-01-01 RX ADMIN — PROPOFOL 50 MG: 10 INJECTION, EMULSION INTRAVENOUS at 14:34

## 2020-01-01 RX ADMIN — DOCUSATE SODIUM 100 MG: 100 CAPSULE, LIQUID FILLED ORAL at 09:10

## 2020-01-01 RX ADMIN — FAMOTIDINE 20 MG: 20 TABLET, FILM COATED ORAL at 17:25

## 2020-01-01 RX ADMIN — FAMOTIDINE 20 MG: 20 TABLET, FILM COATED ORAL at 17:55

## 2020-01-21 NOTE — PROGRESS NOTES
Anticoagulation Clinic Progress Note    Anticoagulation Summary  As of 1/21/2020    INR goal:   2.0-3.0   TTR:   94.9 % (1.3 y)   INR used for dosing:   3.6! (1/21/2020)   Warfarin maintenance plan:   2.5 mg every Tue, Fri; 5 mg all other days   Weekly warfarin total:   30 mg   Plan last modified:   Lyly Vaz [facility] (10/9/2018)   Next INR check:   2/4/2020   Priority:   Maintenance   Target end date:   Indefinite    Indications    Permanent atrial fibrillation [I48.21]             Anticoagulation Episode Summary     INR check location:       Preferred lab:       Send INR reminders to:   [facility]    Comments:         Anticoagulation Care Providers     Provider Role Specialty Phone number    Gopal Kurtz MD Referring Cardiology [phone]          Clinic Interview:  Patient Findings     Positives:   Change in diet/appetite    Negatives:   Signs/symptoms of thrombosis, Signs/symptoms of bleeding,   Laboratory test error suspected, Change in health, Change in alcohol use,   Change in activity, Upcoming invasive procedure, Emergency department   visit, Upcoming dental procedure, Missed doses, Extra doses, Change in   medications, Hospital admission, Bruising, Other complaints    Comments:   Decreased vit k.      Clinical Outcomes     Negatives:   Major bleeding event, Thromboembolic event,   Anticoagulation-related hospital admission, Anticoagulation-related ED   visit, Anticoagulation-related fatality    Comments:   Decreased vit k.        INR History:  Anticoagulation Monitoring 11/19/2019 12/17/2019 1/21/2020   INR 2.6 2.7 3.6   INR Date 11/19/2019 12/17/2019 1/21/2020   INR Goal 2.0-3.0 2.0-3.0 2.0-3.0   Trend Same Same Same   Last Week Total 30 mg 30 mg 30 mg   Next Week Total 30 mg 30 mg 27.5 mg   Sun 5 mg 5 mg 5 mg   Mon 5 mg 5 mg 5 mg   Tue 2.5 mg 2.5 mg Hold (1/21); Otherwise 2.5 mg   Wed 5 mg 5 mg 5 mg   Thu 5 mg 5 mg 5 mg   Fri 2.5 mg 2.5 mg 2.5 mg   Sat 5 mg 5 mg 5 mg   Visit  Report - - -   Some recent data might be hidden       Plan:  1. INR is Supratherapeutic today- see above in Anticoagulation Summary.  Will instruct Vish Morin to Change their warfarin regimen- see above in Anticoagulation Summary.  2. Follow up in 2 weeks  3. Patient declines warfarin refills.  4. Verbal and written information provided. Patient expresses understanding and has no further questions at this time.    Archie Alba [facility]

## 2020-02-04 NOTE — PROGRESS NOTES
Anticoagulation Clinic Progress Note    Anticoagulation Summary  As of 2020    INR goal:   2.0-3.0   TTR:   92.1 % (1.3 y)   INR used for dosin.2! (2020)   Warfarin maintenance plan:   2.5 mg every Tue, Thu, Sat; 5 mg all other days   Weekly warfarin total:   27.5 mg   Plan last modified:   Deja Camacho RPH (2020)   Next INR check:   2020   Priority:   Maintenance   Target end date:   Indefinite    Indications    Permanent atrial fibrillation [I48.21]             Anticoagulation Episode Summary     INR check location:       Preferred lab:       Send INR reminders to:    [facility]    Comments:         Anticoagulation Care Providers     Provider Role Specialty Phone number    Gopal Kurtz MD Referring Cardiology [phone]          Clinic Interview:   Pt notes more frequent & loose stools in past few weeks--will f/u with PCP      INR History:  Anticoagulation Monitoring 2019   INR 2.7 3.6 4.2   INR Date 2019   INR Goal 2.0-3.0 2.0-3.0 2.0-3.0   Trend Same Same Down   Last Week Total 30 mg 30 mg 30 mg   Next Week Total 30 mg 27.5 mg 25 mg   Sun 5 mg 5 mg 5 mg   Mon 5 mg 5 mg 5 mg   Tue 2.5 mg Hold (); Otherwise 2.5 mg Hold ()   Wed 5 mg 5 mg 5 mg   Thu 5 mg 5 mg 2.5 mg   Fri 2.5 mg 2.5 mg 5 mg   Sat 5 mg 5 mg 2.5 mg   Visit Report - - -   Some recent data might be hidden       Plan:  1. INR is Supratherapeutic today- see above in Anticoagulation Summary.  Will instruct Vish Morin to HOLD warfarin dose today, then change their warfarin regimen- see above in Anticoagulation Summary.  2. Follow up in 1 week  3. Patient declines warfarin refills.  4. Verbal and written information provided. Patient expresses understanding and has no further questions at this time.    Deja Camacho RPH

## 2020-02-06 NOTE — PROGRESS NOTES
Subjective Chief complaint is checkup on blood pressure and thyroid  Vish Morin is a 88 y.o. male.     History of Present Illness   Vish is here today for checkup on his blood pressure and thyroid.  He takes benazepril along with metoprolol.  The metoprolol is also for rate control for his atrial fibrillation.  He has permanent atrial fibrillation and is on warfarin.  His pro times are followed at the pro [facility] clinic.  He was eating a little bit differently through the holidays and his most recent pro time was elevated.  Not noticed any bleeding problems from this.  He is concerned about his thyroid because of all the restrictions on taking it.  I did advise him that his last thyroid testing was normal and what ever he is doing right now is working.  The following portions of the patient's history were reviewed and updated as appropriate: allergies, current medications, past family history, past medical history, past social history, past surgical history and problem list.    Review of Systems   Respiratory: Negative for chest tightness and shortness of breath.    Cardiovascular: Negative for chest pain.   Gastrointestinal: Negative for anal bleeding.   Genitourinary: Negative for hematuria.       Objective   Physical Exam   Constitutional: He appears well-developed and well-nourished.   Neck: Carotid bruit is not present.   Cardiovascular: Normal rate, normal heart sounds and intact distal pulses. Exam reveals no gallop and no friction rub.   No murmur heard.  The rhythm is irregular.  The rate is controlled   Pulmonary/Chest: Effort normal and breath sounds normal. No respiratory distress. He has no wheezes. He has no rales.   Abdominal: Soft. Bowel sounds are normal. He exhibits no distension and no mass. There is no tenderness. There is no guarding.   Musculoskeletal: He exhibits no edema.   Nursing note and vitals reviewed.        Assessment/Plan   Vish was seen today for follow-up.    Diagnoses and all  orders for this visit:    Essential hypertension  -     Comprehensive Metabolic Panel  -     Magnesium  -     CBC & Differential    Permanent atrial fibrillation  -     CBC & Differential    Acquired hypothyroidism  -     TSH+Free T4    Hypomagnesemia  -     Magnesium    Other orders  -     benazepril (LOTENSIN) 40 MG tablet; Take 1 tablet by mouth Daily.  -     metoprolol tartrate (LOPRESSOR) 50 MG tablet; Take 1 tablet by mouth Every 12 (Twelve) Hours.      Vish is here today for follow-up.  His blood pressure seems to be doing well.  His atrial fibrillation rate is controlled.  He is on warfarin as his anticoagulant.  I suspect his elevated pro times have been because of change in his diet.  However I am going to rule out the possibility of anemia as a cause.  We are going to repeat his thyroid and assure him that he is doing okay at how he is taking it currently.  He does have a prior history of some low magnesium's and recently had a prescription filled for magnesium replacement.  We will check on that as well

## 2020-02-11 NOTE — PROGRESS NOTES
I have supervised and reviewed the notes, assessments, and/or procedures performed by our PharmD Candidate. The documented assessment and plan were developed cooperatively, and the plan was implemented in my presence. I concur with the documentation of this patient encounter.    Satish Goncalves [facility]

## 2020-02-11 NOTE — PROGRESS NOTES
Anticoagulation Clinic Progress Note    Anticoagulation Summary  As of 2020    INR goal:   2.0-3.0   TTR:   90.8 % (1.3 y)   INR used for dosin.2! (2020)   Warfarin maintenance plan:   2.5 mg every Tue, Thu, Sat; 5 mg all other days   Weekly warfarin total:   27.5 mg   Plan last modified:   Deja Camacho RPH (2020)   Next INR check:   2020   Priority:   Maintenance   Target end date:   Indefinite    Indications    Permanent atrial fibrillation [I48.21]             Anticoagulation Episode Summary     INR check location:       Preferred lab:       Send INR reminders to:    [facility]    Comments:         Anticoagulation Care Providers     Provider Role Specialty Phone number    Gopal Kurtz MD Referring Cardiology [phone]          Clinic Interview:  Patient Findings     Positives:   Change in health, Change in diet/appetite, Bruising    Comments:   Pt reports eating less greens and potentially eating less.   Pt's loose stools have not been as frequent this past week.      Clinical Outcomes     Comments:   Pt reports eating less greens and potentially eating less.   Pt's loose stools have not been as frequent this past week.        INR History:  Anticoagulation Monitoring 2020   INR 3.6 4.2 4.2   INR Date 2020   INR Goal 2.0-3.0 2.0-3.0 2.0-3.0   Trend Same Down Same   Last Week Total 30 mg 30 mg 25 mg   Next Week Total 27.5 mg 25 mg 22.5 mg   Sun 5 mg 5 mg 5 mg   Mon 5 mg 5 mg -   Tue Hold (); Otherwise 2.5 mg Hold () Hold ()   Wed 5 mg 5 mg 2.5 mg ()   Thu 5 mg 2.5 mg 2.5 mg   Fri 2.5 mg 5 mg 5 mg   Sat 5 mg 2.5 mg 2.5 mg   Visit Report - - -   Some recent data might be hidden       Plan:  1. INR is Supratherapeutic today- see above in Anticoagulation Summary.  Will instruct Vish Morin to Change their warfarin regimen- see above in Anticoagulation Summary.  2. Follow up in 6 days  3. Patient  declines warfarin refills.  4. Verbal and written information provided. Patient expresses understanding and has no further questions at this time.    Jj Lam, Pharmacy Intern

## 2020-02-17 NOTE — PROGRESS NOTES
Anticoagulation Clinic Progress Note    Anticoagulation Summary  As of 2020    INR goal:   2.0-3.0   TTR:   89.9 % (1.3 y)   INR used for dosin.7 (2020)   Warfarin maintenance plan:   2.5 mg every Tue, Thu, Sat; 5 mg all other days   Weekly warfarin total:   27.5 mg   No change documented:   Tricia Quintana   Plan last modified:   Deja Camacho RPH (2020)   Next INR check:   3/3/2020   Priority:   Maintenance   Target end date:   Indefinite    Indications    Permanent atrial fibrillation [I48.21]             Anticoagulation Episode Summary     INR check location:       Preferred lab:       Send INR reminders to:    [facility]    Comments:         Anticoagulation Care Providers     Provider Role Specialty Phone number    Gopal Kurtz MD Referring Cardiology [phone]          Clinic Interview:  Patient Findings     Negatives:   Signs/symptoms of thrombosis, Signs/symptoms of bleeding,   Laboratory test error suspected, Change in health, Change in alcohol use,   Change in activity, Upcoming invasive procedure, Emergency department   visit, Upcoming dental procedure, Missed doses, Extra doses, Change in   medications, Change in diet/appetite, Hospital admission, Bruising, Other   complaints      Clinical Outcomes     Negatives:   Major bleeding event, Thromboembolic event,   Anticoagulation-related hospital admission, Anticoagulation-related ED   visit, Anticoagulation-related fatality        INR History:  Anticoagulation Monitoring 2020   INR 4.2 4.2 2.7   INR Date 2020   INR Goal 2.0-3.0 2.0-3.0 2.0-3.0   Trend Down Same Same   Last Week Total 30 mg 25 mg 22.5 mg   Next Week Total 25 mg 22.5 mg 27.5 mg   Sun 5 mg 5 mg 5 mg   Mon 5 mg - 5 mg   Tue Hold () Hold () 2.5 mg   Wed 5 mg 2.5 mg () 5 mg   Thu 2.5 mg 2.5 mg 2.5 mg   Fri 5 mg 5 mg 5 mg   Sat 2.5 mg 2.5 mg 2.5 mg   Visit Report - - -   Some recent data might  be hidden       Plan:  1. INR is therapeutic today- see above in Anticoagulation Summary.   Will instruct Vish MORALES Patience to continue their warfarin regimen- see above in Anticoagulation Summary.  2. Follow up in 1.5-2 weeks.  3. Patient declines warfarin refills.  4. Verbal and written information provided. Patient expresses understanding and has no further questions at this time.    Tricia Quintana

## 2020-03-03 NOTE — PROGRESS NOTES
Anticoagulation Clinic Progress Note    Anticoagulation Summary  As of 3/3/2020    INR goal:   2.0-3.0   TTR:   90.2 % (1.4 y)   INR used for dosin.4 (3/3/2020)   Warfarin maintenance plan:   2.5 mg every Tue, Thu, Sat; 5 mg all other days   Weekly warfarin total:   27.5 mg   No change documented:   Oksana Cummins   Plan last modified:   Deja Camacho RP (2020)   Next INR check:   3/31/2020   Priority:   Maintenance   Target end date:   Indefinite    Indications    Permanent atrial fibrillation [I48.21]             Anticoagulation Episode Summary     INR check location:       Preferred lab:       Send IN[facility] reminders [facility] [place]    Comments:         Anticoagulation Care Providers     Provider Role Specialty Phone number    Gopal Kurtz MD Referring Cardiology [phone]          Clinic Interview:      INR History:  Anticoagulation Monitoring 2020 2020 3/3/2020   INR 4.2 2.7 2.4   INR Date 2020 2020 3/3/2020   INR Goal 2.0-3.0 2.0-3.0 2.0-3.0   Trend Same Same Same   Last Week Total 25 mg 22.5 mg 27.5 mg   Next Week Total 22.5 mg 27.5 mg 27.5 mg   Sun 5 mg 5 mg 5 mg   Mon - 5 mg 5 mg   Tue Hold () 2.5 mg 2.5 mg   Wed 2.5 mg () 5 mg 5 mg   Thu 2.5 mg 2.5 mg 2.5 mg   Fri 5 mg 5 mg 5 mg   Sat 2.5 mg 2.5 mg 2.5 mg   Visit Report - - -   Some recent data might be hidden       Plan:  1. INR is therapeutic today- see above in Anticoagulation Summary.   Will instruct Vish Morin to continue their warfarin regimen- see above in Anticoagulation Summary.  2. Follow up in 4 weeks.  3. Patient declines warfarin refills.  4. Verbal and written information provided. Patient expresses understanding and has no further questions at this time.    Oksana Cummins

## 2020-03-17 NOTE — PROGRESS NOTES
Discharge Planning Assessment  [facility]     Patient Name: Vish Morin  MRN: [id number]  Today's Date: 3/17/2020    Admit Date: 3/17/2020    Discharge Needs Assessment    No documentation.       Discharge Plan     Row Name 03/17/20 0928       Plan    Plan  Plans to return home. Spoke w/ patient and daughter. Both denied any d/c needs r/t equipment, transportation, f/u care or ability to get any scripts filled        Destination      Coordination has not been started for this encounter.      Durable Medical Equipment      Coordination has not been started for this encounter.      Dialysis/Infusion      Coordination has not been started for this encounter.      Home Medical Care      Coordination has not been started for this encounter.      Therapy      Coordination has not been started for this encounter.      Community Resources      Coordination has not been started for this encounter.          Demographic Summary    No documentation.       Functional Status    No documentation.       Psychosocial    No documentation.       Abuse/Neglect    No documentation.       Legal    No documentation.       Substance Abuse    No documentation.       Patient Forms    No documentation.           Yue Ravi RN

## 2020-03-17 NOTE — ED PROVIDER NOTES
EMERGENCY DEPARTMENT ENCOUNTER    CHIEF COMPLAINT  Chief Complaint: LUE pain  History given by:patient  History limited by:none  Time Seen: 0648  Room Number: 08/08  PMD: Nick Chawla MD      HPI:  Pt is a 88 y.o. male who presents via EMS from home with improving LUE pain and RUE skin tear after a mechanical fall PTA. Pt reports that he was only on the floor for about 30 minutes but unable to get himself up. Pt denies LOC and states he was able to wake his daughter up who assisted him. Pt states that he has been having urinary frequency and has to get out of bed 4-5x a night to urinate.  Patient denies neck pain, back pain, and head injury. Tdap is utd.   Past Medical History of aFib anticoagulated on warfarin.    Duration: PTA  Timing:constant  Radiation:none  Quality:LUE  Intensity/Severity:moderate  Progression:improving  Associated Symptoms:urinary frequency, RUE skin tear  Treatment before arrival:EMS care and treatment    PAST MEDICAL HISTORY  Active Ambulatory Problems     Diagnosis Date Noted   • Permanent atrial fibrillation 02/19/2016   • Constipation 02/19/2016   • Gastroesophageal reflux disease 02/19/2016   • Hyperlipidemia 02/19/2016   • Hypertension 02/19/2016   • Hypothyroidism 02/19/2016   • Impaired fasting glucose 02/19/2016   • Low back pain 02/19/2016   • Pain in the muscles 02/19/2016   • Muscle weakness 02/19/2016   • Nausea 02/19/2016   • Poor posture 02/19/2016   • Mineral deficiency 02/19/2016   • Rash 02/19/2016   • Weight loss 02/19/2016   • Dizziness 02/19/2016   • Essential (primary) hypertension 06/09/2017     Resolved Ambulatory Problems     Diagnosis Date Noted   • No Resolved Ambulatory Problems     Past Medical History:   Diagnosis Date   • Atrial fibrillation (CMS/HCC)    • CAD (coronary artery disease)    • Diabetes mellitus (CMS/HCC)    • MOURA (dyspnea on exertion)    • GERD (gastroesophageal reflux disease)    • High risk medication use    • IFG (impaired fasting  glucose)    • Lower back pain    • Muscle pain, thigh    • KENNETH (obstructive sleep apnea)    • Postural imbalance    • Sick sinus syndrome (CMS/HCC)    • Sinus bradycardia    • Syncope        PAST SURGICAL HISTORY  Past Surgical History:   Procedure Laterality Date   • CARDIAC CATHETERIZATION  10/10/2008    diagnostic   • COLONOSCOPY     • CORONARY ANGIOPLASTY WITH STENT PLACEMENT  12/17/2004    Drug-eluting Sirolimus.  3.5 x 33mm Cypher stent to LAD.  3.0 x 13mm Cypher stent to ROSALIA.       FAMILY HISTORY  Family History   Problem Relation Age of Onset   • Heart disease Other        SOCIAL HISTORY  Social History     Socioeconomic History   • Marital status: Single     Spouse name: Not on file   • Number of children: Not on file   • Years of education: Not on file   • Highest education level: Not on file   Tobacco Use   • Smoking status: Former Smoker     Types: Cigars   • Smokeless tobacco: Never Used   Substance and Sexual Activity   • Alcohol use: No     Comment: CAFFEINE USE   • Drug use: No   • Sexual activity: Never         ALLERGIES  Patient has no known allergies.    REVIEW OF SYSTEMS  Review of Systems   Constitutional: Negative for chills and fever.   HENT: Negative for sore throat.    Respiratory: Negative for shortness of breath.    Cardiovascular: Negative for chest pain.   Gastrointestinal: Negative for nausea and vomiting.   Genitourinary: Positive for frequency. Negative for dysuria.   Musculoskeletal: Negative for back pain and neck pain.        + LUE Pain   Skin: Positive for wound (skin tear to RUE). Negative for rash.   Neurological: Negative for dizziness.   Psychiatric/Behavioral: The patient is not nervous/anxious.        PHYSICAL EXAM  ED Triage Vitals [03/17/20 0433]   Temp Heart Rate Resp BP SpO2   97.9 °F (36.6 °C) 76 16 (!) 200/100 97 %       Physical Exam   Constitutional: He is well-developed, well-nourished, and in no distress. No distress.   HENT:   Head: Atraumatic.   Mouth/Throat:  Mucous membranes are normal.   Eyes: Pupils are equal, round, and reactive to light. EOM are normal. No scleral icterus.   Neck: Normal range of motion.   Cardiovascular: Normal rate, regular rhythm and normal heart sounds.   Pulmonary/Chest: Effort normal and breath sounds normal.   Musculoskeletal: Normal range of motion.   L shoulder, upper arm, and elbow tenderness  Skin tear to R elbow  Moves BLE w/o difficulty   Neurological: He is alert.   Skin: Skin is warm and dry.   Psychiatric: Mood and affect normal.   Nursing note and vitals reviewed.      LAB RESULTS  Recent Results (from the past 24 hour(s))   Protime-INR    Collection Time: 03/17/20  6:16 AM   Result Value Ref Range    Protime 23.4 (H) 11.7 - 14.2 Seconds    INR 2.11 (H) 0.90 - 1.10       I ordered the above labs and reviewed the results    RADIOLOGY  XR Humerus Left         XR Shoulder 2+ View Left         CT Head Without Contrast   Preliminary Result   1. No acute intracranial abnormality.                                      I ordered the above noted radiological studies and reviewed the images on the PACS system.        PROGRESS AND CONSULTS  ED Course as of Mar 17 0918   Tue Mar 17, 2020   0700 Fall, no closed head injury but patient is 88 years old and anticoagulated.  Will obtain CT head imaging, obtaining INR.  Obtaining x-rays of injured extremity.  Tetanus up-to-date.    [JG]      ED Course User Index  [JG] Grant Jasso MD       6069-Discussed with pt and family the plan to review XR L shoulder, XR L humerus, CT head, along with PT-INR for further evaluation. The patient indicates understanding of these issues and agrees with the plan.    0730-Reviewed pt's history and workup with Dr. Jasso.  At bedside evaluation, they agree with the plan of care.    0914-Wound care provided by ED staff to R elbow skin tear. Rechecked pt. INR-2.11 and imaging studies-negative acute. Reviewed implications of results, diagnosis, meds, responsibility  "to follow up, warning signs and symptoms of possible worsening, potential complications and reasons to return to ER with patient.  Discussed all results and noted any abnormalities with patient.  Discussed absolute need to recheck abnormalities with his PCP.    Discussed plan for discharge, as there is no emergent indication for admission.  Pt is agreeable and understands need for follow up and repeat testing.  Pt is aware that discharge does not mean that nothing is wrong but it indicates no emergency is present.  Pt is discharged with instructions to follow up with primary care doctor to have their blood pressure rechecked.       DIAGNOSIS  Final diagnoses:   Contusion of head, unspecified part of head, initial encounter   Contusion of left upper extremity, initial encounter   Skin tear of right elbow without complication, initial encounter   Fall in home, initial encounter       FOLLOW UP   Nick Chawla MD  [facility] [id number] [phone]    Today      COURSE & MEDICAL DECISION MAKING  Pertinent Labs and Imaging studies that were ordered and reviewed are noted above.  Results were reviewed/discussed with the patient and they were also made aware of online assess.   Pt also made aware that some labs, such as cultures, will not be resulted during ER visit and follow up with PMD is necessary.     MEDICATIONS GIVEN IN ER  Medications - No data to display    /96   Pulse 85   Temp 97.9 °F (36.6 °C) (Tympanic)   Resp 16   Ht 188 cm (74\")   Wt 72.1 kg (159 lb)   SpO2 96%   BMI 20.41 kg/m²       I personally reviewed the past medical history, past surgical history, social history, family history, current medications and allergies as they appear in this chart.  The scribe's note accurately reflects the work and decisions made by me.     Documentation assistance provided by vance Terrell for JESUS Ty on 3/17/2020 at 09:18. Information recorded by the " scribe was done at my direction and has been verified and validated by me.     Josephine Terrell  03/17/20 0918       Jaye Mcgowan, JESUS  03/17/20 8199

## 2020-03-17 NOTE — ED NOTES
Pt here per EMS from home. Pt fell out of bed, c/o left arm pain and skin tear rt elbow. Did not hit head, denies LOC. Pt is on thinners. Pt states he was getting up to go to the bathroom and lost his balance.     Mahogany Mcmanus, RN  03/17/20 7592

## 2020-03-17 NOTE — ED PROVIDER NOTES
MD ATTESTATION NOTE    The SEPIDEH and I have discussed this patient's history, physical exam, and treatment plan. I have reviewed the documentation and personally had a face to face interaction with the patient. I affirm the SEPIDEH documentation and agree with their diagnostics, findings, treatment, plan, and disposition.  The attached note describes my personal findings.    Vish Morin is a 88 y.o. male who presents to the ED c/o fall and left arm pain.  Patient states he slipped and fell, landing on his left side.  Patient did not hit his head, no headache, no loss of consciousness, no nausea vomiting, no visual disturbance.  Patient complains of pain in the left upper arm and shoulder.  Patient also sustained abrasions on the right arm.  Left arm pain is described as dull achy, worse with movement.  Patient denies any weakness or paresthesias.  Tetanus is up-to-date.  Patient is on Coumadin.    On exam:  General: NAD  Head: NCAT  ENT: Extraocular motion intact, pupils equal and round reactive to light, moist mucous membranes  Neck: Supple, trachea midline.  Cervical spine: No step-offs or deformities, no midline tenderness to palpation, full range of motion  Cardiac, regular rate and rhythm  Lungs: Clear to auscultation bilaterally  Abdomen: Soft, nontender, no rebound tenderness/guarding/rigidity  Extremities: Moves all extremities well, no peripheral edema  Left upper extremity: Tenderness along left lateral shoulder and left upper arm, skin intact, no crepitus or deformity, limited shoulder flexion secondary to pain, elbow has full range of motion, neurovascular intact distally.  Right upper extremity: Abrasion on right elbow and right dorsal wrist.  Alert and oriented x3, extraocular motion intact, pupils are equal and round reactive to light, cranial nerves II through XII are grossly intact, normal speech, moves all extremities well, 5 out of 5 strength all 4 extremities, sensation intact light touch all 4  extremities, no ataxia  Skin: Warm, dry    Labs  Recent Results (from the past 24 hour(s))   Protime-INR    Collection Time: 03/17/20  6:16 AM   Result Value Ref Range    Protime 23.4 (H) 11.7 - 14.2 Seconds    INR 2.11 (H) 0.90 - 1.10       Radiology  Xr Shoulder 2+ View Left    Result Date: 3/17/2020  XR HUMERUS LEFT-, XR SHOULDER 2+ VIEW LEFT-  HISTORY: 88-year-old male with left shoulder pain and arm pain following trauma.  FINDINGS: There is no evidence for fracture or malalignment of the left shoulder. 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 The epicondyles of the humerus are not seen in its entirety and the visualized portions are poorly seen. The rest of the left humerus appears unremarkable. If symptoms are referable to the distal humerus or left elbow, dedicated elbow radiographs are recommended.      Xr Humerus Left    Result Date: 3/17/2020  XR HUMERUS LEFT-, XR SHOULDER 2+ VIEW LEFT-  HISTORY: 88-year-old male with left shoulder pain and arm pain following trauma.  FINDINGS: There is no evidence for fracture or malalignment of the left shoulder. 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 The epicondyles of the humerus are not seen in its entirety and the visualized portions are poorly seen. The rest of the left humerus appears unremarkable. If symptoms are referable to the distal humerus or left elbow, dedicated elbow radiographs are recommended.      Ct Head Without Contrast    Result Date: 3/17/2020  CRANIAL CT SCAN WITHOUT CONTRAST  CLINICAL HISTORY: Trauma/Fall  COMPARISON: None.  TECHNIQUE: Radiation dose reduction techniques were utilized, including automated exposure control and exposure modulation based on body size. Multiple axial images of the head were obtained without contrast.  FINDINGS:  There are no abnormal areas of increased density or mass effect. Diffuse atrophy. There are moderately extensive scattered areas of  decreased density in the white matter likely related to chronic ischemic gliotic changes.    Ventricles, sulci, and cisterns appear normal. Bone window images are unremarkable.         1. No acute intracranial abnormality.              Medical Decision Making:    ED Course as of Mar 17 1152   Tue Mar 17, 2020   0700 Fall, no closed head injury but patient is 88 years old and anticoagulated.  Will obtain CT head imaging, obtaining INR.  Obtaining x-rays of injured extremity.  Tetanus up-to-date.    [JG]      ED Course User Index  [JG] Grant Jasso MD       Diagnosis  Final diagnoses:   Contusion of head, unspecified part of head, initial encounter   Contusion of left upper extremity, initial encounter   Skin tear of right elbow without complication, initial encounter   Fall in home, initial encounter        Grant Jasso MD  03/17/20 9459

## 2020-03-17 NOTE — DISCHARGE INSTRUCTIONS
Continue current home medications  Ice to left arm as needed  Clean skin tear to right elbow daily with soap and water and apply antibiotic ointment  Activity as tolerated  Follow-up with PMD as needed  Return to the ER for fever, chills, change in mental status, headache, vomiting, dizziness, weakness or any new or worsening symptoms

## 2020-03-25 NOTE — OUTREACH NOTE
Care Coordination Assessment    Documented/Reviewed By:  Tori Ren RN Date/time:  3/25/2020  3:37 PM   Assessment completed with:  patient  Enrolled in care management program:  No  Living arrangement:  family members (Comment: DAughter lives with patient)  Support system:  family  Home care services:  No  Equipment used at home:  walker (Comment: Blood pressure cuff)  Bed or wheelchair confined:  No  Inadequate nutrition:  No  Medication adherence problem:  No  Experiencing side effects from current medications:  No  History of fall(s) in last 6 months:  Yes  Difficulty keeping appointments:  No  Family aware of the patient's advance care planning wishes:  Yes

## 2020-03-25 NOTE — OUTREACH NOTE
Care Plan Note      Responses   AWV Materials  Send Materials   Care Gaps Addressed  Flu Shot   Flu Shot Status  Up to Date   Other Patient Education/Resources   24/7 [facility] Nurse Call Line, Advanced Care Planning, Epifanio   24/7 Nurse Call Line Education Method  Verbal   ACP Education Method  Verbal [Completed]   MyChart Education Method  Verbal [Declines]   Advanced Directives:  Patient Has   Ed Visits past 12 months:  1   Hospitalizations past 12 months  None   Medication Adherence  Medications understood        The main concerns and/or symptoms the patient would like to address are:Talked with patient. Discussed  3/17/20 ED visit regarding contusion to head and left upper extremity and abrasion to right arm due to fall. Patient states to be compliant with ED recommendations and states symptoms have improved.He states good ROM to both arms; skin tear to right arm is now a scab; and feels steady when ambulating. Patient will have PT/INR at anticoagulation clinic on 3/31/20. He reports no s/sx of bleeding. He reports no difficulty with dizziness; chest pain;  SOB; appetite or sleeping. Discussed with patient use of Life Alert regarding safety with fall and has discussed with son. Patient lives with daughter;  independent with ADL's; meal preparation; receiving assistance with transportation currently due to fall. He has been driving before fall.  Patient ambulates with walker as needed. He is  compliant with medications; medical appointments; monitoring of PT/INR and blood pressure. Patient confirms upcoming medical appointments with cardiology; and PCP.     Education/instruction provided by Care Coordinator: Reviewed with patient ED recommendations; bleeding precautions; safety and fall precautions; COVID 19 precautions; 24/7 Nurse Line Telephone number; ACM contact information; Advance Directives; My Chart; gaps in care; MWV and Case Management services.  Patient verbalized understanding. He states to  appreciate phone call and declines further outreach at this time. No further questions or concerns voiced at this time.     Follow Up Outreach Due: Follow up as needed.     Tori Ren RN  Ambulatory     3/25/2020, 15:41

## 2020-03-31 NOTE — PROGRESS NOTES
Anticoagulation Clinic Progress Note    Anticoagulation Summary  As of 3/31/2020    INR goal:   2.0-3.0   TTR:   90.7 % (1.4 y)   INR used for dosin.3 (3/31/2020)   Warfarin maintenance plan:   2.5 mg every Tue, Thu, Sat; 5 mg all other days   Weekly warfarin total:   27.5 mg   No change documented:   Oksana Cummins   Plan last modified:   Deja Camacho RPH (2020)   Next INR check:   2020   Priority:   Maintenance   Target end date:   Indefinite    Indications    Permanent atrial fibrillation [I48.21]             Anticoagulation Episode Summary     INR check location:       Preferred lab:       [facility] [place]    Comments:         Anticoagulation Care Providers     Provider Role Specialty Phone number    Gopal Kurtz MD Referring Cardiology [phone]          Clinic Interview:      INR History:  Anticoagulation Monitoring 2020 3/3/2020 3/31/2020   INR 2.7 2.4 2.3   INR Date 2020 3/3/2020 3/31/2020   INR Goal 2.0-3.0 2.0-3.0 2.0-3.0   Trend Same Same Same   Last Week Total 22.5 mg 27.5 mg 27.5 mg   Next Week Total 27.5 mg 27.5 mg 27.5 mg   Sun 5 mg 5 mg 5 mg   Mon 5 mg 5 mg 5 mg   Tue 2.5 mg 2.5 mg 2.5 mg   Wed 5 mg 5 mg 5 mg   Thu 2.5 mg 2.5 mg 2.5 mg   Fri 5 mg 5 mg 5 mg   Sat 2.5 mg 2.5 mg 2.5 mg   Visit Report - - -   Some recent data might be hidden       Plan:  1. INR is therapeutic today- see above in Anticoagulation Summary.   Will instruct Vish CARMEN Patience to continue their warfarin regimen- see above in Anticoagulation Summary.  2. Follow up in 4 weeks.  3. Patient declines warfarin refills.  4. Verbal and written information provided. Patient expresses understanding and has no further questions at this time.    Oksana Cummins

## 2020-04-28 NOTE — PROGRESS NOTES
Anticoagulation Clinic Progress Note    Anticoagulation Summary  As of 2020    INR goal:   2.0-3.0   TTR:   91.2 % (1.5 y)   INR used for dosin.0 (2020)   Warfarin maintenance plan:   2.5 mg every Tue, Thu, Sat; 5 mg all other days   Weekly warfarin total:   27.5 mg   No change documented:   Montse Green   Plan last modified:   Deja Camacho RPH (2020)   Next INR check:   2020   Priority:   Maintenance   Target end date:   Indefinite    Indications    Permanent atrial fibrillation [I48.21]             Anticoagulation Episode Summary     INR check location:       Preferred lab:       Send INR reminders to:    [facility]    Comments:         Anticoagulation Care Providers     Provider Role Specialty Phone number    Gopal Kurtz MD Referring Cardiology [phone]          Clinic Interview:  Patient Findings     Negatives:   Signs/symptoms of thrombosis, Signs/symptoms of bleeding,   Laboratory test error suspected, Change in health, Change in alcohol use,   Change in activity, Upcoming invasive procedure, Emergency department   visit, Upcoming dental procedure, Missed doses, Extra doses, Change in   medications, Change in diet/appetite, Hospital admission, Bruising, Other   complaints      Clinical Outcomes     Negatives:   Major bleeding event, Thromboembolic event,   Anticoagulation-related hospital admission, Anticoagulation-related ED   visit, Anticoagulation-related fatality        INR History:  Anticoagulation Monitoring 3/3/2020 3/31/2020 2020   INR 2.4 2.3 2.0   INR Date 3/3/2020 3/31/2020 2020   INR Goal 2.0-3.0 2.0-3.0 2.0-3.0   Trend Same Same Same   Last Week Total 27.5 mg 27.5 mg 27.5 mg   Next Week Total 27.5 mg 27.5 mg 27.5 mg   Sun 5 mg 5 mg 5 mg   Mon 5 mg 5 mg 5 mg   Tue 2.5 mg 2.5 mg 2.5 mg   Wed 5 mg 5 mg 5 mg   Thu 2.5 mg 2.5 mg 2.5 mg   Fri 5 mg 5 mg 5 mg   Sat 2.5 mg 2.5 mg 2.5 mg   Visit Report - - -   Some recent data might  be hidden       Plan:  1. INR is therapeutic today- see above in Anticoagulation Summary.   Will instruct Vish Morin to continue their warfarin regimen- see above in Anticoagulation Summary.  2. Follow up in 4 weeks.  3. Patient declines warfarin refills.  4. Verbal and written information provided. Patient expresses understanding and has no further questions at this time.    Montse Green

## 2020-05-26 NOTE — PROGRESS NOTES
Anticoagulation Clinic Progress Note    Anticoagulation Summary  As of 2020    INR goal:   2.0-3.0   TTR:   91.6 % (1.6 y)   INR used for dosin.60 (2020)   Warfarin maintenance plan:   2.5 mg every Tue, Thu, Sat; 5 mg all other days   Weekly warfarin total:   27.5 mg   No change documented:   Deja Camacho RPH   Plan last modified:   Deja Camacho RPH (2020)   Next INR check:   2020   Priority:   Maintenance   Target end date:   Indefinite    Indications    Permanent atrial fibrillation [I48.21]             Anticoagulation Episode Summary     INR check location:       Preferred lab:       [facility] IN[facility]ers [facility]    Comments:         Anticoagulation Care Providers     Provider Role Specialty Phone number    Gopal Kurtz MD Referring Cardiology [phone]          Clinic Interview:      INR History:  Anticoagulation Monitoring 3/31/2020 2020 2020   INR 2.3 2.0 2.60   INR Date 3/31/2020 2020 2020   INR Goal 2.0-3.0 2.0-3.0 2.0-3.0   Trend Same Same Same   Last Week Total 27.5 mg 27.5 mg 27.5 mg   Next Week Total 27.5 mg 27.5 mg 27.5 mg   Sun 5 mg 5 mg 5 mg   Mon 5 mg 5 mg 5 mg   Tue 2.5 mg 2.5 mg 2.5 mg   Wed 5 mg 5 mg 5 mg   Thu 2.5 mg 2.5 mg 2.5 mg   Fri 5 mg 5 mg 5 mg   Sat 2.5 mg 2.5 mg 2.5 mg   Visit Report - - -   Some recent data might be hidden       Plan:  1. INR is Therapeutic today- see above in Anticoagulation Summary.   Will instruct Vish Morin to Continue their warfarin regimen- see above in Anticoagulation Summary.  2. Follow up in 4 weeks  3. Spoke with pt today. They have been instructed to call if any changes in medications, doses, concerns, etc. Patient expresses understanding and has no further questions at this time.    Deja Camacho RPH

## 2020-05-28 NOTE — PROGRESS NOTES
Date of Office Visit: 2020  Encounter Provider: JESUS Mueller  Place of Service: [facility] CARDIOLOGY  Patient Name: Vish Morin  :1931    Chief Complaint   Patient presents with   • Atrial Fibrillation     follow up   :     HPI: Vish Morin is a 89 y.o. male who patient of Dr. Hong.  He has permanent A. fib and hypertension.    He was scheduled to come into office today for a routine follow up visit but secondary to COVID 19 pandemic we are doing a telehealth visit.    This patient has consented to a telehealth visit via audio/telephone. The visit was scheduled as a telehealth telephone visit to comply with patient safety concerns in accordance with CDC recommendations.  All vitals recorded within this visit are reported by the patient.  I spent  13 minutes in total including but not limited to the 5 minutes spent in direct conversation with this patient.     He says that he is doing pretty good.  He just had his 89th birthday, he says it is a good day when he wakes up in the morning.    He denies any chest pain, dyspnea, PND, orthopnea or edema.    He does have some intermittent dizziness which is with position changes such as getting out of the bed or getting out of a chair too quickly.    He also says that he has had some falls lately, he says they are due to the fact that he gets a little off balance and one time he got his feet mixed up with his daughters.  He has a walker but says he does not really need to use it in the house.  He has an upcoming appointment with Dr. Chawla.    He is on warfarin for anticoagulation and no bleeding issues, I did discuss with him that if he started having significant falls we would maybe need to consider whether that needed to be stopped.    He told me that none of his falls are due to dizziness or feeling like he was going to pass out.     Past Medical History:   Diagnosis Date   • Atrial fibrillation  (CMS/[facility])    • CAD (coronary artery disease)    • Constipation    • Diabetes mellitus (CMS/[facility])    • MOURA (dyspnea on exertion)    • GERD (gastroesophageal reflux disease)    • High risk medication use    • Hyperlipidemia    • Hypertension    • Hypothyroidism    • IFG (impaired fasting glucose)    • Lower back pain    • Muscle pain, thigh    • Muscle weakness    • Nausea    • KENNETH (obstructive sleep apnea)    • Permanent atrial fibrillation    • Postural imbalance    • Sick sinus syndrome (CMS/[facility])    • Sinus bradycardia    • Syncope        Past Surgical History:   Procedure Laterality Date   • CARDIAC CATHETERIZATION  10/10/2008    diagnostic   • CARDIAC [facility]   • [facility]   • [facility]  [facility]   • COLONOSCOPY     • CORONARY ANGIOPLASTY WITH STENT PLACEMENT  12/17/2004    Drug-eluting Sirolimus.  3.5 x 33mm Cypher stent to LAD.  3.0 x 13mm Cypher stent to ROSALIA.       Social History     Socioeconomic History   • Marital status: Single     Spouse name: Not on file   • Number of children: Not on file   • Years of education: Not on file   • Highest education level: Not on file   Social Needs   • Financial resource strain: Not on file   • Food insecurity:     Worry: Never true     Inability: Never true   • Transportation needs:     Medical: No     Non-medical: No   Tobacco Use   • Smoking status: Former Smoker     Types: Cigars   • Smokeless tobacco: Never Used   Substance and Sexual Activity   • Alcohol use: No     Comment: CAFFEINE USE   • Drug use: No   • Sexual activity: Never       Family History   Problem Relation Age of Onset   • Heart disease Other        Review of Systems   Constitution: Negative for chills, fever and malaise/fatigue.   Cardiovascular: Negative for chest pain, dyspnea  "on exertion, leg swelling, near-syncope, orthopnea, palpitations, paroxysmal nocturnal dyspnea and syncope.   Respiratory: Negative for cough and shortness of breath.    Hematologic/Lymphatic: Bruises/bleeds easily.   Musculoskeletal: Negative for joint pain, joint swelling and myalgias.   Gastrointestinal: Negative for abdominal pain, diarrhea, melena, nausea and vomiting.   Genitourinary: Negative for frequency and hematuria.   Neurological: Positive for dizziness and loss of balance. Negative for light-headedness, numbness, paresthesias and seizures.   Allergic/Immunologic: Negative.    All other systems reviewed and are negative.      No Known Allergies      Current Outpatient Medications:   •  benazepril (LOTENSIN) 40 MG tablet, Take 1 tablet by mouth Daily., Disp: 90 tablet, Rfl: 1  •  calcium carbonate (TUMS) 500 MG chewable tablet, Chew 1 tablet As Needed for Indigestion or Heartburn., Disp: , Rfl:   •  levothyroxine (SYNTHROID, LEVOTHROID) 50 MCG tablet, TAKE 1 TABLET BY MOUTH EVERY DAY, Disp: 90 tablet, Rfl: 1  •  loratadine (CLARITIN) 10 MG tablet, Take 1 tablet by mouth Daily., Disp: 30 tablet, Rfl: 5  •  magnesium oxide (MAG-OX) 400 MG tablet, TAKE 1 TABLET BY MOUTH EVERY 12 HOURS, Disp: 60 tablet, Rfl: 5  •  metoprolol tartrate (LOPRESSOR) 50 MG tablet, Take 1 tablet by mouth Every 12 (Twelve) Hours., Disp: 180 tablet, Rfl: 1  •  warfarin (COUMADIN) 5 MG tablet, TAKE 2.5 MG (ONE HALF TABLET) ON TUESDAYS AND FRIDAYS AND 5 MG (ONE TABLET) ON ALL OTHER DAYS OR AS DIRECTED, Disp: 80 tablet, Rfl: 0      Objective:     Vitals:    05/28/20 0922   Weight: 68 kg (150 lb)   Height: 181.6 cm (71.5\")     Body mass index is 20.63 kg/m².    PHYSICAL EXAM: Audio telehealth visit.    Vitals Reviewed.     Respiratory: No signs of respiratory distress during audio call.     Psychiatric: Patient alert and oriented to person, place, and time. Speech and behavior appropriate. Normal mood and affect.         Assessment:    "    Diagnosis Plan   1. Permanent atrial fibrillation     2. Essential (primary) hypertension            Plan:       1.  Permanent A. fib.  Heart rate controlled on beta-blocker, warfarin for anticoagulation.  He does not report that he feels like his heart races or is irregular.    2.  Hypertension, he does not monitor his blood pressure at home, he has an upcoming appointment with his PCP.    We will have him follow-up with Dr. Kurtz in the office in 6 months, instructed him to call if he had any problems and we would get him in to be seen sooner.    As always, it has been a pleasure to participate in your patient's care.      Sincerely,         JESUS Patel

## 2020-06-01 NOTE — PROGRESS NOTES
Subjective Chief complaint is skin abrasions but also medications  Vihs Morin is a 89 y.o. male.     History of Present Illness Vish is here today for evaluation of some skin abrasions.  He fell approximately week and a half ago.  He suffered some skin tears and abrasions.  He really reports that with his Coumadin this did not seem to bleed much.  His last INR was 2.6.  At his last visit we did discuss his thyroid.  His free T4 was 1.65 but his TSH was still little elevated.  We are going to need to watch that.  He has had some mildly elevated sugars in the past.  This has not risen to the level of diabetes.  The following portions of the patient's history were reviewed and updated as appropriate: allergies, current medications, past family history, past medical history, past social history, past surgical history and problem list.    Review of Systems   Constitutional: Positive for unexpected weight loss.   Respiratory: Negative for chest tightness and shortness of breath.    Cardiovascular: Negative for chest pain.   Neurological: Positive for light-headedness.       Objective   Physical Exam   Constitutional: He appears well-developed and well-nourished.   Cardiovascular: Normal rate and normal heart sounds.   Pulmonary/Chest: Effort normal and breath sounds normal.   Musculoskeletal: He exhibits no edema.   Skin:   He has several scabs in the left elbow forearm region.  These all appear to be well intentioned.   Nursing note and vitals reviewed.        Assessment/Plan   Vish was seen today for follow-up and follow-up.    Diagnoses and all orders for this visit:    Essential hypertension    Acquired hypothyroidism  -     T3, Free  -     TSH+Free T4    Impaired fasting glucose  -     Comprehensive Metabolic Panel  -     Hemoglobin A1c    Multiple abrasions    Vish is here today for follow-up.  We are going to check on status of his thyroid and see if we need to make any adjustments.  I would wonder  whether that is part of the reason for his weight loss.

## 2020-06-23 NOTE — PROGRESS NOTES
Anticoagulation Clinic Progress Note    Anticoagulation Summary  As of 2020    INR goal:   2.0-3.0   TTR:   91.3 % (1.7 y)   INR used for dosin.9! (2020)   Warfarin maintenance plan:   2.5 mg every Tue, Thu, Sat; 5 mg all other days   Weekly warfarin total:   27.5 mg   No change documented:   Oksana Cummins   Plan last modified:   Deja Camacho [facility] (2020)   Next INR check:   2020   Priority:   Maintenance   Target end date:   Indefinite    Indications    Permanent atrial fibrillation (CMS/HCC) [I48.21]             Anticoagulation Episode Summary     INR check location:       Preferred lab:       Send INR reminders to:    [facility]    Comments:         Anticoagulation Care Providers     Provider Role Specialty Phone number    Gopal Kurtz MD Referring Cardiology [phone]          Clinic Interview:      INR History:  Anticoagulation Monitoring 2020   INR 2.0 2.60 1.9   INR Date 2020   INR Goal 2.0-3.0 2.0-3.0 2.0-3.0   Trend Same Same Same   Last Week Total 27.5 mg 27.5 mg 27.5 mg   Next Week Total 27.5 mg 27.5 mg 27.5 mg   Sun 5 mg 5 mg 5 mg   Mon 5 mg 5 mg 5 mg   Tue 2.5 mg 2.5 mg 2.5 mg   Wed 5 mg 5 mg 5 mg   Thu 2.5 mg 2.5 mg 2.5 mg   Fri 5 mg 5 mg 5 mg   Sat 2.5 mg 2.5 mg 2.5 mg   Visit Report - - -   Some recent data might be hidden       Plan:  1. INR is subtherapeutic today- see above in Anticoagulation Summary.   Will instruct Vish MORALES Steveradha to continue their warfarin regimen- see above in Anticoagulation Summary.  2. Follow up in 4 weeks.  3. Patient declines warfarin refills.  4. Verbal and written information provided. Patient expresses understanding and has no further questions at this time.    Oksana Cummins

## 2020-07-09 NOTE — ED TRIAGE NOTES
Pt arrives via EMS from home. States that he had an acute onset of dizziness 45 minutes PTA. States that it is slightly better now but still dizzy. Denies any other symptoms. Pt masked at arrival and triage staff wore all appropriate PPE.

## 2020-07-09 NOTE — ED NOTES
Advised MD VICTORINA Krause of pressures listed below:  170/100 Manual R Arm  Automatic: L Arm : 184/120  Automatic: R arm : 198/125   No new orders received at this time.      Shiv Ashton, RN  07/09/20 3330

## 2020-07-09 NOTE — PROGRESS NOTES
Discharge Planning Assessment  [facility]     Patient Name: Vish Morin  MRN: [id number]  Today's Date: 7/9/2020    Admit Date: 7/9/2020    Discharge Needs Assessment     Row Name 07/09/20 1504       Living Environment    Lives With  child(erum), adult    Name(s) of Who Lives With Patient  Lynsey    Current Living Arrangements  home/apartment/condo    Primary Care Provided by  self;child(erum)    Provides Primary Care For  no one    Family Caregiver if Needed  child(erum), adult    Family Caregiver Names  Lynsey    Quality of Family Relationships  helpful;supportive    Able to Return to Prior Arrangements  yes       Resource/Environmental Concerns    Resource/Environmental Concerns  none    Transportation Concerns  car, none       Transition Planning    Patient/Family Anticipates Transition to  home with family    Transportation Anticipated  family or friend will provide       Discharge Needs Assessment    Readmission Within the Last 30 Days  no previous admission in last 30 days    Concerns Comments  patient declines services at this time    Equipment Currently Used at Home  shower chair    Anticipated Changes Related to Illness  none    Provided Post Acute Provider List?  Refused    Current Discharge Risk  physical impairment dizziness        Discharge Plan     Row Name 07/09/20 8869       Plan    Plan Comments  Entered room, introduced self and explained role to patient and daughter at bedside Lynsey. Patient, daughter and myself all have masks in place. Patient lives in a house w/daughter Lynsey, ambulates without assistance and still drives short distances. Patient cooks some of his own meals, has a life alert band and takes own prescriptions which he has filled [facility]. Patient has PT drawn once a month- daughter assists patient with any needs necessary. Patient is not interested in any d/c resources at this time. Discussed elevated BP and beginning to track pressure w/monitor to take to PCP. Daughter advised  his BP isnt usually this high. Notified ERMD. No further orders noted at this time. Upon d/c patient will be going home w/daughter in private vehicle.     Final Discharge Disposition Code  01 - home or self-care        Destination      Coordination has not been started for this encounter.      Durable Medical Equipment      Coordination has not been started for this encounter.      Dialysis/Infusion      Coordination has not been started for this encounter.      Home Medical Care      Coordination has not been started for this encounter.      Therapy      Coordination has not been started for this encounter.      Community Resources      Coordination has not been started for this encounter.          Demographic Summary     Row Name 07/09/20 1453       General Information    Admission Type  -- ER patient    Arrived From  home    Reason for Consult  discharge planning    Preferred Language  English     Used During This Interaction  no       Contact Information    Permission Granted to Share Info With          Functional Status     Row Name 07/09/20 1501       Functional Status    Usual Activity Tolerance  good    Current Activity Tolerance  good       Functional Status, IADL    Medications  independent    Meal Preparation  independent    Housekeeping  independent    Laundry  independent;assistive person    Shopping  independent;assistive person       Mental Status    General Appearance WDL  WDL       Mental Status Summary    Recent Changes in Mental Status/Cognitive Functioning  no changes       Employment/    Employment Status  retired        Psychosocial    No documentation.       Abuse/Neglect    No documentation.       Legal    No documentation.       Substance Abuse    No documentation.       Patient Forms    No documentation.           Stacey Cordoba RN

## 2020-07-09 NOTE — ED PROVIDER NOTES
EMERGENCY DEPARTMENT ENCOUNTER    Room Number:  34/34  Date of encounter:  7/9/2020  PCP: Nick Chawla MD  Historian: Patient      HPI:  Chief Complaint: Dizzy spell  A complete HPI/ROS/PMH/PSH/SH/FH are unobtainable due to: N/A   Context: Vish Morin is a 89 y.o. male who presents to the ED c/o abrupt onset dizzy spell that is nearly resolved at this time.  Patient reports that he was in the bathroom on the toilet and bent over to take his underwear off.  Upon standing to walk out of the bathroom he was quite dizzy.  He denies syncope or near syncope.  He had no chest pain, shortness of breath, focal numbness or weakness, headache, vision change, or slurred speech.  Seated in the bed at this time his symptoms are nearly resolved.  He had no associated nausea with this episode either.       MEDICAL HISTORY REVIEW  Denies recent fall or injury    PAST MEDICAL HISTORY  Active Ambulatory Problems     Diagnosis Date Noted   • Permanent atrial fibrillation (CMS/HCC) 02/19/2016   • Constipation 02/19/2016   • Gastroesophageal reflux disease 02/19/2016   • Hyperlipidemia 02/19/2016   • Hypertension 02/19/2016   • Hypothyroidism 02/19/2016   • Impaired fasting glucose 02/19/2016   • Low back pain 02/19/2016   • Pain in the muscles 02/19/2016   • Muscle weakness 02/19/2016   • Nausea 02/19/2016   • Poor posture 02/19/2016   • Mineral deficiency 02/19/2016   • Rash 02/19/2016   • Weight loss 02/19/2016   • Dizziness 02/19/2016   • Essential (primary) hypertension 06/09/2017     Resolved Ambulatory Problems     Diagnosis Date Noted   • No Resolved Ambulatory Problems     Past Medical History:   Diagnosis Date   • Atrial fibrillation (CMS/HCC)    • CAD (coronary artery disease)    • Diabetes mellitus (CMS/HCC)    • MOURA (dyspnea on exertion)    • GERD (gastroesophageal reflux disease)    • High risk medication use    • IFG (impaired fasting glucose)    • Lower back pain    • Muscle pain, thigh    • KENNETH  (obstructive sleep apnea)    • Postural imbalance    • Sick sinus syndrome (CMS/HCC)    • Sinus bradycardia    • Syncope          PAST SURGICAL HISTORY  Past Surgical History:   Procedure Laterality Date   • CARDIAC CATHETERIZATION  10/10/2008    diagnostic   • CARD[facility]   • [facility]AC [facility]   • [facility]   • COLONOSCOPY     • CORONARY ANGIOPLASTY WITH STENT PLACEMENT  12/17/2004    Drug-eluting Sirolimus.  3.5 x 33mm Cypher stent to LAD.  3.0 x 13mm Cypher stent to ROSALIA.         FAMILY HISTORY  Family History   Problem Relation Age of Onset   • Heart disease Other          SOCIAL HISTORY  Social History     Socioeconomic History   • Marital status: Single     Spouse name: Not on file   • Number of children: Not on file   • Years of education: Not on file   • Highest education level: Not on file   Social Needs   • Financial resource strain: Not on file   • Food insecurity:     Worry: Never true     Inability: Never true   • Transportation needs:     Medical: No     Non-medical: No   Tobacco Use   • Smoking status: Former Smoker     Types: Cigars   • Smokeless tobacco: Never Used   Substance and Sexual Activity   • Alcohol use: No     Comment: CAFFEINE USE   • Drug use: No   • Sexual activity: Never         ALLERGIES  Patient has no known allergies.        REVIEW OF SYSTEMS  Review of Systems     All systems reviewed and negative except for those discussed in HPI.       PHYSICAL EXAM    I have reviewed the triage vital signs and nursing notes.    ED Triage Vitals [07/09/20 1040]   Temp Heart Rate Resp BP SpO2   97.3 °F (36.3 °C) 76 18 (!) 182/90 98 %      Temp src Heart Rate Source Patient Position BP Location FiO2 (%)   Tympanic Monitor Lying -- --       Physical Exam    Physical Exam    Constitutional: No distress.   HENT:  Head: Normocephalic and atraumatic.   Oropharynx: Mucous membranes are moist.   Eyes: No scleral icterus. No conjunctival pallor.  No appreciable nystagmus, Britta, EOMI  Neck: Painless range of motion noted. Neck supple.   Cardiovascular: Normal rate, occasionally irregular with intact distal pulses  Pulmonary/Chest: No respiratory distress. There are no wheezes, no rhonchi, and no rales.   Abdominal: Soft. There is no tenderness. There is no rebound and no guarding.   Musculoskeletal: Moves all extremities equally. There is no pedal edema or calf tenderness.   Neurological: Alert.  Baseline strength and sensation noted.  NIH stroke scale-0  Skin: Skin is pink, warm, and dry. No pallor.   Psychiatric: Mood and affect normal.   Nursing note and vitals reviewed.    LAB RESULTS  Recent Results (from the past 24 hour(s))   Comprehensive Metabolic Panel    Collection Time: 07/09/20 11:25 AM   Result Value Ref Range    Glucose 154 (H) 65 - 99 mg/dL    BUN 12 8 - 23 mg/dL    Creatinine 0.98 0.76 - 1.27 mg/dL    Sodium 138 136 - 145 mmol/L    Potassium 3.7 3.5 - 5.2 mmol/L    Chloride 104 98 - 107 mmol/L    CO2 25.5 22.0 - 29.0 mmol/L    Calcium 9.4 8.6 - 10.5 mg/dL    Total Protein 6.9 6.0 - 8.5 g/dL    Albumin 3.70 3.50 - 5.20 g/dL    ALT (SGPT) 25 1 - 41 U/L    AST (SGOT) 20 1 - 40 U/L    Alkaline Phosphatase 62 39 - 117 U/L    Total Bilirubin 0.6 0.0 - 1.2 mg/dL    eGFR Non African Amer 72 >60 mL/min/1.73    Globulin 3.2 gm/dL    A/G Ratio 1.2 g/dL    BUN/Creatinine Ratio 12.2 7.0 - 25.0    Anion Gap 8.5 5.0 - 15.0 mmol/L   Protime-INR    Collection Time: 07/09/20 11:25 AM   Result Value Ref Range    Protime 21.8 (H) 11.7 - 14.2 Seconds    INR 1.95 (H) 0.90 - 1.10   Troponin    Collection Time: 07/09/20 11:25 AM   Result Value Ref Range    Troponin T <0.010 0.000 - 0.030 ng/mL   CBC Auto Differential    Collection Time: 07/09/20 11:25 AM   Result Value Ref Range    WBC 6.73 3.40 -  10.80 10*3/mm3    RBC 5.06 4.14 - 5.80 10*6/mm3    Hemoglobin 15.5 13.0 - 17.7 g/dL    Hematocrit 45.9 37.5 - 51.0 %    MCV 90.7 79.0 - 97.0 fL    MCH 30.6 26.6 - 33.0 pg    MCHC 33.8 31.5 - 35.7 g/dL    RDW 13.7 12.3 - 15.4 %    RDW-SD 45.5 37.0 - 54.0 fl    MPV 11.7 6.0 - 12.0 fL    Platelets 166 140 - 450 10*3/mm3    Neutrophil % 73.0 42.7 - 76.0 %    Lymphocyte % 16.0 (L) 19.6 - 45.3 %    Monocyte % 8.9 5.0 - 12.0 %    Eosinophil % 0.9 0.3 - 6.2 %    Basophil % 0.9 0.0 - 1.5 %    Immature Grans % 0.3 0.0 - 0.5 %    Neutrophils, Absolute 4.91 1.70 - 7.00 10*3/mm3    Lymphocytes, Absolute 1.08 0.70 - 3.10 10*3/mm3    Monocytes, Absolute 0.60 0.10 - 0.90 10*3/mm3    Eosinophils, Absolute 0.06 0.00 - 0.40 10*3/mm3    Basophils, Absolute 0.06 0.00 - 0.20 10*3/mm3    Immature Grans, Absolute 0.02 0.00 - 0.05 10*3/mm3    nRBC 0.0 0.0 - 0.2 /100 WBC   Urinalysis With Microscopic If Indicated (No Culture) - Urine, Clean Catch    Collection Time: 07/09/20 12:53 PM   Result Value Ref Range    Color, UA Yellow Yellow, Straw    Appearance, UA Clear Clear    pH, UA 7.5 5.0 - 8.0    Specific Gravity, UA 1.014 1.005 - 1.030    Glucose, UA Negative Negative    Ketones, UA Negative Negative    Bilirubin, UA Negative Negative    Blood, UA Negative Negative    Protein, UA Negative Negative    Leuk Esterase, UA Negative Negative    Nitrite, UA Negative Negative    Urobilinogen, UA 0.2 E.U./dL 0.2 - 1.0 E.U./dL       Ordered the above labs and independently reviewed the results.        RADIOLOGY  No Radiology Exams Resulted Within Past 24 Hours    I ordered the above noted radiological studies. Reviewed by me and discussed with radiologist.  See dictation for official radiology interpretation.      PROCEDURES    Procedures        MEDICATIONS GIVEN IN ER    Medications   sodium chloride 0.9 % flush 10 mL (has no administration in time range)   amLODIPine (NORVASC) tablet 5 mg (has no administration in time range)   cloNIDine  (CATAPRES) tablet 0.1 mg (0.1 mg Oral Given 7/9/20 1448)         PROGRESS, DATA ANALYSIS, CONSULTS, AND MEDICAL DECISION MAKING    My differential diagnosis for dizziness included but is not limited to:  Labyrinthitis, vertigo, concussion, intracranial hemorrhage, stroke, migraine headache, cardiac arrhythmias, acute MI, hypotension, hypertension, hypoxia, inner ear and middle ear infections, anemia, electrolyte abnormalities.      All labs have been independently reviewed by me.  All radiology studies have been reviewed by me and discussed with radiologist dictating the report.   EKG's independently viewed and interpreted by me.  Discussion below represents my analysis of pertinent findings related to patient's condition, differential diagnosis, treatment plan and final disposition.      ED Course as of Jul 09 1608   Thu Jul 09, 2020   1126 EKG           EKG time: 1124  Rhythm/Rate: Atrial fibrillation; ventricular rate 60  P waves and [place]: Not applicable  QRS, axis: Narrow  ST and T waves: No STEMI; QTC within normal limits    Interpreted Contemporaneously by me, independently viewed  Comparison: 10/18/2013      [RS]   1314 Labs reassuring.  There were no focal findings on my bedside exam and he reported his symptoms were improved at that time.  We will attempt to ambulate the patient at this time.    [RS]   1314 Negative orthostatic vital signs    [RS]   1340 Patient tolerated ambulation well in the emergency department.  Plan prescription for meclizine and outpatient follow-up for recurrence of symptoms.    [RS]   1607 Elevated blood pressure readings not significantly improved after clonidine.  I discussed these findings with Dr. Chawla the patient's PCP who is agreeable to follow-up with the patient in short order.  He does request we initiate amlodipine 5 mg daily.    [RS]      ED Course User Index  [RS] Archie Krause MD       AS OF 16:08 VITALS:    BP - 170/100  HR - 73  TEMP - 97.3 °F (36.3 °C)  (Tympanic)  O2 SATS - 94%        DIAGNOSIS  Final diagnoses:   Episode of dizziness   Elevated blood pressure reading in office with diagnosis of hypertension         DISPOSITION  DISCHARGE    Patient discharged in stable condition.    Reviewed implications of results, diagnosis, meds, responsibility to follow up, warning signs and symptoms of possible worsening, potential complications and reasons to return to ER.    Patient/Family voiced understanding of above instructions.    Discussed plan for discharge, as there is no emergent indication for admission. Patient referred to primary care provider for regular health maintenance. Pt/family is agreeable and understands need for follow up and possible repeat testing.  Pt is aware that discharge does not mean that nothing is wrong but it indicates no emergency is present that requires admission and they must continue care with follow-up as given below or physician of their choice.     FOLLOW-UP  Nick Chawla MD  [address] [place] [id number] [phone]    Schedule an appointment as soon as possible for a visit in 3 days  Repeat evaluation of elevated blood pressure    Eva Mehta MD  [address] [place] [id number] [phone]    Schedule an appointment as soon as possible for a visit in 1 week  If symptoms fail to improve, As needed         Medication List      New Prescriptions    amLODIPine 5 MG tablet  Commonly known as:  NORVASC  Take 1 tablet by mouth Daily.     meclizine 25 MG tablet  Commonly known as:  ANTIVERT  Take 1 tablet by mouth 3 times daily as needed for dizziness or nausea               Archie Krause MD  07/09/20 [id number]

## 2020-07-09 NOTE — ED NOTES
Pt to this ER via ems from home. Pt complaining of dizziness that began approx 45 min pta. Pt is alert and oriented states that dizziness has subsided some but he is still dizzy.   This nurse in room wearing mask, eye protection and gloves. Pt wearing mask throughout encounter. Pt Visitor wearing mask. Pt and visitor aware of visitor guidelines.   Pt alert and oriented at this time. No overt deficits noted upon arrival. Skin warm and dry. IV placed pta by ems      Shiv Ashton RN  07/09/20 [id number]

## 2020-07-10 NOTE — OUTREACH NOTE
Care Plan Note      Responses   AWV Materials  Send Materials   Care Gaps Addressed  Diabetic A1C, Flu Shot   HbA1c Status  Up to Date-within defined limits   HbA1c Completion at [facility] or Other  [facility]   Flu Shot Status  Up to Date   Specific Disease Process Teaching  Hypertension   Other Patient Education/Resources   24/7 [facility] Nurse Call Line, Advanced Care Planning, [facility]   24/7 Nurse Call Line Education Method  Verbal   ACP Education Method  -- [Completed]   [facility] Education Method  Verbal   Advanced Directives:  Patient Has   Medication Adherence  Medications understood        The main concerns and/or symptoms the patient would like to address are: Talked with patient. Discussed 7/9/20  ED visit regarding dizziness . Patient states to be compliant with ED recommendations; has obtained medications as ED recommended and will contact PCP today for further recommendations. He declines RN-ACM assistance in contacting PCP. He reports no difficulty with dizziness; falls; chest pain; SOB; appetite or sleeping. Patient lives with daughter; independent with ADL's; light meal preparation; transportation(short distances); and  ambulates without assistive device. Patient is compliant with medications; medical appointments; use of Life Alert  and monitoring of blood pressure. States he will take blood pressure today and contact PCP for recommendations. Confirms appointment of 7/21/20 for PT/INR.     Education/instruction provided by Care Coordinator: Reviewed with patient ED recommendations; education regarding HTN; monitoring of blood pressure; hydration; fall and safety precautions; bleeding precautions; Life Alert; COVID 19 precautions; 24/7 Nurse Line Telephone number; ACM contact information; Advance Directives; My Chart; gaps in care; MWV and Case Management services.  Patient verbalized understanding and states to appreciate phone call.  No further questions or concerns voiced at this time.      Follow Up Outreach Due: Follow up as needed.     Tori Ren RN  Ambulatory     7/10/2020, 12:36

## 2020-07-10 NOTE — TELEPHONE ENCOUNTER
PATIENT CALLED AND REQUESTS HOSPITAL FOLLOW UP.  PLEASE ADVICE.  PATIENT CALL BACK NUMBER [phone]

## 2020-07-14 NOTE — PROGRESS NOTES
Subjective Chief complaint is follow-up for blood pressure from the ER  Vish Morin is a 89 y.o. male.     History of Present Illness Vish is here today for follow-up.  He was seen in the emergency room with dizziness.  He had a fairly unremarkable exam there is lab work looked okay.  He was also noted to be quite hypertensive.  His blood pressure was 193/118.  He was treated with amlodipine and allowed to go home.  He only took 1 dose of the amlodipine at home because his blood pressure has since returned to normal.  He only took 1 or 2 of the meclizine's and his dizziness is better.  He still gets a little bit of dizziness when he goes from sitting to standing.  He had a fairly unremarkable CAT scan of the head in March of this year.  The following portions of the patient's history were reviewed and updated as appropriate: allergies, current medications, past family history, past medical history, past social history, past surgical history and problem list.    Review of Systems   Respiratory: Negative for chest tightness and shortness of breath.    Cardiovascular: Negative for chest pain.       Objective   Physical Exam   Constitutional: He appears well-developed and well-nourished.   Cardiovascular: Normal rate and regular rhythm.   Blood pressure to my exam is 120/80 in the left arm   Pulmonary/Chest: Effort normal and breath sounds normal.   Neurological: He is alert. No cranial nerve deficit.   Nursing note and vitals reviewed.        Assessment/Plan   Vish was seen today for follow-up and dizziness.    Diagnoses and all orders for this visit:    Essential (primary) hypertension    Dizziness    Vish is here today for follow-up.  His blood pressure seems to be okay both to the medical assistance exam in mind.  His dizziness has improved with the dose or 2 of meclizine.  I am going to have him keep the amlodipine on hand should his blood pressure begin to go back up.  He is going to monitor this at home.   If he develops more dizziness he can read start the meclizine and let me know.

## 2020-07-21 NOTE — PROGRESS NOTES
Anticoagulation Clinic Progress Note    Anticoagulation Summary  As of 2020    INR goal:   2.0-3.0   TTR:   87.3 % (1.8 y)   INR used for dosin.9! (2020)   Warfarin maintenance plan:   2.5 mg every Tue, Thu, Sat; 5 mg all other days   Weekly warfarin total:   27.5 mg   Plan last modified:   Deja Camacho RPH (2020)   Next INR check:   2020   Priority:   Maintenance   Target end date:   Indefinite    Indications    Permanent atrial fibrillation (CMS/HCC) [I48.21]             Anticoagulation Episode Summary     INR check location:       Preferred lab:       [facility]ers [facility]:    [facility]    Comments:         Anticoagulation Care Providers     Provider Role Specialty Phone number    Gopal Kurtz MD Referring Cardiology [phone]          Clinic Interview:      INR History:  Anticoagulation Monitoring 2020   INR 2.60 1.9 1.9   INR Date 2020   INR Goal 2.0-3.0 2.0-3.0 2.0-3.0   Trend Same Same Same   Last Week Total 27.5 mg 27.5 mg 27.5 mg   Next Week Total 27.5 mg 27.5 mg 30 mg   Sun 5 mg 5 mg 5 mg   Mon 5 mg 5 mg 5 mg   Tue 2.5 mg 2.5 mg 5 mg (); Otherwise 2.5 mg   Wed 5 mg 5 mg 5 mg   Thu 2.5 mg 2.5 mg 2.5 mg   Fri 5 mg 5 mg 5 mg   Sat 2.5 mg 2.5 mg 2.5 mg   Visit Report - - -   Some recent data might be hidden       Plan:  1. INR is Subtherapeutic today- see above in Anticoagulation Summary.  Will instruct Vish Morin to boost warfarin to 5mg today only, then continue their warfarin regimen- see above in Anticoagulation Summary.  2. Follow up in 3 weeks  3. Patient declines warfarin refills.  4. Verbal and written information provided. Patient expresses understanding and has no further questions at this time.    Deja Camacho RPH

## 2020-08-06 NOTE — PROGRESS NOTES
The ABCs of the Annual Wellness Visit  Initial Medicare Wellness Visit    Chief Complaint   Patient presents with   • Medicare Wellness-Initial Visit       Subjective   History of Present Illness:  Vish Morin is a 89 y.o. male who presents for an Initial Medicare Wellness Visit.    Vish is here today for his annual wellness visit.  Since his last visit he did have a visit to the emergency room for dizziness.  His work-up was unremarkable and he has had very little of that since then.  He does get a little bit dizzy if he goes from lying to sitting quickly.  He does stay seated before getting up from the bed or chair and that seems to help.    HEALTH RISK ASSESSMENT    Recent Hospitalizations:  No hospitalization(s) within the last year.    Current Medical Providers:  Patient Care Team:  Nick Chawla MD as PCP - General (Internal Medicine)  Nick Chawla MD as PCP - Claims Attributed  Deja Camacho RP as Pharmacist  Archie Alba RPH as Pharmacist (Pharmacy)    Smoking Status:  Social History     Tobacco Use   Smoking Status Former Smoker   • Types: Cigars   Smokeless Tobacco Never Used       Alcohol Consumption:  Social History     Substance and Sexual Activity   Alcohol Use No    Comment: CAFFEINE USE       Depression Screen:   PHQ-2/PHQ-9 Depression Screening 8/6/2020   Little interest or pleasure in doing things 0   Feeling down, depressed, or hopeless 0   Trouble falling or staying asleep, or sleeping too much 0   Feeling tired or having little energy 1   Poor appetite or overeating 0   Feeling bad about yourself - or that you are a failure or have let yourself or your family down 0   Trouble concentrating on things, such as reading the newspaper or watching television 0   Moving or speaking so slowly that other people could have noticed. Or the opposite - being so fidgety or restless that you have been moving around a lot more than usual 1   Thoughts that you would be better off  dead, or of hurting yourself in some way 0   Total Score 2   If you checked off any problems, how difficult have these problems made it for you to do your work, take care of things at home, or get along with other people? -       Fall Risk Screen:  KATHRYN Fall Risk Assessment was completed, and patient is at MODERATE risk for falls. Assessment completed on:7/14/2020    Health Habits and Functional and Cognitive Screening:  Functional & Cognitive Status 8/6/2020   Do you have difficulty preparing food and eating? No   Do you have difficulty bathing yourself, getting dressed or grooming yourself? Yes   Do you have difficulty using the toilet? No   Do you have difficulty moving around from place to place? Yes   Do you have trouble with steps or getting out of a bed or a chair? Yes   Current Diet Unhealthy Diet   Dental Exam Not up to date   Eye Exam Up to date   Exercise (times per week) 0 times per week   Current Exercise Activities Include None   Do you need help using the phone?  No   Are you deaf or do you have serious difficulty hearing?  No   Do you need help with transportation? Yes   Do you need help shopping? No   Do you need help preparing meals?  No   Do you need help with housework?  Yes   Do you need help with laundry? Yes   Do you need help taking your medications? No   Do you need help managing money? No   Do you ever drive or ride in a car without wearing a seat belt? No   Have you felt unusual stress, anger or loneliness in the last month? No   Who do you live with? Child   If you need help, do you have trouble finding someone available to you? No   Have you been bothered in the last four weeks by sexual problems? No   Do you have difficulty concentrating, remembering or making decisions? No         Does the patient have evidence of cognitive impairment? Yes    Asprin use counseling:Does not need ASA (and currently is not on it)    Age-appropriate Screening Schedule:  Refer to the list below for future  screening recommendations based on patient's age, sex and/or medical conditions. Orders for these recommended tests are listed in the plan section. The patient has been provided with a written plan.    Health Maintenance   Topic Date Due   • URINE MICROALBUMIN  05/19/1931   • ZOSTER VACCINE (1 of 2) 05/19/1981   • INFLUENZA VACCINE  08/01/2020   • LIPID PANEL  09/25/2020   • HEMOGLOBIN A1C  12/01/2020   • DIABETIC EYE EXAM  02/28/2021   • TDAP/TD VACCINES (2 - Td) 07/28/2028          The following portions of the patient's history were reviewed and updated as appropriate:   He  has a past medical history of Atrial fibrillation (CMS/HCC), CAD (coronary artery disease), Constipation, Diabetes mellitus (CMS/HCC), MOURA (dyspnea on exertion), GERD (gastroesophageal reflux disease), High risk medication use, Hyperlipidemia, Hypertension, Hypothyroidism, IFG (impaired fasting glucose), Lower back pain, Muscle pain, thigh, Muscle weakness, Nausea, KENNETH (obstructive sleep apnea), Permanent atrial fibrillation (CMS/HCC), Postural imbalance, Sick sinus syndrome (CMS/HCC), Sinus bradycardia, and Syncope.  He does not have any pertinent problems on file.  He  has a past surgical history that includes Cardiac catheterization (10/10/2008); Coronary angioplasty with stent (12/17/2004); Colonoscopy; Cardiac electrophysiology mapping and ablation (10/17/2013); Cardioversion (02/12/2013); and Cardiac electrophysiology mapping and ablation (09/25/2012).  His family history includes Heart disease in an other family member.  He  reports that he has quit smoking. His smoking use included cigars. He has never used smokeless tobacco. He reports that he does not drink alcohol or use drugs.  Current Outpatient Medications   Medication Sig Dispense Refill   • benazepril (LOTENSIN) 40 MG tablet TAKE 1 TABLET BY MOUTH EVERY DAY 90 tablet 1   • calcium carbonate (TUMS) 500 MG chewable tablet Chew 1 tablet As Needed for Indigestion or Heartburn.      • levothyroxine (SYNTHROID, LEVOTHROID) 50 MCG tablet TAKE 1 TABLET BY MOUTH EVERY DAY 90 tablet 1   • loratadine (CLARITIN) 10 MG tablet Take 1 tablet by mouth Daily. 30 tablet 5   • magnesium oxide (MAG-OX) 400 MG tablet TAKE 1 TABLET BY MOUTH EVERY 12 HOURS 60 tablet 5   • metoprolol tartrate (LOPRESSOR) 50 MG tablet TAKE 1 TABLET BY MOUTH EVERY 12 HOURS 180 tablet 1   • warfarin (COUMADIN) 5 MG tablet Take one-half tablet (2.5 mg) by mouth Tues, Thurs, and Sat, and take one tablet (5 mg) by mouth all other days or as directed. 75 tablet 1   • amLODIPine (NORVASC) 5 MG tablet Take 1 tablet by mouth Daily. 30 tablet 0   • meclizine (ANTIVERT) 25 MG tablet Take 1 tablet by mouth 3 times daily as needed for dizziness or nausea 30 tablet 0     No current facility-administered medications for this visit.      Current Outpatient Medications on File Prior to Visit   Medication Sig   • benazepril (LOTENSIN) 40 MG tablet TAKE 1 TABLET BY MOUTH EVERY DAY   • calcium carbonate (TUMS) 500 MG chewable tablet Chew 1 tablet As Needed for Indigestion or Heartburn.   • levothyroxine (SYNTHROID, LEVOTHROID) 50 MCG tablet TAKE 1 TABLET BY MOUTH EVERY DAY   • loratadine (CLARITIN) 10 MG tablet Take 1 tablet by mouth Daily.   • magnesium oxide (MAG-OX) 400 MG tablet TAKE 1 TABLET BY MOUTH EVERY 12 HOURS   • metoprolol tartrate (LOPRESSOR) 50 MG tablet TAKE 1 TABLET BY MOUTH EVERY 12 HOURS   • warfarin (COUMADIN) 5 MG tablet Take one-half tablet (2.5 mg) by mouth Tues, Thurs, and Sat, and take one tablet (5 mg) by mouth all other days or as directed.   • amLODIPine (NORVASC) 5 MG tablet Take 1 tablet by mouth Daily.   • meclizine (ANTIVERT) 25 MG tablet Take 1 tablet by mouth 3 times daily as needed for dizziness or nausea     No current facility-administered medications on file prior to visit.      He has No Known Allergies..    Outpatient Medications Prior to Visit   Medication Sig Dispense Refill   • benazepril (LOTENSIN) 40 MG  tablet TAKE 1 TABLET BY MOUTH EVERY DAY 90 tablet 1   • calcium carbonate (TUMS) 500 MG chewable tablet Chew 1 tablet As Needed for Indigestion or Heartburn.     • levothyroxine (SYNTHROID, LEVOTHROID) 50 MCG tablet TAKE 1 TABLET BY MOUTH EVERY DAY 90 tablet 1   • loratadine (CLARITIN) 10 MG tablet Take 1 tablet by mouth Daily. 30 tablet 5   • magnesium oxide (MAG-OX) 400 MG tablet TAKE 1 TABLET BY MOUTH EVERY 12 HOURS 60 tablet 5   • metoprolol tartrate (LOPRESSOR) 50 MG tablet TAKE 1 TABLET BY MOUTH EVERY 12 HOURS 180 tablet 1   • warfarin (COUMADIN) 5 MG tablet Take one-half tablet (2.5 mg) by mouth Tues, Thurs, and Sat, and take one tablet (5 mg) by mouth all other days or as directed. 75 tablet 1   • amLODIPine (NORVASC) 5 MG tablet Take 1 tablet by mouth Daily. 30 tablet 0   • meclizine (ANTIVERT) 25 MG tablet Take 1 tablet by mouth 3 times daily as needed for dizziness or nausea 30 tablet 0     No facility-administered medications prior to visit.        Patient Active Problem List   Diagnosis   • Permanent atrial fibrillation (CMS/HCC)   • Constipation   • Gastroesophageal reflux disease   • Hyperlipidemia   • Hypertension   • Hypothyroidism   • Impaired fasting glucose   • Low back pain   • Pain in the muscles   • Muscle weakness   • Nausea   • Poor posture   • Mineral deficiency   • Rash   • Weight loss   • Dizziness   • Essential (primary) hypertension       Advanced Care Planning:  ACP discussion was held with the patient during this visit. Patient has an advance directive in EMR which is still valid.     Review of Systems   Respiratory: Negative for chest tightness and shortness of breath.    Cardiovascular: Negative for chest pain.   Neurological: Positive for dizziness and light-headedness.       Compared to one year ago, the patient feels his physical health is the same.  Compared to one year ago, the patient feels his mental health is the same.    Reviewed chart for potential of high risk medication  "in the elderly: yes  Reviewed chart for potential of harmful drug interactions in the elderly:yes    Objective         Vitals:    08/06/20 0815   BP: 120/80   BP Location: Right arm   Patient Position: Sitting   Cuff Size: Adult   Pulse: 75   Resp: 20   Temp: 97.5 °F (36.4 °C)   SpO2: 99%   Weight: 69 kg (152 lb 3.2 oz)   Height: 180.3 cm (71\")       Body mass index is 21.23 kg/m².  Discussed the patient's BMI with him. The BMI is in the acceptable range.    Physical Exam   Constitutional: He appears well-developed and well-nourished.   Neck: Carotid bruit is not present. No thyromegaly present.   Cardiovascular: Normal rate, regular rhythm, normal heart sounds and intact distal pulses. Exam reveals no gallop and no friction rub.   No murmur heard.  Pulmonary/Chest: Effort normal and breath sounds normal. No respiratory distress. He has no wheezes. He has no rales.   Abdominal: Soft. Bowel sounds are normal. He exhibits no distension. There is no tenderness. There is no rebound and no guarding.   Musculoskeletal: He exhibits no edema.   Nursing note and vitals reviewed.      Lab Results   Component Value Date     (H) 06/01/2020    HGBA1C 6.30 (H) 06/01/2020        Assessment/Plan   Medicare Risks and Personalized Health Plan  CMS Preventative Services Quick Reference  Immunizations Discussed/Encouraged (specific immunizations; Influenza, Pneumococcal 23 and Shingrix )    The above risks/problems have been discussed with the patient.  Pertinent information has been shared with the patient in the After Visit Summary.  Follow up plans and orders are seen below in the Assessment/Plan Section.    Diagnoses and all orders for this visit:    1. Initial Medicare annual wellness visit (Primary)    2. Dizziness    3. Essential hypertension    4. Hyperlipidemia, unspecified hyperlipidemia type      Follow Up:  No follow-ups on file.     An After Visit Summary and PPPS were given to the patient.       Vish is here today " for his wellness visit.  He seems to be doing well.  He had some dizziness which seems to have resolved.  I am going to administer Pneumovax today.  We did discuss getting the shingles vaccine at the pharmacy.  He is going to get his flu shot in September.

## 2020-08-11 NOTE — PROGRESS NOTES
Anticoagulation Clinic Progress Note    Anticoagulation Summary  As of 2020    INR goal:   2.0-3.0   TTR:   84.6 % (1.8 y)   INR used for dosin.5! (2020)   Warfarin maintenance plan:   2.5 mg every Tue, Thu, Sat; 5 mg all other days   Weekly warfarin total:   27.5 mg   Plan last modified:   Deja Camacho RPH (2020)   Next INR check:   2020   Priority:   Maintenance   Target end date:   Indefinite    Indications    Permanent atrial fibrillation (CMS/HCC) [I48.21]             Anticoagulation Episode Summary     INR check location:       Preferred lab:       [facility]    Comments:         Anticoagulation Care Providers     Provider Role Specialty Phone number    Gopal Kurtz MD Referring Cardiology [phone]          Clinic Interview:      INR History:  Anticoagulation Monitoring 2020   INR 1.9 1.5 -   INR Date 2020 -   INR Goal 2.0-3.0 2.0-3.0 2.0-3.0   Trend Same Same Same   Last Week Total 27.5 mg 27.5 mg 27.5 mg   Next Week Total 30 mg 30 mg 30 mg   Sun 5 mg 5 mg 5 mg   Mon 5 mg 5 mg 5 mg   Tue 5 mg (); Otherwise 2.5 mg 5 mg (); Otherwise 2.5 mg 5 mg (); Otherwise 2.5 mg   Wed 5 mg 5 mg 5 mg   Thu 2.5 mg 2.5 mg 2.5 mg   Fri 5 mg 5 mg 5 mg   Sat 2.5 mg 2.5 mg 2.5 mg   Visit Report - - -   Some recent data might be hidden       Plan:  1. INR is Subtherapeutic today- see above in Anticoagulation Summary.  Will instruct Vish Morin to boost warfarin today only, then continue their warfarin regimen- see above in Anticoagulation Summary.  2. Follow up in 2 weeks  3. Patient declines warfarin refills.  4. Verbal and written information provided. Patient expresses understanding and has no further questions at this time.    Deja Camacho RPH

## 2020-08-25 NOTE — PROGRESS NOTES
Anticoagulation Clinic Progress Note    Anticoagulation Summary  As of 2020    INR goal:   2.0-3.0   TTR:   82.8 % (1.9 y)   INR used for dosin.8! (2020)   Warfarin maintenance plan:   2.5 mg every Tue, Sat; 5 mg all other days   Weekly warfarin total:   30 mg   Plan last modified:   Deja Camacho RPH (2020)   Next INR check:   2020   Priority:   Maintenance   Target end date:   Indefinite    Indications    Permanent atrial fibrillation (CMS/HCC) [I48.21]             Anticoagulation Episode Summary     INR check location:       Preferred lab:       Send INR reminders to:   [facility]    Comments:         Anticoagulation Care Providers     Provider Role Specialty Phone number    Gopal Kurtz MD Referring Cardiology [phone]          Clinic Interview:  Patient Findings     Negatives:   Signs/symptoms of thrombosis, Signs/symptoms of bleeding,   Laboratory test error suspected, Change in health, Change in alcohol use,   Change in activity, Upcoming invasive procedure, Emergency department   visit, Upcoming dental procedure, Missed doses, Extra doses, Change in   medications, Change in diet/appetite, Hospital admission, Bruising, Other   complaints      Clinical Outcomes     Negatives:   Major bleeding event, Thromboembolic event,   Anticoagulation-related hospital admission, Anticoagulation-related ED   visit, Anticoagulation-related fatality      Patient reports no changes.     INR History:  Anticoagulation Monitoring 2020   INR 1.5 - 1.8   INR Date 2020 - 2020   INR Goal 2.0-3.0 2.0-3.0 2.0-3.0   Trend Same Same Up   Last Week Total 27.5 mg 27.5 mg 27.5 mg   Next Week Total 30 mg 30 mg 32.5 mg   Sun 5 mg 5 mg 5 mg   Mon 5 mg 5 mg 5 mg   Tue 5 mg (); Otherwise 2.5 mg 5 mg (); Otherwise 2.5 mg 5 mg (); Otherwise 2.5 mg   Wed 5 mg 5 mg 5 mg   Thu 2.5 mg 2.5 mg 5 mg   Fri 5 mg 5 mg 5 mg   Sat 2.5 mg 2.5 mg 2.5 mg   Visit  Report - - -   Some recent data might be hidden       Plan:  1. INR is Subtherapeutic today- see above in Anticoagulation Summary.  Will instruct Vish Morin to Change their warfarin regimen- see above in Anticoagulation Summary.  2. Follow up in 2 weeks  3. Patient declines warfarin refills.  4. Verbal and written information provided. Patient expresses understanding and has no further questions at this time.    Emmanuelle Byrd [facility]

## 2020-09-09 NOTE — PROGRESS NOTES
Anticoagulation Clinic Progress Note    Anticoagulation Summary  As of 2020    INR goal:   2.0-3.0   TTR:   81.0 % (1.9 y)   INR used for dosin.8! (2020)   Warfarin maintenance plan:   2.5 mg every Tue, Sat; 5 mg all other days   Weekly warfarin total:   30 mg   Plan last modified:   Deja Camacho RPH (2020)   Next INR check:   2020   Priority:   Maintenance   Target end date:   Indefinite    Indications    Permanent atrial fibrillation (CMS/HCC) [I48.21]             Anticoagulation Episode Summary     INR check location:       Preferred lab:       Send INR reminders to:   [facility]    Comments:         Anticoagulation Care Providers     Provider Role Specialty Phone number    Gopal Kurtz MD Referring Cardiology [phone]          Clinic Interview:  Patient Findings     Negatives:   Signs/symptoms of thrombosis, Signs/symptoms of bleeding,   Laboratory test error suspected, Change in health, Change in alcohol use,   Change in activity, Upcoming invasive procedure, Emergency department   visit, Upcoming dental procedure, Missed doses, Extra doses, Change in   medications, Change in diet/appetite, Hospital admission, Bruising, Other   complaints      Clinical Outcomes     Negatives:   Major bleeding event, Thromboembolic event,   Anticoagulation-related hospital admission, Anticoagulation-related ED   visit, Anticoagulation-related fatality        INR History:  Anticoagulation Monitoring 2020   INR - 1.8 1.8   INR Date - 2020   INR Goal 2.0-3.0 2.0-3.0 2.0-3.0   Trend Same Up Same   Last Week Total 27.5 mg 27.5 mg 20 mg   Next Week Total 30 mg 32.5 mg 32.5 mg   Sun 5 mg 5 mg 5 mg   Mon 5 mg 5 mg 5 mg   Tue 5 mg (); Otherwise 2.5 mg 5 mg (); Otherwise 2.5 mg 2.5 mg   Wed 5 mg 5 mg 7.5 mg (); Otherwise 5 mg   Thu 2.5 mg 5 mg 5 mg   Fri 5 mg 5 mg 5 mg   Sat 2.5 mg 2.5 mg 2.5 mg   Visit Report - - -   Some recent data might  be hidden       Plan:  1. INR is Subtherapeutic today- see above in Anticoagulation Summary.   Will instruct Vish Morin to Continue their warfarin regimen with the exception of giving 7.5 mg of warfarin today instead of 5 mg- see above in Anticoagulation Summary.  2. Follow up in 2 weeks  3.  Pt has agreed to only be called if INR out of range. They have been instructed to call if any changes in medications, doses, concerns, etc. Patient expresses understanding and has no further questions at this time.    Satish Goncalves [facility]

## 2020-09-21 NOTE — TELEPHONE ENCOUNTER
Pt called to cancel INR appt for tomorrow. He fell and is not going to be able to come to clinic. He states he is doing ok and is now using his cane to prevent future falls.  He knows he should be checked out for any fall due to anticoagulation.  He agreed to have [facility] do lab draw at his home to check INR this week.  Faxed lab order for 9/23/20.

## 2020-09-24 NOTE — PROGRESS NOTES
Anticoagulation Clinic Progress Note    Anticoagulation Summary  As of 2020    INR goal:  2.0-3.0   TTR:  79.5 % (1.9 y)   INR used for dosin.67 (2020)   Warfarin maintenance plan:  2.5 mg every Tue, Sat; 5 mg all other days   Weekly warfarin total:  30 mg   Plan last modified:  Deja Camacho RPH (2020)   Next INR check:  2020   Priority:  Maintenance   Target end date:  Indefinite    Indications    Permanent atrial fibrillation (CMS/HCC) [I48.21]             Anticoagulation Episode Summary     INR check location:      Preferred lab:      Send INR reminders to:  [facility]    Comments:        Anticoagulation Care Providers     Provider Role Specialty Phone number    Gopal Kurtz MD Referring Cardiology [phone]          Clinic Interview:  Missed a dose this week.     INR History:  Anticoagulation Monitoring 2020   INR 1.8 1.8 1.67   INR Date 2020   INR Goal 2.0-3.0 2.0-3.0 2.0-3.0   Trend Up Same Same   Last Week Total 27.5 mg 20 mg 22.5 mg   Next Week Total 32.5 mg 32.5 mg 32.5 mg   Sun 5 mg 5 mg 5 mg   Mon 5 mg 5 mg 5 mg   Tue 5 mg (); Otherwise 2.5 mg 2.5 mg 2.5 mg   Wed 5 mg 7.5 mg (); Otherwise 5 mg -   Thu 5 mg 5 mg 7.5 mg ()   Fri 5 mg 5 mg 5 mg   Sat 2.5 mg 2.5 mg 2.5 mg   Visit Report - - -   Some recent data might be hidden       Plan:  1. INR is subtherapeutic today- see above in Anticoagulation Summary. Spoke with Vish Morin, instructed to take a booster dose today of 7.5mg. Reinforced the dosing of 2.5mg doses 2 days per week (may have taken 2.5mg 3 days per week).    2. Follow up in 1week  3. Pt has agreed to only be called if INR out of range. They have been instructed to call if any changes in medications, doses, concerns, etc. Patient expresses understanding and has no further questions at this time.    Stacey Mojica, [facility]

## 2020-10-01 NOTE — PROGRESS NOTES
Anticoagulation Clinic Progress Note    Anticoagulation Summary  As of 10/1/2020    INR goal:  2.0-3.0   TTR:  78.8 % (1.9 y)   INR used for dosin.04 (2020)   Warfarin maintenance plan:  2.5 mg every Tue, Sat; 5 mg all other days   Weekly warfarin total:  30 mg   Plan last modified:  Deja Camacho [facility] (2020)   Next INR check:  10/7/2020   Priority:  Maintenance   Target end date:  Indefinite    Indications    Permanent atrial fibrillation (CMS/HCC) [I48.21]             Anticoagulation Episode Summary     INR check location:      Preferred lab:      Send INR reminders to:   [facility]    Comments:        Anticoagulation Care Providers     Provider Role Specialty Phone number    Gopal Kurtz MD Referring Cardiology [phone]          Clinic Interview:  No pertinent clinical findings have been reported.    INR History:  Anticoagulation Monitoring 2020 2020 10/1/2020   INR 1.8 1.67 2.04   INR Date 2020   INR Goal 2.0-3.0 2.0-3.0 2.0-3.0   Trend Same Same Same   Last Week Total 20 mg 22.5 mg 32.5 mg   Next Week Total 32.5 mg 32.5 mg 30 mg   Sun 5 mg 5 mg 5 mg   Mon 5 mg 5 mg 5 mg   Tue 2.5 mg 2.5 mg 2.5 mg   Wed 7.5 mg (); Otherwise 5 mg - -   Thu 5 mg 7.5 mg () 5 mg   Fri 5 mg 5 mg 5 mg   Sat 2.5 mg 2.5 mg 2.5 mg   Visit Report - - -   Some recent data might be hidden       Plan:  1. INR is therapeutic today- see above in Anticoagulation Summary.    Vish Morin to continue their warfarin regimen- see above in Anticoagulation Summary.  2. Follow up in 1 week  3. Pt has agreed to only be called if INR out of range. They have been instructed to call if any changes in medications, doses, concerns, etc. Patient expresses understanding and has no further questions at this time.    Stacey Mojica [facility]

## 2020-10-02 PROBLEM — S72.001A CLOSED FRACTURE OF RIGHT HIP (HCC): Status: ACTIVE | Noted: 2020-01-01

## 2020-10-02 PROBLEM — S72.91XA FEMUR FRACTURE, RIGHT (HCC): Status: ACTIVE | Noted: 2020-01-01

## 2020-10-02 PROBLEM — Z79.01 CHRONIC ANTICOAGULATION: Status: ACTIVE | Noted: 2020-01-01

## 2020-10-02 NOTE — ED PROVIDER NOTES
EMERGENCY DEPARTMENT ENCOUNTER    Room Number:  14/14  PCP: Nick Chawla MD  Historian: Patient  History Limited By: Nothing      HPI  Chief Complaint: Fall with right hip injury  Context: Vish Morin is a 89 y.o. male who presents to the ED c/o fall with right hip injury.  Patient states he was outside and got his feet tangled.  Patient states he fell injuring his right hip.  Patient has been able to stand and take steps gingerly.  Patient states he did not hit his head.  No neck pain chest pain abdominal pain.  No nausea or vomiting.      Location: Right hip  Radiation: None  Character: Aching  Duration: 1 hour  Severity: Moderate  Progression: Not worsening  Aggravating Factors: Movement  Alleviating Factors: Remaining still        MEDICAL RECORD REVIEW    Patient with history of atrial fibrillation and was seen here in July for dizziness          PAST MEDICAL HISTORY  Active Ambulatory Problems     Diagnosis Date Noted   • Permanent atrial fibrillation (CMS/HCC) 02/19/2016   • Constipation 02/19/2016   • Gastroesophageal reflux disease 02/19/2016   • Hyperlipidemia 02/19/2016   • Hypertension 02/19/2016   • Hypothyroidism 02/19/2016   • Impaired fasting glucose 02/19/2016   • Low back pain 02/19/2016   • Pain in the muscles 02/19/2016   • Muscle weakness 02/19/2016   • Nausea 02/19/2016   • Poor posture 02/19/2016   • Mineral deficiency 02/19/2016   • Rash 02/19/2016   • Weight loss 02/19/2016   • Dizziness 02/19/2016   • Essential (primary) hypertension 06/09/2017     Resolved Ambulatory Problems     Diagnosis Date Noted   • No Resolved Ambulatory Problems     Past Medical History:   Diagnosis Date   • Atrial fibrillation (CMS/HCC)    • CAD (coronary artery disease)    • Diabetes mellitus (CMS/HCC)    • MOURA (dyspnea on exertion)    • GERD (gastroesophageal reflux disease)    • High risk medication use    • IFG (impaired fasting glucose)    • Lower back pain    • Muscle pain, thigh    • KENNETH  (obstructive sleep apnea)    • Postural imbalance    • Sick sinus syndrome (CMS/HCC)    • Sinus bradycardia    • Syncope          PAST SURGICAL HISTORY  Past Surgical History:   Procedure Laterality Date   • CARDIAC CATHETERIZATION  10/10/2008    diagnostic   • [facility]   • [facility]   • [facility]   • COLONOSCOPY     • CORONARY ANGIOPLASTY WITH STENT PLACEMENT  12/17/2004    Drug-eluting Sirolimus.  3.5 x 33mm Cypher stent to LAD.  3.0 x 13mm Cypher stent to ROSALIA.         FAMILY HISTORY  Family History   Problem Relation Age of Onset   • Heart disease Other          SOCIAL HISTORY  Social History     Socioeconomic History   • Marital status: Single     Spouse name: Not on file   • Number of children: Not on file   • Years of education: Not on file   • Highest education level: Not on file   Social Needs   • Financial resource strain: Not on file   • Food insecurity     Worry: Never true     Inability: Never true   • Transportation needs     Medical: No     Non-medical: No   Tobacco Use   • Smoking status: Former Smoker     Types: Cigars   • Smokeless tobacco: Never Used   Substance and Sexual Activity   • Alcohol use: No     Comment: CAFFEINE USE   • Drug use: No   • Sexual activity: Never         ALLERGIES  Patient has no known allergies.        REVIEW OF SYSTEMS  Review of Systems   Constitutional: Negative for activity change, appetite change and fever.   HENT: Negative for congestion and sore throat.    Eyes: Negative.    Respiratory: Negative for cough and shortness of breath.    Cardiovascular: Negative for chest pain and leg swelling.   Gastrointestinal: Negative for abdominal pain, diarrhea and vomiting.   Endocrine: Negative.    Genitourinary: Negative for decreased  urine volume and dysuria.   Musculoskeletal: Positive for joint swelling. Negative for neck pain.   Skin: Negative for rash and wound.   Allergic/Immunologic: Negative.    Neurological: Negative for weakness, numbness and headaches.   Hematological: Negative.    Psychiatric/Behavioral: Negative.    All other systems reviewed and are negative.           PHYSICAL EXAM  ED Triage Vitals [10/02/20 1827]   Temp Heart Rate Resp BP SpO2   97.5 °F (36.4 °C) 73 18 (!) 160/105 97 %      Temp src Heart Rate Source Patient Position BP Location FiO2 (%)   Tympanic Monitor -- Right arm --       Physical Exam   Constitutional: He is oriented to person, place, and time. No distress.   HENT:   Head: Normocephalic and atraumatic.   Eyes: Pupils are equal, round, and reactive to light. EOM are normal.   Neck: Normal range of motion. Neck supple.   Cardiovascular: Normal rate, regular rhythm and normal heart sounds.   Pulmonary/Chest: Effort normal and breath sounds normal. No respiratory distress.   Abdominal: Soft. There is no abdominal tenderness. There is no rebound and no guarding.   Musculoskeletal: Normal range of motion.         General: Tenderness present. No edema.      Comments: Mild right hip tenderness   Neurological: He is alert and oriented to person, place, and time. He has normal sensation and normal strength.   Skin: Skin is warm and dry.   Psychiatric: Mood and affect normal.   Nursing note and vitals reviewed.    Patient was wearing a face mask when I entered the room and they continued to wear a mask throughout their stay in the ED.  I wore PPE, including gloves, face mask with shield or face mask with goggles whenever I was in the room with patient.       LAB RESULTS  Recent Results (from the past 24 hour(s))   Comprehensive Metabolic Panel    Collection Time: 10/02/20  8:41 PM    Specimen: Blood   Result Value Ref Range    Glucose 127 (H) 65 - 99 mg/dL    BUN 17 8 - 23 mg/dL    Creatinine 1.04 0.76 - 1.27 mg/dL     Sodium 140 136 - 145 mmol/L    Potassium 4.2 3.5 - 5.2 mmol/L    Chloride 102 98 - 107 mmol/L    CO2 28.0 22.0 - 29.0 mmol/L    Calcium 9.2 8.6 - 10.5 mg/dL    Total Protein 7.2 6.0 - 8.5 g/dL    Albumin 4.10 3.50 - 5.20 g/dL    ALT (SGPT) 30 1 - 41 U/L    AST (SGOT) 35 1 - 40 U/L    Alkaline Phosphatase 82 39 - 117 U/L    Total Bilirubin 0.7 0.0 - 1.2 mg/dL    eGFR Non African Amer 67 >60 mL/min/1.73    Globulin 3.1 gm/dL    A/G Ratio 1.3 g/dL    BUN/Creatinine Ratio 16.3 7.0 - 25.0    Anion Gap 10.0 5.0 - 15.0 mmol/L   Type & Screen    Collection Time: 10/02/20  8:41 PM    Specimen: Blood   Result Value Ref Range    ABO Type A     RH type Negative     Antibody Screen Negative     T&S Expiration Date 10/5/2020 11:59:59 PM    Protime-INR    Collection Time: 10/02/20  8:41 PM    Specimen: Blood   Result Value Ref Range    Protime 23.5 (H) 11.7 - 14.2 Seconds    INR 2.16 (H) 0.90 - 1.10   CBC Auto Differential    Collection Time: 10/02/20  8:41 PM    Specimen: Blood   Result Value Ref Range    WBC 13.21 (H) 3.40 - 10.80 10*3/mm3    RBC 5.27 4.14 - 5.80 10*6/mm3    Hemoglobin 16.4 13.0 - 17.7 g/dL    Hematocrit 48.2 37.5 - 51.0 %    MCV 91.5 79.0 - 97.0 fL    MCH 31.1 26.6 - 33.0 pg    MCHC 34.0 31.5 - 35.7 g/dL    RDW 13.4 12.3 - 15.4 %    RDW-SD 45.4 37.0 - 54.0 fl    MPV 11.0 6.0 - 12.0 fL    Platelets 170 140 - 450 10*3/mm3    Neutrophil % 75.1 42.7 - 76.0 %    Lymphocyte % 15.2 (L) 19.6 - 45.3 %    Monocyte % 7.2 5.0 - 12.0 %    Eosinophil % 0.8 0.3 - 6.2 %    Basophil % 0.7 0.0 - 1.5 %    Immature Grans % 1.0 (H) 0.0 - 0.5 %    Neutrophils, Absolute 9.92 (H) 1.70 - 7.00 10*3/mm3    Lymphocytes, Absolute 2.01 0.70 - 3.10 10*3/mm3    Monocytes, Absolute 0.95 (H) 0.10 - 0.90 10*3/mm3    Eosinophils, Absolute 0.11 0.00 - 0.40 10*3/mm3    Basophils, Absolute 0.09 0.00 - 0.20 10*3/mm3    Immature Grans, Absolute 0.13 (H) 0.00 - 0.05 10*3/mm3    nRBC 0.0 0.0 - 0.2 /100 WBC   Respiratory Panel PCR  w/COVID-19(SARS-CoV-2) STEVAN/ALEXANDRO/BOGDAN/[facility] In-House, NP Swab in UTM/VTM, 3-4 HR TAT - Swab, Nasopharynx    Collection Time: 10/02/20  8:45 PM    Specimen: Nasopharynx; Swab   Result Value Ref Range    ADENOVIRUS, PCR Not Detected Not Detected    Coronavirus 229E Not Detected Not Detected    Coronavirus HKU1 Not Detected Not Detected    Coronavirus NL63 Not Detected Not Detected    Coronavirus OC43 Not Detected Not Detected    COVID19 Not Detected Not Detected - Ref. Range    Human Metapneumovirus Not Detected Not Detected    Human Rhinovirus/Enterovirus Not Detected Not Detected    Influenza A PCR Not Detected Not Detected    Influenza A H1 Not Detected Not Detected    Influenza A H1 2009 PCR Not Detected Not Detected    Influenza A H3 Not Detected Not Detected    Influenza B PCR Not Detected Not Detected    Parainfluenza Virus 1 Not Detected Not Detected    Parainfluenza Virus 2 Not Detected Not Detected    Parainfluenza Virus 3 Not Detected Not Detected    Parainfluenza Virus 4 Not Detected Not Detected    RSV, PCR Not Detected Not Detected    Bordetella pertussis pcr Not Detected Not Detected    Bordetella parapertussis PCR Not Detected Not Detected    Chlamydophila pneumoniae PCR Not Detected Not Detected    Mycoplasma pneumo by PCR Not Detected Not Detected       Ordered the above labs and reviewed the results.        RADIOLOGY  XR Hip With or Without Pelvis 2 - 3 View Right   Final Result      XR Chest 1 View   Final Result           Ordered the above noted radiological studies. Reviewed by me in PACS.            PROCEDURES  Procedures      EKG:          EKG time: 2043  Rhythm/Rate: Atrial fibrillation rate of 84  P waves and GA: No P waves  QRS, axis: Normal QRS  ST and T waves: Flattened ST-T waves    Interpreted Contemporaneously by me, independently viewed  Unchanged compared to prior 7/9/2020        MEDICATIONS GIVEN IN ER  Medications   morphine injection 2 mg (2 mg Intravenous Given 10/2/20 2056)    sodium chloride 0.9 % flush 10 mL (has no administration in time range)   sodium chloride 0.9 % flush 10 mL (has no administration in time range)   acetaminophen (TYLENOL) tablet 650 mg (has no administration in time range)     Or   acetaminophen (TYLENOL) 160 MG/5ML solution 650 mg (has no administration in time range)     Or   acetaminophen (TYLENOL) suppository 650 mg (has no administration in time range)   sodium chloride 0.9 % infusion (has no administration in time range)   ondansetron (ZOFRAN) injection 4 mg (has no administration in time range)             PROGRESS AND CONSULTS  ED Course as of Oct 02 2200   Fri Oct 02, 2020   2122 21:22 EDT  Patient here for fall with hip injury and does have femoral neck fracture.  Patient is not in significant amount of pain.  Patient has been discussed with Dr. Zepeda and then Sandra with [facility] and will be admitted.    [SL]      ED Course User Index  [SL] Fan Peterson MD           MEDICAL DECISION MAKING      MDM  Number of Diagnoses or Management Options  Closed fracture of right hip, initial encounter (CMS/[facility]):      Amount and/or Complexity of Data Reviewed  Clinical lab tests: reviewed and ordered (INR 2.16)  Tests in the radiology section of CPT®: reviewed and ordered (Right femoral neck fracture)  Discuss the patient with other providers: yes (Discussed with Dr. Zepeda and Sandra with Dr. Gustafson and will admit.)               DIAGNOSIS  Final diagnoses:   Closed fracture of right hip, initial encounter (CMS/[facility])           DISPOSITION  admit        Latest Documented Vital Signs:  As of 22:00 EDT  BP- (!) 192/115 HR- 73 Temp- 97.5 °F (36.4 °C) (Tympanic) O2 sat- 91%                       Fan Peterson MD  10/02/20 2202

## 2020-10-02 NOTE — ED NOTES
PT presents to ED via EMS from home. Pt had a slip and fall on the front porch. Pt complaints of R hip pain at this time. EMS states pt was able to stand and take a few steps to stretcher. Pt denies head injury or loc at this time, but states he is on blood thinners at this time. Pt is A&OX4 and in a mask at this time.      Bindu Felix, RN  10/02/20 8810

## 2020-10-03 PROBLEM — S72.001A CLOSED DISPLACED FRACTURE OF RIGHT FEMORAL NECK (HCC): Status: ACTIVE | Noted: 2020-01-01

## 2020-10-03 NOTE — PROGRESS NOTES
Name: Vish Morin ADMIT: 10/2/2020   : 1931  PCP: Nick Chawla MD    MRN: [id number] LOS: 1 days   AGE/SEX: 89 y.o. male  ROOM: [facility]     Subjective   Subjective   Very confused. Had extreme difficulty trying to take his pills while I was in the room. Confused about the oximetry probe. Dtr says he is usually independent at home    Review of Systems     Objective   Objective   Vital Signs  Temp:  [97.5 °F (36.4 °C)-98.8 °F (37.1 °C)] 98.3 °F (36.8 °C)  Heart Rate:  [72-96] 83  Resp:  [16-20] 18  BP: (140-192)/() 140/65  SpO2:  [91 %-97 %] 93 %  on   ;   Device (Oxygen Therapy): room air  Body mass index is 21.62 kg/m².  Physical Exam  Vitals signs reviewed.   Constitutional:       General: He is not in acute distress.     Appearance: He is well-developed.   HENT:      Head: Normocephalic and atraumatic.      Mouth/Throat:      Pharynx: No oropharyngeal exudate.   Eyes:      General: No scleral icterus.     Pupils: Pupils are equal, round, and reactive to light.   Neck:      Musculoskeletal: Neck supple.      Vascular: No JVD.   Cardiovascular:      Rate and Rhythm: Normal rate. Rhythm irregular.      Heart sounds: No murmur.   Pulmonary:      Effort: Pulmonary effort is normal. No respiratory distress.      Breath sounds: Normal breath sounds. No wheezing.   Abdominal:      General: Bowel sounds are normal. There is no distension.      Palpations: Abdomen is soft.      Tenderness: There is no abdominal tenderness.   Lymphadenopathy:      Cervical: No cervical adenopathy.   Skin:     General: Skin is warm and dry.      Findings: No rash.   Neurological:      Mental Status: He is alert. He is disoriented.      Comments: Moves all extremities         Results Review     I reviewed the patient's new clinical results.  Results from last 7 days   Lab Units 10/03/20  0516 10/02/20  2041   WBC 10*3/mm3 14.34* 13.21*   HEMOGLOBIN g/dL 15.4 16.4   PLATELETS 10*3/mm3 152 170     Results from  last 7 days   Lab Units 10/02/20  2041   SODIUM mmol/L 140   POTASSIUM mmol/L 4.2   CHLORIDE mmol/L 102   CO2 mmol/L 28.0   BUN mg/dL 17   CREATININE mg/dL 1.04   GLUCOSE mg/dL 127*   Estimated Creatinine Clearance: 47.9 mL/min (by C-G formula based on SCr of 1.04 mg/dL).  Results from last 7 days   Lab Units 10/02/20  2041   ALBUMIN g/dL 4.10   BILIRUBIN mg/dL 0.7   ALK PHOS U/L 82   AST (SGOT) U/L 35   ALT (SGPT) U/L 30     Results from last 7 days   Lab Units 10/02/20  2041   CALCIUM mg/dL 9.2   ALBUMIN g/dL 4.10       COVID19   Date Value Ref Range Status   10/02/2020 Not Detected Not Detected - Ref. Range Final     No results found for: HGBA1C, POCGLU    XR Hip With or Without Pelvis 2 - 3 View Right  TWO-VIEW RIGHT HIP AND ONE VIEW AP PELVIS     HISTORY: Fell. Hip pain.     FINDINGS: There is a nondisplaced fracture through the neck of the right  femur.     This report was finalized on 10/2/2020 8:43 PM by Dr. Greyson Parada M.D.     XR Chest 1 View  ONE VIEW PORTABLE CHEST     HISTORY: Preop for hip surgery. Hip fracture. Hypertension.     FINDINGS: The lungs are well-expanded and clear. There is mild  cardiomegaly unchanged from 04/10/2019. There is no acute disease.     This report was finalized on 10/2/2020 8:43 PM by Dr. Greyson Parada M.D.       Scheduled Medications  famotidine, 20 mg, Oral, BID AC  levothyroxine, 50 mcg, Oral, Q AM  lisinopril, 40 mg, Oral, Q24H  magnesium oxide, 400 mg, Oral, Q12H  metoprolol tartrate, 50 mg, Oral, Q12H  sodium chloride, 10 mL, Intravenous, Q12H    Infusions  sodium chloride, 75 mL/hr, Last Rate: 75 mL/hr (10/02/20 1135)    Diet  Diet Regular  NPO Diet  NPO Diet       Assessment/Plan     Active Hospital Problems    Diagnosis  POA   • **Femur fracture, right (CMS/HCC) [S72.91XA]  Yes   • Chronic anticoagulation [Z79.01]  Not Applicable   • Closed fracture of right hip (CMS/HCC) [S72.001A]  Yes   • Closed displaced fracture of right femoral neck (CMS/[facility]) [S72.001A]   Unknown   • Hypothyroidism [E03.9]  Yes   • Hypertension [I10]  Yes   • Hyperlipidemia [E78.5]  Yes   • Permanent atrial fibrillation (CMS/HCC) [I48.21]  Yes      Resolved Hospital Problems   No resolved problems to display.       Confusion- need to make sure no traumatic injury w/fall so will CT head now  Check UA  Minimize sedating meds  Right femoral neck fx- OR planned tomorrow if coagulopathy corrected  S/p FFP again today. Recheck in AM and give additional FFP if >1.5  AFib- rate borderline but OK. Holding AC  HTN- better    D/w dtr at bedside    Louis Sprague MD  [facility]  10/03/20  18:20 EDT    I wore protective equipment throughout this patient encounter including a face mask, gloves and protective eyewear.  Hand hygiene was performed before donning protective equipment and after removal when leaving the room.

## 2020-10-03 NOTE — CONSULTS
Orthopedic Consult    Patient: Vish Morin                                           YOB: 1931     Date of Admission: 10/2/2020  6:30 PM            Medical Record Number: [id number]    Attending Physician: Darin Gustafson MD    Consulting Physician: Genna Zepeda    Reason for Consult: RIGHT hip fracture.    History of Present Illness: 89 y.o. male admitted to [facility] with Closed fracture of right hip, initial encounter (CMS/[facility]) [S72.001A].     The patient was evaluated in the emergency room and was diagnosed with a  hip fracture.   Secondary to the age / multiple medical co morbidities the patient was admitted to the hospitalist.   As I was on call for the emergency room I was consulted for further evaluation and treatment.     The patient was in the usual state of health and fell from standing height, resulting in sudden onset hip pain and inability to ambulate.   Denies any history of loss of consciousness, headache, vomiting, or seizures.   Denies any other injuries.   The patient is not accompanied by his daughter  to this hospital visit.     The patient denies any prior  pre-existing pain in the hip.  Patient is a home ambulator. Patient sometimes uses walker/cane assistive device.   The patient  lives at home with his daughter who is currently in [place], he is quite active and independent in activities of daily living.  The patient denies history of dementia.    Patient denies any history of: DVT/PE, MRSA, COPD, CHF, CAD, Diabetes mellitus, Dementia.   The patient has history of : Afib, CAD, DM, KENNETH  The patient is on anticoagulants: Coumadin    Past medical history, past surgical history, social history, family history, ALLERGIES, current medications have been reviewed by me.    Past Medical History:   Diagnosis Date   • Atrial fibrillation (CMS/[facility])    • CAD (coronary artery disease)    • Constipation    • Diabetes mellitus (CMS/[facility])    • MOURA (dyspnea on  exertion)    • GERD (gastroesophageal reflux disease)    • High risk medication use    • Hyperlipidemia    • Hypertension    • Hypothyroidism    • IFG (impaired fasting glucose)    • Lower back pain    • Muscle pain, thigh    • Muscle weakness    • Nausea    • KENNETH (obstructive sleep apnea)    • Permanent atrial fibrillation (CMS/HCC)    • Postural imbalance    • Sick sinus syndrome (CMS/HCC)    • Sinus bradycardia    • Syncope      Past Surgical History:   Procedure Laterality Date   • CARDIAC CATHETERIZATION  10/10/2008    diagnostic   • [facility]   [facility]   • [facility]   • COLONOSCOPY     • CORONARY ANGIOPLASTY WITH STENT PLACEMENT  12/17/2004    Drug-eluting Sirolimus.  3.5 x 33mm Cypher stent to LAD.  3.0 x 13mm Cypher stent to ROSALIA.     Social History     Occupational History   • Not on file   Tobacco Use   • Smoking status: Former Smoker     Types: Cigars   • Smokeless tobacco: Never Used   Substance and Sexual Activity   • Alcohol use: No     Comment: CAFFEINE USE   • Drug use: No   • Sexual activity: Never      Social History     Social History Narrative   • Not on file     Family History   Problem Relation Age of Onset   • Heart disease Other         No Known Allergies    Home Medications:  Medications Prior to Admission   Medication Sig Dispense Refill Last Dose   • benazepril (LOTENSIN) 40 MG tablet TAKE 1 TABLET BY MOUTH EVERY DAY 90 tablet 1 10/2/2020 at Unknown time   • calcium carbonate (TUMS) 500 MG chewable tablet Chew 1 tablet As Needed for Indigestion or Heartburn.   10/1/2020 at Unknown time   • diphenhydrAMINE (BENADRYL) 25 mg capsule Take 25 mg by mouth Daily.      • levothyroxine (SYNTHROID, LEVOTHROID) 50 MCG tablet TAKE 1 TABLET BY MOUTH EVERY DAY  90 tablet 1 10/2/2020 at Unknown time   • magnesium oxide (MAG-OX) 400 MG tablet TAKE 1 TABLET BY MOUTH EVERY 12 HOURS 60 tablet 5 10/2/2020 at Unknown time   • meclizine (ANTIVERT) 25 MG tablet Take 1 tablet by mouth 3 times daily as needed for dizziness or nausea 30 tablet 0 Past Month at Unknown time   • metoprolol tartrate (LOPRESSOR) 50 MG tablet TAKE 1 TABLET BY MOUTH EVERY 12 HOURS 180 tablet 1 10/2/2020 at Unknown time   • warfarin (COUMADIN) 5 MG tablet Take one-half tablet (2.5 mg) by mouth Tues, Thurs, and Sat, and take one tablet (5 mg) by mouth all other days or as directed. 75 tablet 1 10/2/2020 at Unknown time       Current Medications:  Scheduled Meds:famotidine, 20 mg, Oral, BID AC  levothyroxine, 50 mcg, Oral, Q AM  lisinopril, 40 mg, Oral, Q24H  magnesium oxide, 400 mg, Oral, Q12H  metoprolol tartrate, 50 mg, Oral, Q12H  sodium chloride, 10 mL, Intravenous, Q12H      Continuous Infusions:sodium chloride, 75 mL/hr, Last Rate: 75 mL/hr (10/02/20 6119)      PRN Meds:.•  acetaminophen **OR** acetaminophen **OR** acetaminophen  •  calcium carbonate  •  Morphine  •  ondansetron  •  sodium chloride    Review of Systems:   A 12 point system review was reviewed with the patient and from the chart  and is negative except as in history of present illness.      Physical Exam: 89 y.o. male                    Vitals:    10/02/20 2200 10/02/20 2301 10/02/20 2355 10/03/20 0038   BP: 175/88 (!) 182/113 (!) 177/104 (!) 168/108   BP Location:  Right arm Left arm    Pulse:  93  95   Resp:  18 20 20   Temp:  98.8 °F (37.1 °C) 98.6 °F (37 °C) 98.6 °F (37 °C)   TempSrc:  Oral Oral    SpO2: 94%  91% 94%   Weight:       Height:            Gait: Could not be tested , patient is nonambulatory.    Mental/HEENT/cardio/skin: The patient's general appearance was well-nourished, well-hydrated, no acute distress.  Orientation was alert and oriented ×3.  The patient's mood was normal.   Pulmonary exam shows normal late exchange,  no labored breathing, or shortness of breath.    The skin exam showed normal temperature and color in the area of examination.    Extremities:  RIGHT   lower extremity positive shortening, positive external rotation, attempted movements of the  hip are painful and restricted. The patient is able to do gentle active range of motion of her toes. Gross sensation is intact over the toes.    Pulses: Pulses palpable and equal bilaterally    Diagnostic Tests:    Results from last 7 days   Lab Units 10/03/20  0516 10/02/20  2041   WBC 10*3/mm3 14.34* 13.21*   HEMOGLOBIN g/dL 15.4 16.4   HEMATOCRIT % 44.0 48.2   PLATELETS 10*3/mm3 152 170     Results from last 7 days   Lab Units 10/02/20  2041   SODIUM mmol/L 140   POTASSIUM mmol/L 4.2   CHLORIDE mmol/L 102   CO2 mmol/L 28.0   BUN mg/dL 17   CREATININE mg/dL 1.04   GLUCOSE mg/dL 127*   CALCIUM mg/dL 9.2     Results from last 7 days   Lab Units 10/03/20  0516 10/02/20  2041 09/30/20   INR  2.07* 2.16* 2.04     No results found for: URICACID  No results found for: CRYSTAL  Microbiology Results (last 10 days)     Procedure Component Value - Date/Time    COVID PRE-OP / PRE-PROCEDURE SCREENING ORDER (NO ISOLATION) - Swab, Nasopharynx [[id number]]  (Normal) Collected: 10/02/20 2045    Lab Status: Final result Specimen: Swab from Nasopharynx Updated: 10/02/20 2142    Narrative:      The following orders were created for panel order COVID PRE-OP / PRE-PROCEDURE SCREENING ORDER (NO ISOLATION) - Swab, Nasopharynx.  Procedure                               Abnormality         Status                     ---------                               -----------         ------                     Respiratory Panel PCR w/...[[id number]]  Normal              Final result                 Please view results for these tests on the individual orders.    Respiratory Panel PCR w/COVID-19(SARS-CoV-2) STEVAN/ALEXANDRO/BOGDAN/PAD/COR/MAD In-House, NP Swab in UTM/VTM, 3-4 HR TAT - Swab, Nasopharynx [[id number]]  (Normal)  Collected: 10/02/20 2045    Lab Status: Final result Specimen: Swab from Nasopharynx Updated: 10/02/20 2142     ADENOVIRUS, PCR Not Detected     Coronavirus 229E Not Detected     Coronavirus HKU1 Not Detected     Coronavirus NL63 Not Detected     Coronavirus OC43 Not Detected     COVID19 Not Detected     Human Metapneumovirus Not Detected     Human Rhinovirus/Enterovirus Not Detected     Influenza A PCR Not Detected     Influenza A H1 Not Detected     Influenza A H1 2009 PCR Not Detected     Influenza A H3 Not Detected     Influenza B PCR Not Detected     Parainfluenza Virus 1 Not Detected     Parainfluenza Virus 2 Not Detected     Parainfluenza Virus 3 Not Detected     Parainfluenza Virus 4 Not Detected     RSV, PCR Not Detected     Bordetella pertussis pcr Not Detected     Bordetella parapertussis PCR Not Detected     Chlamydophila pneumoniae PCR Not Detected     Mycoplasma pneumo by PCR Not Detected    Narrative:      Fact sheet for providers: https://docs.[employer]/wp-content/uploads/YAM7001-6486-MD2.1-EUA-Provider-Fact-Sheet-3.pdf    Fact sheet for patients: https://docs.[employer]/wp-content/uploads/AVL8788-2610-WE0.1-EUA-Patient-Fact-Sheet-1.pdf        No radiology results for the last 7 days    Assessment: RIGHT  Intracapsular Hip Fracture. Ricco type 3    Patient Active Problem List   Diagnosis   • Permanent atrial fibrillation (CMS/HCC)   • Constipation   • Gastroesophageal reflux disease   • Hyperlipidemia   • Hypertension   • Hypothyroidism   • Impaired fasting glucose   • Low back pain   • Pain in the muscles   • Muscle weakness   • Nausea   • Poor posture   • Mineral deficiency   • Rash   • Weight loss   • Dizziness   • Essential (primary) hypertension   • Chronic anticoagulation   • Femur fracture, right (CMS/HCC)   • Closed fracture of right hip (CMS/HCC)       Plan:    Options and alternatives have been discussed in detail with patient and his daughter family.   The patient is indicated for  a  partial or total hip arthroplasty.    The likely,  Risks and benefits of the procedure including but not limited to infection, DVT, pulmonary embolism,  leg length discrepancy, recurrent dislocation, possibility of injury to nerves or vessels, possibility of periprosthetic fractures have been discussed in detail.  Despite the risks involved, The patient and patient's family  would like to proceed.     The patient is being scheduled for a right total hip arthroplasty by anterior approach at [facility] tentatively for October 4, 2020.     Coumadin on hold since 10/1. INR 2.07 this AM, He is getting FFP.   Patient will be made NPO.   Obtain informed consent.     The patient's admitting service has seen the patient and the patient is cleared to the operating room.    Date: 10/3/2020    Genna Zepeda MD    CC: Nick Chawla MD; JESUS Rincon Troy Andrew, MD

## 2020-10-03 NOTE — PROGRESS NOTES
Clinical Pharmacy Services: Medication History    Vish Morin is a 89 y.o. male presenting to [facility] for Closed fracture of right hip, initial encounter (CMS/[facility]) [S72.001A]    He  has a past medical history of Atrial fibrillation (CMS/[facility]), CAD (coronary artery disease), Constipation, Diabetes mellitus (CMS/[facility]), MOURA (dyspnea on exertion), GERD (gastroesophageal reflux disease), High risk medication use, Hyperlipidemia, Hypertension, Hypothyroidism, IFG (impaired fasting glucose), Lower back pain, Muscle pain, thigh, Muscle weakness, Nausea, KENNETH (obstructive sleep apnea), Permanent atrial fibrillation (CMS/[facility]), Postural imbalance, Sick sinus syndrome (CMS/[facility]), Sinus bradycardia, and Syncope.    Allergies as of 10/02/2020   • (No Known Allergies)       Medication information was obtained from: patient   Pharmacy and Phone Number: [phone]    Prior to Admission Medications     Prescriptions Last Dose Informant Patient Reported? Taking?    benazepril (LOTENSIN) 40 MG tablet 10/2/2020 Self No Yes    TAKE 1 TABLET BY MOUTH EVERY DAY    calcium carbonate (TUMS) 500 MG chewable tablet 10/1/2020 Self Yes Yes    Chew 1 tablet As Needed for Indigestion or Heartburn.    diphenhydrAMINE (BENADRYL) 25 mg capsule  Self Yes Yes    Take 25 mg by mouth Daily.    levothyroxine (SYNTHROID, LEVOTHROID) 50 MCG tablet 10/2/2020 Self No Yes    TAKE 1 TABLET BY MOUTH EVERY DAY    magnesium oxide (MAG-OX) 400 MG tablet 10/2/2020 Self No Yes    TAKE 1 TABLET BY MOUTH EVERY 12 HOURS    meclizine (ANTIVERT) 25 MG tablet Past Month Self No Yes    Take 1 tablet by mouth 3 times daily as needed for dizziness or nausea    metoprolol tartrate (LOPRESSOR) 50 MG tablet 10/2/2020 Self No Yes    TAKE 1 TABLET BY MOUTH EVERY 12 HOURS    warfarin (COUMADIN) 5 MG tablet 10/2/2020 Self No Yes    Take one-half tablet (2.5 mg) by mouth Tues, Thurs, and Sat, and take one tablet (5 mg) by mouth all other days or as directed.     Patient taking differently:  Take one-half tablet (2.5 mg) by mouth Tues and Sat, and take one tablet (5 mg) by mouth all other days or as directed.            Medication notes: patient states that he takes 2.5 mg warfarin on Tues/Sat and 5 mg other days.    This medication list is complete to the best of my knowledge as of 10/3/2020    Please call if questions.    Charan El [facility]   10/3/2020 14:41 EDT

## 2020-10-03 NOTE — H&P
Patient Name:  Vish Morin  YOB: 1931  MRN:  [id number]  Admit Date:  10/2/2020  Patient Care Team:  Nick Chawla MD as PCP - General (Internal Medicine)  Nick Chawla MD as PCP - Claims Attributed  Deja Camacho [facility] as Pharmacist  Archie Alba [facility] as Pharmacist (Pharmacy)      Chief Complaint   Patient presents with   • Fall   • Hip Pain       Subjective     Mr. Morin is a 89 y.o. male with a history of a fib on warfarin, CAD, DM2, HLD, HTN, hypothyroidism, KENNETH that presents to [facility] complaining of right hip pain after fall. Patient reports that he was outside today hen he tripped over his swing and fell to his right side. He denies hitting head or losing consciousness and no other injuries noted. He reports constant pain to his right hip, worse with movement and he hadn't received any pain medication by the time of my exam, so this was ordered and given before I left the room. He denies fever, chest pain, shortness of breath, abdominal pain, changes in bowel or bladder habits, edema. Patient does take warfarin for his a fib but reports his last dose being yesterday. Labs done tonight were fairly unremarkable with exception of slight leukocytosis, xray shows nondisplaced fracture through neck of right femur.     History of Present Illness    Past Medical History:   Diagnosis Date   • Atrial fibrillation (CMS/HCC)    • CAD (coronary artery disease)    • Constipation    • Diabetes mellitus (CMS/HCC)    • MOURA (dyspnea on exertion)    • GERD (gastroesophageal reflux disease)    • High risk medication use    • Hyperlipidemia    • Hypertension    • Hypothyroidism    • IFG (impaired fasting glucose)    • Lower back pain    • Muscle pain, thigh    • Muscle weakness    • Nausea    • KENNETH (obstructive sleep apnea)    • Permanent atrial fibrillation (CMS/HCC)    • Postural imbalance    • Sick sinus syndrome (CMS/HCC)    • Sinus bradycardia    • Syncope       Past Surgical History:   Procedure Laterality Date   • CARDIAC CATHETERIZATION  10/10/2008    diagnostic   • C[facility]   [facility]ARDIAC [facility]   • [facility]   • COLONOSCOPY     • CORONARY ANGIOPLASTY WITH STENT PLACEMENT  12/17/2004    Drug-eluting Sirolimus.  3.5 x 33mm Cypher stent to LAD.  3.0 x 13mm Cypher stent to ROSALIA.     Family History   Problem Relation Age of Onset   • Heart disease Other      Social History     Tobacco Use   • Smoking status: Former Smoker     Types: Cigars   • Smokeless tobacco: Never Used   Substance Use Topics   • Alcohol use: No     Comment: CAFFEINE USE   • Drug use: No     (Not in a hospital admission)    Allergies:  No Known Allergies    Review of Systems   Constitutional: Negative.  Negative for chills and fever.   HENT: Negative.  Negative for congestion and sore throat.    Eyes: Negative.  Negative for visual disturbance.   Respiratory: Negative.  Negative for cough and shortness of breath.    Cardiovascular: Negative.  Negative for chest pain and leg swelling.   Gastrointestinal: Negative.  Negative for abdominal distention, abdominal pain, nausea and vomiting.   Endocrine: Negative.    Genitourinary: Negative.  Negative for dysuria, frequency and urgency.   Musculoskeletal: Positive for arthralgias (right hip).   Skin: Negative.  Negative for color change and pallor.   Allergic/Immunologic: Negative.    Neurological: Negative.  Negative for dizziness and weakness.   Hematological: Negative.    Psychiatric/Behavioral: Negative.  Negative for agitation, behavioral problems and confusion.        Objective    Vital Signs  Temp:  [97.5 °F (36.4 °C)] 97.5 °F (36.4 °C)  Heart Rate:  [73] 73  Resp:  [18] 18  BP: (160)/(105) 160/105  SpO2:  [94 %-97 %]  94 %  on   ;   Device (Oxygen Therapy): room air  Body mass index is 21.62 kg/m².    Physical Exam  Vitals signs and nursing note reviewed.   Constitutional:       General: He is not in acute distress.     Appearance: He is normal weight.   HENT:      Head: Normocephalic and atraumatic.   Eyes:      Extraocular Movements: Extraocular movements intact.   Neck:      Musculoskeletal: Normal range of motion and neck supple.   Cardiovascular:      Rate and Rhythm: Rhythm irregularly irregular.      Pulses: Normal pulses.   Pulmonary:      Effort: Pulmonary effort is normal.      Breath sounds: Examination of the right-lower field reveals decreased breath sounds. Examination of the left-lower field reveals decreased breath sounds. Decreased breath sounds present.   Abdominal:      General: Abdomen is flat. Bowel sounds are normal. There is no distension.      Palpations: Abdomen is soft.   Musculoskeletal:         General: Tenderness (right hip) present.      Comments: Decreased ROM right leg   Skin:     General: Skin is warm and dry.   Neurological:      General: No focal deficit present.      Mental Status: He is alert. Mental status is at baseline.   Psychiatric:         Mood and Affect: Mood normal.         Behavior: Behavior normal.         Thought Content: Thought content normal.         Judgment: Judgment normal.         Results Review:   I reviewed the patient's new clinical results including all labs and xrays.    Lab Results (last 24 hours)     ** No results found for the last 24 hours. **          XR Hip With or Without Pelvis 2 - 3 View Right   Final Result      XR Chest 1 View   Final Result        Assessment/Plan      Active Hospital Problems    Diagnosis  POA   • **Femur fracture, right (CMS/HCC) [S72.91XA]  Yes   • Chronic anticoagulation [Z79.01]  Not Applicable   • Hypothyroidism [E03.9]  Yes   • Hypertension [I10]  Yes   • Hyperlipidemia [E78.5]  Yes   • Permanent atrial fibrillation (CMS/HCC) [I48.21]   Yes      Resolved Hospital Problems   No resolved problems to display.     Femur fracture  -s/p mechanical fall without further injury, no syncope  -NPO after midnight  -ortho consult  -hold home dose warfarin for now  -pre-op EKG/CXR pending  -labs in AM  -IVF overnight  -continue pain medication PRN    HTN/HLD/a fib/hypothyroidism  -holding warfarin as per above and will check INR daily  -INR 2.16 tonight, will give 1 unit FFP tonight  -hold home dose benazepril in anticipation for post-op hypotension, may continue other home meds    VTE Ppx  -SCDs    CODE status  -full    I discussed the patients findings and my recommendations with patient.    JESUS Mcghee  [facility]  10/02/20  8:48 PM EDT

## 2020-10-03 NOTE — PLAN OF CARE
Goal Outcome Evaluation:  Plan of Care Reviewed With: patient  Progress: no change  Outcome Summary: Admit to floor and orient to unit. Pt c/o Rt hip pain. See MAR for med treatment. 1 unit FFP given per MD order for preop as pt takes coumadin for h/o afib. VSS

## 2020-10-04 PROBLEM — S72.001A CLOSED DISPLACED FRACTURE OF RIGHT FEMORAL NECK (HCC): Status: RESOLVED | Noted: 2020-01-01 | Resolved: 2020-01-01

## 2020-10-04 PROBLEM — S72.001A CLOSED FRACTURE OF RIGHT HIP (HCC): Status: RESOLVED | Noted: 2020-01-01 | Resolved: 2020-01-01

## 2020-10-04 PROBLEM — S72.91XA FEMUR FRACTURE, RIGHT (HCC): Status: RESOLVED | Noted: 2020-01-01 | Resolved: 2020-01-01

## 2020-10-04 PROBLEM — Z96.641 STATUS POST RIGHT HIP REPLACEMENT: Status: ACTIVE | Noted: 2020-01-01

## 2020-10-04 NOTE — ANESTHESIA POSTPROCEDURE EVALUATION
"Patient: Vish Morin    Procedure Summary     Date: 10/04/20 Room / Location: [facility] OR [address] [place] [address] MAIN OR    Anesthesia Start: 1425 Anesthesia Stop: 1640    Procedure: TOTAL HIP ARTHROPLASTY ANTERIOR WITH HANA TABLE (Right Hip) Diagnosis:       Closed displaced fracture of right femoral neck (CMS/HCC)      (Closed displaced fracture of right femoral neck (CMS/HCC) [S72.001A])    Surgeon: Genna Zepeda MD Provider: Dominik Brooks MD    Anesthesia Type: general ASA Status: 3          Anesthesia Type: general    Vitals  Vitals Value Taken Time   /106 10/04/20 1745   Temp 36.7 °C (98 °F) 10/04/20 1745   Pulse 74 10/04/20 1745   Resp 18 10/04/20 1745   SpO2 95 % 10/04/20 1747   Vitals shown include unvalidated device data.        Post Anesthesia Care and Evaluation    Patient location during evaluation: bedside  Patient participation: complete - patient participated  Level of consciousness: awake and alert  Pain management: adequate  Airway patency: patent  Anesthetic complications: No anesthetic complications    Cardiovascular status: acceptable  Respiratory status: acceptable  Hydration status: acceptable    Comments: BP (!) 145/106   Pulse 74   Temp 36.7 °C (98 °F) (Oral)   Resp 18   Ht 180.3 cm (71\")   Wt 70.3 kg (155 lb)   SpO2 96%   BMI 21.62 kg/m²           "

## 2020-10-04 NOTE — PLAN OF CARE
Problem: Adult Inpatient Plan of Care  Goal: Plan of Care Review  Goal: Absence of Hospital-Acquired Illness or Injury  Intervention: Identify and Manage Fall Risk  Intervention: Prevent Skin Injury  Intervention: Prevent and Manage VTE (venous thromboembolism) Risk  Goal: Optimal Comfort and Wellbeing  Intervention: Provide Person-Centered Care     Problem: Fall Injury Risk  Goal: Absence of Fall and Fall-Related Injury  Intervention: Identify and Manage Contributors to Fall Injury Risk  Intervention: Promote Injury-Free Environment     Problem: Adjustment to Injury (Hip Fracture)  Goal: Optimal Coping with Change in Health Status  Intervention: Support Psychosocial Response to Injury and Mobility Change     Problem: Delayed Union/Nonunion (Hip Fracture)  Goal: Fracture Stability  Intervention: Promote Fracture Stability and Bone Healing     Problem: Functional Ability Impaired (Hip Fracture)  Goal: Optimal Functional Performance  Intervention: Promote Optimal Functional Status     Problem: Skin Injury Risk Increased  Goal: Skin Health and Integrity  Intervention: Optimize Skin Protection   Goal Outcome Evaluation:  Plan of Care Reviewed With: patient  Progress: no change  Outcome Summary: disoriented to place. patient very restless. RN asked if patient was in pain and he stated he was fine and had 0 pain. Wanted to get up out of bed frequently. INR caitlin in the a.m. 2.01 gave another unit of FFP per orders. NPO. awaiting to have sx in the a.m. will ctm

## 2020-10-04 NOTE — ANESTHESIA PROCEDURE NOTES
Airway  Urgency: elective    Date/Time: 10/4/2020 2:36 PM  Airway not difficult    General Information and Staff    Patient location during procedure: OR  Anesthesiologist: Brian Clifton MD  CRNA: Bri Barger CRNA    Indications and Patient Condition  Indications for airway management: airway protection    Preoxygenated: yes  MILS not maintained throughout  Mask difficulty assessment: 1 - vent by mask    Final Airway Details  Final airway type: endotracheal airway      Successful airway: ETT  Cuffed: yes   Successful intubation technique: direct laryngoscopy  Endotracheal tube insertion site: oral  Blade: Jeovany  Blade size: 4  ETT size (mm): 7.5  Cormack-Lehane Classification: grade I - full view of glottis  Placement verified by: chest auscultation and capnometry   Cuff volume (mL): 7  Measured from: lips  ETT/EBT  to lips (cm): 22  Number of attempts at approach: 1  Assessment: lips, teeth, and gum same as pre-op and atraumatic intubation    Additional Comments  Pt preoxygenated prior to induction, easy mask airway, atraumatic intubation,+ ETCO2, + bs bilat,  ETT secured and connected to ventilator.

## 2020-10-04 NOTE — ANESTHESIA PREPROCEDURE EVALUATION
Anesthesia Evaluation     NPO Solid Status: > 8 hours  NPO Liquid Status: > 8 hours           Airway   Mallampati: I  TM distance: >3 FB  Neck ROM: full  no difficulty expected  Dental - normal exam   (+) edentulous    Pulmonary - normal exam   (+) shortness of breath,   (-) decreased breath sounds, wheezes  Cardiovascular - normal exam    (+) hypertension, CAD, dysrhythmias Atrial Fib, MOURA, hyperlipidemia,       Neuro/Psych  GI/Hepatic/Renal/Endo    (+)   diabetes mellitus,     Musculoskeletal     Abdominal  - normal exam   Substance History      OB/GYN          Other                        Anesthesia Plan    ASA 3     general     intravenous induction     Anesthetic plan, all risks, benefits, and alternatives have been provided, discussed and informed consent has been obtained with: patient.    Plan discussed with CRNA.

## 2020-10-04 NOTE — PROGRESS NOTES
Name: Vish Morin ADMIT: 10/2/2020   : 1931  PCP: Nick Chawla MD    MRN: [id number] LOS: 2 days   AGE/SEX: 89 y.o. male  ROOM: [facility]/MAIN OR     Subjective   Subjective   Looks a little better.  More talkative and lucid.  Denies any complaints.  Son at bedside says he is quite a bit better today    Review of Systems     Objective   Objective   Vital Signs  Temp:  [97.7 °F (36.5 °C)-98.9 °F (37.2 °C)] 98.8 °F (37.1 °C)  Heart Rate:  [77-92] 88  Resp:  [16-18] 16  BP: (157-186)/() 186/105  SpO2:  [91 %-96 %] 93 %  on   ;   Device (Oxygen Therapy): room air  Body mass index is 21.62 kg/m².  Physical Exam  Vitals signs reviewed.   Constitutional:       General: He is not in acute distress.     Appearance: He is well-developed.      Comments: Frail appearing   HENT:      Head: Normocephalic and atraumatic.      Mouth/Throat:      Pharynx: No oropharyngeal exudate.   Eyes:      General: No scleral icterus.     Pupils: Pupils are equal, round, and reactive to light.   Neck:      Musculoskeletal: Neck supple.      Vascular: No JVD.   Cardiovascular:      Rate and Rhythm: Normal rate. Rhythm irregular.      Heart sounds: No murmur.   Pulmonary:      Effort: Pulmonary effort is normal. No respiratory distress.      Breath sounds: Normal breath sounds. No wheezing.   Abdominal:      General: Bowel sounds are normal. There is no distension.      Palpations: Abdomen is soft.      Tenderness: There is no abdominal tenderness.   Skin:     General: Skin is warm and dry.      Findings: No rash.   Neurological:      General: No focal deficit present.      Mental Status: He is alert.      Comments: Moves all extremities, no focal deficit. More coherent and talkative         Results Review     I reviewed the patient's new clinical results.  Results from last 7 days   Lab Units 10/04/20  0745 10/03/20  0516 10/02/20  2041   WBC 10*3/mm3 11.88* 14.34* 13.21*   HEMOGLOBIN g/dL 14.6 15.4 16.4    PLATELETS 10*3/mm3 123* 152 170     Results from last 7 days   Lab Units 10/02/20  2041   SODIUM mmol/L 140   POTASSIUM mmol/L 4.2   CHLORIDE mmol/L 102   CO2 mmol/L 28.0   BUN mg/dL 17   CREATININE mg/dL 1.04   GLUCOSE mg/dL 127*   Estimated Creatinine Clearance: 47.9 mL/min (by C-G formula based on SCr of 1.04 mg/dL).  Results from last 7 days   Lab Units 10/02/20  2041   ALBUMIN g/dL 4.10   BILIRUBIN mg/dL 0.7   ALK PHOS U/L 82   AST (SGOT) U/L 35   ALT (SGPT) U/L 30     Results from last 7 days   Lab Units 10/02/20  2041   CALCIUM mg/dL 9.2   ALBUMIN g/dL 4.10       COVID19   Date Value Ref Range Status   10/02/2020 Not Detected Not Detected - Ref. Range Final     Glucose   Date/Time Value Ref Range Status   10/04/2020 1307 103 70 - 130 mg/dL Final   10/04/2020 0025 117 70 - 130 mg/dL Final       CT Head Without Contrast  Narrative: CT HEAD WITHOUT CONTRAST     HISTORY: Mental status changes     COMPARISON: 03/17/2020     TECHNIQUE: Axial CT imaging was obtained through the brain. No IV  contrast was administered.     FINDINGS:  No acute intracranial hemorrhage is seen. There is diffuse atrophy.  There is periventricular and deep white matter microangiopathic change.  There is no midline shift or mass effect. No calvarial fracture seen.  Paranasal sinuses and mastoid air cells appear clear.     Impression: No acute intracranial findings.     Radiation dose reduction techniques were utilized, including automated  exposure control and exposure modulation based on body size.     This report was finalized on 10/3/2020 7:46 PM by Dr. Elly Rubio M.D.       Scheduled Medications  acetaminophen, 1,000 mg, Oral, Once  ceFAZolin, 2 g, Intravenous, Once  famotidine, 20 mg, Oral, BID AC  [MAR Hold] levothyroxine, 50 mcg, Oral, Q AM  lisinopril, 40 mg, Oral, Q24H  [MAR Hold] magnesium oxide, 400 mg, Oral, Q12H  metoprolol tartrate, 50 mg, Oral, Q12H  mupirocin, 1 application, Each Nare, Once  [MAR Hold] sodium  chloride, 10 mL, Intravenous, Q12H  sodium chloride, 3 mL, Intravenous, Q12H    Infusions  lactated ringers, 9 mL/hr, Last Rate: 9 mL/hr (10/04/20 1312)  sodium chloride, 75 mL/hr, Last Rate: 75 mL/hr (10/04/20 0239)    Diet  NPO Diet       Assessment/Plan     Active Hospital Problems    Diagnosis  POA   • **Femur fracture, right (CMS/HCC) [S72.91XA]  Yes   • Chronic anticoagulation [Z79.01]  Not Applicable   • Closed fracture of right hip (CMS/HCC) [S72.001A]  Yes   • Closed displaced fracture of right femoral neck (CMS/HCC) [S72.001A]  Unknown   • Hypothyroidism [E03.9]  Yes   • Hypertension [I10]  Yes   • Hyperlipidemia [E78.5]  Yes   • Permanent atrial fibrillation (CMS/HCC) [I48.21]  Yes      Resolved Hospital Problems   No resolved problems to display.       Confusion-better   Suspect may have been toxic encephalopathy secondary to morphine given the night before  Head CT negative.  Not clear that he actually hit his head but I suppose he could have some underlying closed head injury. Regardless, seems to be improving  UA ordered but not done  Minimize sedating meds  Right femoral neck fx-okay for OR today.  INR has trended down with FFP  AFib- rate controlled. Holding AC as noted above  Monitor on telemetry postoperatively  HTN-uncontrolled.  Staff did not give his lisinopril this morning secondary to n.p.o. status but he did receive metoprolol.  Will see what his blood pressure does postoperatively and consider adding low-dose amlodipine if elevated  Disposition- PT/OT after surgery.  Probably will need rehab I assume    D/w son at bedside    Louis Sprague MD  [facility]  10/04/20  13:48 EDT    I wore protective equipment throughout this patient encounter including a face mask, gloves and protective eyewear.  Hand hygiene was performed before donning protective equipment and after removal when leaving the room.

## 2020-10-04 NOTE — PROGRESS NOTES
Orthopedic Progress Note      Patient: Vish Morin  YOB: 1931     Date of Admission: 10/2/2020  6:30 PM Medical Record Number: [id number]     Attending Physician: Louis Sprague*    Planned Procedure:  Procedure(s):  TOTAL HIP ARTHROPLASTY ANTERIOR WITH HANA TABLE (Awaiting Surgery)    Subjective : No new orthopaedic complaints     Pain Relief: some relief with present medication.     Systemic Complaints: Confusion  Vitals:    10/03/20 2100 10/04/20 0013 10/04/20 0034 10/04/20 0500   BP:  169/91  170/99   BP Location:  Left arm     Patient Position:  Lying     Pulse: 79 84 82 79   Resp: 18 18  18   Temp: 98.4 °F (36.9 °C) 98.9 °F (37.2 °C)  98.3 °F (36.8 °C)   TempSrc: Oral Oral  Oral   SpO2:  91%  92%   Weight:       Height:           Physical Exam: 89 y.o. male    General Appearance:       Alert, cooperative, in no acute distress                  Extremities:             No clinical sign of DVT        Able to do good movements of digits    Pulses:   Pulses palpable and equal bilaterally           Diagnostic Tests:    Results from last 7 days   Lab Units 10/03/20  0516 10/02/20  2041   WBC 10*3/mm3 14.34* 13.21*   HEMOGLOBIN g/dL 15.4 16.4   HEMATOCRIT % 44.0 48.2   PLATELETS 10*3/mm3 152 170     Results from last 7 days   Lab Units 10/02/20  2041   SODIUM mmol/L 140   POTASSIUM mmol/L 4.2   CHLORIDE mmol/L 102   CO2 mmol/L 28.0   BUN mg/dL 17   CREATININE mg/dL 1.04   GLUCOSE mg/dL 127*   CALCIUM mg/dL 9.2     Results from last 7 days   Lab Units 10/04/20  0212 10/03/20  0516 10/02/20  2041   INR  2.01* 2.07* 2.16*     No results found for: URICACID  No results found for: CRYSTAL  Microbiology Results (last 10 days)     Procedure Component Value - Date/Time    COVID PRE-OP / PRE-PROCEDURE SCREENING ORDER (NO ISOLATION) - Swab, Nasopharynx [[id number]]  (Normal) Collected: 10/02/20 2045    Lab Status: Final result Specimen: Swab from Nasopharynx Updated: 10/02/20 2142    Narrative:       The following orders were created for panel order COVID PRE-OP / PRE-PROCEDURE SCREENING ORDER (NO ISOLATION) - Swab, Nasopharynx.  Procedure                               Abnormality         Status                     ---------                               -----------         ------                     Respiratory Panel PCR w/...[[id number]]  Normal              Final result                 Please view results for these tests on the individual orders.    Respiratory Panel PCR w/COVID-19(SARS-CoV-2) [facility] In-House, NP Swab in UTM/VTM, 3-4 HR TAT - Swab, Nasopharynx [[id number]]  (Normal) Collected: 10/02/20 2045    Lab Status: Final result Specimen: Swab from Nasopharynx Updated: 10/02/20 2142     ADENOVIRUS, PCR Not Detected     Coronavirus 229E Not Detected     Coronavirus HKU1 Not Detected     Coronavirus NL63 Not Detected     Coronavirus OC43 Not Detected     COVID19 Not Detected     Human Metapneumovirus Not Detected     Human Rhinovirus/Enterovirus Not Detected     Influenza A PCR Not Detected     Influenza A H1 Not Detected     Influenza A H1 2009 PCR Not Detected     Influenza A H3 Not Detected     Influenza B PCR Not Detected     Parainfluenza Virus 1 Not Detected     Parainfluenza Virus 2 Not Detected     Parainfluenza Virus 3 Not Detected     Parainfluenza Virus 4 Not Detected     RSV, PCR Not Detected     Bordetella pertussis pcr Not Detected     Bordetella parapertussis PCR Not Detected     Chlamydophila pneumoniae PCR Not Detected     Mycoplasma pneumo by PCR Not Detected    Narrative:      Fact sheet for providers: https://docs.[employer]/wp-content/uploads/ZCW6307-7684-VA4.1-EUA-Provider-Fact-Sheet-3.pdf    Fact sheet for patients: https://docs.[employer]/wp-content/uploads/RPZ8251-6005-MZ7.1-EUA-Patient-Fact-Sheet-1.pdf        Ct Head Without Contrast    Result Date: 10/3/2020  No acute intracranial findings.  Radiation dose reduction techniques were utilized, including  automated exposure control and exposure modulation based on body size.  This report was finalized on 10/3/2020 7:46 PM by Dr. Elly Rubio M.D.              Current Medications:  Scheduled Meds:famotidine, 20 mg, Oral, BID AC  levothyroxine, 50 mcg, Oral, Q AM  lisinopril, 40 mg, Oral, Q24H  magnesium oxide, 400 mg, Oral, Q12H  metoprolol tartrate, 50 mg, Oral, Q12H  sodium chloride, 10 mL, Intravenous, Q12H      Continuous Infusions:sodium chloride, 75 mL/hr, Last Rate: 75 mL/hr (10/04/20 0239)      PRN Meds:.•  acetaminophen **OR** acetaminophen **OR** acetaminophen  •  calcium carbonate  •  ondansetron  •  sodium chloride  •  traMADol    Assessment:   Procedure(s):  TOTAL HIP ARTHROPLASTY ANTERIOR WITH HANA TABLE (Awaiting Surgery)    Patient Active Problem List   Diagnosis   • Permanent atrial fibrillation (CMS/HCC)   • Constipation   • Gastroesophageal reflux disease   • Hyperlipidemia   • Hypertension   • Hypothyroidism   • Impaired fasting glucose   • Low back pain   • Pain in the muscles   • Muscle weakness   • Nausea   • Poor posture   • Mineral deficiency   • Rash   • Weight loss   • Dizziness   • Essential (primary) hypertension   • Chronic anticoagulation   • Femur fracture, right (CMS/HCC)   • Closed fracture of right hip (CMS/HCC)   • Closed displaced fracture of right femoral neck (CMS/HCC)       PLAN:  INR 2.01this AM. Patient receiving FFP.  NPO    To OR on call    JESUS Rincon    Date: 10/4/2020    Time: 06:29 EDT     I have examined this patient .   I have reviewed the labs, done the physical examination.  I agree with above evaluation and plan and  Note by JESUS Abreu  INR is 1.9 after one unit of FFP , will give one more unit of FFP and proceed to OR, HB 15    Genna Zepeda MD        09:40 EDT

## 2020-10-04 NOTE — OP NOTE
Operative  Note    Patient: Vish Morin  YOB: 1931    Medical Record Number: [id number]    Attending Physician: Louis Sprague*    Date of Service: 10/4/2020     Pre-op Diagnosis:   Closed displaced fracture of right femoral neck (CMS/HCC) [S72.001A]    Post-Op Diagnosis Codes:     * Closed displaced fracture of right femoral neck (CMS/HCC) [S72.001A]    PROCEDURE PERFORMED: RIGHT TOTAL HIP ARTHROPLASTY ANTERIOR WITH HANA TABLE by utilizing a Direct anterior approach with Depuy   [place] Porocoat Acetabular  Shell Sector with  holes size 54 mm outer diameter Neutral ALTRX  Polyethylene acetabular  liner- 36 mm internal diameter,   Actis Duo fix Cementless High Offset Collar femoral stem size # 6 with a   Delta ceramic femoral head- 36 mm outer diameter, + 1.5 neck length by utilizing a [place] table ([email or web address]).     Implant Name Type Inv. Item Serial No.  Lot No. LRB No. Used Action   SUT FW #2 W/TPR NDL 1/2 CIR 38IN 97CM 26.5MM PENG - [id number] Implant SUT FW #2 W/TPR NDL 1/2 CIR 38IN 97CM 26.5MM PENG  [id number] Right 1 Implanted   CUP ACET PINN SECTOR 54MM - [id number] Implant CUP ACET PINN SECTOR 54MM  DEPUY [id number] Right 1 Implanted   LINER ACET ALTRX PINN NTRL [id number] - [id number] Implant LINER ACET ALTRX PINN NTRL [id number]  DEPUY [id number] Right 1 Implanted   STEM FEM ACTIS COLR TPR CMTLS HI/OFFST SZ6 - [id number] Implant STEM FEM ACTIS COLR TPR CMTLS HI/OFFST SZ6  DEPUY SYNTHES [id number] Right 1 Implanted   HD FEM BIOLOXDELTA/ART CERAM 36MM PLS1.5 - [id number] Implant HD FEM BIOLOXDELTA/ART CERAM 36MM PLS1.5  DEPUY [id number] Right 1 Implanted       SURGEON: Genna Zepeda MD     ASSISTANT: EMELI Gonzalez    The services of a first assist were necessary for performing the procedure safely and expeditiously.  The first assist was present for the entire duration of the case and helped with positioning, retraction and closure of the incision.     ANESTHESIA:  General  Anesthesiologist: Brian Clifton MD; Dominik Brooks MD  CRNA: Bri Barger CRNA     Estimated Blood Loss: 200 mL    Specimens: * No orders in the log *    COMPLICATIONS: Nil.     DRAINS: * No LDAs found *    INDICATIONS: The patient is a 89 y.o. male who presented to   [facility] following a history of fall from standing height.  She was evaluated in the emergency room and was diagnosed with a left hip fracture.  Secondary to multiple medical comorbidities.  The patient has been admitted to the hospital internist.  As I was on call for the emergency room I was notified regarding the injury and for further evaluation and management of the hip fracture.    He had elevated INR on admission and coumadin was held, had FFP, INR normalized.      The medical history was reviewed. The patient was indicated for a  total hip arthroplasty. Likely risks and benefits of the procedure including, but not limited to infection, DVT, pulmonary embolism, leg length discrepancy, recurrent dislocation, possibility of injury to nerves or vessels, and periprosthetic fractures, Possibility of medical complications including but not limited to stroke, heart attack have been discussed in detail. Despite the risks involved, the patient elected to proceed and informed consent was obtained. The patient was seen in the preoperative holding area, and the  operative site was marked.     DESCRIPTION OF PROCEDURE: The patient has been transferred to [facility] Operating Room.   Preoperative antibiotics in the form of  Kefzol was given intravenously prior to the incision.   After achieving adequate general anesthesia, the patient was transferred onto the [place] table. All bony prominences were padded adequately.   The operative hip was prepped and draped in the usual sterile fashion.   Surgical timeout was done. Correct patient, surgical side and site were identified.  Tranexamic acid was given  intravenously at the time of skin incision.    A skin incision was made overlying the anterolateral aspect of the  hip for about 10 cm from just below and lateral to the anterior/superior iliac spine. Skin and subcutaneous tissue were incised and the deep fascia was incised in line with the skin incision overlying the tensor fascia yordy muscle. The tensor muscle was retracted laterally and interval between the sartorius and the rectus femoris medially and the tensor fascia yordy muscle was developed. The lateral circumflex femoral vessels were identified. They were clamped, cut, and ligated. Unnamed deep fascia was incised. Capsule of the hip joint was identified. Retractors were placed extracapsularly.     The capsule was incised in a L-shaped manner and the anterior capsule was tagged with FiberWire.  Followed by this, the retractors were placed intracapsularly.   There was a hemarthrosis. The femoral neck fracture was identified.  Appropriate capsular releases were done along the inferior and  medial neck and along the saddle of the femoral neck. The femoral neck osteotomy was done distal to the fracture site utilizing an oscillating saw, femoral head was extracted without any difficulty. All bony debris was cleared.     The acetabular cavity was inspected and exposed appropriately by placing retractors taking care to protect the anterior neurovascular structures. . The labrum was excised, pulvinar was excised from the cotyloid fossa. Acetabular cavity was progressively reamed, maintaining the correct inclination and version under image intensifier control. Adequate medialization was obtained. Adequate fit was found to be obtained at reamer size 54 .  The  [place] Porocoat  Acetabular Shell Sector with  holes  54 mm was seated into position. It was found to be seated satisfactorily with adequate inclination and anteversion. There was good stability. Followed by this, a polyethylene liner was seated into position,  checked for stability. This was a 36 mm internal diameter,  highly cross linked neutral 0° lip liner.     The periarticular tissue was infiltrated with local anaesthetic injection which consisted of Naropin, clonidine and ketorolac mixture.     Attention was then directed to the proximal femur. The proximal femur was delivered by utilizing a trochanteric hook placed just distal to the vastus tubercle and proximal to the gluteus brad tendon. The leg was then externally rotated with the gross traction released and  hyperextension, adduction was done using the [place] table spar. The mechanical lift on the table was utilized to support the femur.  Appropriate capsular releases were made to expose the medial slope of the greater trochanter and the proximal femur was delivered. The femoral canal was entered by removing the lateral femoral neck with a box chisel followed by serial broaching. Adequate fit was found to be obtained with a size 6 broach. A trial reduction was performed. Leg lengths and offset were found to be satisfactory under image intensifier on comparison with the opposite hip under image intensifier. Reduction was found to be satisfactory and there was good stability with range of motion of the hip in internal and external rotation. Having been satisfied with the trials, the hip joint was dislocated.     The femoral  trial broach was removed and  Actis Duo fix Cementless High offset Collar femoral stem size # 6 was seated into position. This was found to be satisfactorily seated with good stability.      The delta ceramic femoral head 36 mm +1.5 mm neck length was seated onto the trunnion and impacted. Followed by this, the hip joint was reduced. Reduction was found to be satisfactory and image confirmed leg length, offset , implant position and stem fill of the femoral canal.  There were no iatrogenic fractures. Hip joint was thoroughly irrigated with saline. Soft tissue hemostasis was secured. The  sponge count and needle count was correct. The FiberWire sutures was tagged to the anterior capsular flaps and they were tied to each other. The incision was closed in layers with vicryl and monocryl sutures and the patient was transferred to the recovery room in a stable condition.     The patient tolerated the procedure well. There were no complications. The patient had adequate distal pulses and good capillary refill.     The patient will be mobilized with physical therapy.   Antibiotic prophylaxis  in the form of Kefzol postoperatively two more doses will be given in the first 24 hours.   The  patient will be started on DVT prophylaxis with  Aspirin 81 mg twice daily starting on postoperative day #1.    I discussed the satisfactory performance of the procedure with the patient's family and discussed with them the postoperative management.    CPT CODE : 94123    Genna Zepeda MD     Date: 10/4/2020  Time: 16:22 EDT    cc: Nick Chawla MD; MD Darcie Benitez Matthew Bret*

## 2020-10-04 NOTE — NURSING NOTE
Dr. Zepeda called about morning IRN post first FFP infusion, INR 1.9, he ordered another unit and recheck post. Surgery made aware.

## 2020-10-05 PROBLEM — G93.40 ENCEPHALOPATHY: Status: ACTIVE | Noted: 2020-01-01

## 2020-10-05 NOTE — PROGRESS NOTES
Continued Stay Note  Psychiatric     Patient Name: Vish Morin  MRN: [id number]  Today's Date: 10/5/2020    Admit Date: 10/2/2020    Discharge Plan     Row Name 10/05/20 1535       Plan    Plan  Referral to the [facility] for subacute skilled rehab    Provided Post Acute Provider List?  Refused    Refused Provider List Comment  family member granddaughter Awa [phone] at bedside states family has spoken with the [facility] and they are approved for a bed    Provided Post Acute Provider Quality & Resource List?  N/A    Patient/Family in Agreement with Plan  yes granddaughter Awa Gutierres [phone]    Final Note  Facesheet information was verified with patient and granddaughter, Awa Gutierres [phone] at bedside.  Patient lives in a house with his dtr, Lynsey Maya [phone].  Awa at bedside states that the family has spoken with the [facility] at [place] and have a bed for the patient.  Referral in [facility] and called to Susannah with [facility].  She will evaluate.  Patient's pharmacy and PCP are verified in [facility].  ..........................Marguerite Ortiz RN        Discharge Codes    No documentation.             Marguerite Ortiz RN

## 2020-10-05 NOTE — THERAPY EVALUATION
Patient Name: Vish Morin  : 1931    MRN: [id number]                              Today's Date: 10/5/2020       Admit Date: 10/2/2020    Visit Dx:     ICD-10-CM ICD-9-CM   1. Closed displaced fracture of right femoral neck (CMS/[facility])  S72.001A 820.8   2. Closed fracture of right hip, initial encounter (CMS/Formerly [facility])  S72.001A 820.8     Patient Active Problem List   Diagnosis   • Permanent atrial fibrillation (CMS/HCC)   • Constipation   • Gastroesophageal reflux disease   • Hyperlipidemia   • Hypertension   • Hypothyroidism   • Impaired fasting glucose   • Low back pain   • Pain in the muscles   • Muscle weakness   • Nausea   • Poor posture   • Mineral deficiency   • Rash   • Weight loss   • Dizziness   • Essential (primary) hypertension   • Chronic anticoagulation   • Closed fracture of right hip (CMS/HCC)   • Status post right hip replacement     Past Medical History:   Diagnosis Date   • Atrial fibrillation (CMS/HCC)    • CAD (coronary artery disease)    • Constipation    • Diabetes mellitus (CMS/HCC)    • MOURA (dyspnea on exertion)    • GERD (gastroesophageal reflux disease)    • High risk medication use    • Hyperlipidemia    • Hypertension    • Hypothyroidism    • IFG (impaired fasting glucose)    • Lower back pain    • Muscle pain, thigh    • Muscle weakness    • Nausea    • KENNETH (obstructive sleep apnea)    • Permanent atrial fibrillation (CMS/HCC)    • Postural imbalance    • Sick sinus syndrome (CMS/HCC)    • Sinus bradycardia    • Syncope      Past Surgical History:   Procedure Laterality Date   • CARDIAC CATHETERIZATION  10/10/2008    diagnostic   • CARDIAC ELECTROPHYSIOLOGY MAPPING AND ABLATION  10/17/2013    [facility]   • CARDIAC ELECTROPHYSIOLOGY MAPPING AND ABLATION  2012    [facility]   • CARDIOVERSION  2013    [facility]   • COLONOSCOPY     • CORONARY ANGIOPLASTY WITH STENT PLACEMENT   12/17/2004    Drug-eluting Sirolimus.  3.5 x 33mm Cypher stent to LAD.  3.0 x 13mm Cypher stent to ROSALIA.   • TOTAL HIP ARTHROPLASTY Right 10/4/2020    Procedure: TOTAL HIP ARTHROPLASTY ANTERIOR WITH HANA TABLE;  Surgeon: Genna Zepeda MD;  Location: [facility] OR;  Service: Orthopedics;  Laterality: Right;     General Information     Row Name 10/05/20 1121          Physical Therapy Time and Intention    Document Type  evaluation  -PC     Mode of Treatment  physical therapy  -PC     Row Name 10/05/20 1121          General Information    Patient Profile Reviewed  yes  -PC     Prior Level of Function  independent: cane/rwx at home  -PC     Existing Precautions/Restrictions  fall;hip, anterior  -PC     Barriers to Rehab  cognitive status  -PC     Row Name 10/05/20 1121          Living Environment    Lives With  child(erum), adult  -PC     Row Name 10/05/20 1121          Home Main Entrance    Number of Stairs, Main Entrance  none  -PC     Row Name 10/05/20 1121          Stairs Within Home, Primary    Number of Stairs, Within Home, Primary  none  -PC     Row Name 10/05/20 1121          Cognition    Orientation Status (Cognition)  oriented to;person  -PC     Row Name 10/05/20 1121          Safety Issues, Functional Mobility    Safety Issues Affecting Function (Mobility)  insight into deficits/self-awareness;safety precautions follow-through/compliance;safety precaution awareness  -PC     Impairments Affecting Function (Mobility)  cognition;endurance/activity tolerance;strength  -PC     Cognitive Impairments, Mobility Safety/Performance  insight into deficits/self-awareness;safety precaution awareness;safety precaution follow-through;sequencing abilities  -PC       User Key  (r) = Recorded By, (t) = Taken By, (c) = Cosigned By    Initials Name Provider Type    PC Nereyda Naylor PT Physical Therapist        Mobility     Row Name 10/05/20 1122          Bed Mobility    Bed Mobility  supine-sit;sit-supine  -PC      Sit-Supine Sterling (Bed Mobility)  minimum assist (75% patient effort);verbal cues;nonverbal cues (demo/gesture)  -PC     Row Name 10/05/20 1122          Sit-Stand Transfer    Sit-Stand Sterling (Transfers)  verbal cues;nonverbal cues (demo/gesture);minimum assist (75% patient effort)  -PC     Assistive Device (Sit-Stand Transfers)  walker, front-wheeled  -PC     Row Name 10/05/20 1122          Gait/Stairs (Locomotion)    Sterling Level (Gait)  verbal cues;nonverbal cues (demo/gesture);minimum assist (75% patient effort)  -PC     Assistive Device (Gait)  walker, front-wheeled  -PC     Distance in Feet (Gait)  3 ft , bed to chair  -PC     Deviations/Abnormal Patterns (Gait)  antalgic;stride length decreased;weight shifting decreased  -PC       User Key  (r) = Recorded By, (t) = Taken By, (c) = Cosigned By    Initials Name Provider Type    PC Nereyda Naylor, PT Physical Therapist        Obj/Interventions     Row Name 10/05/20 1123          Range of Motion Comprehensive    Comment, General Range of Motion  WFL x R hip  -PC     Row Name 10/05/20 1123          Strength Comprehensive (MMT)    Comment, General Manual Muscle Testing (MMT) Assessment  B UE WFL for age, LLE WFL for age, RLE NT  -PC     Row Name 10/05/20 1123          Balance    Balance Assessment  sitting dynamic balance;sitting static balance;standing static balance;standing dynamic balance  -PC     Static Sitting Balance  WFL  -PC     Dynamic Sitting Balance  WFL  -PC     Static Standing Balance  mild impairment  -PC     Dynamic Standing Balance  mild impairment  -PC     Row Name 10/05/20 1123          Sensory Assessment (Somatosensory)    Sensory Assessment (Somatosensory)  sensation intact  -PC       User Key  (r) = Recorded By, (t) = Taken By, (c) = Cosigned By    Initials Name Provider Type    PC Nereyda Naylor, PT Physical Therapist        Goals/Plan     Row Name 10/05/20 1127          Bed Mobility Goal 1 (PT)    Activity/Assistive  Device (Bed Mobility Goal 1, PT)  sit to supine;supine to sit  -PC     [place]em[place] Level/Cues Needed (Bed Mobility Goal 1, PT)  contact guard assist  -PC     Time Frame (Bed Mobility Goal 1, PT)  1 week  -PC     Row Name 10/05/20 1127          Transfer Goal 1 (PT)    Activity/Assistive Device (Transfer Goal 1, PT)  sit-to-stand/stand-to-sit  -PC     [place] Level/Cues Needed (Transfer Goal 1, PT)  contact guard assist  -PC     Time Frame (Transfer Goal 1, PT)  1 week  -PC     Row Name 10/05/20 1127          Gait Training Goal 1 (PT)    Activity/Assistive Device (Gait Training Goal 1, PT)  gait (walking locomotion);assistive device use  -PC     [place] Level (Gait Training Goal 1, PT)  contact guard assist  -PC     Distance (Gait Training Goal 1, PT)  25 ft  -PC     Time Frame (Gait Training Goal 1, PT)  1 week  -PC       User Key  (r) = Recorded By, (t) = Taken By, (c) = Cosigned By    Initials Name Provider Type    PC Nereyda Naylor PT Physical Therapist        Clinical Impression     Row Name 10/05/20 1127          Pain    Additional Documentation  Pain Scale: Numbers Pre/Post-Treatment (Group);Pain Scale: FACES Pre/Post-Treatment (Group)  -PC     Row Name 10/05/20 1128          Pain Scale: FACES Pre/Post-Treatment    Pain: FACES Scale, Pretreatment  2-->hurts little bit  -PC     Posttreatment Pain Rating  2-->hurts little bit  -PC     Pain Location - Side  Right  -PC     Pain Location  hip  -PC     Row Name 10/05/20 1126          Plan of Care Review    Plan of Care Reviewed With  patient  -PC     Outcome Summary  pt adm with R hip fx, underwent anterior hip replacement 10/4, he presents with weakness, impaired functional mobility and activity tolerance, he will benefit from PT to address, prior to admission he lived with his daughter, independent with household mobility, anticipate that pt will need SNF at discharge, today he was pleasantly confused, needed frequent verbal and tactile cues, he was  able to take 2-3 short steps to a chair at bedside with assist  -PC     Row Name 10/05/20 1124          Therapy Assessment/Plan (PT)    Rehab Potential (PT)  good, to achieve stated therapy goals  -PC     Criteria for Skilled Interventions Met (PT)  yes;skilled treatment is necessary  -PC     Row Name 10/05/20 1124          Positioning and Restraints    Pre-Treatment Position  in bed  -PC     Post Treatment Position  chair  -PC     In Chair  sitting;call light within reach;encouraged to call for assist;exit alarm on  -PC       User Key  (r) = Recorded By, (t) = Taken By, (c) = Cosigned By    Initials Name Provider Type    PC Nereyda Naylor, PT Physical Therapist        Outcome Measures     Row Name 10/05/20 1127          How much help from another person do you currently need...    Turning from your back to your side while in flat bed without using bedrails?  2  -PC     Moving from lying on back to sitting on the side of a flat bed without bedrails?  3  -PC     Moving to and from a bed to a chair (including a wheelchair)?  2  -PC     Standing up from a chair using your arms (e.g., wheelchair, bedside chair)?  3  -PC     Climbing 3-5 steps with a railing?  1  -PC     To walk in hospital room?  2  -PC     AM-PAC 6 Clicks Score (PT)  13  -PC     Row Name 10/05/20 1127          Functional Assessment    Outcome Measure Options  AM-PAC 6 Clicks Basic Mobility (PT)  -PC       User Key  (r) = Recorded By, (t) = Taken By, (c) = Cosigned By    Initials Name Provider Type    PC Nereyda Naylor PT Physical Therapist        Physical Therapy Education                 Title: PT OT SLP Therapies (In Progress)     Topic: Physical Therapy (In Progress)     Point: Mobility training (In Progress)     Learning Progress Summary           Patient Acceptance, E,D, NR by PC at 10/5/2020 1128                   Point: Home exercise program (In Progress)     Learning Progress Summary           Patient Acceptance, E,D, NR by  at 10/5/2020  1128                   Point: Body mechanics (In Progress)     Learning Progress Summary           Patient Acceptance, E,D, NR by PC at 10/5/2020 1128                   Point: Precautions (In Progress)     Learning Progress Summary           Patient Acceptance, E,D, NR by  at 10/5/2020 1128                               User Key     Initials Effective Dates Name Provider Type Discipline     04/03/18 -  Nereyda Naylor PT Physical Therapist PT              PT Recommendation and Plan  Planned Therapy Interventions (PT): bed mobility training, gait training, transfer training, strengthening  Plan of Care Reviewed With: patient  Outcome Summary: pt adm with R hip fx, underwent anterior hip replacement 10/4, he presents with weakness, impaired functional mobility and activity tolerance, he will benefit from PT to address, prior to admission he lived with his daughter, independent with household mobility, anticipate that pt will need SNF at discharge, today he was pleasantly confused, needed frequent verbal and tactile cues, he was able to take 2-3 short steps to a chair at bedside with assist     Time Calculation:   PT Charges     Row Name 10/05/20 1129             Time Calculation    Start Time  1049  -PC      Stop Time  1111  -PC      Time Calculation (min)  22 min  -PC      PT Received On  10/05/20  -      PT - Next Appointment  10/06/20  -      PT Goal Re-Cert Due Date  10/12/20  -        User Key  (r) = Recorded By, (t) = Taken By, (c) = Cosigned By    Initials Name Provider Type    PC Nereyda Naylor, PT Physical Therapist        Therapy Charges for Today     Code Description Service Date Service Provider Modifiers Qty    36367438561 HC PT EVAL MOD COMPLEXITY 2 10/5/2020 Nereyda Naylor, PT GP 1    76714567251 HC PT THER PROC EA 15 MIN 10/5/2020 Nereyda Naylor, PT GP 1          PT G-Codes  Outcome Measure Options: AM-PAC 6 Clicks Basic Mobility (PT)  AM-PAC 6 Clicks Score (PT): 13    Nereyda Naylor  PT  10/5/2020

## 2020-10-05 NOTE — PLAN OF CARE
Problem: Adult Inpatient Plan of Care  Goal: Plan of Care Review  Outcome: Ongoing, Not Progressing  Goal: Patient-Specific Goal (Individualized)  Outcome: Ongoing, Not Progressing  Goal: Absence of Hospital-Acquired Illness or Injury  Outcome: Ongoing, Not Progressing  Intervention: Identify and Manage Fall Risk  Intervention: Prevent Skin Injury  Intervention: Prevent and Manage VTE (venous thromboembolism) Risk  Goal: Optimal Comfort and Wellbeing  Outcome: Ongoing, Not Progressing  Intervention: Provide Person-Centered Care  Goal: Readiness for Transition of Care  Outcome: Ongoing, Not Progressing  Intervention: Mutually Develop Transition Plan     Problem: Fall Injury Risk  Goal: Absence of Fall and Fall-Related Injury  Outcome: Ongoing, Not Progressing  Intervention: Identify and Manage Contributors to Fall Injury Risk  Intervention: Promote Injury-Free Environment     Problem: Adjustment to Injury (Hip Fracture)  Goal: Optimal Coping with Change in Health Status  Outcome: Ongoing, Not Progressing  Intervention: Support Psychosocial Response to Injury and Mobility Change     Problem: Delayed Union/Nonunion (Hip Fracture)  Goal: Fracture Stability  Outcome: Ongoing, Not Progressing  Intervention: Promote Fracture Stability and Bone Healing     Problem: Functional Ability Impaired (Hip Fracture)  Goal: Optimal Functional Performance  Outcome: Ongoing, Not Progressing  Intervention: Promote Optimal Functional Status     Problem: Pain (Hip Fracture)  Goal: Acceptable Pain Level  Outcome: Ongoing, Not Progressing     Problem: Skin Injury Risk Increased  Goal: Skin Health and Integrity  Outcome: Ongoing, Not Progressing  Intervention: Optimize Skin Protection   Goal Outcome Evaluation:  Plan of Care Reviewed With: patient  Progress: no change  Outcome Summary: Patient POD 1. C/o 0 pain throughout the night. Patient became extremely agitated once during  the night and was wripping his clothes off and hitting staff,  but once he talked to his daughter on the phone he calmed down. Patient was confused but has night progressed patient seemed more oriented. Patient tolerated pudding and jello for a snack, and taking his meds whole with some water. Turned patient and supported hip appropriately. Patient did not want ice pack on his hip. Dressing has no drainage. RN has been doing frequent neuro checks on the RE. Will ct

## 2020-10-05 NOTE — DISCHARGE PLACEMENT REQUEST
"Vish Hearn (89 y.o. Male)     Date of Birth Social Security Number Address Home Phone MRN    05/19/1931  [id number] [address] [phone] [id number]    Worship Marital Status          Tenriism Single       Admission Date Admission Type Admitting Provider Attending Provider Department, Room/Bed    10/2/20 Emergency Darin Gustafson MD Benton, John B, MD [address], [address]    Discharge Date Discharge Disposition Discharge Destination                       Attending Provider: Gopal Sanabria MD    Allergies: No Known Allergies    Isolation: None   Infection: None   Code Status: CPR    Ht: 180.3 cm (71\")   Wt: 70.3 kg (155 lb)    Admission Cmt: None   Principal Problem: Closed fracture of right hip (CMS/HCC) [S72.001A]                 Active Insurance as of 10/2/2020     Primary Coverage     Payor Plan Insurance Group Employer/Plan Group    MEDICARE MEDICARE A & B      Payor Plan Address Payor Plan Phone Number Payor Plan Fax Number Effective Dates    [address] [phone]  5/1/1996 - None Entered    [facility] [id number]       Subscriber Name Subscriber Birth Date Member ID       VISH HEARN 5/19/1931 [id number]           Secondary Coverage     Payor Plan Insurance Group Employer/Plan Group    [employer] SUP AAR HEALTH CARE OPTIONS      Payor Plan Address Payor Plan Phone Number Payor Plan Fax Number Effective Dates    [facility] [phone]  1/1/2017 - None Entered    [address]       [facility] [address]       Subscriber Name Subscriber Birth Date Member ID       VISH HEARN 5/19/1931 [id number]                 Emergency Contacts      (Rel.) Home Phone Work Phone Mobile Phone    Lynsey Maya (Daughter) [phone] -- [phone]          "

## 2020-10-05 NOTE — PROGRESS NOTES
Patient: Vish Morin  YOB: 1931     Date of Admission: 10/2/2020  6:30 PM Medical Record Number: [id number]     Attending Physician: Gopal Sanabria MD    Procedure(s):  TOTAL HIP ARTHROPLASTY ANTERIOR WITH HANA TABLE Post Operative Day Number: 1    Subjective : No new orthopaedic complaints     Pain Relief: some relief with present medication.     Systemic Complaints: Confusion overnight  Vitals:    10/04/20 2058 10/04/20 2244 10/05/20 0025 10/05/20 0306   BP: 150/86 148/88 142/86    BP Location: Left arm Left arm     Patient Position: Lying Lying     Pulse:  86 77    Resp:  18     Temp:  98.8 °F (37.1 °C)     TempSrc:  Oral     SpO2:  94%  98%   Weight:       Height:           Physical Exam: 89 y.o. male    General Appearance:       Alert, cooperative, in no acute distress                  Extremities:    Dressing Clean, Dry and Intact         Incision healthy without signs or symptoms of infections         No clinical sign of DVT        Able to do good movements of digits    Pulses:   Pulses palpable and equal bilaterally           Diagnostic Tests:     Results from last 7 days   Lab Units 10/04/20  0745 10/03/20  0516 10/02/20  2041   WBC 10*3/mm3 11.88* 14.34* 13.21*   HEMOGLOBIN g/dL 14.6 15.4 16.4   HEMATOCRIT % 42.1 44.0 48.2   PLATELETS 10*3/mm3 123* 152 170     Results from last 7 days   Lab Units 10/02/20  2041   SODIUM mmol/L 140   POTASSIUM mmol/L 4.2   CHLORIDE mmol/L 102   CO2 mmol/L 28.0   BUN mg/dL 17   CREATININE mg/dL 1.04   GLUCOSE mg/dL 127*   CALCIUM mg/dL 9.2     Results from last 7 days   Lab Units 10/05/20  0326 10/04/20  1141 10/04/20  0745   INR  1.67* 1.59* 1.91*     No results found for: CRP  No results found for: SEDRATE  No results found for: URICACID  No results found for: CRYSTAL  Microbiology Results (last 10 days)     Procedure Component Value - Date/Time    COVID PRE-OP / PRE-PROCEDURE SCREENING ORDER (NO ISOLATION) - Swab, Nasopharynx [[id number]]   (Normal) Collected: 10/02/20 2045    Lab Status: Final result Specimen: Swab from Nasopharynx Updated: 10/02/20 2142    Narrative:      The following orders were created for panel order COVID PRE-OP / PRE-PROCEDURE SCREENING ORDER (NO ISOLATION) - Swab, Nasopharynx.  Procedure                               Abnormality         Status                     ---------                               -----------         ------                     Respiratory Panel PCR w/...[[id number]]  Normal              Final result                 Please view results for these tests on the individual orders.    Respiratory Panel PCR w/COVID-19(SARS-CoV-2) STEVAN/ALEXANDRO/BOGDAN/PAD/COR/MAD In-House, NP Swab in UTM/VTM, 3-4 HR TAT - Swab, Nasopharynx [[id number]]  (Normal) Collected: 10/02/20 2045    Lab Status: Final result Specimen: Swab from Nasopharynx Updated: 10/02/20 2142     ADENOVIRUS, PCR Not Detected     Coronavirus 229E Not Detected     Coronavirus HKU1 Not Detected     Coronavirus NL63 Not Detected     Coronavirus OC43 Not Detected     COVID19 Not Detected     Human Metapneumovirus Not Detected     Human Rhinovirus/Enterovirus Not Detected     Influenza A PCR Not Detected     Influenza A H1 Not Detected     Influenza A H1 2009 PCR Not Detected     Influenza A H3 Not Detected     Influenza B PCR Not Detected     Parainfluenza Virus 1 Not Detected     Parainfluenza Virus 2 Not Detected     Parainfluenza Virus 3 Not Detected     Parainfluenza Virus 4 Not Detected     RSV, PCR Not Detected     Bordetella pertussis pcr Not Detected     Bordetella parapertussis PCR Not Detected     Chlamydophila pneumoniae PCR Not Detected     Mycoplasma pneumo by PCR Not Detected    Narrative:      Fact sheet for providers: https://docs.[employer]/wp-content/uploads/FHJ9768-0876-QJ8.1-EUA-Provider-Fact-Sheet-3.pdf    Fact sheet for patients: https://docs.[employer]/wp-content/uploads/VWT2916-7718-JX8.1-EUA-Patient-Fact-Sheet-1.pdf        Ct Head Without  Contrast    Result Date: 10/3/2020  No acute intracranial findings.  Radiation dose reduction techniques were utilized, including automated exposure control and exposure modulation based on body size.  This report was finalized on 10/3/2020 7:46 PM by Dr. Elly Rubio M.D.              Current Medications:  Scheduled Meds:acetaminophen, 1,000 mg, Oral, Q8H  aspirin, 81 mg, Oral, Q12H  docusate sodium, 100 mg, Oral, BID  famotidine, 20 mg, Oral, BID AC  levothyroxine, 50 mcg, Oral, Q AM  lisinopril, 40 mg, Oral, Q24H  magnesium oxide, 400 mg, Oral, Q12H  metoprolol tartrate, 50 mg, Oral, Q12H  sodium chloride, 10 mL, Intravenous, Q12H  traMADol, 50 mg, Oral, Q12H      Continuous Infusions:sodium chloride, 100 mL/hr, Last Rate: 100 mL/hr (10/04/20 2002)      PRN Meds:.•  acetaminophen **OR** acetaminophen **OR** acetaminophen  •  bisacodyl  •  calcium carbonate  •  HYDROcodone-acetaminophen  •  ondansetron  •  sodium chloride  •  traMADol    Assessment:    Procedure(s):  TOTAL HIP ARTHROPLASTY ANTERIOR WITH HANA TABLE    Patient Active Problem List   Diagnosis   • Permanent atrial fibrillation (CMS/HCC)   • Constipation   • Gastroesophageal reflux disease   • Hyperlipidemia   • Hypertension   • Hypothyroidism   • Impaired fasting glucose   • Low back pain   • Pain in the muscles   • Muscle weakness   • Nausea   • Poor posture   • Mineral deficiency   • Rash   • Weight loss   • Dizziness   • Essential (primary) hypertension   • Chronic anticoagulation   • Status post right hip replacement       PLAN:   Continues current post-op course  Anticoagulation: Aspirin started  Dressing Change PRN  Mobilize with PT as tolerated per protocol    Weight Bearing: WBAT  Discharge Plan: OK to plan for discharge in  when medically appropriate from orthopadic perspective.      Deborah Cheung, JESUS    Date: 10/5/2020    Time: 07:00 EDT

## 2020-10-05 NOTE — PLAN OF CARE
Problem: Adult Inpatient Plan of Care  Goal: Plan of Care Review  Recent Flowsheet Documentation  Taken 10/5/2020 1124 by Nereyda Naylor PT  Plan of Care Reviewed With: patient  Outcome Summary: pt adm with R hip fx, underwent anterior hip replacement 10/4, he presents with weakness, impaired functional mobility and activity tolerance, he will benefit from PT to address, prior to admission he lived with his daughter, independent with household mobility, anticipate that pt will need SNF at discharge, today he was pleasantly confused, needed frequent verbal and tactile cues, he was able to take 2-3 short steps to a chair at bedside with assist   ..Patient was intermittently wearing a face mask during this therapy encounter. Therapist used appropriate personal protective equipment including eye protection, mask, and gloves.  Mask used was standard procedure mask. Appropriate PPE was worn during the entire therapy session. Hand hygiene was completed before and after therapy session. Patient is not in enhanced droplet precautions.

## 2020-10-05 NOTE — PROGRESS NOTES
"   LOS: 3 days   Patient Care Team:  Nick Chawla MD as PCP - General (Internal Medicine)  Nick Chawla MD as PCP - Claims Attributed  Deja Camacho formerly [facility] as Pharmacist  Archie Alba formerly [facility] as Pharmacist (Pharmacy)    Chief Complaint: tired    Subjective     Pt has no complaints today other than being a little tired. He is still confused. He denies pain. He is on scheduled Ultram and APAP as well as PRN Ultram and Lortab. He didn't eat much for lunch, but is tolerating diet.       Subjective:  Symptoms:  Stable.  He reports weakness and anorexia.  No shortness of breath, malaise, cough, chest pain, headache, chest pressure, diarrhea or anxiety.    Diet:  Poor intake.  No nausea or vomiting.    Activity level: Impaired due to weakness.    Pain:  He reports no pain.        History taken from: patient chart RN    Objective     Vital Signs  Temp:  [98.1 °F (36.7 °C)-98.8 °F (37.1 °C)] 98.2 °F (36.8 °C)  Heart Rate:  [74-86] 84  Resp:  [18] 18  BP: (114-157)/(56-95) 149/95    Objective:  General Appearance:  Comfortable, in no acute distress and ill-appearing (chronically).    Vital signs: (most recent): Blood pressure 149/95, pulse 84, temperature 98.2 °F (36.8 °C), temperature source Oral, resp. rate 18, height 180.3 cm (71\"), weight 70.3 kg (155 lb), SpO2 97 %.  Vital signs are normal.  No fever.    Output: Producing urine and no stool output.    HEENT: Normal HEENT exam.    Lungs:  Normal effort and normal respiratory rate.  Breath sounds clear to auscultation.  He is not in respiratory distress.    Heart: Normal rate.  Irregular rhythm.    Abdomen: Abdomen is soft.  Bowel sounds are normal.   There is no abdominal tenderness.     Extremities: There is no dependent edema.  (NVI in distal BLEs)  Pulses: Distal pulses are intact.    Neurological: Patient is alert.  Patient has normal muscle tone.  (Generally weak, oriented to person and year).    Skin:  Warm and dry.  No rash.             Results " Review:     I reviewed the patient's new clinical results.  I reviewed the patient's other test results and agree with the interpretation  I personally viewed and interpreted the patient's EKG/Telemetry data  Discussed with pt, RN, and CCP    Results from last 7 days   Lab Units 10/04/20  0745 10/03/20  0516 10/02/20  2041   WBC 10*3/mm3 11.88* 14.34* 13.21*   HEMOGLOBIN g/dL 14.6 15.4 16.4   PLATELETS 10*3/mm3 123* 152 170     Results from last 7 days   Lab Units 10/02/20  2041   SODIUM mmol/L 140   POTASSIUM mmol/L 4.2   CHLORIDE mmol/L 102   CO2 mmol/L 28.0   BUN mg/dL 17   CREATININE mg/dL 1.04   CALCIUM mg/dL 9.2   Estimated Creatinine Clearance: 47.9 mL/min (by C-G formula based on SCr of 1.04 mg/dL).    Medication Review: reviewed and adjusted    Assessment/Plan       Closed fracture of right hip (CMS/HCC)    Permanent atrial fibrillation (CMS/HCC)    Hyperlipidemia    Hypertension    Hypothyroidism    Chronic anticoagulation    Status post right hip replacement    Encephalopathy NOS          Plan:   (POD #1 s/p GIL for right femoral neck fracture by Dr. Zepeda  -continue PT  -dc scheduled Ultram given his confusion  -CCP working on rehab placement  -no labs today    Encephalopathy NOS  -suspect multifactorial: analgesia, change in environment, etc  -change Ultram to PRN, avoid any other opioids  -check UA  -CT head was fine  -may take some time to resolve completely    Accelerated HTN  -BPs improved today on home regimen  -renal function fine  -no HA    AFib, chronic AC with Coumadin  -s/p FFP prior to surgery  -INR 1/67 today  -restart Coumadin when okay with Ortho  -continue Metoprolol, rate fine    Full code  SCDs and BID baby ASA for DVT ppx per Ortho  Dispo: [facility] soon  ).       Gopal Sanabria MD  10/05/20  18:40 EDT    Time: 35min

## 2020-10-06 NOTE — PROGRESS NOTES
"   LOS: 4 days   Patient Care Team:  Nick Chawla MD as PCP - General (Internal Medicine)  Nick Chawla MD as PCP - Claims Attributed  Deja Camacho [facility] as Pharmacist  Archie Alba [facility] as Pharmacist (Pharmacy)    Chief Complaint: \"I'm all strapped down\"    Subjective     CTSP for RN concerns. She felt like he \"just doesn't look good\" and she noted a jump in WBC.   Pt is awake and alert today and that's an improvement over yesterday. He is still not oriented to place or purpose--has to be reminded that he has a repaired hip frx. He has no pain at all.   He was found OOB on floor last night by staff. Hip XR was okay and dressing intact. He was also combative last night and required wrist restraints that are still in place.  He has no complaints this AM and actually looks much better to me today than he did yesterday.  His only complaint is of being in restraints.      Subjective:  Symptoms:  Improved.  He reports weakness and anorexia.  No shortness of breath, malaise, cough, chest pain, headache, chest pressure, diarrhea or anxiety.    Diet:  Poor intake.  No nausea or vomiting.    Activity level: Impaired due to weakness.    Pain:  He reports no pain.        History taken from: patient chart RN    Objective     Vital Signs  Temp:  [97.4 °F (36.3 °C)-98.2 °F (36.8 °C)] 97.7 °F (36.5 °C)  Heart Rate:  [] 113  Resp:  [14-18] 18  BP: (113-178)/() 147/98    Objective:  General Appearance:  Comfortable, in no acute distress and ill-appearing (chronically).    Vital signs: (most recent): Blood pressure 147/98, pulse 113, temperature 97.7 °F (36.5 °C), temperature source Oral, resp. rate 18, height 180.3 cm (71\"), weight 70.3 kg (155 lb), SpO2 95 %.  Vital signs are normal.  No fever.    Output: Producing urine.    HEENT: Normal HEENT exam.    Lungs:  Normal effort and normal respiratory rate.  Breath sounds clear to auscultation.  He is not in respiratory distress.    Heart: Normal " rate.  Irregular rhythm.    Abdomen: Abdomen is soft.  Bowel sounds are normal.   There is no abdominal tenderness.     Extremities: There is no dependent edema.  (NVI in distal BLEs)  Pulses: Distal pulses are intact.    Neurological: Patient is alert.  Patient has normal muscle tone.  (Generally weak, oriented to person and year  Much more awake, alert, conversational today  Voice clear and strong  CN2-12 intact).    Pupils:  Pupils are equal, round, and reactive to light.    Skin:  Warm and dry.  No rash.             Results Review:     I reviewed the patient's new clinical results.  I reviewed the patient's new imaging results and agree with the interpretation.  I reviewed the patient's other test results and agree with the interpretation  I personally viewed and interpreted the patient's EKG/Telemetry data  Discussed with pt, RN, CCP, and Pharm and Dr. Zepeda    Results from last 7 days   Lab Units 10/06/20  0345 10/04/20  0745 10/03/20  0516 10/02/20  2041   WBC 10*3/mm3 17.08* 11.88* 14.34* 13.21*   HEMOGLOBIN g/dL 13.8 14.6 15.4 16.4   PLATELETS 10*3/mm3 175 123* 152 170     Results from last 7 days   Lab Units 10/06/20  0345 10/02/20  2041   SODIUM mmol/L 135* 140   POTASSIUM mmol/L 3.8 4.2   CHLORIDE mmol/L 104 102   CO2 mmol/L 16.4* 28.0   BUN mg/dL 27* 17   CREATININE mg/dL 1.04 1.04   CALCIUM mg/dL 8.8 9.2   Estimated Creatinine Clearance: 47.9 mL/min (by C-G formula based on SCr of 1.04 mg/dL).    Medication Review: reviewed    Assessment/Plan       Closed fracture of right hip (CMS/HCC)    Permanent atrial fibrillation (CMS/HCC)    Hyperlipidemia    Hypertension    Hypothyroidism    Chronic anticoagulation    Status post right hip replacement    Encephalopathy NOS          Plan:   (POD #2 s/p GIL for right femoral neck fracture by Dr. Zepeda  -continue PT  -dc'd scheduled Ultram given his confusion and lethargy, much improved today  -CCP working on rehab placement    Encephalopathy NOS  -suspect  multifactorial: analgesia, change in environment, etc  -changed Ultram to PRN--not requiring any so far, avoid any other opioids  -seems much improved today, though he is in restraints  -UA equivocal  -CT head fine  -may take some time to resolve completely    Accelerated HTN  -BPs acceptable today on home regimen  -renal function fine  -no HA    AFib, chronic AC with Coumadin  -s/p FFP prior to surgery  -INR 2.08 today even though Coumadin not restarted  -d/w Dr. Zepeda and he is okay with restarting Coumadin and dc'd BID ASA  -continue Metoprolol, rate fine    Leukocytosis  -probably reactive--looks great today, no fever or hypotension  -checked CXR this AM and it looked fine  -venous duplex ordered and pending  -UA is equivocal--mainly looks a little dry and BUN up a bit, have increased IVF rate for now  -trend WBC and watch for fever or hypotension    Full code  SCDs and BID baby ASA for DVT ppx per Ortho--can dc as INR therapeutic, Coumadin moving forward  Dispo: [facility] soon  ).       Gopal Sanabria MD  10/06/20  10:13 EDT    Time: 35min

## 2020-10-06 NOTE — PLAN OF CARE
Problem: Adult Inpatient Plan of Care  Goal: Plan of Care Review  10/6/2020 0447 by Margie Castle, RN  Flowsheets (Taken 10/6/2020 0447)  Progress: no change  Plan of Care Reviewed With: patient  Outcome Summary: BP elevated, APRN notified and no new orders. Pt confused, oriented to person and oriented to place and situation at times. Pt restless/agitated most of the shift. Restraints continued. See nursing note for fall. No further changes at this time.     Problem: Adult Inpatient Plan of Care  Goal: Plan of Care Review  10/6/2020 0446 by Margie Castle, GRACE  Outcome: Ongoing, Progressing     Problem: Adult Inpatient Plan of Care  Goal: Patient-Specific Goal (Individualized)  Outcome: Ongoing, Progressing     Problem: Adult Inpatient Plan of Care  Goal: Absence of Hospital-Acquired Illness or Injury  Outcome: Ongoing, Progressing     Problem: Adult Inpatient Plan of Care  Goal: Absence of Hospital-Acquired Illness or Injury  Intervention: Identify and Manage Fall Risk  Recent Flowsheet Documentation  Taken 10/6/2020 0412 by Margie Castle, RN  Safety Promotion/Fall Prevention:   activity supervised   assistive device/personal items within reach   clutter free environment maintained   fall prevention program maintained   lighting adjusted   nonskid shoes/slippers when out of bed   room organization consistent   safety round/check completed  Taken 10/6/2020 0339 by Margie Castle, RN  Safety Promotion/Fall Prevention:   activity supervised   assistive device/personal items within reach   clutter free environment maintained   fall prevention program maintained   lighting adjusted   nonskid shoes/slippers when out of bed   room organization consistent   safety round/check completed  Taken 10/6/2020 0204 by Margie Castle, RN  Safety Promotion/Fall Prevention:   activity supervised   assistive device/personal items within reach   clutter free environment maintained   fall prevention program maintained   lighting  adjusted   nonskid shoes/slippers when out of bed   room organization consistent   safety round/check completed  Taken 10/6/2020 0010 by Margie Castle RN  Safety Promotion/Fall Prevention:   activity supervised   assistive device/personal items within reach   clutter free environment maintained   fall prevention program maintained   lighting adjusted   nonskid shoes/slippers when out of bed   room organization consistent   safety round/check completed  Taken 10/5/2020 2352 by Margie Castle RN  Safety Promotion/Fall Prevention:   activity supervised   assistive device/personal items within reach   clutter free environment maintained   fall prevention program maintained   lighting adjusted   nonskid shoes/slippers when out of bed   room organization consistent   safety round/check completed  Taken 10/5/2020 2248 by Margie Castle RN  Safety Promotion/Fall Prevention:   activity supervised   assistive device/personal items within reach   clutter free environment maintained   fall prevention program maintained   lighting adjusted   nonskid shoes/slippers when out of bed   room organization consistent   safety round/check completed  Taken 10/5/2020 2133 by Margie Castle RN  Safety Promotion/Fall Prevention:   activity supervised   assistive device/personal items within reach   clutter free environment maintained   fall prevention program maintained   lighting adjusted   nonskid shoes/slippers when out of bed   room organization consistent   safety round/check completed     Problem: Adult Inpatient Plan of Care  Goal: Absence of Hospital-Acquired Illness or Injury  Intervention: Prevent Skin Injury  Recent Flowsheet Documentation  Taken 10/6/2020 0412 by Margie Castle RN  Body Position:   supine   weight shift assistance provided  Taken 10/6/2020 0339 by Margie Castle RN  Body Position: (pt found supine)   turned   side-lying, left   weight shift assistance provided  Taken 10/6/2020 0204 by Tin  GRACE Hanson  Body Position: position maintained  Taken 10/6/2020 0010 by Margie Castle RN  Body Position: position maintained  Taken 10/5/2020 2352 by Margie Castle RN  Body Position:   turned   side-lying, left   weight shift assistance provided  Taken 10/5/2020 2248 by Margie Castle RN  Body Position:   supine   weight shift assistance provided  Taken 10/5/2020 2133 by Margie Castle RN  Body Position: position changed independently     Problem: Adult Inpatient Plan of Care  Goal: Absence of Hospital-Acquired Illness or Injury  Intervention: Prevent and Manage VTE (venous thromboembolism) Risk  Recent Flowsheet Documentation  Taken 10/6/2020 0010 by Margie Castle RN  VTE Prevention/Management:   bilateral   dorsiflexion/plantar flexion performed     Problem: Adult Inpatient Plan of Care  Goal: Absence of Hospital-Acquired Illness or Injury  Intervention: Prevent Infection  Recent Flowsheet Documentation  Taken 10/6/2020 0412 by Margie Castle RN  Infection Prevention:   rest/sleep promoted   single patient room provided  Taken 10/6/2020 0339 by Margie Castle RN  Infection Prevention:   rest/sleep promoted   single patient room provided  Taken 10/6/2020 0204 by Margie Castle RN  Infection Prevention:   rest/sleep promoted   single patient room provided  Taken 10/6/2020 0010 by Margie Castle RN  Infection Prevention:   rest/sleep promoted   single patient room provided  Taken 10/5/2020 2352 by Margie Castle RN  Infection Prevention:   rest/sleep promoted   single patient room provided  Taken 10/5/2020 2248 by Margie Castle RN  Infection Prevention:   rest/sleep promoted   single patient room provided  Taken 10/5/2020 2133 by Margie Castle RN  Infection Prevention:   rest/sleep promoted   single patient room provided     Problem: Adult Inpatient Plan of Care  Goal: Optimal Comfort and Wellbeing  Outcome: Ongoing, Progressing     Problem: Adult Inpatient Plan of Care  Goal: Optimal  Comfort and Wellbeing  Intervention: Provide Person-Centered Care  Recent Flowsheet Documentation  Taken 10/6/2020 0010 by Margie Castle RN  Trust Relationship/Rapport:   care explained   choices provided  Taken 10/5/2020 2133 by Margie Castle RN  Trust Relationship/Rapport: care explained     Problem: Adult Inpatient Plan of Care  Goal: Readiness for Transition of Care  Outcome: Ongoing, Progressing     Problem: Fall Injury Risk  Goal: Absence of Fall and Fall-Related Injury  Outcome: Ongoing, Progressing     Problem: Fall Injury Risk  Goal: Absence of Fall and Fall-Related Injury  Intervention: Identify and Manage Contributors to Fall Injury Risk  Recent Flowsheet Documentation  Taken 10/6/2020 0339 by Margie Castle RN  Medication Review/Management: medications reviewed  Taken 10/6/2020 0204 by Margie Castle RN  Medication Review/Management: medications reviewed     Problem: Fall Injury Risk  Goal: Absence of Fall and Fall-Related Injury  Intervention: Promote Injury-Free Environment  Recent Flowsheet Documentation  Taken 10/6/2020 0412 by Margie Castle RN  Safety Promotion/Fall Prevention:   activity supervised   assistive device/personal items within reach   clutter free environment maintained   fall prevention program maintained   lighting adjusted   nonskid shoes/slippers when out of bed   room organization consistent   safety round/check completed  Taken 10/6/2020 0339 by Margie Castle RN  Safety Promotion/Fall Prevention:   activity supervised   assistive device/personal items within reach   clutter free environment maintained   fall prevention program maintained   lighting adjusted   nonskid shoes/slippers when out of bed   room organization consistent   safety round/check completed  Taken 10/6/2020 0204 by Margie Castle RN  Safety Promotion/Fall Prevention:   activity supervised   assistive device/personal items within reach   clutter free environment maintained   fall prevention  program maintained   lighting adjusted   nonskid shoes/slippers when out of bed   room organization consistent   safety round/check completed  Taken 10/6/2020 0010 by Margie Castle RN  Safety Promotion/Fall Prevention:   activity supervised   assistive device/personal items within reach   clutter free environment maintained   fall prevention program maintained   lighting adjusted   nonskid shoes/slippers when out of bed   room organization consistent   safety round/check completed  Taken 10/5/2020 2352 by Margie Castle RN  Safety Promotion/Fall Prevention:   activity supervised   assistive device/personal items within reach   clutter free environment maintained   fall prevention program maintained   lighting adjusted   nonskid shoes/slippers when out of bed   room organization consistent   safety round/check completed  Taken 10/5/2020 2248 by Margie Castle RN  Safety Promotion/Fall Prevention:   activity supervised   assistive device/personal items within reach   clutter free environment maintained   fall prevention program maintained   lighting adjusted   nonskid shoes/slippers when out of bed   room organization consistent   safety round/check completed  Taken 10/5/2020 2133 by Margie Castle RN  Safety Promotion/Fall Prevention:   activity supervised   assistive device/personal items within reach   clutter free environment maintained   fall prevention program maintained   lighting adjusted   nonskid shoes/slippers when out of bed   room organization consistent   safety round/check completed     Problem: Adjustment to Injury (Hip Fracture)  Goal: Optimal Coping with Change in Health Status  Outcome: Ongoing, Progressing     Problem: Adjustment to Injury (Hip Fracture)  Goal: Optimal Coping with Change in Health Status  Intervention: Support Psychosocial Response to Injury and Mobility Change  Recent Flowsheet Documentation  Taken 10/6/2020 0010 by Margie Castle RN  Family/Support System Care: support  provided  Taken 10/5/2020 2133 by Margie Castle RN  Family/Support System Care: support provided     Problem: Delayed Union/Nonunion (Hip Fracture)  Goal: Fracture Stability  Outcome: Ongoing, Progressing     Problem: Functional Ability Impaired (Hip Fracture)  Goal: Optimal Functional Performance  Outcome: Ongoing, Progressing     Problem: Pain (Hip Fracture)  Goal: Acceptable Pain Level  Outcome: Ongoing, Progressing     Problem: Skin Injury Risk Increased  Goal: Skin Health and Integrity  Outcome: Ongoing, Progressing     Problem: Skin Injury Risk Increased  Goal: Skin Health and Integrity  Intervention: Optimize Skin Protection  Recent Flowsheet Documentation  Taken 10/6/2020 0412 by Margie Castle RN  Head of Bed (HOB): HOB lowered  Taken 10/6/2020 0339 by Margie Castle RN  Head of Bed (HOB): HOB elevated  Taken 10/6/2020 0204 by Margie Castle RN  Head of Bed (HOB): HOB at 30-45 degrees  Taken 10/6/2020 0010 by Margie Castle RN  Pressure Reduction Techniques: frequent weight shift encouraged  Head of Bed (HOB): HOB lowered  Pressure Reduction Devices:   positioning supports utilized   pressure-redistributing mattress utilized  Skin Protection:   adhesive use limited   incontinence pads utilized  Taken 10/5/2020 2352 by Margie Castle RN  Head of Bed (HOB): HOB elevated  Taken 10/5/2020 2248 by Margie Castle RN  Head of Bed (HOB): HOB lowered  Taken 10/5/2020 2133 by Margie Castle RN  Pressure Reduction Techniques: frequent weight shift encouraged  Head of Bed (HOB): HOB lowered  Pressure Reduction Devices:   positioning supports utilized   pressure-redistributing mattress utilized  Skin Protection:   adhesive use limited   incontinence pads utilized     Problem: Restraint, Nonbehavioral (Nonviolent)  Goal: Discontinuation Criteria Achieved  Outcome: Ongoing, Progressing     Problem: Restraint, Nonbehavioral (Nonviolent)  Goal: Discontinuation Criteria Achieved  Intervention: Implement  Least-restrictive Safety Strategies  Recent Flowsheet Documentation  Taken 10/6/2020 0010 by Margie Castle RN  Diversional Activities: television     Problem: Restraint, Nonbehavioral (Nonviolent)  Goal: Personal Dignity and Safety Maintained  Outcome: Ongoing, Progressing     Problem: Restraint, Nonbehavioral (Nonviolent)  Goal: Personal Dignity and Safety Maintained  Intervention: Protect Dignity, Rights, and Personal Wellbeing  Recent Flowsheet Documentation  Taken 10/6/2020 0010 by Margie Castle RN  Trust Relationship/Rapport:   care explained   choices provided  Taken 10/5/2020 2133 by Margie Castle RN  Trust Relationship/Rapport: care explained     Problem: Restraint, Nonbehavioral (Nonviolent)  Goal: Personal Dignity and Safety Maintained  Intervention: Protect Skin and Joint Integrity  Recent Flowsheet Documentation  Taken 10/6/2020 0412 by Margie Castle RN  Body Position:   supine   weight shift assistance provided  Taken 10/6/2020 0339 by Margie Castle RN  Body Position: (pt found supine)   turned   side-lying, left   weight shift assistance provided  Taken 10/6/2020 0204 by Margie Castle RN  Body Position: position maintained  Taken 10/6/2020 0010 by Margie Castle RN  Body Position: position maintained  Taken 10/5/2020 2352 by Margie Castle RN  Body Position:   turned   side-lying, left   weight shift assistance provided  Taken 10/5/2020 2248 by Margie Castle RN  Body Position:   supine   weight shift assistance provided  Taken 10/5/2020 2133 by Margie Castle RN  Body Position: position changed independently   Goal Outcome Evaluation:  Plan of Care Reviewed With: patient  Progress: no change

## 2020-10-06 NOTE — PROGRESS NOTES
Pharmacy Consult: Warfarin Dosing/ Monitoring    Vish Morin is a 89 y.o. male, estimated creatinine clearance is 47.9 mL/min (by C-G formula based on SCr of 1.04 mg/dL). weighing 70.3 kg (155 lb).    PMH: Atrial fibrillation, CAD, Constipation, Diabetes mellitus, MOURA, GERD, High risk medication use, Hyperlipidemia, Hypertension, Hypothyroidism, Impaired fasting glucose, Lower back pain, Muscle pain, thigh, Muscle weakness, Nausea, KENNETH, Permanent atrial fibrillation, Postural imbalance, Sick sinus syndrome, Sinus bradycardia, and Syncope.    Social History     Tobacco Use    Smoking status: Former Smoker     Types: Cigars    Smokeless tobacco: Never Used   Substance Use Topics    Alcohol use: No     Comment: CAFFEINE USE    Drug use: No     Results from last 7 days   Lab Units 10/06/20  0345 10/05/20  0326 10/04/20  1141 10/04/20  0745 10/04/20  0212 10/03/20  0516 10/02/20  2041   INR  2.08* 1.67* 1.59* 1.91* 2.01* 2.07* 2.16*   HEMOGLOBIN g/dL 13.8  --   --  14.6  --  15.4 16.4   HEMATOCRIT % 38.9  --   --  42.1  --  44.0 48.2   PLATELETS 10*3/mm3 175  --   --  123*  --  152 170     Results from last 7 days   Lab Units 10/06/20  0345 10/02/20  2041   SODIUM mmol/L 135* 140   POTASSIUM mmol/L 3.8 4.2   CHLORIDE mmol/L 104 102   CO2 mmol/L 16.4* 28.0   BUN mg/dL 27* 17   CREATININE mg/dL 1.04 1.04   CALCIUM mg/dL 8.8 9.2   BILIRUBIN mg/dL 1.2 0.7   ALK PHOS U/L 105 82   ALT (SGPT) U/L 27 30   AST (SGOT) U/L 51* 35   GLUCOSE mg/dL 127* 127*     Anticoagulation history: [facility]  Home Med: Warfarin 5 mg po qSuMWThF + Warfarin 2.5 mg po qTuSa (30 mg/weekly)    Hospital Anticoagulation:  Consulting provider: Dr Sanabria  Start date: 10/6/2020 restart home med  Indication: Afib  Target INR: 2-3  Expected duration: Lifelong  Bridge Therapy: No    Date 10/2 10/3 10/4 10/5 10/6        INR 2.16 2.07 2.01/  1.91/  1.59 1.67 2.08        Warfarin dose  None None None None 2.5 mg          Potential  drug interactions:   1) Acetaminophen: may result in an increased risk of bleeding.   2) Tramadol: may result in an increase in prothrombin time and an increased risk of bleeding.   3) Levothyroxine: may result in an increased risk of bleeding.     Will continue to monitor for drug-drug interaction as medications are added to patient's profile.     Relevant nutrition status: Diet Regular     Other:   Dietary advances -> changes in dietary vitamin K intake may alter response to warfarin.  Acute infection/inflammation may cause an increased sensitivity to warfarin    Lab: Albumin=  3.50 g/dL (10/6/2020)    Education complete?/ Date: [facility]    Assessment/Plan:  Dose: Will start home regimen of Warfarin 5 mg po qSuMWThF + Warfarin 2.5 mg po qTuSa (30 mg/weekly)  Monitor: s/s bleed  Follow up INR    Pharmacy will continue to follow until discharge or discontinuation of warfarin.     Alize Sinha [facility]  Clinical Staff Pharmacist

## 2020-10-06 NOTE — PLAN OF CARE
Goal Outcome Evaluation:  Plan of Care Reviewed With: patient  Progress: no change  Outcome Summary: BP elevated. Continuing to monitor. 2L O2 applied pt sats while sleeping 84%. Falls precautions in place. Interactions with staff today has been appropriate. Pt has not attempted to exit the bed thus far. Will continue to monitor.

## 2020-10-06 NOTE — NURSING NOTE
At aproximately 2040 pt was found kneeling next to bed. Assisted pt back to bed with assist x3. Pt assessed and no signs of injury noted. Pt denies pain. Surgical incisions assessed and WNL. Vital signs obtained, BP slightly elevated. HM notified at 2046, Charge nurse notified at 2041, APRN notified at 2059, and daughter notified at 2105. Right hip x-ray ordered per APRN.

## 2020-10-06 NOTE — PROGRESS NOTES
Patient: Vish Morin  YOB: 1931     Date of Admission: 10/2/2020  6:30 PM Medical Record Number: [id number]     Attending Physician: Gopal Sanabria MD    Procedure(s):  TOTAL HIP ARTHROPLASTY ANTERIOR WITH HANA TABLE Post Operative Day Number: 2    Subjective : No new orthopaedic complaints     Pain Relief: some relief with present medication.     Systemic Complaints: No Complaints  Vitals:    10/06/20 0737 10/06/20 0738 10/06/20 0824 10/06/20 0842   BP:   147/98    BP Location:   Right arm    Patient Position:   Lying    Pulse:    113   Resp:       Temp:       TempSrc:       SpO2: (!) 85% (!) 78% 97% 95%   Weight:       Height:           Physical Exam: 89 y.o. male    General Appearance:       confused, in restraints, in no acute distress                  Extremities:    Dressing Clean, Dry and Intact         Incision healthy without signs or symptoms of infections         No clinical sign of DVT        Able to do good movements of digits    Pulses:   Pulses palpable and equal bilaterally           Diagnostic Tests:     Results from last 7 days   Lab Units 10/06/20  0345 10/04/20  0745 10/03/20  0516   WBC 10*3/mm3 17.08* 11.88* 14.34*   HEMOGLOBIN g/dL 13.8 14.6 15.4   HEMATOCRIT % 38.9 42.1 44.0   PLATELETS 10*3/mm3 175 123* 152     Results from last 7 days   Lab Units 10/06/20  0345 10/02/20  2041   SODIUM mmol/L 135* 140   POTASSIUM mmol/L 3.8 4.2   CHLORIDE mmol/L 104 102   CO2 mmol/L 16.4* 28.0   BUN mg/dL 27* 17   CREATININE mg/dL 1.04 1.04   GLUCOSE mg/dL 127* 127*   CALCIUM mg/dL 8.8 9.2     Results from last 7 days   Lab Units 10/06/20  0345 10/05/20  0326 10/04/20  1141   INR  2.08* 1.67* 1.59*     No results found for: CRP  No results found for: SEDRATE  No results found for: URICACID  No results found for: CRYSTAL  Microbiology Results (last 10 days)     Procedure Component Value - Date/Time    COVID PRE-OP / PRE-PROCEDURE SCREENING ORDER (NO ISOLATION) - Swab, Nasopharynx  [[id number]]  (Normal) Collected: 10/02/20 2045    Lab Status: Final result Specimen: Swab from Nasopharynx Updated: 10/02/20 2142    Narrative:      The following orders were created for panel order COVID PRE-OP / PRE-PROCEDURE SCREENING ORDER (NO ISOLATION) - Swab, Nasopharynx.  Procedure                               Abnormality         Status                     ---------                               -----------         ------                     Respiratory Panel PCR w/...[[id number]]  Normal              Final result                 Please view results for these tests on the individual orders.    Respiratory Panel PCR w/COVID-19(SARS-CoV-2) [facility]/ALEXANDRO/BOGDAN/[facility] In-House, NP Swab in UTM/VTM, 3-4 HR TAT - Swab, Nasopharynx [[id number]]  (Normal) Collected: 10/02/20 2045    Lab Status: Final result Specimen: Swab from Nasopharynx Updated: 10/02/20 2142     ADENOVIRUS, PCR Not Detected     Coronavirus 229E Not Detected     Coronavirus HKU1 Not Detected     Coronavirus NL63 Not Detected     Coronavirus OC43 Not Detected     COVID19 Not Detected     Human Metapneumovirus Not Detected     Human Rhinovirus/Enterovirus Not Detected     Influenza A PCR Not Detected     Influenza A H1 Not Detected     Influenza A H1 2009 PCR Not Detected     Influenza A H3 Not Detected     Influenza B PCR Not Detected     Parainfluenza Virus 1 Not Detected     Parainfluenza Virus 2 Not Detected     Parainfluenza Virus 3 Not Detected     Parainfluenza Virus 4 Not Detected     RSV, PCR Not Detected     Bordetella pertussis pcr Not Detected     Bordetella parapertussis PCR Not Detected     Chlamydophila pneumoniae PCR Not Detected     Mycoplasma pneumo by PCR Not Detected    Narrative:      Fact sheet for providers: https://docs.[employer]/wp-content/uploads/TXG0064-8983-JE1.1-EUA-Provider-Fact-Sheet-3.pdf    Fact sheet for patients: https://docs.[employer]/wp-content/uploads/RKJ7045-5760-HD1.1-EUA-Patient-Fact-Sheet-1.pdf        Ct  Head Without Contrast    Result Date: 10/3/2020  No acute intracranial findings.  Radiation dose reduction techniques were utilized, including automated exposure control and exposure modulation based on body size.  This report was finalized on 10/3/2020 7:46 PM by Dr. Elly Rubio M.D.      Xr Chest 1 View    Result Date: 10/6/2020  1. No active disease is seen in the chest and the etiology of the postoperative leukocytosis is not established on this exam. 2. High riding right humeral head likely secondary to chronic right rotator cuff tear. 3. There is borderline cardiomegaly.        Xr Hip With Or Without Pelvis 2 - 3 View Right    Result Date: 10/5/2020  No acute fracture or subluxation identified.  This report was finalized on 10/5/2020 11:03 PM by Dr. Elly Rubio M.D.              Current Medications:  Scheduled Meds:acetaminophen, 1,000 mg, Oral, Q8H  docusate sodium, 100 mg, Oral, BID  famotidine, 20 mg, Oral, BID AC  levothyroxine, 50 mcg, Oral, Q AM  lisinopril, 40 mg, Oral, Q24H  magnesium oxide, 400 mg, Oral, Q12H  metoprolol tartrate, 50 mg, Oral, Q12H  sodium chloride, 10 mL, Intravenous, Q12H      Continuous Infusions:sodium chloride, 75 mL/hr, Last Rate: 75 mL/hr (10/06/20 0909)      PRN Meds:.•  acetaminophen **OR** acetaminophen **OR** acetaminophen  •  bisacodyl  •  calcium carbonate  •  ondansetron  •  sodium chloride  •  traMADol    Assessment:    Procedure(s):  TOTAL HIP ARTHROPLASTY ANTERIOR WITH HANA TABLE    Patient Active Problem List   Diagnosis   • Permanent atrial fibrillation (CMS/HCC)   • Constipation   • Gastroesophageal reflux disease   • Hyperlipidemia   • Hypertension   • Hypothyroidism   • Impaired fasting glucose   • Low back pain   • Pain in the muscles   • Muscle weakness   • Nausea   • Poor posture   • Mineral deficiency   • Rash   • Weight loss   • Dizziness   • Essential (primary) hypertension   • Chronic anticoagulation   • Closed fracture of right hip (CMS/HCC)    • Status post right hip replacement   • Encephalopathy NOS       PLAN:   Continues current post-op course  Anticoagulation: Aspirin started on postoperative day 1 we will discontinue it.  Anticoagulation: Okay to restart home dose of Coumadin.  His INR is 2.08.  No need to bridge Lovenox.  Dressing Change in am  Mobilize with PT as tolerated per protocol    Weight Bearing: WBAT  Discharge Plan: OK to plan for discharge in  today to home, home health / SNF  from orthopadic perspective.      Genna Zepeda MD    Date: 10/6/2020    Time: 10:27 EDT

## 2020-10-07 NOTE — NURSING NOTE
Pt's  Grand-daughter walking onto unit @1531 as patient is being wheeled down for stat CT of the head. Notified of orders and patient status. The granddaughter notified Lynsey, pt's daughter and all questions answered.

## 2020-10-07 NOTE — PROGRESS NOTES
"   LOS: 5 days   Patient Care Team:  Nick Chawla MD as PCP - General (Internal Medicine)  Nick Chawla MD as PCP - Claims Attributed  Deja Camacho [facility] as Pharmacist  Archie Alba RP as Pharmacist (Pharmacy)    Chief Complaint: tired    Subjective     Feeling okay today. Tired. Tolerating diet. Slept better last night. Out of restraints since yesterday. Voiding well. No pain.      Subjective:  Symptoms:  Stable.  He reports weakness.  No shortness of breath, malaise, cough, chest pain, headache, chest pressure, anorexia, diarrhea or anxiety.    Diet:  Poor intake.  No nausea or vomiting.    Activity level: Impaired due to weakness.    Pain:  He reports no pain.        History taken from: patient chart RN    Objective     Vital Signs  Temp:  [98.5 °F (36.9 °C)] 98.5 °F (36.9 °C)  Heart Rate:  [95] 95  Resp:  [16] 16  BP: (138-160)/(90-91) 160/91    Objective:  General Appearance:  Comfortable, in no acute distress and ill-appearing (chronically).    Vital signs: (most recent): Blood pressure 160/91, pulse 95, temperature 98.5 °F (36.9 °C), temperature source Oral, resp. rate 16, height 180.3 cm (71\"), weight 70.3 kg (155 lb), SpO2 98 %.  Vital signs are normal.  No fever.    Output: Producing urine.    HEENT: Normal HEENT exam.    Lungs:  Normal effort and normal respiratory rate.  Breath sounds clear to auscultation.  He is not in respiratory distress.    Heart: Normal rate.  Irregular rhythm.    Abdomen: Abdomen is soft.  Bowel sounds are normal.   There is no abdominal tenderness.     Extremities: There is no dependent edema.  (NVI in distal BLEs)  Pulses: Distal pulses are intact.    Neurological: Patient is alert.  Patient has normal muscle tone.  (Generally weak, oriented to person and year  Voice clear and strong  CN2-12 intact).    Pupils:  Pupils are equal, round, and reactive to light.    Skin:  Warm and dry.  No rash.             Results Review:     I reviewed the patient's new " clinical results.  I reviewed the patient's other test results and agree with the interpretation  I personally viewed and interpreted the patient's EKG/Telemetry data  Discussed with pt, RN, and CCP    Results from last 7 days   Lab Units 10/07/20  0349 10/06/20  0345 10/04/20  0745 10/03/20  0516 10/02/20  2041   WBC 10*3/mm3 15.46* 17.08* 11.88* 14.34* 13.21*   HEMOGLOBIN g/dL 14.2 13.8 14.6 15.4 16.4   PLATELETS 10*3/mm3 201 175 123* 152 170     Results from last 7 days   Lab Units 10/07/20  0349 10/06/20  0345 10/02/20  2041   SODIUM mmol/L 133* 135* 140   POTASSIUM mmol/L 3.6 3.8 4.2   CHLORIDE mmol/L 102 104 102   CO2 mmol/L 17.7* 16.4* 28.0   BUN mg/dL 23 27* 17   CREATININE mg/dL 0.81 1.04 1.04   CALCIUM mg/dL 8.4* 8.8 9.2   Estimated Creatinine Clearance: 61.5 mL/min (by C-G formula based on SCr of 0.81 mg/dL).    Medication Review: reviewed and adjusted    Assessment/Plan       Closed fracture of right hip (CMS/HCC)    Permanent atrial fibrillation (CMS/HCC)    Hyperlipidemia    Hypertension    Hypothyroidism    Chronic anticoagulation    Status post right hip replacement    Encephalopathy NOS          Plan:   (POD #3 s/p GIL for right femoral neck fracture by Dr. Zepeda  -continue PT  -dc'd scheduled Ultram given his confusion and lethargy, much improved today  -CCP working on rehab placement    Encephalopathy NOS  -suspect multifactorial: analgesia, change in environment, etc  -changed Ultram to PRN--not requiring any so far today, avoid any other opioids  -seems much improved today, though he is in restraints  -UA equivocal  -CT head fine  -may take some time to resolve completely    Accelerated HTN  -BPs acceptable today on home regimen  -renal function fine  -no HA    AFib, chronic AC with Coumadin  -s/p FFP prior to surgery  -INR therapeutic today  -continue Metoprolol, rate fine    Leukocytosis  -probably reactive--looks great today, no fever or hypotension  -checked CXR and it looked  fine  -venous duplex neg for DVT  -UA was equivocal--mainly looked a little dry and BUN up a bit, increased IVF rate yesterday, will dc now  -trend WBC and watch for fever or hypotension, WBC down today    Full code  Coumadin should suffice for DVT ppx  Dispo: [facility] soon, bed may be available tomorrow  ).       Gopal Sanabria MD  10/07/20  14:34 EDT    Time: 20min

## 2020-10-07 NOTE — PROGRESS NOTES
Patient: Vish Morin  YOB: 1931     Date of Admission: 10/2/2020  6:30 PM Medical Record Number: [id number]     Attending Physician: Gopal Sanabria MD    Procedure(s):  TOTAL HIP ARTHROPLASTY ANTERIOR WITH HANA TABLE Post Operative Day Number: 3    Subjective : No new orthopaedic complaints     Pain Relief: some relief with present medication.     Systemic Complaints: confusion  Vitals:    10/06/20 1055 10/06/20 1303 10/06/20 2100 10/07/20 0046   BP:  152/85 138/90    BP Location:  Right arm Left arm    Patient Position:  Lying Lying    Pulse:  85     Resp:  18 16    Temp:  98.6 °F (37 °C)  Comment: pt refusing vitals at this time   TempSrc:  Oral     SpO2: 98% 95% 93%    Weight:       Height:           Physical Exam: 89 y.o. male    General Appearance:       Alert, cooperative, in no acute distress                  Extremities:    Dressing Clean, Dry and Intact         Incision healthy without signs or symptoms of infections         No clinical sign of DVT        Able to do good movements of digits    Pulses:   Pulses palpable and equal bilaterally           Diagnostic Tests:     Results from last 7 days   Lab Units 10/07/20  0349 10/06/20  0345 10/04/20  0745   WBC 10*3/mm3 15.46* 17.08* 11.88*   HEMOGLOBIN g/dL 14.2 13.8 14.6   HEMATOCRIT % 40.7 38.9 42.1   PLATELETS 10*3/mm3 201 175 123*     Results from last 7 days   Lab Units 10/07/20  0349 10/06/20  0345 10/02/20  2041   SODIUM mmol/L 133* 135* 140   POTASSIUM mmol/L 3.6 3.8 4.2   CHLORIDE mmol/L 102 104 102   CO2 mmol/L 17.7* 16.4* 28.0   BUN mg/dL 23 27* 17   CREATININE mg/dL 0.81 1.04 1.04   GLUCOSE mg/dL 103* 127* 127*   CALCIUM mg/dL 8.4* 8.8 9.2     Results from last 7 days   Lab Units 10/07/20  0349 10/06/20  0345 10/05/20  0326   INR  2.04* 2.08* 1.67*     No results found for: CRP  No results found for: SEDRATE  No results found for: URICACID  No results found for: CRYSTAL  Microbiology Results (last 10 days)     Procedure  Component Value - Date/Time    COVID PRE-OP / PRE-PROCEDURE SCREENING ORDER (NO ISOLATION) - Swab, Nasopharynx [[id number]]  (Normal) Collected: 10/02/20 2045    Lab Status: Final result Specimen: Swab from Nasopharynx Updated: 10/02/20 2142    Narrative:      The following orders were created for panel order COVID PRE-OP / PRE-PROCEDURE SCREENING ORDER (NO ISOLATION) - Swab, Nasopharynx.  Procedure                               Abnormality         Status                     ---------                               -----------         ------                     Respiratory Panel PCR w/...[[id number]]  Normal              Final result                 Please view results for these tests on the individual orders.    Respiratory Panel PCR w/COVID-19(SARS-CoV-2) [facility]EX[facility]O/[facility] In-House, NP Swab in UTM/VTM, 3-4 HR TAT - Swab, Nasopharynx [[id number]]  (Normal) Collected: 10/02/20 2045    Lab Status: Final result Specimen: Swab from Nasopharynx Updated: 10/02/20 2142     ADENOVIRUS, PCR Not Detected     Coronavirus 229E Not Detected     Coronavirus HKU1 Not Detected     Coronavirus NL63 Not Detected     Coronavirus OC43 Not Detected     COVID19 Not Detected     Human Metapneumovirus Not Detected     Human Rhinovirus/Enterovirus Not Detected     Influenza A PCR Not Detected     Influenza A H1 Not Detected     Influenza A H1 2009 PCR Not Detected     Influenza A H3 Not Detected     Influenza B PCR Not Detected     Parainfluenza Virus 1 Not Detected     Parainfluenza Virus 2 Not Detected     Parainfluenza Virus 3 Not Detected     Parainfluenza Virus 4 Not Detected     RSV, PCR Not Detected     Bordetella pertussis pcr Not Detected     Bordetella parapertussis PCR Not Detected     Chlamydophila pneumoniae PCR Not Detected     Mycoplasma pneumo by PCR Not Detected    Narrative:      Fact sheet for providers: https://docs.[employer]/wp-content/uploads/PTU4261-8460-ZS7.1-EUA-Provider-Fact-Sheet-3.pdf    Fact sheet for  patients: https://docs.[employer]/wp-content/uploads/EQO5189-0331-JA7.1-EUA-Patient-Fact-Sheet-1.pdf        Ct Head Without Contrast    Result Date: 10/3/2020  No acute intracranial findings.  Radiation dose reduction techniques were utilized, including automated exposure control and exposure modulation based on body size.  This report was finalized on 10/3/2020 7:46 PM by Dr. Elly Rubio M.D.      Xr Chest 1 View    Result Date: 10/6/2020  1. No active disease is seen in the chest and the etiology of the postoperative leukocytosis is not established on this exam. 2. High riding right humeral head likely secondary to chronic right rotator cuff tear. 3. There is borderline cardiomegaly.  This report was finalized on 10/6/2020 2:02 PM by Dr. Slim Morrison M.D.      Xr Hip With Or Without Pelvis 2 - 3 View Right    Result Date: 10/5/2020  No acute fracture or subluxation identified.  This report was finalized on 10/5/2020 11:03 PM by Dr. Elly Rubio M.D.              Current Medications:  Scheduled Meds:acetaminophen, 1,000 mg, Oral, Q8H  docusate sodium, 100 mg, Oral, BID  famotidine, 20 mg, Oral, BID AC  levothyroxine, 50 mcg, Oral, Q AM  lisinopril, 40 mg, Oral, Q24H  magnesium oxide, 400 mg, Oral, Q12H  metoprolol tartrate, 50 mg, Oral, Q12H  sodium chloride, 10 mL, Intravenous, Q12H  warfarin, 2.5 mg, Oral, Once per day on Tue Sat  warfarin, 5 mg, Oral, Once per day on Sun Mon Wed Thu Fri      Continuous Infusions:Pharmacy to dose warfarin,   sodium chloride, 75 mL/hr, Last Rate: 75 mL/hr (10/06/20 5647)      PRN Meds:.•  acetaminophen **OR** acetaminophen **OR** acetaminophen  •  bisacodyl  •  calcium carbonate  •  ondansetron  •  Pharmacy to dose warfarin  •  sodium chloride  •  traMADol    Assessment:    Procedure(s):  TOTAL HIP ARTHROPLASTY ANTERIOR WITH HANA TABLE    Patient Active Problem List   Diagnosis   • Permanent atrial fibrillation (CMS/HCC)   • Constipation   • Gastroesophageal reflux  disease   • Hyperlipidemia   • Hypertension   • Hypothyroidism   • Impaired fasting glucose   • Low back pain   • Pain in the muscles   • Muscle weakness   • Nausea   • Poor posture   • Mineral deficiency   • Rash   • Weight loss   • Dizziness   • Essential (primary) hypertension   • Chronic anticoagulation   • Closed fracture of right hip (CMS/HCC)   • Status post right hip replacement   • Encephalopathy NOS       PLAN:   Continues current post-op course  Anticoagulation: Coumadin. INR 2.04 this am  Dressing Change PRN  Mobilize with PT as tolerated per protocol    Weight Bearing: WBAT  Discharge Plan: OK to plan for discharge from orthopadic perspective.      Deborah Cheung, APRN    Date: 10/7/2020    Time: 07:19 EDT

## 2020-10-07 NOTE — PLAN OF CARE
Problem: Adult Inpatient Plan of Care  Goal: Plan of Care Review  Recent Flowsheet Documentation  Taken 10/7/2020 1453 by Latricia Nieto, PT  Outcome Summary: Improving activity tolerance and ind. w/ mobility during PT.  Able to ambulate short distance to chair with minimal assist using RW, slow shuffling steps w/ cues for sequencing.  Performed exercises w/ assist.  Currently recommend DC to SNU>       ..Patient was intermittently wearing a face mask during this therapy encounter. Therapist used appropriate personal protective equipment including eye protection, mask, and gloves.  Mask used was standard procedure mask. Appropriate PPE was worn during the entire therapy session. Hand hygiene was completed before and after therapy session. Patient is not in enhanced droplet precautions.

## 2020-10-07 NOTE — NURSING NOTE
Notified Justina Jones (see orders) regarding pt pulling out IV x2. Has removed heart monitor but staff was able to replace. Pt denies pain. Pt has not attempted OOB at this time. Called daughter, Lynsey, on phone to speak with pt and help reorient. Pt was not accepting of reorientation attempts by daughter and states he wants to call a cab and go home. Pt is not oriented to place, time, or situation. Bed alarm set. Will increase rounding frequency and emphasize safety in pt care.

## 2020-10-07 NOTE — THERAPY TREATMENT NOTE
Patient Name: Vish Morin  : 1931    MRN: [id number]                              Today's Date: 10/7/2020       Admit Date: 10/2/2020    Visit Dx:     ICD-10-CM ICD-9-CM   1. Closed displaced fracture of right femoral neck (CMS/[facility])  S72.001A 820.8   2. Closed fracture of right hip, initial encounter (CMS/[facility])  S72.001A 820.8     Patient Active Problem List   Diagnosis   • Permanent atrial fibrillation (CMS/HCC)   • Constipation   • Gastroesophageal reflux disease   • Hyperlipidemia   • Hypertension   • Hypothyroidism   • Impaired fasting glucose   • Low back pain   • Pain in the muscles   • Muscle weakness   • Nausea   • Poor posture   • Mineral deficiency   • Rash   • Weight loss   • Dizziness   • Essential (primary) hypertension   • Chronic anticoagulation   • Closed fracture of right hip (CMS/HCC)   • Status post right hip replacement   • Encephalopathy NOS     Past Medical History:   Diagnosis Date   • Atrial fibrillation (CMS/HCC)    • CAD (coronary artery disease)    • Constipation    • Diabetes mellitus (CMS/HCC)    • MOURA (dyspnea on exertion)    • GERD (gastroesophageal reflux disease)    • High risk medication use    • Hyperlipidemia    • Hypertension    • Hypothyroidism    • IFG (impaired fasting glucose)    • Lower back pain    • Muscle pain, thigh    • Muscle weakness    • Nausea    • KENNETH (obstructive sleep apnea)    • Permanent atrial fibrillation (CMS/HCC)    • Postural imbalance    • Sick sinus syndrome (CMS/HCC)    • Sinus bradycardia    • Syncope      Past Surgical History:   Procedure Laterality Date   • CARDIAC CATHETERIZATION  10/10/2008    diagnostic   • [facility]   • CARDIAC ELECTROPHYSIOLOGY MAPPING AND ABLATION  [facility] Dr. Tessie Figueroa   • [facility]   • COLONOSCOPY     • CORONARY ANGIOPLASTY WITH  STENT PLACEMENT  12/17/2004    Drug-eluting Sirolimus.  3.5 x 33mm Cypher stent to LAD.  3.0 x 13mm Cypher stent to ROSALIA.   • TOTAL HIP ARTHROPLASTY Right 10/4/2020    Procedure: TOTAL HIP ARTHROPLASTY ANTERIOR WITH HANA TABLE;  Surgeon: Genna Zepeda MD;  Location: [facility] OR;  Service: Orthopedics;  Laterality: Right;     General Information     Row Name 10/07/20 1450          Physical Therapy Time and Intention    Document Type  therapy note (daily note)  -AR     Mode of Treatment  physical therapy  -AR     Row Name 10/07/20 1450          General Information    Patient Profile Reviewed  yes  -AR     Existing Precautions/Restrictions  fall;hip, anterior  -AR     Row Name 10/07/20 1450          Cognition    Orientation Status (Cognition)  oriented to;person;disoriented to;place;situation;time  -AR     Row Name 10/07/20 1450          Safety Issues, Functional Mobility    Impairments Affecting Function (Mobility)  cognition;endurance/activity tolerance;strength;balance  -AR       User Key  (r) = Recorded By, (t) = Taken By, (c) = Cosigned By    Initials Name Provider Type    AR Latricia Nieto PT Physical Therapist        Mobility     Row Name 10/07/20 1451          Bed Mobility    Bed Mobility  sit-supine;supine-sit  -AR     Supine-Sit Bracken (Bed Mobility)  minimum assist (75% patient effort)  -AR     Sit-Supine Bracken (Bed Mobility)  not tested  -AR     Assistive Device (Bed Mobility)  bed rails;head of bed elevated  -AR     Row Name 10/07/20 1451          Transfers    Comment (Transfers)  sit<>stand 4 x from bed and chair  -AR     Row Name 10/07/20 1451          Sit-Stand Transfer    Sit-Stand Bracken (Transfers)  minimum assist (75% patient effort)  -AR     Assistive Device (Sit-Stand Transfers)  walker, front-wheeled  -AR     Row Name 10/07/20 1451          Gait/Stairs (Locomotion)    Bracken Level (Gait)  minimum assist (75% patient effort)  -AR     Assistive Device (Gait)   walker, front-wheeled  -AR     Distance in Feet (Gait)  3' cues for sequencing and direction, no LOB or significant safety concerns. Follows instructions for mobility easily despite confusion/thinking he's in a restaurant.  -AR     Deviations/Abnormal Patterns (Gait)  festinating/shuffling;gait speed decreased  -AR     Bilateral Gait Deviations  heel strike decreased;forward flexed posture  -AR       User Key  (r) = Recorded By, (t) = Taken By, (c) = Cosigned By    Initials Name Provider Type    Latricia Yarbrough, PT Physical Therapist        Obj/Interventions     Row Name 10/07/20 1452          Motor Skills    Therapeutic Exercise  -- R hip ex AAROm w/ frequent cues due to confusion 5x  -AR       User Key  (r) = Recorded By, (t) = Taken By, (c) = Cosigned By    Initials Name Provider Type    Latricia Yarbrough, PT Physical Therapist        Goals/Plan    No documentation.       Clinical Impression     Row Name 10/07/20 1453          Pain    Additional Documentation  Pain Scale: Numbers Pre/Post-Treatment (Group)  -AR     Row Name 10/07/20 1453          Pain Scale: Numbers Pre/Post-Treatment    Pretreatment Pain Rating  0/10 - no pain  -AR     Posttreatment Pain Rating  0/10 - no pain  -AR     Pre/Posttreatment Pain Comment  does not appear in any pain/distress during mobility or at rest  -AR     Pain Intervention(s)  Repositioned  -AR     Row Name 10/07/20 7943          Plan of Care Review    Outcome Summary  Improving activity tolerance and ind. w/ mobility during PT.  Able to ambulate short distance to chair with minimal assist using RW, slow shuffling steps w/ cues for sequencing.  Performed exercises w/ assist.  Currently recommend DC to SNU>  -AR     Row Name 10/07/20 1453          Therapy Assessment/Plan (PT)    Rehab Potential (PT)  good, to achieve stated therapy goals  -AR     Criteria for Skilled Interventions Met (PT)  yes  -AR     Row Name 10/07/20 1733          Positioning and Restraints     Pre-Treatment Position  in bed  -AR     Post Treatment Position  chair  -AR     In Chair  notified nsg;reclined;sitting;call light within reach;encouraged to call for assist;exit alarm on;with nsg  -AR       User Key  (r) = Recorded By, (t) = Taken By, (c) = Cosigned By    Initials Name Provider Type    Latricia Yarbrough, PT Physical Therapist        Outcome Measures     Row Name 10/07/20 1454          How much help from another person do you currently need...    Turning from your back to your side while in flat bed without using bedrails?  3  -AR     Moving from lying on back to sitting on the side of a flat bed without bedrails?  3  -AR     Moving to and from a bed to a chair (including a wheelchair)?  3  -AR     Standing up from a chair using your arms (e.g., wheelchair, bedside chair)?  3  -AR     Climbing 3-5 steps with a railing?  1  -AR     To walk in hospital room?  2  -AR     AM-PAC 6 Clicks Score (PT)  15  -AR     Row Name 10/07/20 1456          Functional Assessment    Outcome Measure Options  AM-PAC 6 Clicks Basic Mobility (PT)  -AR       User Key  (r) = Recorded By, (t) = Taken By, (c) = Cosigned By    Initials Name Provider Type    Latricia Yarbrough PT Physical Therapist        Physical Therapy Education                 Title: PT OT SLP Therapies (In Progress)     Topic: Physical Therapy (In Progress)     Point: Mobility training (In Progress)     Learning Progress Summary           Patient Acceptance, E, NR by AR at 10/7/2020 1454    Acceptance, E,D, NR by PC at 10/5/2020 1128                   Point: Home exercise program (In Progress)     Learning Progress Summary           Patient Acceptance, E, NR by AR at 10/7/2020 1454    Acceptance, E,D, NR by PC at 10/5/2020 1128                   Point: Body mechanics (In Progress)     Learning Progress Summary           Patient Acceptance, E, NR by AR at 10/7/2020 1454    Acceptance, E,D, NR by PC at 10/5/2020 1128                   Point: Precautions  (In Progress)     Learning Progress Summary           Patient Acceptance, E, NR by AR at 10/7/2020 1454    Acceptance, E,D, NR by PC at 10/5/2020 1128                               User Key     Initials Effective Dates Name Provider Type Discipline    PC 04/03/18 -  Nereyda Naylor PT Physical Therapist PT    AR 04/03/18 -  Latricia Nieto PT Physical Therapist PT              PT Recommendation and Plan     Outcome Summary: Improving activity tolerance and ind. w/ mobility during PT.  Able to ambulate short distance to chair with minimal assist using RW, slow shuffling steps w/ cues for sequencing.  Performed exercises w/ assist.  Currently recommend DC to SNU>     Time Calculation:   PT Charges     Row Name 10/07/20 1450             Time Calculation    Start Time  1316  -AR      Stop Time  1340  -AR      Time Calculation (min)  24 min  -AR      PT Received On  10/07/20  -AR      PT - Next Appointment  10/08/20  -AR        User Key  (r) = Recorded By, (t) = Taken By, (c) = Cosigned By    Initials Name Provider Type    AR Latricia Nieto PT Physical Therapist        Therapy Charges for Today     Code Description Service Date Service Provider Modifiers Qty    55111146943 HC PT THER PROC EA 15 MIN 10/7/2020 Latricia Nieto PT GP 2          PT G-Codes  Outcome Measure Options: AM-PAC 6 Clicks Basic Mobility (PT)  AM-PAC 6 Clicks Score (PT): 15    Latricia Nieto PT  10/7/2020

## 2020-10-07 NOTE — CODE DOCUMENTATION
Found on floor face down. Has abrasion on forehead, left knee. CT scan of head ordered by Dr Sanabria.  Appears sleepy but follows commands and moves all extremities. Is a bit confused wihich is his baseline

## 2020-10-07 NOTE — PLAN OF CARE
Goal Outcome Evaluation:  Plan of Care Reviewed With: patient  Progress: improving  Outcome Summary: Pt disoriented to person, place, time, situation. Pulled out IV x2. Bed alarm on but as of this point has not attempted OOB. denies pain and states wants to go home. Called daughter, Lynsey, to have her speak with pt on phone. VSS

## 2020-10-07 NOTE — PROGRESS NOTES
"Responded to loud crash from pt's room  Found face-down on floor with bleeding from forehead  Pt conscious, said \"I fell and hit my head\"  Very small lac to forehead, bleeding easily controlled  Small abrasion to left knee  Right hip looks okay  Moves all 4 extremities without issue  No focal neuro deficits, at mental baseline (I had just seen pt in room about 30min prior and he is unchanged)  Will get stat CT head and XR's of left knee and right hip  "

## 2020-10-07 NOTE — PROGRESS NOTES
Continued Stay Note  [facility]     Patient Name: Vish Morin  MRN: [id number]  Today's Date: 10/7/2020    Admit Date: 10/2/2020    Discharge Plan     Row Name 10/07/20 1328       Plan    Plan  Referral is pending for skilled rehab    Plan Comments  Message for Lashay to update status of referral for [facility].  Anticipate dc tomorrow.....................Marguerite Ortiz RN        Discharge Codes    No documentation.             Marguerite Ortiz RN     gradual onset

## 2020-10-07 NOTE — PROGRESS NOTES
Pharmacy Consult: Warfarin Dosing/ Monitoring    Vish Morin is a 89 y.o. male, estimated creatinine clearance is 61.5 mL/min (by C-G formula based on SCr of 0.81 mg/dL). weighing 70.3 kg (155 lb).    PMH: Atrial fibrillation, CAD, Constipation, Diabetes mellitus, MOURA, GERD, High risk medication use, Hyperlipidemia, Hypertension, Hypothyroidism, Impaired fasting glucose, Lower back pain, Muscle pain, thigh, Muscle weakness, Nausea, KENNETH, Permanent atrial fibrillation, Postural imbalance, Sick sinus syndrome, Sinus bradycardia, and Syncope.    Social History     Tobacco Use   • Smoking status: Former Smoker     Types: Cigars   • Smokeless tobacco: Never Used   Substance Use Topics   • Alcohol use: No     Comment: CAFFEINE USE   • Drug use: No     Results from last 7 days   Lab Units 10/07/20  0349 10/06/20  0345 10/05/20  0326 10/04/20  1141 10/04/20  0745 10/04/20  0212 10/03/20  0516 10/02/20  2041   INR  2.04* 2.08* 1.67* 1.59* 1.91* 2.01* 2.07* 2.16*   HEMOGLOBIN g/dL 14.2 13.8  --   --  14.6  --  15.4 16.4   HEMATOCRIT % 40.7 38.9  --   --  42.1  --  44.0 48.2   PLATELETS 10*3/mm3 201 175  --   --  123*  --  152 170     Results from last 7 days   Lab Units 10/07/20  0349 10/06/20  0345 10/02/20  2041   SODIUM mmol/L 133* 135* 140   POTASSIUM mmol/L 3.6 3.8 4.2   CHLORIDE mmol/L 102 104 102   CO2 mmol/L 17.7* 16.4* 28.0   BUN mg/dL 23 27* 17   CREATININE mg/dL 0.81 1.04 1.04   CALCIUM mg/dL 8.4* 8.8 9.2   BILIRUBIN mg/dL 1.5* 1.2 0.7   ALK PHOS U/L 137* 105 82   ALT (SGPT) U/L 45* 27 30   AST (SGOT) U/L 72* 51* 35   GLUCOSE mg/dL 103* 127* 127*     Anticoagulation history: [facility]  Home Med: Warfarin 5 mg po qSuMWThF + Warfarin 2.5 mg po qTuSa (30 mg/weekly)    Hospital Anticoagulation:  Consulting provider: Dr Sanabria  Start date: 10/6/2020 restart home med  Indication: Afib  Target INR: 2-3  Expected duration: Lifelong  Bridge Therapy: No    Date 10/2 10/3 10/4 10/5 10/6 10/7        INR 2.16 2.07 2.01/  1.91/  1.59 1.67 2.08 2.04       Warfarin dose  None None None None 2.5 mg 5 mg         Potential drug interactions:   1) Acetaminophen: may result in an increased risk of bleeding.   2) Tramadol: may result in an increase in prothrombin time and an increased risk of bleeding.   3) Levothyroxine: may result in an increased risk of bleeding.     Will continue to monitor for drug-drug interaction as medications are added to patient's profile.     Relevant nutrition status: Diet Regular     Other:   · Dietary advances -> changes in dietary vitamin K intake may alter response to warfarin.  · Acute infection/inflammation may cause an increased sensitivity to warfarin    Lab: Albumin=  3.30 g/dL (10/7/2020)    Education complete?/ Date: [facility]    Assessment/Plan:  Dose: Will continue home regimen of Warfarin 5 mg po qSuMWThF + Warfarin 2.5 mg po qTuSa (30 mg/weekly)  Monitor: s/s bleed  Follow up INR    Pharmacy will continue to follow until discharge or discontinuation of warfarin.     Lily Wren  Pharmacy Intern

## 2020-10-08 PROBLEM — R29.6 REPEATED FALLS: Status: ACTIVE | Noted: 2020-01-01

## 2020-10-08 PROBLEM — D72.829 LEUKOCYTOSIS: Status: ACTIVE | Noted: 2020-01-01

## 2020-10-08 NOTE — PLAN OF CARE
Problem: Adult Inpatient Plan of Care  Goal: Plan of Care Review  Flowsheets (Taken 10/8/2020 1300)  Plan of Care Reviewed With: patient  Outcome Summary: Pt w good tolerance to PT, pt able to ambulate 50ft Min A x 2 for safety, rwx, altered gait mechanics- shuffling, slowed татьяна- pt does c/o R hip pain. Noted apparent fall yesterday, pt returned back to bed post PT session - poseys in place. Will cont to progress as great.    ..Patient was wearing a face mask during this therapy encounter. Therapist used appropriate personal protective equipment including eye protection, mask, and gloves.  Mask used was standard procedure mask. Appropriate PPE was worn during the entire therapy session. Hand hygiene was completed before and after therapy session. Patient is not in enhanced droplet precautions.

## 2020-10-08 NOTE — PLAN OF CARE
Goal Outcome Evaluation:  Plan of Care Reviewed With: patient, family  Progress: improving  Outcome Summary: patient confused throughout shift, patient remains in restraints for safety, pt turned q2, no c/o pain, blood pressure slightly elevated today- norvasc added, will continue to monitor closely

## 2020-10-08 NOTE — PLAN OF CARE
"Goal Outcome Evaluation:  Plan of Care Reviewed With: patient  Progress: improving  Outcome Summary: pt had fall on previous shift and was placed in posey vest for his own protection. He denies pain at this time and remains disoriented and stating \"I've got to get out of here.\" VSS  "

## 2020-10-08 NOTE — PROGRESS NOTES
Patient: Vish Morin  YOB: 1931     Date of Admission: 10/2/2020  6:30 PM Medical Record Number: [id number]     Attending Physician: Gopal Sanabria MD    Procedure(s):  TOTAL HIP ARTHROPLASTY ANTERIOR WITH HANA TABLE Post Operative Day Number: 4    Subjective : No new orthopaedic complaints     Pain Relief: some relief with present medication.     Systemic Complaints: Fall yesterday  Vitals:    10/07/20 1637 10/07/20 1948 10/07/20 2344 10/08/20 0400   BP: 148/98 (!) 162/112 135/75 140/88   BP Location: Right arm Right arm Right arm Right arm   Patient Position: Lying Lying Lying Lying   Pulse: 78 90 86 77   Resp: 18 18 18 18   Temp: 98.6 °F (37 °C) 98.3 °F (36.8 °C) 98.3 °F (36.8 °C) 97.2 °F (36.2 °C)   TempSrc: Oral Oral Oral Oral   SpO2: 96% 98% 98% 98%   Weight:       Height:           Physical Exam: 89 y.o. male    General Appearance:       Alert, cooperative, in no acute distress                  Extremities:    Dressing Clean, Dry and Intact         Incision healthy without signs or symptoms of infections         Positive ecchymosis. Unchanged since yesterday. No signs of hematoma.         No clinical sign of DVT        Able to do good movements of digits    Pulses:   Pulses palpable and equal bilaterally           Diagnostic Tests:     Results from last 7 days   Lab Units 10/08/20  0500 10/07/20  0349 10/06/20  0345   WBC 10*3/mm3 13.50* 15.46* 17.08*   HEMOGLOBIN g/dL 13.5 14.2 13.8   HEMATOCRIT % 37.5 40.7 38.9   PLATELETS 10*3/mm3 184 201 175     Results from last 7 days   Lab Units 10/08/20  0459 10/07/20  0349 10/06/20  0345   SODIUM mmol/L 136 133* 135*   POTASSIUM mmol/L 3.2* 3.6 3.8   CHLORIDE mmol/L 104 102 104   CO2 mmol/L 20.7* 17.7* 16.4*   BUN mg/dL 17 23 27*   CREATININE mg/dL 0.57* 0.81 1.04   GLUCOSE mg/dL 94 103* 127*   CALCIUM mg/dL 8.1* 8.4* 8.8     Results from last 7 days   Lab Units 10/08/20  0459 10/07/20  0349 10/06/20  0345   INR  1.86* 2.04* 2.08*     No  results found for: CRP  No results found for: SEDRATE  No results found for: URICACID  No results found for: CRYSTAL  Microbiology Results (last 10 days)     Procedure Component Value - Date/Time    Urine Culture - Urine, Urine, Clean Catch [[id number]]  (Normal) Collected: 10/05/20 1722    Lab Status: Final result Specimen: Urine, Clean Catch Updated: 10/07/20 0958     Urine Culture No growth    COVID PRE-OP / PRE-PROCEDURE SCREENING ORDER (NO ISOLATION) - Swab, Nasopharynx [[id number]]  (Normal) Collected: 10/02/20 2045    Lab Status: Final result Specimen: Swab from Nasopharynx Updated: 10/02/20 2142    Narrative:      The following orders were created for panel order COVID PRE-OP / PRE-PROCEDURE SCREENING ORDER (NO ISOLATION) - Swab, Nasopharynx.  Procedure                               Abnormality         Status                     ---------                               -----------         ------                     Respiratory Panel PCR w/...[[id number]]  Normal              Final result                 Please view results for these tests on the individual orders.    Respiratory Panel PCR w/COVID-19(SARS-CoV-2) STEVAN/ALEXANDRO/BOGDAN/PAD/COR/[facility] In-House, NP Swab in UTM/VTM, 3-4 HR TAT - Swab, Nasopharynx [[id number]]  (Normal) Collected: 10/02/20 2045    Lab Status: Final result Specimen: Swab from Nasopharynx Updated: 10/02/20 2142     ADENOVIRUS, PCR Not Detected     Coronavirus 229E Not Detected     Coronavirus HKU1 Not Detected     Coronavirus NL63 Not Detected     Coronavirus OC43 Not Detected     COVID19 Not Detected     Human Metapneumovirus Not Detected     Human Rhinovirus/Enterovirus Not Detected     Influenza A PCR Not Detected     Influenza A H1 Not Detected     Influenza A H1 2009 PCR Not Detected     Influenza A H3 Not Detected     Influenza B PCR Not Detected     Parainfluenza Virus 1 Not Detected     Parainfluenza Virus 2 Not Detected     Parainfluenza Virus 3 Not Detected     Parainfluenza Virus 4 Not  Detected     RSV, PCR Not Detected     Bordetella pertussis pcr Not Detected     Bordetella parapertussis PCR Not Detected     Chlamydophila pneumoniae PCR Not Detected     Mycoplasma pneumo by PCR Not Detected    Narrative:      Fact sheet for providers: https://docs.[employer]/wp-content/uploads/AQF7986-3191-BE0.1-EUA-Provider-Fact-Sheet-3.pdf    Fact sheet for patients: https://docs.[employer]/wp-content/uploads/YZF4722-0090-KS0.1-EUA-Patient-Fact-Sheet-1.pdf        Xr Knee 1 Or 2 View Left    Result Date: 10/7/2020  No acute process.  This report was finalized on 10/7/2020 4:38 PM by Dr. Levar Munoz M.D.      Ct Head Without Contrast    Result Date: 10/7/2020  1. No change when compared to prior head CT 10/03/2020. 2. 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 3. There is a small scalp hematoma, scalp laceration over the anterior frontal region from recent head trauma. The remainder of the head CT is unremarkable with no acute skull fracture or intracranial hemorrhage identified.  Radiation dose reduction techniques were utilized, including automated exposure control and exposure modulation based on body size.       Ct Head Without Contrast    Result Date: 10/3/2020  No acute intracranial findings.  Radiation dose reduction techniques were utilized, including automated exposure control and exposure modulation based on body size.  This report was finalized on 10/3/2020 7:46 PM by Dr. Elly Rubio M.D.      Xr Chest 1 View    Result Date: 10/6/2020  1. No active disease is seen in the chest and the etiology of the postoperative leukocytosis is not established on this exam. 2. High  riding right humeral head likely secondary to chronic right rotator cuff tear. 3. There is borderline cardiomegaly.  This report was finalized on 10/6/2020 2:02 PM by Dr. Slim Morrison M.D.      Xr Hip With Or Without Pelvis 2 - 3 View Right    Result Date: 10/7/2020   No acute fracture is identified. Follow-up/further evaluation can be obtained as indications persist.    This report was finalized on 10/7/2020 4:27 PM by Dr. Hira Weber M.D.      Xr Hip With Or Without Pelvis 2 - 3 View Right    Result Date: 10/5/2020  No acute fracture or subluxation identified.  This report was finalized on 10/5/2020 11:03 PM by Dr. Elly Rubio M.D.              Current Medications:  Scheduled Meds:acetaminophen, 1,000 mg, Oral, Q8H  docusate sodium, 100 mg, Oral, BID  famotidine, 20 mg, Oral, BID AC  levothyroxine, 50 mcg, Oral, Q AM  lisinopril, 40 mg, Oral, Q24H  magnesium oxide, 400 mg, Oral, Q12H  metoprolol tartrate, 50 mg, Oral, Q12H  sodium chloride, 10 mL, Intravenous, Q12H  warfarin, 2.5 mg, Oral, Once per day on Tue Sat  warfarin, 5 mg, Oral, Once per day on Sun Mon Wed Thu Fri      Continuous Infusions:Pharmacy to dose warfarin,       PRN Meds:.•  acetaminophen **OR** acetaminophen **OR** acetaminophen  •  bisacodyl  •  calcium carbonate  •  ondansetron  •  Pharmacy to dose warfarin  •  sodium chloride  •  traMADol    Assessment:    Procedure(s):  TOTAL HIP ARTHROPLASTY ANTERIOR WITH HANA TABLE    Patient Active Problem List   Diagnosis   • Permanent atrial fibrillation (CMS/HCC)   • Constipation   • Gastroesophageal reflux disease   • Hyperlipidemia   • Hypertension   • Hypothyroidism   • Impaired fasting glucose   • Low back pain   • Pain in the muscles   • Muscle weakness   • Nausea   • Poor posture   • Mineral deficiency   • Rash   • Weight loss   • Dizziness   • Essential (primary) hypertension   • Chronic anticoagulation   • Closed fracture of right hip (CMS/HCC)   • Status post right hip replacement    • Encephalopathy NOS       PLAN:   Patient had a fall yesterday. He denies any increased pain. Xrays are negative for fracture. The implant remains intact.   Continues current post-op course  Anticoagulation: Coumadin.   Dressing Change PRN  Mobilize with PT as tolerated per protocol    Weight Bearing: WBAT  Discharge Plan: OK to plan for discharge from orthopadic perspective.      JESUS Rincon    Date: 10/8/2020    Time: 07:19 EDT

## 2020-10-08 NOTE — THERAPY TREATMENT NOTE
Acute Care - Physical Therapy Treatment Note  [facility]     Patient Name: Vish Morin  : 1931  MRN: [id number]  Today's Date: 10/8/2020           PT Assessment (last 12 hours)      PT Evaluation and Treatment     Row Name 10/08/20 1300          Physical Therapy Time and Intention    Subjective Information  complains of;pain  -     Document Type  therapy note (daily note)  -     Mode of Treatment  physical therapy  -     Patient Effort  good  -     Symptoms Noted During/After Treatment  none  -     Row Name 10/08/20 1300          General Information    Patient Profile Reviewed  yes  -     Barriers to Rehab  cognitive status  -     Row Name 10/08/20 1300          Cognition    Orientation Status (Cognition)  oriented to;person  -     Follows Commands (Cognition)  follows one-step commands;75-90% accuracy;repetition of directions required  -     Row Name 10/08/20 1300          Pain    Additional Documentation  Pain Scale: FACES Pre/Post-Treatment (Group)  -     Row Name 10/08/20 1300          Pain Scale: Word Pre/Post-Treatment    Pain Intervention(s)  Repositioned;Ambulation/increased activity  -     Row Name 10/08/20 1300          Pain Scale: FACES Pre/Post-Treatment    Pain: FACES Scale, Pretreatment  4-->hurts little more  -     Posttreatment Pain Rating  4-->hurts little more  -     Pain Location - Side  Right  -     Pain Location  hip  -     Row Name 10/08/20 1300          Bed Mobility    Supine-Sit [place] (Bed Mobility)  minimum assist (75% patient effort);verbal cues;nonverbal cues (demo/gesture)  -     Sit-Supine [place] (Bed Mobility)  not tested  -     Assistive Device (Bed Mobility)  bed rails;head of bed elevated  -     Row Name 10/08/20 1300          Transfers    Transfers  sit-stand transfer;stand-sit transfer  -     Sit-Stand [place] (Transfers)  minimum assist (75% patient effort);verbal cues  -     Row Name 10/08/20 1300           Sit-Stand Transfer    Assistive Device (Sit-Stand Transfers)  walker, front-wheeled  -     Row Name 10/08/20 1300          Gait/Stairs (Locomotion)    [place] Level (Gait)  minimum assist (75% patient effort);verbal cues 2nd person CGA for safety  -     Assistive Device (Gait)  walker, front-wheeled  -     Distance in Feet (Gait)  50  -LH     Pattern (Gait)  step-through  -     Deviations/Abnormal Patterns (Gait)  festinating/shuffling;татьяна decreased  -     Bilateral Gait Deviations  forward flexed posture;heel strike decreased  -     Row Name 10/08/20 1300          Safety Issues, Functional Mobility    Impairments Affecting Function (Mobility)  cognition;endurance/activity tolerance;strength;balance  -     Row Name 10/08/20 1300          Balance    Static Sitting Balance  WFL  -     Static Standing Balance  mild impairment;supported rwx  -     Row Name             Wound 10/04/20 Right anterior hip    Wound - Properties Group Placement Date: 10/04/20  - Side: Right  -JM Orientation: anterior  -JM Location: hip  -JM Additional Comments: surgical incision  -JM    Retired Wound - Properties Group Date first assessed: 10/04/20  - Side: Right  -JM Location: hip  -JM Additional Comments: surgical incision  -JM    Row Name 10/08/20 1300          Plan of Care Review    Plan of Care Reviewed With  patient  -     Outcome Summary  Pt w good tolerance to PT, pt able to ambulate 50ft Min A x 2 for safety, rwx, good tolerance- does c/o R hip pain. Noted apparent fall yesterday, pt returned back to bed post PT session - poseys in place. Will cont to progress as greta.   -     Row Name 10/08/20 1300          Positioning and Restraints    Pre-Treatment Position  in bed  -     Post Treatment Position  bed  -     In Bed  supine;call light within reach;encouraged to call for assist;exit alarm on  -     Restraints  reapplied:;vest  -       User Key  (r) = Recorded By, (t) = Taken By, (c) =  Cosigned By    Initials Name Provider Type     Antonina Espinosa, PT Physical Therapist    Alber Hoffmann, RN Registered Nurse        Physical Therapy Education                 Title: PT OT SLP Therapies (In Progress)     Topic: Physical Therapy (In Progress)     Point: Mobility training (In Progress)     Learning Progress Summary           Patient Acceptance, E, NR by  at 10/8/2020 1332    Acceptance, E, NR by AR at 10/7/2020 1454    Acceptance, E,D, NR by PC at 10/5/2020 1128                   Point: Home exercise program (In Progress)     Learning Progress Summary           Patient Acceptance, E, NR by  at 10/8/2020 1332    Acceptance, E, NR by AR at 10/7/2020 1454    Acceptance, E,D, NR by PC at 10/5/2020 1128                   Point: Body mechanics (In Progress)     Learning Progress Summary           Patient Acceptance, E, NR by AR at 10/7/2020 1454    Acceptance, E,D, NR by PC at 10/5/2020 1128                   Point: Precautions (In Progress)     Learning Progress Summary           Patient Acceptance, E, NR by AR at 10/7/2020 1454    Acceptance, E,D, NR by PC at 10/5/2020 1128                               User Key     Initials Effective Dates Name Provider Type Discipline     04/03/18 -  Antonina Espinosa, PT Physical Therapist PT    PC 04/03/18 -  Nereyda Naylor PT Physical Therapist PT    AR 04/03/18 -  Latricia Nieto PT Physical Therapist PT              PT Recommendation and Plan     Plan of Care Reviewed With: patient  Outcome Summary: Pt w good tolerance to PT, pt able to ambulate 50ft Min A x 2 for safety, rwx, good tolerance- does c/o R hip pain. Noted apparent fall yesterday, pt returned back to bed post PT session - poseys in place. Will cont to progress as greta.   Outcome Measures     Row Name 10/08/20 1300             How much help from another person do you currently need...    Turning from your back to your side while in flat bed without using bedrails?  3  -      Moving from lying  on back to sitting on the side of a flat bed without bedrails?  3  -LH      Moving to and from a bed to a chair (including a wheelchair)?  3  -LH      Standing up from a chair using your arms (e.g., wheelchair, bedside chair)?  3  -LH      Climbing 3-5 steps with a railing?  2  -LH      To walk in hospital room?  2  -      AM-PAC 6 Clicks Score (PT)  16  -         Functional Assessment    Outcome Measure Options  AM-PAC 6 Clicks Basic Mobility (PT)  -        User Key  (r) = Recorded By, (t) = Taken By, (c) = Cosigned By    Initials Name Provider Type     Antonina Espinosa, PT Physical Therapist           Time Calculation:   PT Charges     Row Name 10/08/20 1334             Time Calculation    Start Time  1028  -      Stop Time  1042  -      Time Calculation (min)  14 min  -      PT Received On  10/08/20  -      PT - Next Appointment  10/09/20  -        User Key  (r) = Recorded By, (t) = Taken By, (c) = Cosigned By    Initials Name Provider Type     Antonina Espinosa, PT Physical Therapist        Therapy Charges for Today     Code Description Service Date Service Provider Modifiers Qty    14235759013 HC PT THER PROC EA 15 MIN 10/8/2020 Antonina Espinosa, PT GP 1          PT G-Codes  Outcome Measure Options: AM-PAC 6 Clicks Basic Mobility (PT)  AM-PAC 6 Clicks Score (PT): 16    Antonina Espinosa PT  10/8/2020

## 2020-10-08 NOTE — PROGRESS NOTES
Continued Stay Note  [facility]     Patient Name: Vish Morin  MRN: [id number]  Today's Date: 10/8/2020    Admit Date: 10/2/2020    Discharge Plan     Row Name 10/08/20 1127       Plan    Plan  Need more choices for skilled rehab    Refused Provider List Comment  Spoke with Lashay with [facility] and she states that [place] has declined paitent.  Will speak with family for more choices for skilled rehab.................Marguerite Ortiz RN        Discharge Codes    No documentation.             Marguerite Ortiz RN

## 2020-10-08 NOTE — PROGRESS NOTES
Pharmacy Consult: Warfarin Dosing/ Monitoring    Vish Morin is a 89 y.o. male, estimated creatinine clearance is 61.5 mL/min (by C-G formula based on SCr of 0.81 mg/dL). weighing 70.3 kg (155 lb).    PMH: Atrial fibrillation, CAD, Constipation, Diabetes mellitus, MOURA, GERD, High risk medication use, Hyperlipidemia, Hypertension, Hypothyroidism, Impaired fasting glucose, Lower back pain, Muscle pain, thigh, Muscle weakness, Nausea, KENNETH, Permanent atrial fibrillation, Postural imbalance, Sick sinus syndrome, Sinus bradycardia, and Syncope.    Social History     Tobacco Use    Smoking status: Former Smoker     Types: Cigars    Smokeless tobacco: Never Used   Substance Use Topics    Alcohol use: No     Comment: CAFFEINE USE    Drug use: No     Results from last 7 days   Lab Units 10/07/20  0349 10/06/20  0345 10/05/20  0326 10/04/20  1141 10/04/20  0745 10/04/20  0212 10/03/20  0516 10/02/20  2041   INR  2.04* 2.08* 1.67* 1.59* 1.91* 2.01* 2.07* 2.16*   HEMOGLOBIN g/dL 14.2 13.8  --   --  14.6  --  15.4 16.4   HEMATOCRIT % 40.7 38.9  --   --  42.1  --  44.0 48.2   PLATELETS 10*3/mm3 201 175  --   --  123*  --  152 170     Results from last 7 days   Lab Units 10/07/20  0349 10/06/20  0345 10/02/20  2041   SODIUM mmol/L 133* 135* 140   POTASSIUM mmol/L 3.6 3.8 4.2   CHLORIDE mmol/L 102 104 102   CO2 mmol/L 17.7* 16.4* 28.0   BUN mg/dL 23 27* 17   CREATININE mg/dL 0.81 1.04 1.04   CALCIUM mg/dL 8.4* 8.8 9.2   BILIRUBIN mg/dL 1.5* 1.2 0.7   ALK PHOS U/L 137* 105 82   ALT (SGPT) U/L 45* 27 30   AST (SGOT) U/L 72* 51* 35   GLUCOSE mg/dL 103* 127* 127*     Anticoagulation history: [facility]  Home Med: Warfarin 5 mg po qSuMWThF + Warfarin 2.5 mg po qTuSa (30 mg/weekly)    Hospital Anticoagulation:  Consulting provider: Dr Sanabria  Start date: 10/6/2020 restart home med  Indication: Afib  Target INR: 2-3  Expected duration: Lifelong  Bridge Therapy: No    Date 10/2 10/3 10/4 10/5 10/6 10/7 10/8        INR 2.16 2.07 2.01/  1.91/  1.59 1.67 2.08 2.04 1.86      Warfarin dose  None None None None 2.5 mg 5 mg 5 mg        Potential drug interactions:   1) Acetaminophen: may result in an increased risk of bleeding.   2) Tramadol: may result in an increase in prothrombin time and an increased risk of bleeding.   3) Levothyroxine: may result in an increased risk of bleeding.     Will continue to monitor for drug-drug interaction as medications are added to patient's profile.     Relevant nutrition status: Diet Regular     Other:   Dietary advances -> changes in dietary vitamin K intake may alter response to warfarin.  Acute infection/inflammation may cause an increased sensitivity to warfarin    Lab: Albumin=  3.50 g/dL (10/6/2020)    Education complete?/ Date: [facility]    Assessment/Plan:    Noted slight decrease in INR today as a result of warfarin held from 10/2-10/5    Dose: Will continue home regimen of Warfarin 5 mg po qSuMWThF + Warfarin 2.5 mg po qTuSa (30 mg/weekly)  Monitor: s/s bleed  Follow up INR    Pharmacy will continue to follow until discharge or discontinuation of warfarin.     Alize Sinha [facility]  Clinical Staff Pharmacist

## 2020-10-08 NOTE — PROGRESS NOTES
"   LOS: 6 days   Patient Care Team:  Nick Chawla MD as PCP - General (Internal Medicine)  Nick Chawla MD as PCP - Claims Attributed  Deja Camacho [facility] as Pharmacist  Archie Alba [facility] as Pharmacist (Pharmacy)    Chief Complaint: none    Subjective     Feeling okay today. Tired. Tolerating diet. Slept better last night. In posey vest. Voiding well. No pain. Still confused. D/w dtr on phone.      Subjective:  Symptoms:  Stable.  He reports weakness.  No shortness of breath, malaise, cough, chest pain, headache, chest pressure, anorexia, diarrhea or anxiety.    Diet:  Poor intake.  No nausea or vomiting.    Activity level: Impaired due to weakness.    Pain:  He reports no pain.        History taken from: patient chart family RN    Objective     Vital Signs  Temp:  [97.2 °F (36.2 °C)-98.6 °F (37 °C)] 98 °F (36.7 °C)  Heart Rate:  [77-94] 93  Resp:  [16-18] 18  BP: (135-190)/() 162/109    Objective:  General Appearance:  Comfortable, in no acute distress and ill-appearing (chronically).    Vital signs: (most recent): Blood pressure (!) 162/109, pulse 93, temperature 98 °F (36.7 °C), temperature source Oral, resp. rate 18, height 180.3 cm (71\"), weight 70.3 kg (155 lb), SpO2 100 %.  Vital signs are normal.  No fever.  (BPs high).    Output: Producing urine and producing stool.    HEENT: Normal HEENT exam.  (Dressing to forehead lac, no bleeding evident)    Lungs:  Normal effort and normal respiratory rate.  Breath sounds clear to auscultation.  He is not in respiratory distress.    Heart: Normal rate.  Irregular rhythm.    Abdomen: Abdomen is soft.  Bowel sounds are normal.   There is no abdominal tenderness.     Extremities: There is no dependent edema.  (NVI in distal BLEs)  Pulses: Distal pulses are intact.    Neurological: Patient is alert.  Patient has normal muscle tone.  (Generally weak, oriented to person and year  Voice clear and strong  CN2-12 intact).    Pupils:  Pupils are " equal, round, and reactive to light.    Skin:  Warm and dry.  No rash.             Results Review:     I reviewed the patient's new clinical results.  I reviewed the patient's new imaging results and agree with the interpretation.  I reviewed the patient's other test results and agree with the interpretation  I personally viewed and interpreted the patient's EKG/Telemetry data  Discussed with pt, dtr, RN, CCP, and Dr. Norton    Results from last 7 days   Lab Units 10/08/20  0500 10/07/20  0349 10/06/20  0345 10/04/20  0745 10/03/20  0516 10/02/20  2041   WBC 10*3/mm3 13.50* 15.46* 17.08* 11.88* 14.34* 13.21*   HEMOGLOBIN g/dL 13.5 14.2 13.8 14.6 15.4 16.4   PLATELETS 10*3/mm3 184 201 175 123* 152 170     Results from last 7 days   Lab Units 10/08/20  0459 10/07/20  0349 10/06/20  0345 10/02/20  2041   SODIUM mmol/L 136 133* 135* 140   POTASSIUM mmol/L 3.2* 3.6 3.8 4.2   CHLORIDE mmol/L 104 102 104 102   CO2 mmol/L 20.7* 17.7* 16.4* 28.0   BUN mg/dL 17 23 27* 17   CREATININE mg/dL 0.57* 0.81 1.04 1.04   CALCIUM mg/dL 8.1* 8.4* 8.8 9.2   MAGNESIUM mg/dL 2.2  --   --   --    Estimated Creatinine Clearance: 62.2 mL/min (A) (by C-G formula based on SCr of 0.57 mg/dL (L)).    Medication Review: reviewed and adjusted    Assessment/Plan       Closed fracture of right hip (CMS/HCC)    Permanent atrial fibrillation (CMS/HCC)    Hyperlipidemia    Hypertension    Hypothyroidism    Chronic anticoagulation    Status post right hip replacement    Encephalopathy NOS    Leukocytosis    Repeated falls          Plan:   (POD #4 s/p GIL for right femoral neck fracture by Dr. Zepeda  -continue PT  -dc'd scheduled Ultram given his confusion and lethargy, mental status improving, not requiring any Ultram for a couple days now  -CCP working on rehab placement    Encephalopathy NOS  -suspect multifactorial: analgesia, change in environment, etc  -changed Ultram to PRN--not requiring any so far again today, avoid any other opioids  -seems  much improved today, though he is in restraints due to multiple falls here  -UA equivocal  -CT head fine  -may take some time to resolve completely    Falls  -in restraints now  -XR of left knee and right hip are fine  -CT head was okay, but d/w Dr. Norton this AM and he is not confident in read given poor quality of study and would like to repeat CT head today to confirm no SDH    Accelerated HTN  -BPs high today on home regimen  -renal function fine  -no HA  -will add amlodipine and monitor    AFib, chronic AC with Coumadin  -s/p FFP prior to surgery  -INR 1.86 today, pharmacy dosing coumadin  -may need to reconsider pt's AC given his recurrent falls here, hopefully as his mentation improves and he can comply with care/assistance here his fall risk will be minimal, if he were to go home with family then would not continue AC as I don't feel family could provide high enough level of observation  -continue Metoprolol, rate fine    Leukocytosis  -probably reactive--looks great today, no fever or hypotension  -checked CXR and it looked fine  -venous duplex neg for DVT  -UA was equivocal--mainly looked a little dry and BUN up a bit, s/p IVFs  -trend WBC and watch for fever or hypotension, WBC down again today    Hypokalemia  -replace with protocol  -checked Mg++ this AM and it is fine    Full code  Coumadin should suffice for DVT ppx  Dispo: [facility] soon when bed available  ).       Gopal Sanabria MD  10/08/20  12:31 EDT    Time: 30min

## 2020-10-09 NOTE — PROGRESS NOTES
Continued Stay Note  [facility]     Patient Name: Vish Morin  MRN: [id number]  Today's Date: 10/9/2020    Admit Date: 10/2/2020    Discharge Plan     Row Name 10/09/20 1655       Plan    Plan  Family declines to provide other choices, However the [facility] has not accepted for skilled rehab at this time    Refused Provider List Comment  Incoming call from Truong Avila [phone].  He states that the patient will be accepted at the [facility] for skilled rehab after he is out of restraints.  Explained that at this time Lashay with [facility] has not accepted.  Truong states he is power of  and CCP requested a copy of his paperwork for our records and provided the fax # [phone].  Attempted to explain that other choices would be needed for referrals for skilled rehab.  Truong states on Monday he will be out of restraints and accepted at [facility].  Truong declines to provide other choices.  Truong states they talked to someone they know at the [facility] and he will be accepted when out of restraints........................Marguerite Ortiz RN    Row Name 10/09/20 0798       Plan    Plan  Family will discuss and provide more choices    Refused Provider List Comment   for dtr, Lynsey Maya [phone].  Spoke with Durga jimenez [phone].  She was notified that the [facility] has declined for skilled rehab.  She will discuss with family and provide CCP with more choices and states she will call back within the hour...................Marguerite Ortiz RN        Discharge Codes    No documentation.             Marguerite Ortiz RN

## 2020-10-09 NOTE — PLAN OF CARE
Goal Outcome Evaluation:  Plan of Care Reviewed With: patient  Progress: improving  Problem: Adult Inpatient Plan of Care  Goal: Plan of Care Review  Outcome: Ongoing, Progressing  Flowsheets  Taken 10/9/2020 1734 by Justina Zayas RN  Progress: improving  Outcome Summary: patient tolerating being out of restaints, no c/o pain, blood pressures better today, patient had large bm today, turn q2, incontinence care, will continue to monitor closely  Taken 10/9/2020 1440 by Justina Paz PTA  Plan of Care Reviewed With: patient

## 2020-10-09 NOTE — PROGRESS NOTES
Continued Stay Note  [facility]     Patient Name: Vish Morin  MRN: [id number]  Today's Date: 10/9/2020    Admit Date: 10/2/2020    Discharge Plan     Row Name 10/09/20 1628       Plan    Plan  Family will discuss and provide more choices    Refused Provider List Comment  VM for dtr, Lynsey Maya [phone].  Spoke with Durga jimenez [phone].  She was notified that the [facility] has declined for skilled rehab.  She will discuss with family and provide CCP with more choices and states she will call back within the hour...................Marguerite Ortiz RN        Discharge Codes    No documentation.             Marguerite Ortiz RN

## 2020-10-09 NOTE — PROGRESS NOTES
[facility]     LOS: 7 days     Subjective:    CC:Fall and Hip Pain    DIET:  Diet Order   Procedures   • Diet Regular   Denies any chest pain, no shortness of air, tolerating oral intake, no excessive stooling, large BM today, and Posey restraint    Objective:    Vital Signs:  Temp:  [97.5 °F (36.4 °C)-98.5 °F (36.9 °C)] 98.2 °F (36.8 °C)  Heart Rate:  [] 100  Resp:  [16-18] 18  BP: (134-166)/() 134/81    SpO2:  [90 %-99 %] 99 %  on   ;   Device (Oxygen Therapy): room air  Body mass index is 21.62 kg/m².    Physical Exam  Constitutional:       Appearance: He is well-developed.   HENT:      Head: Normocephalic and atraumatic.   Cardiovascular:      Heart sounds: No murmur. No friction rub.   Pulmonary:      Effort: Pulmonary effort is normal.      Breath sounds: Normal breath sounds.   Abdominal:      General: Bowel sounds are normal. There is no distension.      Palpations: Abdomen is soft.      Tenderness: There is no abdominal tenderness.   Skin:     General: Skin is warm and dry.   Neurological:      Mental Status: He is alert.      Comments: Alert in restraints         Results Review:    Glucose   Date Value Ref Range Status   10/09/2020 132 (H) 65 - 99 mg/dL Final   10/08/2020 94 65 - 99 mg/dL Final   10/07/2020 103 (H) 65 - 99 mg/dL Final     Results from last 7 days   Lab Units 10/09/20  0400   WBC 10*3/mm3 13.16*   HEMOGLOBIN g/dL 13.7   HEMATOCRIT % 38.4   PLATELETS 10*3/mm3 201     Results from last 7 days   Lab Units 10/09/20  0400   SODIUM mmol/L 135*   POTASSIUM mmol/L 3.5   CHLORIDE mmol/L 103   CO2 mmol/L 22.2   BUN mg/dL 16   CREATININE mg/dL 0.62*   CALCIUM mg/dL 8.2*   BILIRUBIN mg/dL 1.3*   ALK PHOS U/L 104   ALT (SGPT) U/L 31   AST (SGOT) U/L 52*   GLUCOSE mg/dL 132*     Results from last 7 days   Lab Units 10/09/20  0400   INR  2.13*     Results from last 7 days   Lab Units 10/09/20  0400   MAGNESIUM mg/dL 2.1         Cultures:  Urine Culture   Date  Value Ref Range Status   10/05/2020 No growth  Final       I have reviewed daily medications and changes in CPOE    Scheduled meds  acetaminophen, 1,000 mg, Oral, Q8H  amLODIPine, 2.5 mg, Oral, Q24H  docusate sodium, 100 mg, Oral, BID  famotidine, 20 mg, Oral, BID AC  levothyroxine, 50 mcg, Oral, Q AM  lisinopril, 40 mg, Oral, Q24H  magnesium oxide, 400 mg, Oral, Q12H  metoprolol tartrate, 50 mg, Oral, Q12H  polyethylene glycol, 17 g, Oral, TID  sodium chloride, 10 mL, Intravenous, Q12H  warfarin, 2.5 mg, Oral, Once per day on Tue Sat  warfarin, 5 mg, Oral, Once per day on Sun Mon Wed Thu Fri        Pharmacy to dose warfarin,       PRN meds  •  acetaminophen **OR** acetaminophen **OR** acetaminophen  •  bisacodyl  •  calcium carbonate  •  ondansetron  •  Pharmacy to dose warfarin  •  potassium chloride  •  potassium chloride  •  sodium chloride  •  traMADol        Closed fracture of right hip (CMS/HCC)    Permanent atrial fibrillation (CMS/HCC)    Hyperlipidemia    Hypertension    Hypothyroidism    Chronic anticoagulation    Status post right hip replacement    Encephalopathy NOS    Leukocytosis    Repeated falls        Assessment/Plan:  POD #4 s/p GIL for right femoral neck fracture by Dr. Zepeda  -continue PT  -dc'd scheduled Ultram given his confusion and lethargy, mental status improving, not requiring any Ultram for a couple days now  -CCP working on rehab placement, discussed with CCP today     Encephalopathy NOS  -Slowly starting to improve according to nursing staff, still in restraints but more cooperative today     Falls  -in restraints now  -XR of left knee and right hip are fine  -Fall and concern for bleed, CT yesterday at 2 PM shows no evidence of bleeding     Accelerated HTN  -BPs high today on home regimen  -renal function fine  -no HA  -Norvasc added BP is slightly better     AFib, chronic AC with Coumadin  -s/p FFP prior to surgery  -INR  pharmacy dosing coumadin  -Patient's rate is  controlled     Leukocytosis  -probably reactive--looks great today, no fever or hypotension  -BC is stable     Hypokalemia  -replace with protocol  -He is in levels normal     Full code  Coumadin should suffice for DVT ppx  Dispo: [facility] soon when bed available and out of restraints          Wilson Schmidt MD  10/09/20  19:06 EDT

## 2020-10-09 NOTE — PLAN OF CARE
Problem: Adult Inpatient Plan of Care  Goal: Plan of Care Review  Recent Flowsheet Documentation  Taken 10/9/2020 1434 by Justina Paz PTA  Outcome Summary:   amb dist limited due to need for BR   then became a lengthy assist w/clean-up in BR   difficulty standing from low commode , MOD 2 required   plans SNU at [place]  Patient was wearing a face mask during this therapy encounter. Therapist used appropriate personal protective equipment including eye protection, mask, and gloves.  Mask used was standard procedure mask. Appropriate PPE was worn during the entire therapy session. Hand hygiene was completed before and after therapy session. Patient is not in enhanced droplet precautions.

## 2020-10-09 NOTE — THERAPY TREATMENT NOTE
Patient Name: Vish Morin  : 1931    MRN: [id number]                              Today's Date: 10/9/2020       Admit Date: 10/2/2020    Visit Dx:     ICD-10-CM ICD-9-CM   1. Closed displaced fracture of right femoral neck (CMS/[facility])  S72.001A 820.8   2. Closed fracture of right hip, initial encounter (CMS/[facility])  S72.001A 820.8     Patient Active Problem List   Diagnosis   • Permanent atrial fibrillation (CMS/HCC)   • Constipation   • Gastroesophageal reflux disease   • Hyperlipidemia   • Hypertension   • Hypothyroidism   • Impaired fasting glucose   • Low back pain   • Pain in the muscles   • Muscle weakness   • Nausea   • Poor posture   • Mineral deficiency   • Rash   • Weight loss   • Dizziness   • Essential (primary) hypertension   • Chronic anticoagulation   • Closed fracture of right hip (CMS/HCC)   • Status post right hip replacement   • Encephalopathy NOS   • Leukocytosis   • Repeated falls     Past Medical History:   Diagnosis Date   • Atrial fibrillation (CMS/HCC)    • CAD (coronary artery disease)    • Constipation    • Diabetes mellitus (CMS/HCC)    • MOURA (dyspnea on exertion)    • GERD (gastroesophageal reflux disease)    • High risk medication use    • Hyperlipidemia    • Hypertension    • Hypothyroidism    • IFG (impaired fasting glucose)    • Lower back pain    • Muscle pain, thigh    • Muscle weakness    • Nausea    • KENNETH (obstructive sleep apnea)    • Permanent atrial fibrillation (CMS/HCC)    • Postural imbalance    • Sick sinus syndrome (CMS/HCC)    • Sinus bradycardia    • Syncope      Past Surgical History:   Procedure Laterality Date   • CARDIAC CATHETERIZATION  10/10/2008    diagnostic   • [facility]   • [facility]   • CARD[facility]ERSION  [facility]    [facility] Dr. Tessie Figu[facility]   • COLONOSCOPY      • CORONARY ANGIOPLASTY WITH STENT PLACEMENT  12/17/2004    Drug-eluting Sirolimus.  3.5 x 33mm Cypher stent to LAD.  3.0 x 13mm Cypher stent to ROSALIA.   • TOTAL HIP ARTHROPLASTY Right 10/4/2020    Procedure: TOTAL HIP ARTHROPLASTY ANTERIOR WITH HANA TABLE;  Surgeon: Genna Zepeda MD;  Location: [facility];  Service: Orthopedics;  Laterality: Right;     General Information     Row Name 10/09/20 1429          Physical Therapy Time and Intention    Document Type  therapy note (daily note)  -     Mode of Treatment  individual therapy;physical therapy  -     Row Name 10/09/20 1429          General Information    Patient Profile Reviewed  yes  -     Existing Precautions/Restrictions  fall;hip, anterior;right  -     Row Name 10/09/20 1429          Safety Issues, Functional Mobility    Impairments Affecting Function (Mobility)  endurance/activity tolerance;balance;strength  -     Cognitive Impairments, Mobility Safety/Performance  awareness, need for assistance;insight into deficits/self-awareness;judgment;safety precaution awareness  -     Comment, Safety Issues/Impairments (Mobility)  improved cognition  -       User Key  (r) = Recorded By, (t) = Taken By, (c) = Cosigned By    Initials Name Provider Type     Justina Paz PTA Physical Therapy Assistant        Mobility     Row Name 10/09/20 1431          Bed Mobility    Supine-Sit [place] (Bed Mobility)  moderate assist (50% patient effort)  -     Sit-Supine [place] (Bed Mobility)  2 person assist;minimum assist (75% patient effort)  -     Assistive Device (Bed Mobility)  bed rails;head of bed elevated  -     Row Name 10/09/20 1431          Sit-Stand Transfer    Sit-Stand [place] (Transfers)  2 person assist;minimum assist (75% patient effort);moderate assist (50% patient effort) MOD assist from commode  -     Assistive Device (Sit-Stand Transfers)  walker, front-wheeled  -     Row Name 10/09/20 1431           Gait/Stairs (Locomotion)    [place] Level (Gait)  2 person assist;minimum assist (75% patient effort)  -     Assistive Device (Gait)  walker, front-wheeled  -     Distance in Feet (Gait)  20 ft, urgency for BR limiting  -     Deviations/Abnormal Patterns (Gait)  тать[place]на decreased;festinating/shuffling  -     Bilateral Gait Deviations  forward flexed posture  -       User Key  (r) = Recorded By, (t) = Taken By, (c) = Cosigned By    Initials Name Provider Type    Justina Mojica PTA Physical Therapy Assistant        Obj/Interventions     Row Name 10/09/20 1433          Motor Skills    Therapeutic Exercise  -- LAQs x5 EOB  -       User Key  (r) = Recorded By, (t) = Taken By, (c) = Cosigned By    Initials Name Provider Type    Justina Mojica PTA Physical Therapy Assistant        Goals/Plan    No documentation.       Clinical Impression     Row Name 10/09/20 1434          Pain Scale: Numbers Pre/Post-Treatment    Pretreatment Pain Rating  0/10 - no pain  -     Posttreatment Pain Rating  0/10 - no pain  -     Row Name 10/09/20 1440 10/09/20 1434       Plan of Care Review    Plan of Care Reviewed With  patient  -  --    Outcome Summary  --  amb dist limited due to need for BR; then became a lengthy assist w/clean-up in BR ; difficulty standing from low commode , MOD 2 required; plans SNU at [place]  -    Row Name 10/09/20 1434          Positioning and Restraints    Pre-Treatment Position  in bed  -     Post Treatment Position  bed  -     In Bed  fowlers;call light within reach;encouraged to call for assist;exit alarm on;notified nsg  -       User Key  (r) = Recorded By, (t) = Taken By, (c) = Cosigned By    Initials Name Provider Type    Justina Mojica PTA Physical Therapy Assistant        Outcome Measures     Row Name 10/09/20 1436          How much help from another person do you currently need...    Turning from your back to your side while in flat bed without using bedrails?  3   -JM     Moving from lying on back to sitting on the side of a flat bed without bedrails?  3  -JM     Moving to and from a bed to a chair (including a wheelchair)?  3  -JM     Standing up from a chair using your arms (e.g., wheelchair, bedside chair)?  2  -JM     Climbing 3-5 steps with a railing?  1  -JM     To walk in hospital room?  3  -JM     AM-PAC 6 Clicks Score (PT)  15  -       User Key  (r) = Recorded By, (t) = Taken By, (c) = Cosigned By    Initials Name Provider Type    Justina Mojica PTA Physical Therapy Assistant        Physical Therapy Education                 Title: PT OT SLP Therapies (In Progress)     Topic: Physical Therapy (In Progress)     Point: Mobility training (In Progress)     Learning Progress Summary           Patient Acceptance, E,D, NR by  at 10/9/2020 1437    Acceptance, E, NR by  at 10/8/2020 1332    Acceptance, E, NR by AR at 10/7/2020 1454    Acceptance, E,D, NR by PC at 10/5/2020 1128                   Point: Home exercise program (In Progress)     Learning Progress Summary           Patient Acceptance, E,D, NR by  at 10/9/2020 1437    Acceptance, E, NR by  at 10/8/2020 1332    Acceptance, E, NR by AR at 10/7/2020 1454    Acceptance, E,D, NR by PC at 10/5/2020 1128                   Point: Body mechanics (In Progress)     Learning Progress Summary           Patient Acceptance, E,D, NR by  at 10/9/2020 1437    Acceptance, E, NR by AR at 10/7/2020 1454    Acceptance, E,D, NR by PC at 10/5/2020 1128                   Point: Precautions (In Progress)     Learning Progress Summary           Patient Acceptance, E,D, NR by  at 10/9/2020 1437    Acceptance, E, NR by AR at 10/7/2020 1454    Acceptance, E,D, NR by PC at 10/5/2020 1128                               User Key     Initials Effective Dates Name Provider Type [facility] access hospital 04/03/18 -  Antonina Espinosa, PT Physical Therapist PT    PC 04/03/18 -  Nereyda Naylor, PT Physical Therapist PT     03/07/18 -  Jackson  SAHARA Horn Physical Therapy Assistant PT    AR 04/03/18 -  Latricia Nieto PT Physical Therapist PT              PT Recommendation and Plan     Plan of Care Reviewed With: patient  Outcome Summary: amb dist limited due to need for BR; then became a lengthy assist w/clean-up in BR ; difficulty standing from low commode , MOD 2 required; plans SNU at [place]     Time Calculation:   PT Charges     Row Name 10/09/20 1438             Time Calculation    Start Time  1336  -      Stop Time  1414  -      Time Calculation (min)  38 min  -      PT Received On  10/09/20  -NANI      PT - Next Appointment  10/10/20  -NANI        User Key  (r) = Recorded By, (t) = Taken By, (c) = Cosigned By    Initials Name Provider Type    Justina Mojica PTA Physical Therapy Assistant        Therapy Charges for Today     Code Description Service Date Service Provider Modifiers Qty    92834893356 HC PT THER PROC EA 15 MIN 10/9/2020 Justina Paz PTA GP 3    17121175821 HC PT THER SUPP EA 15 MIN 10/9/2020 Justina Paz PTA GP 2          PT G-Codes  Outcome Measure Options: AM-PAC 6 Clicks Basic Mobility (PT)  AM-PAC 6 Clicks Score (PT): 15    Justina Paz PTA  10/9/2020

## 2020-10-09 NOTE — PROGRESS NOTES
Pharmacy Consult: Warfarin Dosing/ Monitoring    Vish Morin is a 89 y.o. male, estimated creatinine clearance is 62.2 mL/min (A) (by C-G formula based on SCr of 0.62 mg/dL (L)). weighing 70.3 kg (155 lb).    PMH: Atrial fibrillation, CAD, Constipation, Diabetes mellitus, MOURA, GERD, High risk medication use, Hyperlipidemia, Hypertension, Hypothyroidism, Impaired fasting glucose, Lower back pain, Muscle pain, thigh, Muscle weakness, Nausea, KENNETH, Permanent atrial fibrillation, Postural imbalance, Sick sinus syndrome, Sinus bradycardia, and Syncope.    Social History     Tobacco Use    Smoking status: Former Smoker     Types: Cigars    Smokeless tobacco: Never Used   Substance Use Topics    Alcohol use: No     Comment: CAFFEINE USE    Drug use: No     Results from last 7 days   Lab Units 10/09/20  0400 10/08/20  0500 10/08/20  0459 10/07/20  0349 10/06/20  0345 10/05/20  0326 10/04/20  1141 10/04/20  0745  10/03/20  0516 10/02/20  2041   INR  2.13*  --  1.86* 2.04* 2.08* 1.67* 1.59* 1.91*   < > 2.07* 2.16*   HEMOGLOBIN g/dL 13.7 13.5  --  14.2 13.8  --   --  14.6  --  15.4 16.4   HEMATOCRIT % 38.4 37.5  --  40.7 38.9  --   --  42.1  --  44.0 48.2   PLATELETS 10*3/mm3 201 184  --  201 175  --   --  123*  --  152 170    < > = values in this interval not displayed.     Results from last 7 days   Lab Units 10/09/20  0400 10/08/20  1853 10/08/20  0459 10/07/20  0349   SODIUM mmol/L 135*  --  136 133*   POTASSIUM mmol/L 3.5 3.9 3.2* 3.6   CHLORIDE mmol/L 103  --  104 102   CO2 mmol/L 22.2  --  20.7* 17.7*   BUN mg/dL 16  --  17 23   CREATININE mg/dL 0.62*  --  0.57* 0.81   CALCIUM mg/dL 8.2*  --  8.1* 8.4*   BILIRUBIN mg/dL 1.3*  --  1.5* 1.5*   ALK PHOS U/L 104  --  110 137*   ALT (SGPT) U/L 31  --  31 45*   AST (SGOT) U/L 52*  --  55* 72*   GLUCOSE mg/dL 132*  --  94 103*     Anticoagulation history: [facility]  Home Med: Warfarin 5 mg po qSuMWThF + Warfarin 2.5 mg po qTuSa (30  mg/weekly)    Hospital Anticoagulation:  Consulting provider: Dr Sanabria  Start date: 10/6/2020 restart home med  Indication: Afib  Target INR: 2-3  Expected duration: Lifelong  Bridge Therapy: No    Date 10/2 10/3 10/4 10/5 10/6 10/7 10/8  10/9     INR 2.16 2.07 2.01/  1.91/  1.59 1.67 2.08 2.04 1.86 2.13     Warfarin dose  None None None None 2.5 mg 5 mg 5 mg 5 mg       Potential drug interactions:   1) Acetaminophen: may result in an increased risk of bleeding.   2) Tramadol: may result in an increase in prothrombin time and an increased risk of bleeding.   3) Levothyroxine: may result in an increased risk of bleeding.     Will continue to monitor for drug-drug interaction as medications are added to patient's profile.     Relevant nutrition status: Diet Regular     Other:   Dietary advances -> changes in dietary vitamin K intake may alter response to warfarin.  Acute infection/inflammation may cause an increased sensitivity to warfarin    Lab: Albumin=  3.50 g/dL (10/6/2020)    Education complete?/ Date: [facility]    Assessment/Plan:    1) Dose: Will continue home regimen of Warfarin 5 mg po qSuMWThF + Warfarin 2.5 mg po qTuSa (30 mg/weekly)  2) Monitor: s/s bleed  3) Follow up INR    Pharmacy will continue to follow until discharge or discontinuation of warfarin.     Alize Sinha [facility]  Clinical Staff Pharmacist

## 2020-10-10 NOTE — PLAN OF CARE
Pt found up in chair, gown and monitor on floor.  Cold he stated.  Min 2 to stand and take few steps.  Pt had BM so no walk farther but feel he was capable.  Pt assisted back to bed and nursing notified.

## 2020-10-10 NOTE — PROGRESS NOTES
Discharge Planning Assessment  [facility]     Patient Name: Vish Morin  MRN: [id number]  Today's Date: 10/10/2020    Admit Date: 10/2/2020    Discharge Needs Assessment    No documentation.       Discharge Plan     Row Name 10/10/20 1646       Plan    Plan  [facility] has accepted and will have a bed available bed Monday     Refused Provider List Comment  Call received from [facility] for [facility] will have a bed Monday . Patria EDWARDS        Continued Care and Services - Admitted Since 10/2/2020     Destination Coordination complete    Service Provider Request Status Selected Services Address Phone Fax    [facility]   Selected Skilled Nursing [address] [facility] [address] [phone]    [facility]  Pending - Request Sent N/A [address] [facility] [address] [phone]    [facility]  Declined  safety risk  N/A [address] [facility] [address] [phone]                Demographic Summary    No documentation.       Functional Status    No documentation.       Psychosocial    No documentation.       Abuse/Neglect    No documentation.       Legal    No documentation.       Substance Abuse    No documentation.       Patient Forms    No documentation.           Shyann Benoit, RN

## 2020-10-10 NOTE — THERAPY TREATMENT NOTE
Patient Name: Vish Morin  : 1931    MRN: [id number]                              Today's Date: 10/10/2020       Admit Date: 10/2/2020    Visit Dx:     ICD-10-CM ICD-9-CM   1. Closed displaced fracture of right femoral neck (CMS/[facility])  S72.001A 820.8   2. Closed fracture of right hip, initial encounter (CMS/[facility])  S72.001A 820.8     Patient Active Problem List   Diagnosis   • Permanent atrial fibrillation (CMS/HCC)   • Constipation   • Gastroesophageal reflux disease   • Hyperlipidemia   • Hypertension   • Hypothyroidism   • Impaired fasting glucose   • Low back pain   • Pain in the muscles   • Muscle weakness   • Nausea   • Poor posture   • Mineral deficiency   • Rash   • Weight loss   • Dizziness   • Essential (primary) hypertension   • Chronic anticoagulation   • Closed fracture of right hip (CMS/HCC)   • Status post right hip replacement   • Encephalopathy NOS   • Leukocytosis   • Repeated falls     Past Medical History:   Diagnosis Date   • Atrial fibrillation (CMS/HCC)    • CAD (coronary artery disease)    • Constipation    • Diabetes mellitus (CMS/HCC)    • MOURA (dyspnea on exertion)    • GERD (gastroesophageal reflux disease)    • High risk medication use    • Hyperlipidemia    • Hypertension    • Hypothyroidism    • IFG (impaired fasting glucose)    • Lower back pain    • Muscle pain, thigh    • Muscle weakness    • Nausea    • KENNETH (obstructive sleep apnea)    • Permanent atrial fibrillation (CMS/HCC)    • Postural imbalance    • Sick sinus syndrome (CMS/HCC)    • Sinus bradycardia    • Syncope      Past Surgical History:   Procedure Laterality Date   • CARDIAC CATHETERIZATION  10/10/2008    diagnostic   • C[facility]   • CARDIAC ELECTROP[facility]Y [facility]   • [facility]   •  COLONOSCOPY     • CORONARY ANGIOPLASTY WITH STENT PLACEMENT  12/17/2004    Drug-eluting Sirolimus.  3.5 x 33mm Cypher stent to LAD.  3.0 x 13mm Cypher stent to ROSALIA.   • TOTAL HIP ARTHROPLASTY Right 10/4/2020    Procedure: TOTAL HIP ARTHROPLASTY ANTERIOR WITH HANA TABLE;  Surgeon: Genna Zepeda MD;  Location: [facility];  Service: Orthopedics;  Laterality: Right;     General Information     Row Name 10/10/20 1624          Physical Therapy Time and Intention    Document Type  therapy note (daily note)  -SI     Mode of Treatment  physical therapy  -SI     Row Name 10/10/20 1624          Cognition    Orientation Status (Cognition)  unable/difficult to assess;person;situation very quiet as tired and cold  -SI       User Key  (r) = Recorded By, (t) = Taken By, (c) = Cosigned By    Initials Name Provider Type    SI Yanet Junior PTA Physical Therapy Assistant        Mobility     Row Name 10/10/20 1625          Bed Mobility    Sit-Supine [place] (Bed Mobility)  minimum assist (75% patient effort);2 person assist  -SI     Row Name 10/10/20 1625          Transfers    Comment (Transfers)  Pt up in chair , found with gown off, monitor off and on floor.  Pt reported he had been up long time and tired and cold  -SI     Row Name 10/10/20 1625          Sit-Stand Transfer    Sit-Stand [place] (Transfers)  minimum assist (75% patient effort);2 person assist  -SI     Assistive Device (Sit-Stand Transfers)  walker, front-wheeled  -SI     Row Name 10/10/20 1625          Gait/Stairs (Locomotion)    Distance in Feet (Gait)  few steps to bed as pt had BM when stood  -SI       User Key  (r) = Recorded By, (t) = Taken By, (c) = Cosigned By    Initials Name Provider Type    Yanet Estrella PTA Physical Therapy Assistant        Obj/Interventions     Row Name 10/10/20 1628          Balance    Static Sitting Balance  WFL  -SI     Static Standing Balance  moderate impairment  -SI       User Key  (r) = Recorded By,  (t) = Taken By, (c) = Cosigned By    Initials Name Provider Type    Yanet Estrella PTA Physical Therapy Assistant        Goals/Plan    No documentation.       Clinical Impression     Row Name 10/10/20 1628          Pain Scale: Numbers Pre/Post-Treatment    Pretreatment Pain Rating  0/10 - no pain  -SI     Posttreatment Pain Rating  0/10 - no pain  -SI     Row Name 10/10/20 1628          Vital Signs    O2 Delivery Pre Treatment  room air  -SI     O2 Delivery Intra Treatment  room air  -SI     O2 Delivery Post Treatment  room air  -SI     Row Name 10/10/20 1628          Positioning and Restraints    Pre-Treatment Position  sitting in chair/recliner  -SI     Post Treatment Position  bed  -SI     In Bed  notified nsg;supine;call light within reach;exit alarm on  -SI       User Key  (r) = Recorded By, (t) = Taken By, (c) = Cosigned By    Initials Name Provider Type    Yanet Estrella PTA Physical Therapy Assistant        Outcome Measures     Row Name 10/10/20 1629          How much help from another person do you currently need...    Turning from your back to your side while in flat bed without using bedrails?  3  -SI     Moving from lying on back to sitting on the side of a flat bed without bedrails?  3  -SI     Moving to and from a bed to a chair (including a wheelchair)?  3  -SI     Standing up from a chair using your arms (e.g., wheelchair, bedside chair)?  3  -SI     Climbing 3-5 steps with a railing?  2  -SI     To walk in hospital room?  3  -SI     AM-PAC 6 Clicks Score (PT)  17  -SI       User Key  (r) = Recorded By, (t) = Taken By, (c) = Cosigned By    Initials Name Provider Type    Yanet Estrella PTA Physical Therapy Assistant        Physical Therapy Education                 Title: PT OT SLP Therapies (In Progress)     Topic: Physical Therapy (In Progress)     Point: Mobility training (In Progress)     Learning Progress Summary           Patient Acceptance, E, NR by JAMAL at 10/10/2020 1630     Comment:  feet as he shuffles, general mobility instruction    Acceptance, E,D, NR by  at 10/9/2020 1437    Acceptance, E, NR by  at 10/8/2020 1332    Acceptance, E, NR by AR at 10/7/2020 1454    Acceptance, E,D, NR by PC at 10/5/2020 1128                   Point: Home exercise program (In Progress)     Learning Progress Summary           Patient Acceptance, E,D, NR by  at 10/9/2020 1437    Acceptance, E, NR by  at 10/8/2020 1332    Acceptance, E, NR by AR at 10/7/2020 1454    Acceptance, E,D, NR by PC at 10/5/2020 1128                   Point: Body mechanics (In Progress)     Learning Progress Summary           Patient Acceptance, E,D, NR by  at 10/9/2020 1437    Acceptance, E, NR by AR at 10/7/2020 1454    Acceptance, E,D, NR by PC at 10/5/2020 1128                   Point: Precautions (In Progress)     Learning Progress Summary           Patient Acceptance, E,D, NR by  at 10/9/2020 1437    Acceptance, E, NR by AR at 10/7/2020 1454    Acceptance, E,D, NR by PC at 10/5/2020 1128                               User Key     Initials Effective Dates Name Provider Type Discipline     05/18/15 -  Yanet Junior, PTA Physical Therapy Assistant PT     04/03/18 -  Antonina Espinosa, PT Physical Therapist PT    PC 04/03/18 -  Nereyda Naylor, PT Physical Therapist PT     03/07/18 -  Justina Paz PTA Physical Therapy Assistant PT    AR 04/03/18 -  Latricia Nieto, PT Physical Therapist PT              PT Recommendation and Plan           Time Calculation:   PT Charges     Row Name 10/10/20 1632             Time Calculation    Start Time  1610  -SI      Stop Time  1625  -SI      Time Calculation (min)  15 min  -SI         Time Calculation- PT    Total Timed Code Minutes- PT  15 minute(s)  -SI        User Key  (r) = Recorded By, (t) = Taken By, (c) = Cosigned By    Initials Name Provider Type    SI Yanet Junior, PTA Physical Therapy Assistant        Therapy Charges for Today     Code Description  Service Date Service Provider Modifiers Qty    53536877020 HC PT THER PROC EA 15 MIN 10/10/2020 Yanet Junior, PTA GP 1          PT G-Codes  Outcome Measure Options: AM-PAC 6 Clicks Basic Mobility (PT)  AM-PAC 6 Clicks Score (PT): 17    Yanet Junior PTA  10/10/2020

## 2020-10-10 NOTE — PROGRESS NOTES
Pharmacy Consult: Warfarin Dosing/ Monitoring    Vish Morin is a 89 y.o. male, estimated creatinine clearance is 62.2 mL/min (A) (by C-G formula based on SCr of 0.62 mg/dL (L)). weighing 70.3 kg (155 lb).    PMH: Atrial fibrillation, CAD, Constipation, Diabetes mellitus, MOURA, GERD, High risk medication use, Hyperlipidemia, Hypertension, Hypothyroidism, Impaired fasting glucose, Lower back pain, Muscle pain, thigh, Muscle weakness, Nausea, KENNETH, Permanent atrial fibrillation, Postural imbalance, Sick sinus syndrome, Sinus bradycardia, and Syncope.    Social History     Tobacco Use    Smoking status: Former Smoker     Types: Cigars    Smokeless tobacco: Never Used   Substance Use Topics    Alcohol use: No     Comment: CAFFEINE USE    Drug use: No     Results from last 7 days   Lab Units 10/10/20  0455 10/10/20  0023 10/09/20  0400 10/08/20  0500 10/08/20  0459 10/07/20  0349 10/06/20  0345 10/05/20  0326 10/04/20  1141 10/04/20  0745   INR  2.26*  --  2.13*  --  1.86* 2.04* 2.08* 1.67* 1.59* 1.91*   APTT seconds  --  40.1*  --   --   --   --   --   --   --   --    HEMOGLOBIN g/dL  --   --  13.7 13.5  --  14.2 13.8  --   --  14.6   HEMATOCRIT %  --   --  38.4 37.5  --  40.7 38.9  --   --  42.1   PLATELETS 10*3/mm3  --   --  201 184  --  201 175  --   --  123*     Results from last 7 days   Lab Units 10/09/20  0400 10/08/20  1853 10/08/20  0459 10/07/20  0349   SODIUM mmol/L 135*  --  136 133*   POTASSIUM mmol/L 3.5 3.9 3.2* 3.6   CHLORIDE mmol/L 103  --  104 102   CO2 mmol/L 22.2  --  20.7* 17.7*   BUN mg/dL 16  --  17 23   CREATININE mg/dL 0.62*  --  0.57* 0.81   CALCIUM mg/dL 8.2*  --  8.1* 8.4*   BILIRUBIN mg/dL 1.3*  --  1.5* 1.5*   ALK PHOS U/L 104  --  110 137*   ALT (SGPT) U/L 31  --  31 45*   AST (SGOT) U/L 52*  --  55* 72*   GLUCOSE mg/dL 132*  --  94 103*     Anticoagulation history: [facility]  Home Med: Warfarin 5 mg po qSuMWThF + Warfarin 2.5 mg po qTuSa (30  mg/weekly)    Hospital Anticoagulation:  Consulting provider: Dr Sanabria  Start date: 10/6/2020 restart home med  Indication: Afib  Target INR: 2-3  Expected duration: Lifelong  Bridge Therapy: No    Date 10/2 10/3 10/4 10/5 10/6 10/7 10/8  10/9 10/10    INR 2.16 2.07 2.01/  1.91/  1.59 1.67 2.08 2.04 1.86 2.13 2.26    Warfarin dose  None None None None 2.5 mg 5 mg 5 mg 5 mg 2.5 mg      Potential drug interactions:   1) Acetaminophen: may result in an increased risk of bleeding.   2) Tramadol: may result in an increase in prothrombin time and an increased risk of bleeding.   3) Levothyroxine: may result in an increased risk of bleeding.     Will continue to monitor for drug-drug interaction as medications are added to patient's profile.     Relevant nutrition status: Diet Regular     Other:   Dietary advances -> changes in dietary vitamin K intake may alter response to warfarin.  Acute infection/inflammation may cause an increased sensitivity to warfarin    Lab: Albumin=  3.50 g/dL (10/6/2020)    Education complete?/ Date: [facility]    Assessment/Plan:    1) INR therapeutic today. Will continue home regimen of Warfarin 5 mg po qSuMWThF + Warfarin 2.5 mg po qTuSa (30 mg/weekly)  2) Monitor: s/s bleed  3) Follow up INR tomorrow    Pharmacy will continue to follow until discharge or discontinuation of warfarin.     Nancy Mccoy, PharmD, BCPS   Clinical Staff Pharmacist

## 2020-10-10 NOTE — PROGRESS NOTES
Discharge Planning Assessment  [facility]     Patient Name: Vish Morin  MRN: [id number]  Today's Date: 10/10/2020    Admit Date: 10/2/2020    Discharge Needs Assessment    No documentation.       Discharge Plan     Row Name 10/10/20 1210       Plan    Plan  New referrals to [facility] and [facility]: [facility] does not have beds    Refused Provider List Comment  Call received from patient's nurse, patient is ready for discharge and needs rehab. Call placed to daughter Lynsey Maya [phone] she was able to provide 2 skilled nursing facilities [facility] and [facility] both placed in Epic. Left message for Shyann Tapia/[facility] [phone]. Call placed to Sergio/Ameena for [facility] does not have any beds. Shyann Benoit RN        Continued Care and Services - Admitted Since 10/2/2020     Destination     Service Provider Request Status Selected Services Address Phone Fax    [facility]  Pending - Request Sent N/A [address] [facility] [address] [phone]    [facility]  Pending - Request Sent N/A [address] [facility] [address] [phone]    [facility]  Declined  safety risk  N/A [address] , [facility] [id number] [phone]                Demographic Summary    No documentation.       Functional Status    No documentation.       Psychosocial    No documentation.       Abuse/Neglect    No documentation.       Legal    No documentation.       Substance Abuse    No documentation.       Patient Forms    No documentation.           Shyann Benoit, RN

## 2020-10-10 NOTE — PLAN OF CARE
Goal Outcome Evaluation:  Plan of Care Reviewed With: patient  Progress: improving  Outcome Summary: pt very confused during night shif t, was able to be reoriented , vitals stable , on room air , Afib , pt did try to get out of bed multiple times , family has priveledges to come and sit with patient - although none avail last evening to sit with patient .    Pt has been every 2 hr turn , very incontinent and has had multiple loose BMs , pt has miralax yesterday , - doses held this shift due to patient having freq Bms ,

## 2020-10-10 NOTE — DISCHARGE PLACEMENT REQUEST
"Vish Hearn CARMEN (89 y.o. Male)     Date of Birth Social Security Number Address Home Phone MRN    05/19/1931  [address] [address] [phone] [id number]    [employer] Marital Status          Sabianism Single       Admission Date Admission Type Admitting Provider Attending Provider Department, Room/Bed    10/2/20 Emergency Darin Gustafson MD Edling, Stephen A, MD [address], [address]1    Discharge Date Discharge Disposition Discharge Destination                       Attending Provider: Wilson Schmidt MD    Allergies: No Known Allergies    Isolation: None   Infection: None   Code Status: CPR    Ht: 180.3 cm (71\")   Wt: 70.3 kg (155 lb)    Admission Cmt: None   Principal Problem: Closed fracture of right hip (CMS/HCC) [S72.001A]                 Active Insurance as of 10/2/2020     Primary Coverage     Payor Plan Insurance Group Employer/Plan Group    MEDICARE MEDICARE A & B      Payor Plan Address Payor Plan Phone Number Payor Plan Fax Number Effective Dates    [address] [phone]  5/1/1996 - None Entered    [facility] [id number]       Subscriber Name Subscriber Birth Date Member ID       VISH HEARN 5/19/1931 [id number]           Secondary Coverage     Payor Plan Insurance Group Employer/Plan Group    [employer] SUP AAR HEALTH CARE OPTIONS      Payor Plan Address Payor Plan Phone Number Payor Plan Fax Number Effective Dates    [facility] [phone]  1/1/2017 - None Entered    [address]       [facility] [address]       Subscriber Name Subscriber Birth Date Member ID       VISH HEARN 5/19/1931 [id number]                 Emergency Contacts      (Rel.) Home Phone Work Phone Mobile Phone    Lynsey Maya (Daughter) [phone] -- [phone]            "

## 2020-10-10 NOTE — PROGRESS NOTES
Patient: Vish Morin  YOB: 1931     Date of Admission: 10/2/2020  6:30 PM Medical Record Number: [id number]     Attending Physician: Wilson Schmidt MD    Procedure(s):  TOTAL HIP ARTHROPLASTY ANTERIOR WITH HANA TABLE Post Operative Day Number: 6    Subjective : No new orthopaedic complaints     Pain Relief: some relief with present medication.     Systemic Complaints: No Complaints  Vitals:    10/09/20 2344 10/10/20 0312 10/10/20 0734 10/10/20 0849   BP: 164/97  (!) 165/102 162/92   BP Location: Right arm  Right arm    Patient Position: Lying  Lying    Pulse: 100 82 88 78   Resp: 18  18    Temp: 98.9 °F (37.2 °C)  98.6 °F (37 °C)    TempSrc: Oral  Oral    SpO2:  99% 98%    Weight:       Height:           Physical Exam: 89 y.o. male    General Appearance:       Alert, cooperative, in no acute distress                  Extremities:    Dressing Clean, Dry and Intact         Incision healthy without signs or symptoms of infections         No clinical sign of DVT        Able to do good movements of digits    Pulses:   Pulses palpable and equal bilaterally           Diagnostic Tests:     Results from last 7 days   Lab Units 10/09/20  0400 10/08/20  0500 10/07/20  0349   WBC 10*3/mm3 13.16* 13.50* 15.46*   HEMOGLOBIN g/dL 13.7 13.5 14.2   HEMATOCRIT % 38.4 37.5 40.7   PLATELETS 10*3/mm3 201 184 201     Results from last 7 days   Lab Units 10/09/20  0400 10/08/20  1853 10/08/20  0459 10/07/20  0349   SODIUM mmol/L 135*  --  136 133*   POTASSIUM mmol/L 3.5 3.9 3.2* 3.6   CHLORIDE mmol/L 103  --  104 102   CO2 mmol/L 22.2  --  20.7* 17.7*   BUN mg/dL 16  --  17 23   CREATININE mg/dL 0.62*  --  0.57* 0.81   GLUCOSE mg/dL 132*  --  94 103*   CALCIUM mg/dL 8.2*  --  8.1* 8.4*     Results from last 7 days   Lab Units 10/10/20  0455 10/10/20  0023 10/09/20  0400 10/08/20  0459   INR  2.26*  --  2.13* 1.86*   APTT seconds  --  40.1*  --   --      No results found for: CRP  No results found for:  SEDRATE  No results found for: URICACID  No results found for: CRYSTAL  Microbiology Results (last 10 days)     Procedure Component Value - Date/Time    Urine Culture - Urine, Urine, Clean Catch [[id number]]  (Normal) Collected: 10/05/20 1722    Lab Status: Final result Specimen: Urine, Clean Catch Updated: 10/07/20 0958     Urine Culture No growth    COVID PRE-OP / PRE-PROCEDURE SCREENING ORDER (NO ISOLATION) - Swab, Nasopharynx [[id number]]  (Normal) Collected: 10/02/20 2045    Lab Status: Final result Specimen: Swab from Nasopharynx Updated: 10/02/20 2142    Narrative:      The following orders were created for panel order COVID PRE-OP / PRE-PROCEDURE SCREENING ORDER (NO ISOLATION) - Swab, Nasopharynx.  Procedure                               Abnormality         Status                     ---------                               -----------         ------                     Respiratory Panel PCR w/...[[id number]]  Normal              Final result                 Please view results for these tests on the individual orders.    Respiratory Panel PCR w/COVID-19(SARS-CoV-2) STEVAN/ALEXANDRO/BOGDAN/PAD/COR/MAD In-House, NP Swab in UTM/VTM, 3-4 HR TAT - Swab, Nasopharynx [[id number]]  (Normal) Collected: 10/02/20 2045    Lab Status: Final result Specimen: Swab from Nasopharynx Updated: 10/02/20 2142     ADENOVIRUS, PCR Not Detected     Coronavirus 229E Not Detected     Coronavirus HKU1 Not Detected     Coronavirus NL63 Not Detected     Coronavirus OC43 Not Detected     COVID19 Not Detected     Human Metapneumovirus Not Detected     Human Rhinovirus/Enterovirus Not Detected     Influenza A PCR Not Detected     Influenza A H1 Not Detected     Influenza A H1 2009 PCR Not Detected     Influenza A H3 Not Detected     Influenza B PCR Not Detected     Parainfluenza Virus 1 Not Detected     Parainfluenza Virus 2 Not Detected     Parainfluenza Virus 3 Not Detected     Parainfluenza Virus 4 Not Detected     RSV, PCR Not Detected     Bordetella  pertussis pcr Not Detected     Bordetella parapertussis PCR Not Detected     Chlamydophila pneumoniae PCR Not Detected     Mycoplasma pneumo by PCR Not Detected    Narrative:      Fact sheet for providers: https://docs.[employer]/wp-content/uploads/PWY6894-4755-JS9.1-EUA-Provider-Fact-Sheet-3.pdf    Fact sheet for patients: https://docs.[employer]/wp-content/uploads/MDU3313-3409-SF6.1-EUA-Patient-Fact-Sheet-1.pdf        Xr Knee 1 Or 2 View Left    Result Date: 10/7/2020  No acute process.  This report was finalized on 10/7/2020 4:38 PM by Dr. Levar Munoz M.D.      Ct Head Without Contrast    Result Date: 10/8/2020  1. No change when compared to prior head CT 10/03/2020. 2. 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 3. There is a small scalp hematoma, scalp laceration over the anterior frontal region from recent head trauma. The remainder of the head CT is unremarkable with no acute skull fracture or intracranial hemorrhage identified. 4. Due to motion artifact its very difficult to evaluate for subtle subdural hematoma. Prior to signing this report, I discussed this limitation with Dr. Gopal Sanabria by telephone on 10/08/2020 10:45 AM and a head CT is going to be obtained today 10/08/2022 in order to reevaluate to ensure that there is no subdural hematoma.  Radiation dose reduction techniques were utilized, including automated exposure control and exposure modulation based on body size.  This report was finalized on 10/8/2020 2:45 PM by Dr. Slim Morrison M.D.      Ct Head Without Contrast    Result Date: 10/8/2020   No evidence for acute traumatic intracranial pathology.  Radiation dose  reduction techniques were utilized, including automated exposure control and exposure modulation based on body size.  This report was finalized on 10/8/2020 2:22 PM by Dr. Rios Mireles M.D.      Ct Head Without Contrast    Result Date: 10/3/2020  No acute intracranial findings.  Radiation dose reduction techniques were utilized, including automated exposure control and exposure modulation based on body size.  This report was finalized on 10/3/2020 7:46 PM by Dr. Elly Rubio M.D.      Xr Chest 1 View    Result Date: 10/6/2020  1. No active disease is seen in the chest and the etiology of the postoperative leukocytosis is not established on this exam. 2. High riding right humeral head likely secondary to chronic right rotator cuff tear. 3. There is borderline cardiomegaly.  This report was finalized on 10/6/2020 2:02 PM by Dr. Slim Morrison M.D.      Xr Hip With Or Without Pelvis 2 - 3 View Right    Result Date: 10/7/2020   No acute fracture is identified. Follow-up/further evaluation can be obtained as indications persist.    This report was finalized on 10/7/2020 4:27 PM by Dr. Hira Weber M.D.      Xr Hip With Or Without Pelvis 2 - 3 View Right    Result Date: 10/5/2020  No acute fracture or subluxation identified.  This report was finalized on 10/5/2020 11:03 PM by Dr. Elly Rubio M.D.              Current Medications:  Scheduled Meds:acetaminophen, 1,000 mg, Oral, Q8H  amLODIPine, 2.5 mg, Oral, Q24H  docusate sodium, 100 mg, Oral, BID  famotidine, 20 mg, Oral, BID AC  levothyroxine, 50 mcg, Oral, Q AM  lisinopril, 40 mg, Oral, Q24H  magnesium oxide, 400 mg, Oral, Q12H  metoprolol tartrate, 50 mg, Oral, Q12H  polyethylene glycol, 17 g, Oral, TID  sodium chloride, 10 mL, Intravenous, Q12H  warfarin, 2.5 mg, Oral, Once per day on Tue Sat  warfarin, 5 mg, Oral, Once per day on Sun Mon Wed Thu Fri      Continuous Infusions:Pharmacy to dose warfarin,       PRN Meds:.•  acetaminophen **OR**  acetaminophen **OR** acetaminophen  •  bisacodyl  •  calcium carbonate  •  ondansetron  •  Pharmacy to dose warfarin  •  potassium chloride  •  potassium chloride  •  sodium chloride  •  traMADol    Assessment: Procedure(s):  TOTAL HIP ARTHROPLASTY ANTERIOR WITH HANA TABLE    Patient Active Problem List   Diagnosis   • Permanent atrial fibrillation (CMS/HCC)   • Constipation   • Gastroesophageal reflux disease   • Hyperlipidemia   • Hypertension   • Hypothyroidism   • Impaired fasting glucose   • Low back pain   • Pain in the muscles   • Muscle weakness   • Nausea   • Poor posture   • Mineral deficiency   • Rash   • Weight loss   • Dizziness   • Essential (primary) hypertension   • Chronic anticoagulation   • Closed fracture of right hip (CMS/HCC)   • Status post right hip replacement   • Encephalopathy NOS   • Leukocytosis   • Repeated falls       PLAN:   Continues current post-op course  Anticoagulation: on warfarin  Dressing Change prn  Mobilize with PT as tolerated per protocol    Weight Bearing: WBAT  Discharge Plan: OK to plan for discharge to home/ snf  from orthopadic perspective.      Genna Zepeda MD    Date: 10/10/2020    Time: 10:19 EDT

## 2020-10-10 NOTE — PLAN OF CARE
Goal Outcome Evaluation:  Plan of Care Reviewed With: patient  Progress: improving  Outcome Summary: Pt alert with confusion, pt pleasent during shift, vital signs stable and on room air.  Pt out of bed and into chair.  Pt did not try to get out of bed or chair during shift.  Pt reoriented during shift.

## 2020-10-10 NOTE — PROGRESS NOTES
[facility]     LOS: 8 days     Subjective:    CC:Fall and Hip Pain    DIET:  Diet Order   Procedures   • Diet Regular   Denies any chest pain, no shortness of air, tolerating oral intake, no excessive stooling, large BM today, and out of Posey restraint    Objective:    Vital Signs:  Temp:  [97 °F (36.1 °C)-98.9 °F (37.2 °C)] 98.6 °F (37 °C)  Heart Rate:  [] 78  Resp:  [18] 18  BP: (134-165)/() 162/92    SpO2:  [96 %-99 %] 98 %  on   ;   Device (Oxygen Therapy): room air  Body mass index is 21.62 kg/m².    Physical Exam  Constitutional:       Appearance: He is well-developed.      Comments: Comfortable out of restraints   HENT:      Head: Normocephalic and atraumatic.   Cardiovascular:      Rate and Rhythm: Normal rate. Rhythm irregular.      Heart sounds: No murmur. No friction rub.   Pulmonary:      Effort: Pulmonary effort is normal.      Breath sounds: Normal breath sounds.   Abdominal:      General: Bowel sounds are normal. There is no distension.      Palpations: Abdomen is soft.      Tenderness: There is no abdominal tenderness.   Skin:     General: Skin is warm and dry.   Neurological:      Mental Status: He is alert.      Comments: Able to give 2020, [facility]         Results Review:    Glucose   Date Value Ref Range Status   10/09/2020 132 (H) 65 - 99 mg/dL Final   10/08/2020 94 65 - 99 mg/dL Final     Results from last 7 days   Lab Units 10/09/20  0400   WBC 10*3/mm3 13.16*   HEMOGLOBIN g/dL 13.7   HEMATOCRIT % 38.4   PLATELETS 10*3/mm3 201     Results from last 7 days   Lab Units 10/09/20  0400   SODIUM mmol/L 135*   POTASSIUM mmol/L 3.5   CHLORIDE mmol/L 103   CO2 mmol/L 22.2   BUN mg/dL 16   CREATININE mg/dL 0.62*   CALCIUM mg/dL 8.2*   BILIRUBIN mg/dL 1.3*   ALK PHOS U/L 104   ALT (SGPT) U/L 31   AST (SGOT) U/L 52*   GLUCOSE mg/dL 132*     Results from last 7 days   Lab Units 10/10/20  0455 10/10/20  0023   INR  2.26*  --    APTT seconds  --  40.1*      Results from last 7 days   Lab Units 10/09/20  0400   MAGNESIUM mg/dL 2.1         Cultures:  Urine Culture   Date Value Ref Range Status   10/05/2020 No growth  Final       I have reviewed daily medications and changes in CPOE    Scheduled meds  acetaminophen, 1,000 mg, Oral, Q8H  amLODIPine, 2.5 mg, Oral, Q24H  docusate sodium, 100 mg, Oral, BID  famotidine, 20 mg, Oral, BID AC  levothyroxine, 50 mcg, Oral, Q AM  lisinopril, 40 mg, Oral, Q24H  magnesium oxide, 400 mg, Oral, Q12H  metoprolol tartrate, 50 mg, Oral, Q12H  polyethylene glycol, 17 g, Oral, TID  sodium chloride, 10 mL, Intravenous, Q12H  warfarin, 2.5 mg, Oral, Once per day on Tue Sat  warfarin, 5 mg, Oral, Once per day on Sun Mon Wed Thu Fri        Pharmacy to dose warfarin,       PRN meds  •  acetaminophen **OR** acetaminophen **OR** acetaminophen  •  bisacodyl  •  calcium carbonate  •  ondansetron  •  Pharmacy to dose warfarin  •  potassium chloride  •  potassium chloride  •  sodium chloride  •  traMADol        Closed fracture of right hip (CMS/HCC)    Permanent atrial fibrillation (CMS/HCC)    Hyperlipidemia    Hypertension    Hypothyroidism    Chronic anticoagulation    Status post right hip replacement    Encephalopathy NOS    Leukocytosis    Repeated falls        Assessment/Plan:  POD #4 s/p GIL for right femoral neck fracture by Dr. Zepeda  -continue PT  -mental status is better  -will ask ccp about possible transfer     Encephalopathy NOS  -improved     Falls  -more cooperative     Accelerated HTN  -BPs still high  -renal function stable  -Norvasc added and will increase     AFib, chronic AC with Coumadin  -s/p FFP prior to surgery  -INR  pharmacy dosing coumadin  -Patient's rate is controlled     Leukocytosis  -probably reactive--looks great today, no fever or hypotension  -BC is stable     Hypokalemia  -replace with protocol  -He is in levels normal     Full code  Coumadin appropriate for DVT ppx  Dispo: [facility] soon when bed available and  out of restraints          Wilson Schmidt MD  10/10/20  12:02 EDT

## 2020-10-11 NOTE — PROGRESS NOTES
"Physicians Statement of Medical Necessity for  Ambulance Transportation    GENERAL INFORMATION     Name: Vish Morin  YOB: 1931  Medicare #: [id number]  Transport Date: 10/12  Origin: [facility]   Destination: [facility]   Is the Patient's stay covered under Medicare Part A (PPS/DRG?)Yes   Closest appropriate facility? Yes  If this a hosp-hosp transfer? No  Is this a hospice patient? No    MEDICAL NECESSITY QUESTIONAIRE    Ambulance Transportation is medically necessary only if other means of transportation are contraindicated or would be potentially harmful to the patient.  To meet this requirement, the patient must be either \"bed confined\" or suffer from a condition such that transport by means other than an ambulance is contraindicated by the patient's condition.  The following questions must be answered by the healthcare professional signing below for this form to be valid:     1) Describe the MEDICAL CONDITION (physical and/or mental) of this patient AT THE TIME OF AMBULANCE TRANSPORT that requires the patient to be transported in an ambulance, and why transport by other means is contraindicated by the patient's condition:   Active Hospital Problems    Diagnosis   • **Closed fracture of right hip (CMS/HCC)   • Leukocytosis   • Repeated falls   • Encephalopathy NOS   • Status post right hip replacement   • Chronic anticoagulation   • Hypothyroidism   • Hypertension   • Hyperlipidemia   • Permanent atrial fibrillation (CMS/HCC)       Past Medical History:   Diagnosis Date   • Atrial fibrillation (CMS/HCC)    • CAD (coronary artery disease)    • Constipation    • Diabetes mellitus (CMS/HCC)    • MOURA (dyspnea on exertion)    • GERD (gastroesophageal reflux disease)    • High risk medication use    • Hyperlipidemia    • Hypertension    • Hypothyroidism    • IFG (impaired fasting glucose)    • Lower back pain    • Muscle pain, thigh    • Muscle weakness    • Nausea    • KENNETH " "(obstructive sleep apnea)    • Permanent atrial fibrillation (CMS/HCC)    • Postural imbalance    • Sick sinus syndrome (CMS/HCC)    • Sinus bradycardia    • Syncope       Past Surgical History:   Procedure Laterality Date   • CARDIAC CATHETERIZATION  10/10/2008    diagnostic   • CARDIAC ELECTROPHYSIOLOGY MAPPING AND ABLATION  10/17/2013    [facility] Dr. Tessie Figueroa   • CARDIAC ELECTROPHYSIOLOGY MAPPING AND ABLATION  09/25/2012    [facility] Dr. Tessie Figueroa   • CARDIOVERSION  02/12/2013    [facility] Dr. Tessie Figueroa   • COLONOSCOPY     • CORONARY ANGIOPLASTY WITH STENT PLACEMENT  12/17/2004    Drug-eluting Sirolimus.  3.5 x 33mm Cypher stent to LAD.  3.0 x 13mm Cypher stent to ROSALIA.   • TOTAL HIP ARTHROPLASTY Right 10/4/2020    Procedure: TOTAL HIP ARTHROPLASTY ANTERIOR WITH HANA TABLE;  Surgeon: Genna Zepeda MD;  Location: [facility];  Service: Orthopedics;  Laterality: Right;      2) Is this patient \"bed confined\" as defined below?Yes    To be \"bed confined\" the patient must satisfy all three of the following criteria:  (1) unable to get up from bed without assistance; AND (2) unable to ambulate;  AND (3) unable to sit in a chair or wheelchair.  3) Can this patient safely be transported by car or wheelchair van (I.e., may safely sit during transport, without an attendant or monitoring?)No   4. In addition to completing questions 1-3 above, please check any of the following conditions that apply:          *Note: supporting documentation for any boxes checked must be maintained in the patient's medical records Unable to tolerate seated position for time needed to transport      SIGNATURE OF PHYSICIAN OR OTHER AUTHORIZED HEALTHCARE PROFESSIONAL    I certify that the above information is true and correct based on my evaluation of this patient, and represent that the patient requires transport by ambulance and that other forms of transport are contraindicated.  I " understand that this information will be used by the Centers for Medicare and Medicaid Services (CMS) to support the determiniation of medical necessity for ambulance services, and I represent that I have personal knowledge of the patient's condition at the time of transport.       If this box is checked, I also certify that the patient is physically or mentally incapable of signing the ambulance service's claim form and that the institution with which I am affiliated has furnished care, services or assistance to the patient.  My signature below is made on behalf of the patient pursuant to 42 .36(b)(4). In accordance with 42 .37, the specific reason(s) that the patient is physically or mentally incapable of signing the claim for is as follows:     Signature of Physician or Healthcare Professional     Shyann Benoit RN   Date/Time:     10/11/2020  13:47 EDT       (For Scheduled repetitive transport, this form is not valid for transports performed more than 60 days after this date).                                                                                                                                            --------------------------------------------------------------------------------------------  Printed Name and Credentials of Physician or Authorized Healthcare Professional     *Form must be signed by patient's attending physician for scheduled, repetitive transports,.  For non-repetitive ambulance transports, if unable to obtain the signature of the attending physician, any of the following may sign (please select below):     Physician  Clinical Nurse Specialist  Registered Nurse     Physician Assistant  Discharge Planner  Licensed Practical Nurse     Nurse Practitioner

## 2020-10-11 NOTE — PROGRESS NOTES
Discharge Planning Assessment  [facility]     Patient Name: Vish Morin  MRN: [id number]  Today's Date: 10/11/2020    Admit Date: 10/2/2020    Discharge Needs Assessment    No documentation.       Discharge Plan     Row Name 10/11/20 1333       Plan    Plan  [facility] has accepted and will have a bed available tomorrow.    Plan Comments  Bed available tomorrow at [facility] spoke with daughter Lynsey Maya [phone] she is agreeable with this discharge plan. [facility] is on standby to transport patient to [facility] tomorrow. Patria EDWARDS        Continued Care and Services - Admitted Since 10/2/2020     Destination Coordination complete    Service Provider Request Status Selected Services Address Phone Fax    [facility]   Selected Skilled Nursing [address] [address] [phone]    [facility]  Pending - Request Sent N/A [address] [address] [phone]    [facility]  Declined  safety risk  N/A [address] [facility] [id number] [phone]                Demographic Summary    No documentation.       Functional Status    No documentation.       Psychosocial    No documentation.       Abuse/Neglect    No documentation.       Legal    No documentation.       Substance Abuse    No documentation.       Patient Forms    No documentation.           Shyann Benoit, RN

## 2020-10-11 NOTE — PLAN OF CARE
Goal Outcome Evaluation:  Plan of Care Reviewed With: patient  Progress: improving  Outcome Summary: pt remains confused but can reorient pretty well to self and location , he has had several bed changes with Bm& Urine , afib , room air , Vitals stable , no compaints , tylenol given as scheduled.

## 2020-10-11 NOTE — PLAN OF CARE
Pt up in chair.  Min reena of 1 for sit to stand and to amb with RWX 50 feet.  Decrease stride length and shuffling type gait.  Up in chair most of day

## 2020-10-11 NOTE — THERAPY TREATMENT NOTE
Patient Name: Vish Morin  : 1931    MRN: [id number]                              Today's Date: 10/11/2020       Admit Date: 10/2/2020    Visit Dx:     ICD-10-CM ICD-9-CM   1. Closed displaced fracture of right femoral neck (CMS/[facility])  S72.001A 820.8   2. Closed fracture of right hip, initial encounter (CMS/[facility])  S72.001A 820.8     Patient Active Problem List   Diagnosis   • Permanent atrial fibrillation (CMS/HCC)   • Constipation   • Gastroesophageal reflux disease   • Hyperlipidemia   • Hypertension   • Hypothyroidism   • Impaired fasting glucose   • Low back pain   • Pain in the muscles   • Muscle weakness   • Nausea   • Poor posture   • Mineral deficiency   • Rash   • Weight loss   • Dizziness   • Essential (primary) hypertension   • Chronic anticoagulation   • Closed fracture of right hip (CMS/HCC)   • Status post right hip replacement   • Encephalopathy NOS   • Leukocytosis   • Repeated falls     Past Medical History:   Diagnosis Date   • Atrial fibrillation (CMS/HCC)    • CAD (coronary artery disease)    • Constipation    • Diabetes mellitus (CMS/HCC)    • MOURA (dyspnea on exertion)    • GERD (gastroesophageal reflux disease)    • High risk medication use    • Hyperlipidemia    • Hypertension    • Hypothyroidism    • IFG (impaired fasting glucose)    • Lower back pain    • Muscle pain, thigh    • Muscle weakness    • Nausea    • KENNETH (obstructive sleep apnea)    • Permanent atrial fibrillation (CMS/HCC)    • Postural imbalance    • Sick sinus syndrome (CMS/HCC)    • Sinus bradycardia    • Syncope      Past Surgical History:   Procedure Laterality Date   • CARDIAC CATHETERIZATION  10/10/2008    diagnostic   • [facility]   • [facility]   • [facility]   •  COLONOSCOPY     • CORONARY ANGIOPLASTY WITH STENT PLACEMENT  12/17/2004    Drug-eluting Sirolimus.  3.5 x 33mm Cypher stent to LAD.  3.0 x 13mm Cypher stent to ROSALIA.   • TOTAL HIP ARTHROPLASTY Right 10/4/2020    Procedure: TOTAL HIP ARTHROPLASTY ANTERIOR WITH HANA TABLE;  Surgeon: Genna Zepeda MD;  Location: [facility];  Service: Orthopedics;  Laterality: Right;     General Information     Row Name 10/11/20 1056          Physical Therapy Time and Intention    Document Type  therapy note (daily note)  -SI     Mode of Treatment  physical therapy  -SI     Row Name 10/11/20 1056          General Information    Barriers to Rehab  medically complex;cognitive status  -SI     Row Name 10/11/20 1056          Cognition    Orientation Status (Cognition)  disoriented to;place  -SI       User Key  (r) = Recorded By, (t) = Taken By, (c) = Cosigned By    Initials Name Provider Type    SI Yanet Junior PTA Physical Therapy Assistant        Mobility     Row Name 10/11/20 1057          Bed Mobility    Supine-Sit Payne (Bed Mobility)  -- up in chair  -SI     Row Name 10/11/20 1057          Sit-Stand Transfer    Sit-Stand Payne (Transfers)  minimum assist (75% patient effort)  -SI     Assistive Device (Sit-Stand Transfers)  walker, front-wheeled  -SI     Row Name 10/11/20 1057          Gait/Stairs (Locomotion)    Payne Level (Gait)  minimum assist (75% patient effort);1 person assist  -SI     Assistive Device (Gait)  walker, front-wheeled  -SI     Distance in Feet (Gait)  50  -SI     Deviations/Abnormal Patterns (Gait)  bilateral deviations;base of support, narrow;festinating/shuffling  -SI       User Key  (r) = Recorded By, (t) = Taken By, (c) = Cosigned By    Initials Name Provider Type    SI Yanet Junior PTA Physical Therapy Assistant        Obj/Interventions     Row Name 10/11/20 1058          Balance    Static Sitting Balance  WFL  -SI     Static Standing Balance  moderate impairment  -SI        User Key  (r) = Recorded By, (t) = Taken By, (c) = Cosigned By    Initials Name Provider Type    Yanet Estrella PTA Physical Therapy Assistant        Goals/Plan    No documentation.       Clinical Impression     Row Name 10/11/20 1059          Pain Scale: Numbers Pre/Post-Treatment    Pretreatment Pain Rating  0/10 - no pain  -SI     Posttreatment Pain Rating  0/10 - no pain  -SI     Row Name 10/11/20 1059          Vital Signs    Pre SpO2 (%)  100  -SI     O2 Delivery Pre Treatment  room air  -SI     O2 Delivery Intra Treatment  room air  -SI     Post SpO2 (%)  100  -SI     Pre Patient Position  Sitting  -SI     Post Patient Position  Sitting  -SI     Row Name 10/11/20 1059          Positioning and Restraints    Pre-Treatment Position  sitting in chair/recliner  -SI     In Chair  sitting;reclined;call light within reach;exit alarm on  -SI       User Key  (r) = Recorded By, (t) = Taken By, (c) = Cosigned By    Initials Name Provider Type    Yanet Estrella PTA Physical Therapy Assistant        Outcome Measures     Row Name 10/11/20 1100          How much help from another person do you currently need...    Turning from your back to your side while in flat bed without using bedrails?  4  -SI     Moving from lying on back to sitting on the side of a flat bed without bedrails?  3  -SI     Moving to and from a bed to a chair (including a wheelchair)?  3  -SI     Standing up from a chair using your arms (e.g., wheelchair, bedside chair)?  3  -SI     Climbing 3-5 steps with a railing?  2  -SI     To walk in hospital room?  3  -SI     AM-PAC 6 Clicks Score (PT)  18  -SI       User Key  (r) = Recorded By, (t) = Taken By, (c) = Cosigned By    Initials Name Provider Type    Yanet Estrella PTA Physical Therapy Assistant        Physical Therapy Education                 Title: PT OT SLP Therapies (In Progress)     Topic: Physical Therapy (In Progress)     Point: Mobility training (In Progress)     Learning  Progress Summary           Patient Acceptance, E, NR by SI at 10/11/2020 1101    Comment: basic mobility instruction    Acceptance, E, NR by SI at 10/10/2020 1630    Comment:  feet as he shuffles, general mobility instruction    Acceptance, E,D, NR by  at 10/9/2020 1437    Acceptance, E, NR by LH at 10/8/2020 1332    Acceptance, E, NR by AR at 10/7/2020 1454    Acceptance, E,D, NR by PC at 10/5/2020 1128                   Point: Home exercise program (In Progress)     Learning Progress Summary           Patient Acceptance, E,D, NR by  at 10/9/2020 1437    Acceptance, E, NR by LH at 10/8/2020 1332    Acceptance, E, NR by AR at 10/7/2020 1454    Acceptance, E,D, NR by PC at 10/5/2020 1128                   Point: Body mechanics (In Progress)     Learning Progress Summary           Patient Acceptance, E,D, NR by  at 10/9/2020 1437    Acceptance, E, NR by AR at 10/7/2020 1454    Acceptance, E,D, NR by PC at 10/5/2020 1128                   Point: Precautions (In Progress)     Learning Progress Summary           Patient Acceptance, E,D, NR by  at 10/9/2020 1437    Acceptance, E, NR by AR at 10/7/2020 1454    Acceptance, E,D, NR by PC at 10/5/2020 1128                               User Key     Initials Effective Dates Name Provider Type Discipline    SI 05/18/15 -  Yanet Junior, PTA Physical Therapy Assistant PT    LH 04/03/18 -  Antonina Espinosa, PT Physical Therapist PT    PC 04/03/18 -  Nereyda Naylor, PT Physical Therapist PT     03/07/18 -  Justina Paz PTA Physical Therapy Assistant PT    AR 04/03/18 -  Latricia Nieto, PT Physical Therapist PT              PT Recommendation and Plan           Time Calculation:   PT Charges     Row Name 10/11/20 1102             Time Calculation    Start Time  1040  -SI      Stop Time  1103  -SI      Time Calculation (min)  23 min  -SI         Time Calculation- PT    Total Timed Code Minutes- PT  17 minute(s)  -SI        User Key  (r) = Recorded By, (t) =  Taken By, (c) = Cosigned By    Initials Name Provider Type    Yanet Estrella, SAHARA Physical Therapy Assistant        Therapy Charges for Today     Code Description Service Date Service Provider Modifiers Qty    58073000824 HC PT THER PROC EA 15 MIN 10/10/2020 Yanet Junior, SAHARA GP 1    38191467757 HC PT THER PROC EA 15 MIN 10/11/2020 Yanet Junior, SAHARA GP 1    35492249944 HC PT THER PROC EA 15 MIN 10/11/2020 Yanet Junior, SAHARA GP 1          PT G-Codes  Outcome Measure Options: AM-PAC 6 Clicks Basic Mobility (PT)  AM-PAC 6 Clicks Score (PT): 18    Yanet Junior PTA  10/11/2020

## 2020-10-11 NOTE — PROGRESS NOTES
[facility]     LOS: 9 days     Subjective:    CC:Fall and Hip Pain    DIET:  Diet Order   Procedures   • Diet Regular   Patient sitting up in chair, denies any chest pain or shortness of air nausea or vomiting    Objective:    Vital Signs:  Temp:  [97.4 °F (36.3 °C)-97.9 °F (36.6 °C)] 97.8 °F (36.6 °C)  Heart Rate:  [78-96] 80  Resp:  [16] 16  BP: (149-155)/() 155/93    SpO2:  [93 %-100 %] 93 %  on   ;   Device (Oxygen Therapy): room air  Body mass index is 21.62 kg/m².    Physical Exam  Constitutional:       Appearance: He is well-developed.      Comments: Comfortable out of restraints   HENT:      Head: Normocephalic and atraumatic.   Cardiovascular:      Rate and Rhythm: Normal rate. Rhythm irregular.      Heart sounds: No murmur. No friction rub.   Pulmonary:      Effort: Pulmonary effort is normal.      Breath sounds: Normal breath sounds.   Abdominal:      General: Bowel sounds are normal. There is no distension.      Palpations: Abdomen is soft.      Tenderness: There is no abdominal tenderness.   Skin:     General: Skin is warm and dry.   Neurological:      Mental Status: He is alert.      Comments: Able to give 2020, [facility]         Results Review:    Glucose   Date Value Ref Range Status   10/11/2020 118 (H) 65 - 99 mg/dL Final   10/09/2020 132 (H) 65 - 99 mg/dL Final     Results from last 7 days   Lab Units 10/11/20  1239   WBC 10*3/mm3 11.51*   HEMOGLOBIN g/dL 14.6   HEMATOCRIT % 44.4   PLATELETS 10*3/mm3 250     Results from last 7 days   Lab Units 10/11/20  1239   SODIUM mmol/L 133*   POTASSIUM mmol/L 3.6   CHLORIDE mmol/L 100   CO2 mmol/L 23.6   BUN mg/dL 10   CREATININE mg/dL 0.75*   CALCIUM mg/dL 8.6   BILIRUBIN mg/dL 1.2   ALK PHOS U/L 94   ALT (SGPT) U/L 28   AST (SGOT) U/L 38   GLUCOSE mg/dL 118*     Results from last 7 days   Lab Units 10/11/20  0358  10/10/20  0023   INR  2.64*   < >  --    APTT seconds  --   --  40.1*    < > = values in this interval  not displayed.     Results from last 7 days   Lab Units 10/09/20  0400   MAGNESIUM mg/dL 2.1         Cultures:  Urine Culture   Date Value Ref Range Status   10/05/2020 No growth  Final       I have reviewed daily medications and changes in CPOE    Scheduled meds  acetaminophen, 1,000 mg, Oral, Q8H  amLODIPine, 5 mg, Oral, Q24H  docusate sodium, 100 mg, Oral, BID  famotidine, 20 mg, Oral, BID AC  levothyroxine, 50 mcg, Oral, Q AM  lisinopril, 40 mg, Oral, Q24H  magnesium oxide, 400 mg, Oral, Q12H  metoprolol tartrate, 50 mg, Oral, Q12H  polyethylene glycol, 17 g, Oral, TID  sodium chloride, 10 mL, Intravenous, Q12H  warfarin, 2 mg, Oral, Once  warfarin, 2.5 mg, Oral, Once per day on Tue Sat  [START ON 10/12/2020] warfarin, 5 mg, Oral, Once per day on Sun Mon Wed Thu Fri        Pharmacy to dose warfarin,       PRN meds  •  acetaminophen **OR** acetaminophen **OR** acetaminophen  •  bisacodyl  •  calcium carbonate  •  ondansetron  •  Pharmacy to dose warfarin  •  potassium chloride  •  potassium chloride  •  sodium chloride  •  traMADol        Closed fracture of right hip (CMS/HCC)    Permanent atrial fibrillation (CMS/HCC)    Hyperlipidemia    Hypertension    Hypothyroidism    Chronic anticoagulation    Status post right hip replacement    Encephalopathy NOS    Leukocytosis    Repeated falls        Assessment/Plan:  POD #4 s/p GIL for right femoral neck fracture by Dr. Zepeda  -continue PT  -mental status is better  -Possible rehab tomorrow     Encephalopathy NOS  -Much improved     Falls  -more cooperative     Accelerated HTN  -BP still slightly elevated  -renal function stable  -Norvasc recently increased     AFib, chronic AC with Coumadin  -s/p FFP prior to surgery  -INR  pharmacy dosing coumadin  -Patient's rate is controlled     Leukocytosis  -probably reactive--doing well, no fever or hypotension     Hypokalemia  -replace with protocol     Full code  Coumadin appropriate for DVT ppx  Dispo: [facility]  tomorrow    Discussed with hiro at bedside          Wilson Schmidt MD  10/11/20  17:35 EDT

## 2020-10-11 NOTE — PROGRESS NOTES
Pharmacy Consult: Warfarin Dosing/ Monitoring    Vish Morin is a 89 y.o. male, estimated creatinine clearance is 62.2 mL/min (A) (by C-G formula based on SCr of 0.62 mg/dL (L)). weighing 70.3 kg (155 lb).    PMH: Atrial fibrillation, CAD, Constipation, Diabetes mellitus, MOURA, GERD, High risk medication use, Hyperlipidemia, Hypertension, Hypothyroidism, Impaired fasting glucose, Lower back pain, Muscle pain, thigh, Muscle weakness, Nausea, KENNETH, Permanent atrial fibrillation, Postural imbalance, Sick sinus syndrome, Sinus bradycardia, and Syncope.    Social History     Tobacco Use    Smoking status: Former Smoker     Types: Cigars    Smokeless tobacco: Never Used   Substance Use Topics    Alcohol use: No     Comment: CAFFEINE USE    Drug use: No     Results from last 7 days   Lab Units 10/11/20  0358 10/10/20  0455 10/10/20  0023 10/09/20  0400 10/08/20  0500 10/08/20  0459 10/07/20  0349 10/06/20  0345 10/05/20  0326   INR  2.64* 2.26*  --  2.13*  --  1.86* 2.04* 2.08* 1.67*   APTT seconds  --   --  40.1*  --   --   --   --   --   --    HEMOGLOBIN g/dL  --   --   --  13.7 13.5  --  14.2 13.8  --    HEMATOCRIT %  --   --   --  38.4 37.5  --  40.7 38.9  --    PLATELETS 10*3/mm3  --   --   --  201 184  --  201 175  --      Results from last 7 days   Lab Units 10/09/20  0400 10/08/20  1853 10/08/20  0459 10/07/20  0349   SODIUM mmol/L 135*  --  136 133*   POTASSIUM mmol/L 3.5 3.9 3.2* 3.6   CHLORIDE mmol/L 103  --  104 102   CO2 mmol/L 22.2  --  20.7* 17.7*   BUN mg/dL 16  --  17 23   CREATININE mg/dL 0.62*  --  0.57* 0.81   CALCIUM mg/dL 8.2*  --  8.1* 8.4*   BILIRUBIN mg/dL 1.3*  --  1.5* 1.5*   ALK PHOS U/L 104  --  110 137*   ALT (SGPT) U/L 31  --  31 45*   AST (SGOT) U/L 52*  --  55* 72*   GLUCOSE mg/dL 132*  --  94 103*     Anticoagulation history: [facility]  Home Med: Warfarin 5 mg po qSuMWThF + Warfarin 2.5 mg po qTuSa (30 mg/weekly)    Hospital Anticoagulation:  Consulting  provider: Dr Sanabria  Start date: 10/6/2020 restart home med  Indication: Afib  Target INR: 2-3  Expected duration: Lifelong  Bridge Therapy: No    Date 10/2 10/3 10/4 10/5 10/6 10/7 10/8  10/9 10/10 10/11   INR 2.16 2.07 2.01/  1.91/  1.59 1.67 2.08 2.04 1.86 2.13 2.26 2.64   Warfarin dose  None None None None 2.5 mg 5 mg 5 mg 5 mg 2.5 mg 2 mg     Potential drug interactions:   1) Acetaminophen: may result in an increased risk of bleeding.   2) Tramadol: may result in an increase in prothrombin time and an increased risk of bleeding.   3) Levothyroxine: may result in an increased risk of bleeding.     Will continue to monitor for drug-drug interaction as medications are added to patient's profile.     Relevant nutrition status: Diet Regular, poor intake charted per nursing yesterday, only 25% dinner intake on 10/10    Other:   Dietary advances -> changes in dietary vitamin K intake may alter response to warfarin.  Acute infection/inflammation may cause an increased sensitivity to warfarin    Lab: Albumin=  3.50 g/dL (10/6/2020)    Education complete?/ Date: [facility]    Assessment/Plan:    1) INR therapeutic today at 2.64 but going upward - could be due to poor intake. Will give warfarin 2 mg dose today and restart home regimen of Warfarin 5 mg po qSuMWThF + Warfarin 2.5 mg po qTuSa (30 mg/weekly) tomorrow.  2) Monitor: s/s bleed  3) Follow up INR tomorrow    Pharmacy will continue to follow until discharge or discontinuation of warfarin.     Nancy Mccoy, PharmD, BCPS   Clinical Staff Pharmacist

## 2020-10-11 NOTE — PLAN OF CARE
Goal Outcome Evaluation:  Plan of Care Reviewed With: patient  Progress: improving  Outcome Summary: Pt alert with confusion, pt reorient during shift.  Pt became a little agitated during shfit with positive redirection. Pt up in chair and tolerated well.  Waiting to send pt to [facility].

## 2020-10-12 NOTE — PROGRESS NOTES
Continued Stay Note  [facility]     Patient Name: Vish Morin  MRN: [id number]  Today's Date: 10/12/2020    Admit Date: 10/2/2020    Discharge Plan     Row Name 10/12/20 1536       Plan    Plan  [facility] by  EMS    Plan Comments  Pt with orders to DC today to [facility] for BLAYNE/skilled care.   Call placed to pt's daughter Lynsey to inform of DC and  EMS to transport today at 4:00.  Pt's transfer packet complete and sent with pt.  Pt's RN to call report.        Discharge Codes    No documentation.       Expected Discharge Date and Time     Expected Discharge Date Expected Discharge Time    Oct 12, 2020             YURY Quintero

## 2020-10-12 NOTE — PLAN OF CARE
Goal Outcome Evaluation:  Plan of Care Reviewed With: patient  Progress: improving  Outcome Summary: pt very confused this shift , constantly trying to get out of bed at times , has had several bed changes and Bms , held evening doses of stool regimen ,    Pt to possibly dc to [facility] today ,   Pt has been out of restraints ( Posey ) for longer then 24 hr ,   Patients daughter - sugey updated in the night of patients care and status   Afib , room air VSS , high bp , on metoprolol ., pain control , right hip dressing c/d/I luzmaria with steri stips , pt is very incontinent and q2t , will cont to monitor

## 2020-10-12 NOTE — PROGRESS NOTES
Pharmacy Consult: Warfarin Dosing/ Monitoring    Vish Morin is a 89 y.o. male, estimated creatinine clearance is 62.2 mL/min (A) (by C-G formula based on SCr of 0.62 mg/dL (L)). weighing 70.3 kg (155 lb).    PMH: Atrial fibrillation, CAD, Constipation, Diabetes mellitus, MOURA, GERD, High risk medication use, Hyperlipidemia, Hypertension, Hypothyroidism, Impaired fasting glucose, Lower back pain, Muscle pain, thigh, Muscle weakness, Nausea, KENNETH, Permanent atrial fibrillation, Postural imbalance, Sick sinus syndrome, Sinus bradycardia, and Syncope.    Social History     Tobacco Use    Smoking status: Former Smoker     Types: Cigars    Smokeless tobacco: Never Used   Substance Use Topics    Alcohol use: No     Comment: CAFFEINE USE    Drug use: No     Results from last 7 days   Lab Units 10/11/20  0358 10/10/20  0455 10/10/20  0023 10/09/20  0400 10/08/20  0500 10/08/20  0459 10/07/20  0349 10/06/20  0345 10/05/20  0326   INR  2.64* 2.26*  --  2.13*  --  1.86* 2.04* 2.08* 1.67*   APTT seconds  --   --  40.1*  --   --   --   --   --   --    HEMOGLOBIN g/dL  --   --   --  13.7 13.5  --  14.2 13.8  --    HEMATOCRIT %  --   --   --  38.4 37.5  --  40.7 38.9  --    PLATELETS 10*3/mm3  --   --   --  201 184  --  201 175  --      Results from last 7 days   Lab Units 10/09/20  0400 10/08/20  1853 10/08/20  0459 10/07/20  0349   SODIUM mmol/L 135*  --  136 133*   POTASSIUM mmol/L 3.5 3.9 3.2* 3.6   CHLORIDE mmol/L 103  --  104 102   CO2 mmol/L 22.2  --  20.7* 17.7*   BUN mg/dL 16  --  17 23   CREATININE mg/dL 0.62*  --  0.57* 0.81   CALCIUM mg/dL 8.2*  --  8.1* 8.4*   BILIRUBIN mg/dL 1.3*  --  1.5* 1.5*   ALK PHOS U/L 104  --  110 137*   ALT (SGPT) U/L 31  --  31 45*   AST (SGOT) U/L 52*  --  55* 72*   GLUCOSE mg/dL 132*  --  94 103*     Anticoagulation history: [facility]  Home Med: Warfarin 5 mg po qSuMWThF + Warfarin 2.5 mg po qTuSa (30 mg/weekly)    Hospital Anticoagulation:  Consulting  provider: Dr Sanabria  Start date: 10/6/2020 restart home med  Indication: Afib  Target INR: 2-3  Expected duration: Lifelong  Bridge Therapy: No    Date 10/2 10/3 10/4 10/5 10/6 10/7 10/8  10/9 10/10 10/11 10/12   INR 2.16 2.07 2.01/  1.91/  1.59 1.67 2.08 2.04 1.86 2.13 2.26 2.64 2.61   Warfarin dose  None None None None 2.5 mg 5 mg 5 mg 5 mg 2.5 mg 2 mg 5 mg     Potential drug interactions:   1) Acetaminophen: may result in an increased risk of bleeding.   2) Tramadol: may result in an increase in prothrombin time and an increased risk of bleeding.   3) Levothyroxine: may result in an increased risk of bleeding.     Will continue to monitor for drug-drug interaction as medications are added to patient's profile.     Relevant nutrition status: Diet Regular, poor intake    Other:   Dietary advances -> changes in dietary vitamin K intake may alter response to warfarin.  Acute infection/inflammation may cause an increased sensitivity to warfarin    Lab: Albumin=  3.20 g/dL (10/11/2020)    Education complete?/ Date: [facility]    Assessment/Plan:    1) INR therapeutic today at 2.61  2) Continue home regimen of Warfarin 5 mg po qSuMWThF + Warfarin 2.5 mg po qTuSa (30 mg/weekly)  2) Monitor: s/s bleed  3) Follow up INR tomorrow    Pharmacy will continue to follow until discharge or discontinuation of warfarin.     Alize Sinha [facility]  Clinical Staff Pharmacist

## 2020-10-12 NOTE — DISCHARGE SUMMARY
[facility]    ASSOCIATES  [phone]    DISCHARGE SUMMARY  [facility]    Patient Identification:  Name: Vihs Morin  Age: 89 y.o.  Sex: male  :  1931  MRN: [id number]  Primary Care Physician: Nick Chawla MD    Admit date: 10/2/2020  Discharge date and time:      Discharge Diagnoses:  Closed fracture of right hip (CMS/HCC)    Permanent atrial fibrillation (CMS/HCC)    Hyperlipidemia    Hypertension    Hypothyroidism    Chronic anticoagulation    Status post right hip replacement    Encephalopathy NOS    Leukocytosis    Repeated falls       History of present illness from H&P:  Mr. Morin is a 89 y.o. male with a history of a fib on warfarin, CAD, DM2, HLD, HTN, hypothyroidism, KENNETH that presents to [facility] complaining of right hip pain after fall. Patient reports that he was outside today hen he tripped over his swing and fell to his right side. He denies hitting head or losing consciousness and no other injuries noted. He reports constant pain to his right hip, worse with movement and he hadn't received any pain medication by the time of my exam, so this was ordered and given before I left the room. He denies fever, chest pain, shortness of breath, abdominal pain, changes in bowel or bladder habits, edema. Patient does take warfarin for his a fib but reports his last dose being yesterday. Labs done tonight [on admission] were fairly unremarkable with exception of slight leukocytosis, xray shows nondisplaced fracture through neck of right femur.     Hospital Course:     As noted above the patient was admitted to hospital after a fall and was found to have right hip fracture.  Patient underwent repair by Dr. Zepeda.  Postoperatively the patient had confusion.  Patient is now been off of pain medication for several days and mental status has improved.  Initially required restraints and is now out of restraints for over 24 hours.   Patient had a fall during the hospital stay injuring his head.  A CT scan was done and follow-up CT was done which showed no evidence of bleeding.  Patient's mental status, currently pleasant.  He is confused about his location.  When asked what year it is he is unable to give me the correct year.    Patient underwent operative repair on 10/4 s/p GIL for right femoral neck fracture by Dr. Zepeda  -continue PT at rehab  -mental status is better  -Subacute rehab today     Encephalopathy NOS  -Much improved     Falls at home  -more cooperative     Accelerated HTN  -Patient's blood pressure is been on the high side.  Blood pressure is now 150 -160 systolic.  Patient was just started on Norvasc.  Continue to monitor blood pressure will need to titrate up  -Patient will continue on metoprolol 50 q. 12, lisinopril 40      AFib, chronic AC with Coumadin  -s/p FFP prior to surgery  -INR  pharmacy dosing coumadin  -Patient's rate is controlled     Leukocytosis  -probably reactive--doing well, no fever or hypotension  -Recheck at the nursing care facility     Hypokalemia  -replace with protocol     EKG on 10/4 shows atrial fibrillation, no change from prior EKG    Lower extremity Doppler done on 10/2/2020 shows no DVT    Further hospital course by problem list:      The patient was seen and examined on the day of discharge.    Consults:   Consults     Date and Time Order Name Status Description    10/2/2020 2104 Inpatient Orthopedic Surgery Consult Completed     10/2/2020 2026 Ortho (on-call MD unless specified) Completed     10/2/2020 2026 LHA (on-call MD unless specified) Details Completed           Results from last 7 days   Lab Units 10/11/20  1239   WBC 10*3/mm3 11.51*   HEMOGLOBIN g/dL 14.6   HEMATOCRIT % 44.4   PLATELETS 10*3/mm3 250       Results from last 7 days   Lab Units 10/11/20  1239   SODIUM mmol/L 133*   POTASSIUM mmol/L 3.6   CHLORIDE mmol/L 100   CO2 mmol/L 23.6   BUN mg/dL 10   CREATININE mg/dL 0.75*    GLUCOSE mg/dL 118*   CALCIUM mg/dL 8.6       Significant Diagnostic Studies:   WBC   Date Value Ref Range Status   10/11/2020 11.51 (H) 3.40 - 10.80 10*3/mm3 Final     Hemoglobin   Date Value Ref Range Status   10/11/2020 14.6 13.0 - 17.7 g/dL Final     Hematocrit   Date Value Ref Range Status   10/11/2020 44.4 37.5 - 51.0 % Final     Platelets   Date Value Ref Range Status   10/11/2020 250 140 - 450 10*3/mm3 Final     Sodium   Date Value Ref Range Status   10/11/2020 133 (L) 136 - 145 mmol/L Final     Potassium   Date Value Ref Range Status   10/11/2020 3.6 3.5 - 5.2 mmol/L Final     Chloride   Date Value Ref Range Status   10/11/2020 100 98 - 107 mmol/L Final     CO2   Date Value Ref Range Status   10/11/2020 23.6 22.0 - 29.0 mmol/L Final     BUN   Date Value Ref Range Status   10/11/2020 10 8 - 23 mg/dL Final     Creatinine   Date Value Ref Range Status   10/11/2020 0.75 (L) 0.76 - 1.27 mg/dL Final     Glucose   Date Value Ref Range Status   10/11/2020 118 (H) 65 - 99 mg/dL Final     Calcium   Date Value Ref Range Status   10/11/2020 8.6 8.6 - 10.5 mg/dL Final     AST (SGOT)   Date Value Ref Range Status   10/11/2020 38 1 - 40 U/L Final     ALT (SGPT)   Date Value Ref Range Status   10/11/2020 28 1 - 41 U/L Final     Alkaline Phosphatase   Date Value Ref Range Status   10/11/2020 94 39 - 117 U/L Final     INR   Date Value Ref Range Status   10/12/2020 2.61 (H) 0.90 - 1.10 Final     No results found for: COLORU, CLARITYU, SPECGRAV, PHUR, PROTEINUR, GLUCOSEU, KETONESU, BLOODU, NITRITE, LEUKOCYTESUR, BILIRUBINUR, UROBILINOGEN, RBCUA, WBCUA, BACTERIA, UACOMMENT  No results found for: TROPONINT, TROPONINI, BNP  No components found for: HGBA1C;2  No components found for: TSH;2    Imaging Results (All)     Procedure Component Value Units Date/Time    CT Head Without Contrast [[id number]] Collected: 10/07/20 1702     Updated: 10/08/20 1448    Narrative:      STAT NONCONTRAST HEAD CT 10/07/2020     CLINICAL HISTORY:  Patient fell in room, forehead laceration.     TECHNIQUE: Spiral CT images were obtained from the base of the skull to  the vertex without intravenous contrast and due to motion degradation of  images patient was brought down for additional spiral CT images from the  base of the skull to the vertex without intravenous contrast and images  were reformatted and submitted in 3 mm thick axial CT section with brain  algorithm and 2 mm thick sagittal and coronal reconstructions were  performed and submitted in brain algorithm.      COMPARISON: This is correlated to a noncontrast head CT from [facility] on 10/03/2020.     FINDINGS: There are some patchy areas of low-density extending from the  periventricular into the subcortical white matter of the cerebral  hemispheres consistent with mild-to-moderate small vessel disease. There  is a 3 mm old lacunar infarct in the lateral left thalamus and old  lacunar infarct in the body of the left caudate nucleus. The remainder  of the brain parenchyma is normal in attenuation, ventricles are normal  in size. I see no mass effect and no midline shift, no convincing acute  intracranial hemorrhage is identified, no acute skull fractures  identified. Paranasal sinuses, mastoid air cells and middle ear cavities  are clear. Calcified plaques are present in the intracranial segments of  the distal left vertebral artery cavernous segments of the internal  carotid arteries bilaterally.       Impression:      1. No change when compared to prior head CT 10/03/2020.  2. There is mild-to-moderate small vessel disease in the cerebral white  matter, 3 mm old lacunar infarct in the anterolateral left thalamus and  an old lacunar infarct in the body of the left caudate nucleus and there  are calcified plaques in the intracranial segment of the distal  vertebral arteries and cavernous segments of the internal carotid  arteries bilaterally.   3. There is a small scalp hematoma,  scalp laceration over the anterior  frontal region from recent head trauma. The remainder of the head CT is  unremarkable with no acute skull fracture or intracranial hemorrhage  identified.  4. Due to motion artifact its very difficult to evaluate for subtle  subdural hematoma. Prior to signing this report, I discussed this  limitation with Dr. Gopal Sanabria by telephone on 10/08/2020 10:45 AM and  a head CT is going to be obtained today 10/08/2022 in order to  reevaluate to ensure that there is no subdural hematoma.     Radiation dose reduction techniques were utilized, including automated  exposure control and exposure modulation based on body size.     This report was finalized on 10/8/2020 2:45 PM by Dr. Slim Morrison M.D.       CT Head Without Contrast [[id number]] Collected: 10/08/20 1421     Updated: 10/08/20 1425    Narrative:      CT HEAD WITHOUT CONTRAST     CLINICAL HISTORY: Fall with closed head injury.     TECHNIQUE: CT scan of the head was obtained with 2 mm axial bone  algorithm and 3 mm axial soft tissue algorithm images. No intravenous  contrast was administered.     FINDINGS:     There is a left anterior frontal convexity scalp hematoma. However,  there is no evidence for a calvarial fracture. There is no evidence for  an acute extra-axial hemorrhage. The ventricles, sulci, and cisterns are  age appropriate. Old lacunar disease is seen within the left caudate.  Changes of chronic small vessel ischemic phenomena are incidentally  noted. Atherosclerotic changes are identified within the intracranial  vasculature.       Impression:         No evidence for acute traumatic intracranial pathology.     Radiation dose reduction techniques were utilized, including automated  exposure control and exposure modulation based on body size.     This report was finalized on 10/8/2020 2:22 PM by Dr. Rios Mireles M.D.       XR Knee 1 or 2 View Left [[id number]] Collected: 10/07/20 1637     Updated: 10/07/20 1641     Narrative:      XR KNEE 1 OR 2 VW LEFT-  10/07/2020     HISTORY: Fell, left knee pain.     AP and lateral views of the left knee demonstrate no acute bone joint or  soft tissue abnormalities. Minimal atrophic changes are seen in the  knee. There are some arterial calcification.       Impression:      No acute process.     This report was finalized on 10/7/2020 4:38 PM by Dr. Levar Munoz M.D.       XR Hip With or Without Pelvis 2 - 3 View Right [[id number]] Collected: 10/07/20 1625     Updated: 10/07/20 1630    Narrative:      XR HIP W OR WO PELVIS 2-3 VIEW RIGHT-     INDICATIONS: Trauma     TECHNIQUE: Frontal views of the pelvis, 3 views of the right hip     COMPARISON: 10/5/2020     FINDINGS:     Intact appearing right hip arthroplasty hardware is seen. No acute  fracture, erosion, or dislocation is identified. Degenerative changes  are seen at symphysis pubis and partly included lower lumbar spine.  Arterial calcifications are conspicuous. Previous surgical soft tissue  gas appears decreased.       Impression:         No acute fracture is identified. Follow-up/further evaluation can be  obtained as indications persist.           This report was finalized on 10/7/2020 4:27 PM by Dr. Hira Weber M.D.       XR Chest 1 View [[id number]] Collected: 10/06/20 1001     Updated: 10/06/20 1405    Narrative:      PORTABLE VIEW OF THE CHEST 10/06/2020     CLINICAL HISTORY: Postop leukocytosis.     COMPARISON: This is correlated to a prior chest x-ray 10/02/2020.     FINDINGS: The cardiomediastinal silhouette is upper limits normal to  mildly enlarged. The pulmonary vasculature is within normal limits. The  lungs are clear. The costophrenic angles are sharp. There is high riding  right humeral head suggesting chronic right rotator cuff tear.       Impression:      1. No active disease is seen in the chest and the etiology of the  postoperative leukocytosis is not established on this exam.  2. High riding right  humeral head likely secondary to chronic right  rotator cuff tear.   3. There is borderline cardiomegaly.     This report was finalized on 10/6/2020 2:02 PM by Dr. Slim Morrison M.D.       XR Hip With or Without Pelvis 2 - 3 View Right [[id number]] Collected: 10/05/20 2302     Updated: 10/05/20 2306    Narrative:      AP VIEW OF THE PELVIS +3 VIEWS RIGHT HIP     HISTORY: Fall     COMPARISON: 10/04/2020     FINDINGS:  Patient is status post right hip arthroplasty. Hardware is unchanged in  position. No acute fracture or subluxation is identified. Advanced  degenerative changes of the lumbosacral spine are noted. Additional  degenerative changes are noted involving the SI joints and left hip.  There are dense vascular calcifications.       Impression:      No acute fracture or subluxation identified.     This report was finalized on 10/5/2020 11:03 PM by Dr. Elly Rubio M.D.       XR Hip With or Without Pelvis 1 View Right [[id number]] Collected: 10/04/20 1703     Updated: 10/04/20 1830    Narrative:      TWO-VIEW PORTABLE RIGHT HIP AND PELVIS IN OR     HISTORY: Hip replacement for fracture.     FINDINGS:  Imaging in the operating room was performed at the time of  total hip replacement and the final image shows satisfactory alignment.  Five views were obtained and the fluoroscopy time measures 43 seconds.     This report was finalized on 10/4/2020 6:27 PM by Dr. Greyson Parada M.D.       XR Hip With or Without Pelvis 1 View Right [[id number]] Collected: 10/04/20 1656     Updated: 10/04/20 1700    Narrative:      ONE VIEW PORTABLE RIGHT HIP AND AP PELVIS     HISTORY: Hip replacement for fracture     FINDINGS: The patient has had recent total right hip replacement and the  alignment appears normal.     This report was finalized on 10/4/2020 4:57 PM by Dr. Greyson Parada M.D.       FL C Arm During Surgery [[id number]] Resulted: 10/04/20 1614     Updated: 10/04/20 1614    Narrative:      This procedure was  auto-finalized with no dictation required.    CT Head Without Contrast [[id number]] Collected: 10/03/20 1941     Updated: 10/03/20 1949    Narrative:      CT HEAD WITHOUT CONTRAST     HISTORY: Mental status changes     COMPARISON: 03/17/2020     TECHNIQUE: Axial CT imaging was obtained through the brain. No IV  contrast was administered.     FINDINGS:  No acute intracranial hemorrhage is seen. There is diffuse atrophy.  There is periventricular and deep white matter microangiopathic change.  There is no midline shift or mass effect. No calvarial fracture seen.  Paranasal sinuses and mastoid air cells appear clear.       Impression:      No acute intracranial findings.     Radiation dose reduction techniques were utilized, including automated  exposure control and exposure modulation based on body size.     This report was finalized on 10/3/2020 7:46 PM by Dr. Elly Rubio M.D.       XR Hip With or Without Pelvis 2 - 3 View Right [[id number]] Collected: 10/02/20 2021     Updated: 10/02/20 2046    Narrative:      TWO-VIEW RIGHT HIP AND ONE VIEW AP PELVIS     HISTORY: Fell. Hip pain.     FINDINGS: There is a nondisplaced fracture through the neck of the right  femur.     This report was finalized on 10/2/2020 8:43 PM by Dr. Greyson Parada M.D.       XR Chest 1 View [[id number]] Collected: 10/02/20 2021     Updated: 10/02/20 2046    Narrative:      ONE VIEW PORTABLE CHEST     HISTORY: Preop for hip surgery. Hip fracture. Hypertension.     FINDINGS: The lungs are well-expanded and clear. There is mild  cardiomegaly unchanged from 04/10/2019. There is no acute disease.     This report was finalized on 10/2/2020 8:43 PM by Dr. Greyson Parada M.D.         No results found for: SITE, ALLENTEST, PHART, KOS3QGY, PO2ART, SIC3YBU, BASEEXCESS, A7VZIQLO, HGBBG, HCTABG, OXYHEMOGLOBI, METHHGBN, CARBOXYHGB, CO2CT, BAROMETRIC, MODALITY, FIO2       Discharge Medications      New Medications      Instructions Start Date    acetaminophen 325 MG tablet  Commonly known as: TYLENOL   650 mg, Oral, Every 4 Hours PRN      amLODIPine 5 MG tablet  Commonly known as: NORVASC   5 mg, Oral, Every 24 Hours Scheduled   Start Date: October 13, 2020     docusate sodium 100 MG capsule   100 mg, Oral, 2 Times Daily      polyethylene glycol 17 g packet  Commonly known as: MIRALAX   17 g, Oral, 3 Times Daily         Changes to Medications      Instructions Start Date   warfarin 5 MG tablet  Commonly known as: COUMADIN  What changed: additional instructions   Take one-half tablet (2.5 mg) by mouth Tues, Thurs, and Sat, and take one tablet (5 mg) by mouth all other days or as directed.         Continue These Medications      Instructions Start Date   benazepril 40 MG tablet  Commonly known as: LOTENSIN   TAKE 1 TABLET BY MOUTH EVERY DAY      calcium carbonate 500 MG chewable tablet  Commonly known as: TUMS   1 tablet, Oral, As Needed      levothyroxine 50 MCG tablet  Commonly known as: SYNTHROID, LEVOTHROID   TAKE 1 TABLET BY MOUTH EVERY DAY      magnesium oxide 400 MG tablet  Commonly known as: MAG-OX   TAKE 1 TABLET BY MOUTH EVERY 12 HOURS      meclizine 25 MG tablet  Commonly known as: ANTIVERT   Take 1 tablet by mouth 3 times daily as needed for dizziness or nausea      metoprolol tartrate 50 MG tablet  Commonly known as: LOPRESSOR   TAKE 1 TABLET BY MOUTH EVERY 12 HOURS         Stop These Medications    diphenhydrAMINE 25 mg capsule  Commonly known as: BENADRYL              Patient Instructions:       Future Appointments   Date Time Provider Department Center   12/28/2020 11:40 AM Gopal Kurtz MD [facility] None         Contact information for follow-up providers     Genna Zepeda MD. Schedule an appointment as soon as possible for a visit on 10/28/2020.    Specialty: Orthopedic Surgery  Contact information:  [facility]  [address]  [place] [id number] [phone]             Nick Chawla MD .    Specialty:  Internal Medicine  Contact information:  [address]  [facility] [address]  [phone]                   Contact information for after-discharge care     Destination     [facility] .    Service: Skilled Nursing  Contact information:  [address]  [facility] [address] [phone]                             Discharge Order (From admission, onward)     Start     Ordered    10/12/20 1510  Discharge patient  Once     Expected Discharge Date: 10/12/20    Discharge Disposition: Home or Self Care    Physician of Record for Attribution - Please select from Treatment Team: MURIEL ANTONIO [[id number]]    Review needed by CMO to determine Physician of Record: No       Question Answer Comment   Physician of Record for Attribution - Please select from Treatment Team MURIEL ANTONIO    Review needed by CMO to determine Physician of Record No        10/12/20 1512                Diet Order   Procedures   • Diet Regular     Monitor INR is at the nursing care facility as the patient is on warfarin    Follow-up with nursing care physician in the next 1 to 2 days    CBC/BMP/PT/INR in 1 to 2 days      Total time spent discharging patient including evaluation, post hospitalization follow up,  medication and post hospitalization instructions and education, total time exceeds 30 minutes.    Signed:  Wilson Schmidt MD  10/12/2020  15:12 EDT

## 2020-10-12 NOTE — PLAN OF CARE
Goal Outcome Evaluation:  Plan of Care Reviewed With: patient  Progress: improving  Outcome Summary: Continuing to monitor.VSS. Discharge today.

## 2020-10-12 NOTE — PROGRESS NOTES
"Physicians Statement of Medical Necessity for  Ambulance Transportation    GENERAL INFORMATION     Name: Vish Morin  YOB: 1931  Medicare #: [id number]  Transport Date: 10/12/2020 (Valid for round trips this date, or for scheduled repetitive trips for 60 days from the date signed below.)  Origin: [facility]  Destination: [facility]  Is the Patient's stay covered under Medicare Part A (PPS/DRG?)Yes  Closest appropriate facility? Yes  If this a hosp-hosp transfer? No  Is this a hospice patient? No  MEDICAL NECESSITY QUESTIONAIRE    Ambulance Transportation is medically necessary only if other means of transportation are contraindicated or would be potentially harmful to the patient.  To meet this requirement, the patient must be either \"bed confined\" or suffer from a condition such that transport by means other than an ambulance is contraindicated by the patient's condition.  The following questions must be answered by the healthcare professional signing below for this form to be valid:     1) Describe the MEDICAL CONDITION (physical and/or mental) of this patient AT THE TIME OF AMBULANCE TRANSPORT that requires the patient to be transported in an ambulance, and why transport by other means is contraindicated by the patient's condition: Non ambulatory: confusion   Past Medical History:   Diagnosis Date   • Atrial fibrillation (CMS/HCC)    • CAD (coronary artery disease)    • Constipation    • Diabetes mellitus (CMS/HCC)    • MOURA (dyspnea on exertion)    • GERD (gastroesophageal reflux disease)    • High risk medication use    • Hyperlipidemia    • Hypertension    • Hypothyroidism    • IFG (impaired fasting glucose)    • Lower back pain    • Muscle pain, thigh    • Muscle weakness    • Nausea    • KENNETH (obstructive sleep apnea)    • Permanent atrial fibrillation (CMS/HCC)    • Postural imbalance    • Sick sinus syndrome (CMS/HCC)    • Sinus bradycardia    • Syncope          2) Is this " "patient \"bed confined\" as defined below? yes   To be \"bed confined\" the patient must satisfy all three of the following criteria:  (1) unable to get up from bed without assistance; AND (2) unable to ambulate;  AND (3) unable to sit in a chair or wheelchair.  3) Can this patient safely be transported by car or wheelchair van (I.e., may safely sit during transport, without an attendant or monitoring?) no  4. In addition to completing questions 1-3 above, please check any of the following conditions that apply:          *Note: supporting documentation for any boxes checked must be maintained in the patient's medical records  Confusion and non ambulatory   SIGNATURE OF PHYSICIAN OR OTHER AUTHORIZED HEALTHCARE PROFESSIONAL    I certify that the above information is true and correct based on my evaluation of this patient, and represent that the patient requires transport by ambulance and that other forms of transport are contraindicated.  I understand that this information will be used by the Centers for Medicare and Medicaid Services (CMS) to support the determiniation of medical necessity for ambulance services, and I represent that I have personal knowledge of the patient's condition at the time of transport.    MS   If this box is checked, I also certify that the patient is physically or mentally incapable of signing the ambulance service's claim form and that the institution with which I am affiliated has furnished care, services or assistance to the patient.  My signature below is made on behalf of the patient pursuant to 42 .36(b)(4). In accordance with 42 .37, the specific reason(s) that the patient is physically or mentally incapable of signing the claim for is as follows:  Non ambulatory and confused    Signature of Physician or Healthcare Professional  Date/Time:        (For Scheduled repetitive transport, this form is not valid for transports performed more than 60 days after this date).          "                                                                                                                                   --------------------------------------------------------------------------------------------  Printed Name and Credentials of Physician or Authorized Healthcare Professional     *Form must be signed by patient's attending physician for scheduled, repetitive transports,.  For non-repetitive ambulance transports, if unable to obtain the signature of the attending physician, any of the following may sign (please select below):     Physician  Clinical Nurse Specialist  Registered Nurse     Physician Assistant  Discharge Planner  Licensed Practical Nurse     Nurse Practitioner

## 2020-10-12 NOTE — OUTREACH NOTE
Prep Survey      Responses   [facility] facility patient discharged from?  [place]   Is LACE score < 7 ?  No   Eligibility  Not Eligible   What are the reasons patient is not eligible?  [facility]   Does the patient have one of the following disease processes/diagnoses(primary or secondary)?  Total Joint Replacement   Prep survey completed?  Yes          Stacey Noe RN

## 2020-10-13 NOTE — PROGRESS NOTES
Case Management Discharge Note      Final Note: DC  to skilled bed at [facility] via [facility]. Beverly Sanchez RN    Provided Post Acute Provider List?: Refused  Refused Provider List Comment: Call received from Jaime for [facility] will have a bed tomorrow. Patria EDWARDS  Provided Post Acute Provider Quality & Resource List?: N/A    Selected Continued Care - Discharged on 10/12/2020 Admission date: 10/2/2020 - Discharge disposition: Home or Self Care    Destination Coordination complete    Service Provider Selected Services Address Phone Fax    [facility]  Skilled Nursing [address] [address] [phone]          Durable Medical Equipment    No services have been selected for the patient.              Dialysis/Infusion    No services have been selected for the patient.              Home Medical Care    No services have been selected for the patient.              Therapy    No services have been selected for the patient.              Community Resources    No services have been selected for the patient.                  Transportation Services  Ambulance: [facility]    Final Discharge Disposition Code: 03 - skilled nursing facility (SNF)

## 2020-11-01 PROBLEM — S71.001A OPEN WOUND OF RIGHT HIP: Status: ACTIVE | Noted: 2020-01-01

## 2020-11-01 NOTE — PLAN OF CARE
Goal Outcome Evaluation:  Plan of Care Reviewed With: patient  Progress: no change   Pt admitted from ER. He had right hip surgery a month ago and was in Rehab, where he fell and post op incision got dehisced. Family called EMS to check on the wound. Wound cultures pending . Pt denies pain. Island dressing placed in ER. Pt ambulates with a walker and assist of 1. Alert/oriented. Ortho consult in am. Bed alarm on.

## 2020-11-01 NOTE — PROGRESS NOTES
[facility]     LOS: 0 days     Subjective:    CC:Wound Infection    DIET:  Diet Order   Procedures   • NPO Diet     No cp  No soa  No bm  Objective:    Vital Signs:  Temp:  [97 °F (36.1 °C)-98.3 °F (36.8 °C)] 97 °F (36.1 °C)  Heart Rate:  [83-96] 92  Resp:  [13-20] 14  BP: (127-168)/() 136/79    SpO2:  [97 %-100 %] 100 %  on   ;   Device (Oxygen Therapy): room air  Body mass index is 20.24 kg/m².    Physical Exam  Constitutional:       Appearance: He is well-developed.   HENT:      Head: Normocephalic and atraumatic.   Cardiovascular:      Rate and Rhythm: Normal rate and regular rhythm.      Heart sounds: No murmur. No friction rub.   Pulmonary:      Effort: Pulmonary effort is normal.      Breath sounds: Normal breath sounds.   Abdominal:      General: Bowel sounds are normal. There is no distension.      Palpations: Abdomen is soft.      Tenderness: There is no abdominal tenderness.   Musculoskeletal:      Comments: Wound on right hip   Skin:     General: Skin is warm and dry.   Neurological:      Mental Status: He is alert.   Psychiatric:         Mood and Affect: Mood normal.         Behavior: Behavior normal.         Results Review:    Glucose   Date Value Ref Range Status   11/01/2020 110 (H) 65 - 99 mg/dL Final   10/31/2020 129 (H) 65 - 99 mg/dL Final     Results from last 7 days   Lab Units 11/01/20  0413   WBC 10*3/mm3 6.66   HEMOGLOBIN g/dL 13.2   HEMATOCRIT % 39.2   PLATELETS 10*3/mm3 252     Results from last 7 days   Lab Units 11/01/20 0413 10/31/20  2338   SODIUM mmol/L 140 141   POTASSIUM mmol/L 3.6 3.6   CHLORIDE mmol/L 105 106   CO2 mmol/L 25.8 25.6   BUN mg/dL 8 11   CREATININE mg/dL 0.74* 0.71*   CALCIUM mg/dL 9.0 8.8   BILIRUBIN mg/dL  --  0.4   ALK PHOS U/L  --  110   ALT (SGPT) U/L  --  26   AST (SGOT) U/L  --  28   GLUCOSE mg/dL 110* 129*     Results from last 7 days   Lab Units 11/01/20  0413   INR  1.55*             Cultures:  No results found for:  BLOODCX, URINECX, WOUNDCX, MRSACX, RESPCX, STOOLCX    I have reviewed daily medications and changes in CPOE    Scheduled meds  [START ON 11/2/2020] amLODIPine, 5 mg, Oral, Q24H  levothyroxine, 50 mcg, Oral, Daily  metoprolol tartrate, 50 mg, Oral, Q12H  sodium chloride, 10 mL, Intravenous, Q12H  warfarin, 7.5 mg, Oral, Once        Pharmacy to dose warfarin,       PRN meds  •  acetaminophen **OR** acetaminophen **OR** acetaminophen  •  acetaminophen  •  ondansetron  •  Pharmacy to dose warfarin  •  [COMPLETED] Insert peripheral IV **AND** sodium chloride  •  sodium chloride        Open wound of right hip    Permanent atrial fibrillation (CMS/HCC)    Gastroesophageal reflux disease    Hyperlipidemia    Hypertension    Hypothyroidism    Chronic anticoagulation        Assessment/Plan:    Open wound of right hip  -going to surgery today  -Obtain wound culture if there is any drainage, there is no redness or significant tenderness at site, white blood cell count normal, lactic acid normal  -Wound care nurse to see  -Id consult     A. fib/chronic anticoagulation/hypertension  -INR 1.55  -hold coumadin     GERD/hyperlipidemia/hypothyroidism  -levothyroxine          Wilson Schmidt MD  11/01/20  16:23 EST

## 2020-11-01 NOTE — PLAN OF CARE
Problem: Adult Inpatient Plan of Care  Goal: Plan of Care Review  Recent Flowsheet Documentation  Taken 11/1/2020 1017 by Rosmery Knowles PT  Plan of Care Reviewed With: patient  Outcome Summary: Pt was admitted from rehab with concerns about wound at site of recent hip surgery. Pt has been at rehab since surgery. He is ambulating well with Rwx, tolerated ROM exercises and had no complaints of pain. Per notes, daughter plans for pt to return to different rehab facility. PT will follow 3x/wk to ensure pt continues to progress towards baseline. Patient was intermittently wearing a face mask during this therapy encounter. Therapist used appropriate personal protective equipment including eye protection, mask, and gloves.  Mask used was standard procedure mask. Appropriate PPE was worn during the entire therapy session. Hand hygiene was completed before and after therapy session. Patient is not in enhanced droplet precautions.

## 2020-11-01 NOTE — CONSULTS
Orthopedic Consult      Patient: Vish Morin  YOB: 1931     Date of Admission: 10/31/2020  9:53 PM    Medical Record Number: [id number]    Attending Physician: Wilson Schmidt MD    Consulting Physician: Genna Zepeda MD    Reason for Consult: Wound dehiscence/nonhealing wound right hip status post right total hip arthroplasty    History of Present Illness: 89 y.o. male admitted to [facility] with Open wound of right hip, initial encounter [S71.001A].     The patient was evaluated in the emergency room and was diagnosed with a nonhealing wound.  The patient is known to me from his right total hip arthroplasty on October 4, 2020.  He had some postoperative confusion.  Subsequently the patient was discharged to [facility].  Apparently the family was not satisfied with the care and they wanted him to be transferred to the emergency room for a nonhealing wound.    Secondary to the age and multiple medical co morbidities the patient was admitted to the hospitalist.   As I was on call for the emergency room I was consulted for further evaluation and treatment.       Denies any history of fevers, chills,.  He denies any pain in the right hip.    The patient is accompanied by his daughter family members  to this hospital visit.     Patient is a community ambulator prior to the right hip injury.     The patient denies history of dementia.    Patient denies any history of: DVT/PE, MRSA, COPD, CHF, CAD, Diabetes mellitus, Dementia or A-Fib.   The patient has history of :  The patient is on anticoagulants: Warfarin      Past medical history, Past surgical history, family history, Social history, current medications, home medications Have been reviewed by me.    Past Medical History:   Diagnosis Date   • Atrial fibrillation (CMS/HCC)    • CAD (coronary artery disease)    • Constipation    • Diabetes mellitus (CMS/HCC)    • MOURA (dyspnea on exertion)    • GERD (gastroesophageal reflux  disease)    • High risk medication use    • Hyperlipidemia    • Hypertension    • Hypothyroidism    • IFG (impaired fasting glucose)    • Lower back pain    • Muscle pain, thigh    • Muscle weakness    • Nausea    • KENNETH (obstructive sleep apnea)    • Permanent atrial fibrillation (CMS/HCC)    • Postural imbalance    • Sick sinus syndrome (CMS/HCC)    • Sinus bradycardia    • Syncope      Past Surgical History:   Procedure Laterality Date   • CARDIAC CATHETERIZATION  10/10/2008    diagnostic   • CARDIAC ELECTROPHYSIOLOGY MAPPING AND ABLATION  10/17/2013    [facility] Dr. Tessie Figueroa   • CARDIAC ELECTROPHYSIOLOGY MAPPING AND ABLATION  09/25/2012    [facility] Dr. Tessie Figueroa   • CARDIOVERSION  02/12/2013    [facility] Dr. Tessie Figueroa   • COLONOSCOPY     • CORONARY ANGIOPLASTY WITH STENT PLACEMENT  12/17/2004    Drug-eluting Sirolimus.  3.5 x 33mm Cypher stent to LAD.  3.0 x 13mm Cypher stent to ROSALIA.   • TOTAL HIP ARTHROPLASTY Right 10/4/2020    Procedure: TOTAL HIP ARTHROPLASTY ANTERIOR WITH HANA TABLE;  Surgeon: Genna Zepeda MD;  Location: [facility];  Service: Orthopedics;  Laterality: Right;     Social History     Occupational History   • Not on file   Tobacco Use   • Smoking status: Former Smoker     Types: Cigars   • Smokeless tobacco: Never Used   Substance and Sexual Activity   • Alcohol use: No     Comment: CAFFEINE USE   • Drug use: No   • Sexual activity: Never      Social History     Social History Narrative   • Not on file     Family History   Problem Relation Age of Onset   • Heart disease Other    • Malig Hyperthermia Neg Hx         No Known Allergies     Home Medications:  Medications Prior to Admission   Medication Sig Dispense Refill Last Dose   • warfarin (COUMADIN) 5 MG tablet Take one-half tablet (2.5 mg) by mouth Tues, Thurs, and Sat, and take one tablet (5 mg) by mouth all other days or as directed. (Patient taking differently: Take one-half  tablet (2.5 mg) by mouth Tues and Sat, and take one tablet (5 mg) by mouth all other days or as directed.) 75 tablet 1 10/31/2020 at Unknown time   • acetaminophen (TYLENOL) 325 MG tablet Take 2 tablets by mouth Every 4 (Four) Hours As Needed for Mild Pain .      • amLODIPine (NORVASC) 5 MG tablet Take 1 tablet by mouth Daily.      • benazepril (LOTENSIN) 40 MG tablet TAKE 1 TABLET BY MOUTH EVERY DAY 90 tablet 1    • calcium carbonate (TUMS) 500 MG chewable tablet Chew 1 tablet As Needed for Indigestion or Heartburn.      • levothyroxine (SYNTHROID, LEVOTHROID) 50 MCG tablet TAKE 1 TABLET BY MOUTH EVERY DAY 90 tablet 1    • magnesium oxide (MAG-OX) 400 MG tablet TAKE 1 TABLET BY MOUTH EVERY 12 HOURS 60 tablet 5    • meclizine (ANTIVERT) 25 MG tablet Take 1 tablet by mouth 3 times daily as needed for dizziness or nausea 30 tablet 0    • metoprolol tartrate (LOPRESSOR) 50 MG tablet TAKE 1 TABLET BY MOUTH EVERY 12 HOURS 180 tablet 1    • polyethylene glycol (polyethylene glycol) 17 g packet Take 17 g by mouth 3 (Three) Times a Day.          Current Medications:  Scheduled Meds:sodium chloride, 10 mL, Intravenous, Q12H      Continuous Infusions:Pharmacy to dose warfarin,       PRN Meds:.•  acetaminophen **OR** acetaminophen **OR** acetaminophen  •  ondansetron  •  Pharmacy to dose warfarin  •  [COMPLETED] Insert peripheral IV **AND** sodium chloride  •  sodium chloride    Review of Systems:   A 12 point system review was reviewed with the patient and from the chart  and is negative except as mentioned in history of present illness.      Physical Exam: 89 y.o. male                    Vitals:    11/01/20 0140 11/01/20 0220 11/01/20 0400 11/01/20 0741   BP:  164/87 127/68 127/74   BP Location:  Right arm Right arm Right arm   Patient Position:  Lying Lying Lying   Pulse:  93 92 91   Resp:  18 18 18   Temp:  97.5 °F (36.4 °C) 97.5 °F (36.4 °C) 97.8 °F (36.6 °C)   TempSrc:  Skin Skin Oral   SpO2:  99% 99% 97%   Weight: 64 kg  (141 lb 1.5 oz) 64 kg (141 lb 1.5 oz)     Height:            Gait: Not evaluated.     Mental/HEENT/cardio/skin: The patient's general appearance was well-nourished, well-hydrated, no acute distress.  Orientation was alert and oriented ×3.  The patient's mood was normal.   Pulmonary exam shows normal late exchange, no labored breathing, or shortness of breath.      Extremities: Right hip there is dehiscence and necrosis of the skin edges.  The wound measures approximately 10 cm x 4 cm at its widest dimensions.  The floor of the wound is healthy mild granulation tissue.  No active signs of infection there is some foul-smelling discharge.    Pulses:  Pulses palpable and equal bilaterally    Diagnostic Tests:    Results from last 7 days   Lab Units 11/01/20  0413 10/31/20  2338   WBC 10*3/mm3 6.66 6.90   HEMOGLOBIN g/dL 13.2 13.3   HEMATOCRIT % 39.2 38.3   PLATELETS 10*3/mm3 252 239     Results from last 7 days   Lab Units 11/01/20  0413 10/31/20  2338   SODIUM mmol/L 140 141   POTASSIUM mmol/L 3.6 3.6   CHLORIDE mmol/L 105 106   CO2 mmol/L 25.8 25.6   BUN mg/dL 8 11   CREATININE mg/dL 0.74* 0.71*   GLUCOSE mg/dL 110* 129*   CALCIUM mg/dL 9.0 8.8     Results from last 7 days   Lab Units 11/01/20 0413   INR  1.55*     No results found for: URICACID  No results found for: CRYSTAL  Microbiology Results (last 10 days)     Procedure Component Value - Date/Time    Wound Culture - Wound, Hip, Right [[id number]] Collected: 11/01/20 0425    Lab Status: Preliminary result Specimen: Wound from Hip, Right Updated: 11/01/20 0916     Gram Stain Moderate (3+) WBCs seen      No organisms seen    COVID PRE-OP / PRE-PROCEDURE SCREENING ORDER (NO ISOLATION) - Swab, Nasopharynx [[id number]]  (Normal) Collected: 11/01/20 0044    Lab Status: Final result Specimen: Swab from Nasopharynx Updated: 11/01/20 0145    Narrative:      The following orders were created for panel order COVID PRE-OP / PRE-PROCEDURE SCREENING ORDER (NO ISOLATION) - Swab,  Nasopharynx.  Procedure                               Abnormality         Status                     ---------                               -----------         ------                     Respiratory Panel PCR w/...[[id number]]  Normal              Final result                 Please view results for these tests on the individual orders.    Respiratory Panel PCR w/COVID-19(SARS-CoV-2) STEVAN/ALEXANDRO/BOGDAN/PAD/COR/MAD/LONG In-House, NP Swab in UTM/VTM, 3-4 HR TAT - Swab, Nasopharynx [[id number]]  (Normal) Collected: 11/01/20 0044    Lab Status: Final result Specimen: Swab from Nasopharynx Updated: 11/01/20 0145     ADENOVIRUS, PCR Not Detected     Coronavirus 229E Not Detected     Coronavirus HKU1 Not Detected     Coronavirus NL63 Not Detected     Coronavirus OC43 Not Detected     COVID19 Not Detected     Human Metapneumovirus Not Detected     Human Rhinovirus/Enterovirus Not Detected     Influenza A PCR Not Detected     Influenza B PCR Not Detected     Parainfluenza Virus 1 Not Detected     Parainfluenza Virus 2 Not Detected     Parainfluenza Virus 3 Not Detected     Parainfluenza Virus 4 Not Detected     RSV, PCR Not Detected     Bordetella pertussis pcr Not Detected     Bordetella parapertussis PCR Not Detected     Chlamydophila pneumoniae PCR Not Detected     Mycoplasma pneumo by PCR Not Detected    Narrative:      Fact sheet for providers: https://docs.[employer]/wp-content/uploads/LBM0599-7745-YB4.1-EUA-Provider-Fact-Sheet-3.pdf    Fact sheet for patients: https://docs.[employer]/wp-content/uploads/OYK7172-6483-QI6.1-EUA-Patient-Fact-Sheet-1.pdf        No radiology results for the last 7 days    The labs, X-ray results for preoperative evaluation have been reviewed by me.    Assessment: Wound dehiscence/nonhealing wound status post right anterior total hip replacement    Patient Active Problem List   Diagnosis   • Permanent atrial fibrillation (CMS/HCC)   • Constipation   • Gastroesophageal reflux disease   •  Hyperlipidemia   • Hypertension   • Hypothyroidism   • Impaired fasting glucose   • Low back pain   • Pain in the muscles   • Muscle weakness   • Nausea   • Poor posture   • Mineral deficiency   • Rash   • Weight loss   • Dizziness   • Essential (primary) hypertension   • Chronic anticoagulation   • Closed fracture of right hip (CMS/HCC)   • Status post right hip replacement   • Encephalopathy NOS   • Leukocytosis   • Repeated falls   • Open wound of right hip       Plan:    Options and alternatives were discussed in detail with the patient and   family.   The patient is indicated for formal surgical debridement and possible wound closure and placement of wound VAC.   Likely risks and benefits of the procedure including but not limited to persistent infection, nonhealing wound, sepsis, spread to the hip joint have been discussed in detail despite the risks involved the patient and family elected to proceed.  Patient will be made n.p.o.  Informed consent  His INR is 1.55 no need to hold off on anticoagulation  Wound cultures are pending       Date: 11/1/2020    Genna Zepeda MD     CC: Nick Chawla MD; MD Brayan Benitez Stephen A, MD

## 2020-11-01 NOTE — PROGRESS NOTES
Pharmacy Consult: Warfarin Dosing/ Monitoring    Vish Morin is a 89 y.o. male, estimated creatinine clearance is 56.7 mL/min (A) (by C-G formula based on SCr of 0.74 mg/dL (L)). weighing 64 kg (141 lb 1.5 oz).     has a past medical history of Atrial fibrillation (CMS/HCC), CAD (coronary artery disease), Constipation, Diabetes mellitus (CMS/HCC), MOURA (dyspnea on exertion), GERD (gastroesophageal reflux disease), High risk medication use, Hyperlipidemia, Hypertension, Hypothyroidism, IFG (impaired fasting glucose), Lower back pain, Muscle pain, thigh, Muscle weakness, Nausea, KENNETH (obstructive sleep apnea), Permanent atrial fibrillation (CMS/HCC), Postural imbalance, Sick sinus syndrome (CMS/HCC), Sinus bradycardia, and Syncope.    Social History     Tobacco Use    Smoking status: Former Smoker     Types: Cigars    Smokeless tobacco: Never Used   Substance Use Topics    Alcohol use: No     Comment: CAFFEINE USE    Drug use: No       Results from last 7 days   Lab Units 11/01/20  0413 10/31/20  2338   INR  1.55*  --    HEMOGLOBIN g/dL 13.2 13.3   HEMATOCRIT % 39.2 38.3   PLATELETS 10*3/mm3 252 239     Results from last 7 days   Lab Units 11/01/20 0413 10/31/20  2338   SODIUM mmol/L 140 141   POTASSIUM mmol/L 3.6 3.6   CHLORIDE mmol/L 105 106   CO2 mmol/L 25.8 25.6   BUN mg/dL 8 11   CREATININE mg/dL 0.74* 0.71*   CALCIUM mg/dL 9.0 8.8   BILIRUBIN mg/dL  --  0.4   ALK PHOS U/L  --  110   ALT (SGPT) U/L  --  26   AST (SGOT) U/L  --  28   GLUCOSE mg/dL 110* 129*     Anticoagulation history:   Take one-half tablet (2.5 mg) by mouth Tues and Sat, and take one tablet (5 mg) by mouth all other days or as directed.            Hospital Anticoagulation:  Consulting provider: Tenisha LEE  Start date: 11/1/20  Indication: AFIB requiring full anticoagulation  Target INR: 2-3  Expected duration: indefinite  Bridge Therapy: none                Date 11/1            INR 1.55            Warfarin dose 7.5mg               Potential drug interactions: none    Relevant nutrition status: NPO    Education complete?/ Date: pending    Assessment/Plan:  Dose: INR at 1.55 (lower than target) therefore will give 7.5mg of warfarin today. Will not start on previous home regimen till INR in target. (might take a few days)  Monitor INR daily  Follow up tomorrow    Pharmacy will continue to follow until discharge or discontinuation of warfarin.   Clarke Chaidez RPH  11/1/2020

## 2020-11-01 NOTE — THERAPY EVALUATION
Patient Name: Vish Morin  : 1931    MRN: [id number]                              Today's Date: 2020       Admit Date: 10/31/2020    Visit Dx:     ICD-10-CM ICD-9-CM   1. Open wound of right hip, initial encounter  S71.001A 890.0     Patient Active Problem List   Diagnosis   • Permanent atrial fibrillation (CMS/HCC)   • Constipation   • Gastroesophageal reflux disease   • Hyperlipidemia   • Hypertension   • Hypothyroidism   • Impaired fasting glucose   • Low back pain   • Pain in the muscles   • Muscle weakness   • Nausea   • Poor posture   • Mineral deficiency   • Rash   • Weight loss   • Dizziness   • Essential (primary) hypertension   • Chronic anticoagulation   • Closed fracture of right hip (CMS/HCC)   • Status post right hip replacement   • Encephalopathy NOS   • Leukocytosis   • Repeated falls   • Open wound of right hip     Past Medical History:   Diagnosis Date   • Atrial fibrillation (CMS/HCC)    • CAD (coronary artery disease)    • Constipation    • Diabetes mellitus (CMS/HCC)    • MOURA (dyspnea on exertion)    • GERD (gastroesophageal reflux disease)    • High risk medication use    • Hyperlipidemia    • Hypertension    • Hypothyroidism    • IFG (impaired fasting glucose)    • Lower back pain    • Muscle pain, thigh    • Muscle weakness    • Nausea    • KENNETH (obstructive sleep apnea)    • Permanent atrial fibrillation (CMS/HCC)    • Postural imbalance    • Sick sinus syndrome (CMS/HCC)    • Sinus bradycardia    • Syncope      Past Surgical History:   Procedure Laterality Date   • CARDIAC CATHETERIZATION  10/10/2008    diagnostic   • [facility]   • CARDIAC ELECTROPHYSIOLOGY MAPPING AND ABLATION  2012    [facility] Dr. Tessie Figueroa   • CARDIOVERSION  2013    [facility] Dr. Tessie Figueroa   • COLONOSCOPY     • CORONARY ANGIOPLASTY WITH STENT PLACEMENT  2004     Drug-eluting Sirolimus.  3.5 x 33mm Cypher stent to LAD.  3.0 x 13mm Cypher stent to ROSALIA.   • TOTAL HIP ARTHROPLASTY Right 10/4/2020    Procedure: TOTAL HIP ARTHROPLASTY ANTERIOR WITH HANA TABLE;  Surgeon: Genna Zepeda MD;  Location: [facility];  Service: Orthopedics;  Laterality: Right;     General Information     Row Name 11/01/20 1005          Physical Therapy Time and Intention    Document Type  evaluation  -     Mode of Treatment  individual therapy;physical therapy  -     Row Name 11/01/20 1005          General Information    Prior Level of Function  independent:  -     Existing Precautions/Restrictions  fall  -     Barriers to Rehab  previous functional deficit  -     Row Name 11/01/20 1005          Living Environment    Lives With  child(erum), adult admitted from rehab  -     Row Name 11/01/20 1005          Cognition    Orientation Status (Cognition)  oriented x 3  -       User Key  (r) = Recorded By, (t) = Taken By, (c) = Cosigned By    Initials Name Provider Type    Rosmery Antoine PT Physical Therapist        Mobility     Row Name 11/01/20 1005          Bed Mobility    Comment (Bed Mobility)  up in chair  -     Row Name 11/01/20 1005          Sit-Stand Transfer    Sit-Stand Bond (Transfers)  contact guard  -     Assistive Device (Sit-Stand Transfers)  walker, front-wheeled  -     Row Name 11/01/20 1005          Gait/Stairs (Locomotion)    Bond Level (Gait)  contact guard  -     Assistive Device (Gait)  walker, front-wheeled  -     Distance in Feet (Gait)  100  -KH     Deviations/Abnormal Patterns (Gait)  gait speed decreased  -       User Key  (r) = Recorded By, (t) = Taken By, (c) = Cosigned By    Initials Name Provider Type    Rosmery Antoine PT Physical Therapist        Obj/Interventions     Row Name 11/01/20 1016          Range of Motion Comprehensive    Comment, General Range of Motion  BLE WFL  -     Row Name  11/01/20 1016          Strength Comprehensive (MMT)    Comment, General Manual Muscle Testing (MMT) Assessment  BLE WFL  -KH     Row Name 11/01/20 1016          Motor Skills    Therapeutic Exercise  -- hip prrotocol x 10 reps BLE  -     Row Name 11/01/20 1016          Balance    Balance Assessment  sitting static balance;sitting dynamic balance;standing static balance;standing dynamic balance  -KH     Static Sitting Balance  WNL  -KH     Dynamic Sitting Balance  WNL  -KH     Static Standing Balance  WFL with AD  -KH     Dynamic Standing Balance  WFL with Rwx  -KH       User Key  (r) = Recorded By, (t) = Taken By, (c) = Cosigned By    Initials Name Provider Type    Rosmery Antoine, PT Physical Therapist        Goals/Plan     Row Name 11/01/20 1020          Bed Mobility Goal 1 (PT)    Activity/Assistive Device (Bed Mobility Goal 1, PT)  bed mobility activities, all  -KH     Davis Level/Cues Needed (Bed Mobility Goal 1, PT)  independent  -KH     Time Frame (Bed Mobility Goal 1, PT)  1 week  -     Row Name 11/01/20 1020          Transfer Goal 1 (PT)    Activity/Assistive Device (Transfer Goal 1, PT)  transfers, all;walker, rolling  -KH     Davis Level/Cues Needed (Transfer Goal 1, PT)  supervision required  -KH     Row Name 11/01/20 1020          Gait Training Goal 1 (PT)    Activity/Assistive Device (Gait Training Goal 1, PT)  gait (walking locomotion);walker, rolling  -KH     Davis Level (Gait Training Goal 1, PT)  supervision required  -KH     Distance (Gait Training Goal 1, PT)  150  -KH     Time Frame (Gait Training Goal 1, PT)  1 week  -     Row Name 11/01/20 1020          Patient Education Goal (PT)    Activity (Patient Education Goal, PT)  HEP  -KH     Davis/Cues/Accuracy (Memory Goal 2, PT)  demonstrates adequately  -KH     Time Frame (Patient Education Goal, PT)  1 week  -KH       User Key  (r) = Recorded By, (t) = Taken By, (c) = Cosigned By    Initials Name  Provider Type    Rosmery Antoine, PT Physical Therapist        Clinical Impression     Row Name 11/01/20 1017          Pain    Additional Documentation  Pain Scale: Numbers Pre/Post-Treatment (Group)  -     Row Name 11/01/20 1017          Pain Scale: Numbers Pre/Post-Treatment    Pretreatment Pain Rating  0/10 - no pain  -     Posttreatment Pain Rating  0/10 - no pain  -     Row Name 11/01/20 1017          Plan of Care Review    Plan of Care Reviewed With  patient  -     Outcome Summary  Pt was admitted from rehab with concerns about wound at site of recent hip surgery. Pt has been at rehab since surgery. He is ambulating well with Rwx, tolerated ROM exercises and had no complaints of pain. Per notes, daughter plans for pt to return to different rehab facility. PT will follow 3x/wk to ensure pt continues to progress towards baseline.  -     Row Name 11/01/20 1017          Therapy Assessment/Plan (PT)    Patient/Family Therapy Goals Statement (PT)  pt is unsure  -     Rehab Potential (PT)  good, to achieve stated therapy goals  -     Criteria for Skilled Interventions Met (PT)  skilled treatment is necessary  -[facility] Name 11/01/20 1017          Positioning and Restraints    Pre-Treatment Position  sitting in chair/recliner  -     Post Treatment Position  chair  -     In Chair  sitting;call light within reach;encouraged to call for assist;exit alarm on;notified [facility]  -       User Key  (r) = Recorded By, (t) = Taken By, (c) = Cosigned By    Initials Name Provider Type    Rosmery Antoine, PT Physical Therapist        Outcome Measures     Row Name 11/01/20 1021          How much help from another person do you currently need...    Turning from your back to your side while in flat bed without using bedrails?  3  -KH     Moving from lying on back to sitting on the side of a flat bed without bedrails?  3  -KH     Moving to and from a bed to a chair (including a wheelchair)?  3   -KH     Standing up from a chair using your arms (e.g., wheelchair, bedside chair)?  3  -KH     Climbing 3-5 steps with a railing?  2  -KH     To walk in hospital room?  3  -KH     AM-PAC 6 Clicks Score (PT)  17  -     Row Name 11/01/20 1021          Functional Assessment    Outcome Measure Options  AM-PAC 6 Clicks Basic Mobility (PT)  -       User Key  (r) = Recorded By, (t) = Taken By, (c) = Cosigned By    Initials Name Provider Type    Rosmery Antoine, PT Physical Therapist        Physical Therapy Education                 Title: PT OT SLP Therapies (Done)     Topic: Physical Therapy (Done)     Point: Mobility training (Done)     Learning Progress Summary           Patient Acceptance, E,D, VU,NR by  at 11/1/2020 1021                   Point: Home exercise program (Done)     Learning Progress Summary           Patient Acceptance, E,D, VU,NR by  at 11/1/2020 1021                   Point: Body mechanics (Done)     Learning Progress Summary           Patient Acceptance, E,D, VU,NR by  at 11/1/2020 1021                   Point: Precautions (Done)     Learning Progress Summary           Patient Acceptance, E,D, VU,NR by  at 11/1/2020 1021                               User Key     Initials Effective Dates Name Provider Type [facility] 02/05/19 -  Rosmery Knowles, PT Physical Therapist PT              PT Recommendation and Plan  Planned Therapy Interventions (PT): balance training, bed mobility training, gait training, home exercise program, patient/family education, strengthening, transfer training  Plan of Care Reviewed With: patient  Outcome Summary: Pt was admitted from rehab with concerns about wound at site of recent hip surgery. Pt has been at rehab since surgery. He is ambulating well with Rwx, tolerated ROM exercises and had no complaints of pain. Per notes, daughter plans for pt to return to different rehab facility. PT will follow 3x/wk to ensure pt continues to progress  towards baseline.     Time Calculation:   PT Charges     Row Name 11/01/20 1022             Time Calculation    Start Time  0935  -      Stop Time  0948  -      Time Calculation (min)  13 min  -      PT Received On  11/01/20  -      PT - Next Appointment  11/02/20  -      PT Goal Re-Cert Due Date  11/08/20  -         Time Calculation- PT    Total Timed Code Minutes- PT  8 minute(s)  -        User Key  (r) = Recorded By, (t) = Taken By, (c) = Cosigned By    Initials Name Provider Type    Rosmery Antoine, PT Physical Therapist        Therapy Charges for Today     Code Description Service Date Service Provider Modifiers Qty    57860058336 HC PT EVAL MOD COMPLEXITY 2 11/1/2020 Rosmery Knowles, PT GP 1    [id number] HC PT THER PROC EA 15 MIN 11/1/2020 Rosmery Knowles, PT GP 1          PT G-Codes  Outcome Measure Options: AM-PAC 6 Clicks Basic Mobility (PT)  AM-PAC 6 Clicks Score (PT): 17    Rosmery Knowles PT  11/1/2020

## 2020-11-01 NOTE — PROGRESS NOTES
APS report noted on my desk upon arrival- patient not in the ED- faxing to agencies and [facility] per policy. Stacey Cordoba RN

## 2020-11-01 NOTE — ED NOTES
Pt daughter at bedside. Pt had R hip replacement sx about 1 month ago after fall at home. Was sent to [facility] for [facility]. Daughter reports pt has large wound on R hip that she was just informed about 2 days ago. Daughter requested for pt to be brought to ER for further eval. Pt denies any fever or pain. Pt a&ox4.     Pt 7 family wearing mask and RN wearing mask and eyewear throughout encounter.        Pao Andujar, RN  10/31/20 5132

## 2020-11-01 NOTE — ED PROVIDER NOTES
EMERGENCY DEPARTMENT ENCOUNTER    CHIEF COMPLAINT  Chief Complaint: Open wound  History given by: Patient  History limited by: None  Room Number: P395/1  PMD: Nick Chawla MD      HPI:  Pt is a 89 y.o. male who presents complaining of an open wound to his right hip that he first noticed approximately 1 week ago that has been steadily worsening since that point.  Symptoms were initially gradual in onset.  The patient states that there is very little pain at the wound/incision site.  The patient did have a surgical repair of a fractured right femoral neck just under 1 month ago and he is currently in a rehabilitation facility secondary to that.  Per the family, he is currently on oral antibiotics and is receiving wound care for this wound.  The patient became quite a bit more concerned tonight because he feels as though the wound is not being adequately cared for and he feels as though that it has worsened.  Symptoms are currently moderate in intensity.  He denies any associated fever/chills, headache, chest pain, nausea/vomiting, or known sick contacts.  There are no aggravating or alleviating factors.    Previous Episodes: none  Treatment before arrival: none      PAST MEDICAL HISTORY  Active Ambulatory Problems     Diagnosis Date Noted   • Permanent atrial fibrillation (CMS/HCC) 02/19/2016   • Constipation 02/19/2016   • Gastroesophageal reflux disease 02/19/2016   • Hyperlipidemia 02/19/2016   • Hypertension 02/19/2016   • Hypothyroidism 02/19/2016   • Impaired fasting glucose 02/19/2016   • Low back pain 02/19/2016   • Pain in the muscles 02/19/2016   • Muscle weakness 02/19/2016   • Nausea 02/19/2016   • Poor posture 02/19/2016   • Mineral deficiency 02/19/2016   • Rash 02/19/2016   • Weight loss 02/19/2016   • Dizziness 02/19/2016   • Essential (primary) hypertension 06/09/2017   • Chronic anticoagulation 10/02/2020   • Closed fracture of right hip (CMS/HCC) 10/02/2020   • Status post right hip  replacement 10/04/2020   • Encephalopathy NOS 10/05/2020   • Leukocytosis 10/08/2020   • Repeated falls 10/08/2020     Resolved Ambulatory Problems     Diagnosis Date Noted   • Closed displaced fracture of right femoral neck (CMS/HCC) 10/02/2020     Past Medical History:   Diagnosis Date   • Atrial fibrillation (CMS/[facility])    • CAD (coronary artery disease)    • Diabetes mellitus (CMS/[facility])    • MOURA (dyspnea on exertion)    • GERD (gastroesophageal reflux disease)    • High risk medication use    • IFG (impaired fasting glucose)    • Lower back pain    • Muscle pain, thigh    • KENNETH (obstructive sleep apnea)    • Postural imbalance    • Sick sinus syndrome (CMS/[facility])    • Sinus bradycardia    • Syncope        PAST SURGICAL HISTORY  Past Surgical History:   Procedure Laterality Date   • CARDIAC CATHETERIZATION  10/10/2008    diagnostic   • [facility]   • CARDIAC ELECTROPHYSIOLOGY [facility] AND [facility]BL[facility]  [facility]   • [facility]   • COLONOSCOPY     • CORONARY ANGIOPLASTY WITH STENT PLACEMENT  12/17/2004    Drug-eluting Sirolimus.  3.5 x 33mm Cypher stent to LAD.  3.0 x 13mm Cypher stent to ROSALIA.   • TOTAL HIP ARTHROPLASTY Right 10/4/2020    Procedure: TOTAL HIP ARTHROPLASTY ANTERIOR WITH HANA TABLE;  Surgeon: Genna Zepeda MD;  Location: [facility];  Service: Orthopedics;  Laterality: Right;       FAMILY HISTORY  Family History   Problem Relation Age of Onset   • Heart disease Other    • Malig Hyperthermia Neg Hx        SOCIAL HISTORY  Social History     Socioeconomic History   • Marital status: Single     Spouse name: Not on file   • Number of children: Not on file   • Years of education: Not on file   • Highest education level: Not on file   Social Needs   • Financial resource strain: Not on file   • Food insecurity      Worry: Never true     Inability: Never true   • Transportation needs     Medical: No     Non-medical: No   Tobacco Use   • Smoking status: Former Smoker     Types: Cigars   • Smokeless tobacco: Never Used   Substance and Sexual Activity   • Alcohol use: No     Comment: CAFFEINE USE   • Drug use: No   • Sexual activity: Never       ALLERGIES  Patient has no known allergies.    REVIEW OF SYSTEMS  Review of Systems   Constitutional: Negative for activity change, appetite change and fever.   HENT: Negative for congestion and sore throat.    Eyes: Negative.    Respiratory: Negative for cough and shortness of breath.    Cardiovascular: Negative for chest pain and leg swelling.   Gastrointestinal: Negative for abdominal pain, diarrhea and vomiting.   Endocrine: Negative.    Genitourinary: Negative for decreased urine volume and dysuria.   Musculoskeletal: Negative for neck pain.   Skin: Positive for wound. Negative for rash.   Allergic/Immunologic: Negative.    Neurological: Negative for weakness, numbness and headaches.   Hematological: Negative.    Psychiatric/Behavioral: Negative.    All other systems reviewed and are negative.      PHYSICAL EXAM  ED Triage Vitals [10/31/20 2047]   Temp Heart Rate Resp BP SpO2   98.3 °F (36.8 °C) 96 13 151/85 99 %      Temp src Heart Rate Source Patient Position BP Location FiO2 (%)   Tympanic Monitor -- -- --       Physical Exam   Constitutional: He is oriented to person, place, and time. No distress.   HENT:   Head: Normocephalic and atraumatic.   Eyes: Pupils are equal, round, and reactive to light. EOM are normal.   Neck: Normal range of motion. Neck supple.   Cardiovascular: Normal rate, regular rhythm and normal heart sounds.   Pulmonary/Chest: Effort normal and breath sounds normal. No respiratory distress.   Abdominal: Soft. There is no abdominal tenderness. There is no rebound and no guarding.   Musculoskeletal: Normal range of motion.         General: No edema.      Comments:  No tenderness to palpation to the right hip   Neurological: He is alert and oriented to person, place, and time. He has normal sensation and normal strength.   Skin: Skin is warm and dry.   There is a large area of open wound to the right hip/pelvic region that given his recent surgical procedure could be a wound dehiscence.  There is no surrounding erythema but there is mild yellow drainage coming from the wound.   Psychiatric: Mood and affect normal.   Nursing note and vitals reviewed.      LAB RESULTS  Lab Results (last 24 hours)     Procedure Component Value Units Date/Time    CBC & Differential [[id number]]  (Abnormal) Collected: 10/31/20 2338    Specimen: Blood from Arm, Right Updated: 10/31/20 2347    Narrative:      The following orders were created for panel order CBC & Differential.  Procedure                               Abnormality         Status                     ---------                               -----------         ------                     CBC Auto Differential[[id number]]        Abnormal            Final result                 Please view results for these tests on the individual orders.    Comprehensive Metabolic Panel [[id number]]  (Abnormal) Collected: 10/31/20 2338    Specimen: Blood from Arm, Right Updated: 11/01/20 0009     Glucose 129 mg/dL      BUN 11 mg/dL      Creatinine 0.71 mg/dL      Sodium 141 mmol/L      Potassium 3.6 mmol/L      Chloride 106 mmol/L      CO2 25.6 mmol/L      Calcium 8.8 mg/dL      Total Protein 6.5 g/dL      Albumin 3.40 g/dL      ALT (SGPT) 26 U/L      AST (SGOT) 28 U/L      Alkaline Phosphatase 110 U/L      Total Bilirubin 0.4 mg/dL      eGFR Non African Amer 104 mL/min/1.73      Globulin 3.1 gm/dL      A/G Ratio 1.1 g/dL      BUN/Creatinine Ratio 15.5     Anion Gap 9.4 mmol/L     Narrative:      GFR Normal >60  Chronic Kidney Disease <60  Kidney Failure <15      CBC Auto Differential [[id number]]  (Abnormal) Collected: 10/31/20 2338    Specimen: Blood from  Arm, Right Updated: 10/31/20 7274     WBC 6.90 10*3/mm3      RBC 4.32 10*6/mm3      Hemoglobin 13.3 g/dL      Hematocrit 38.3 %      MCV 88.7 fL      MCH 30.8 pg      MCHC 34.7 g/dL      RDW 13.9 %      RDW-SD 44.4 fl      MPV 9.6 fL      Platelets 239 10*3/mm3      Neutrophil % 64.6 %      Lymphocyte % 22.3 %      Monocyte % 9.6 %      Eosinophil % 2.0 %      Basophil % 0.9 %      Immature Grans % 0.6 %      Neutrophils, Absolute 4.46 10*3/mm3      Lymphocytes, Absolute 1.54 10*3/mm3      Monocytes, Absolute 0.66 10*3/mm3      Eosinophils, Absolute 0.14 10*3/mm3      Basophils, Absolute 0.06 10*3/mm3      Immature Grans, Absolute 0.04 10*3/mm3      nRBC 0.0 /100 WBC     COVID PRE-OP / PRE-PROCEDURE SCREENING ORDER (NO ISOLATION) - Swab, Nasopharynx [[id number]]  (Normal) Collected: 11/01/20 0044    Specimen: Swab from Nasopharynx Updated: 11/01/20 0145    Narrative:      The following orders were created for panel order COVID PRE-OP / PRE-PROCEDURE SCREENING ORDER (NO ISOLATION) - Swab, Nasopharynx.  Procedure                               Abnormality         Status                     ---------                               -----------         ------                     Respiratory Panel PCR w/...[[id number]]  Normal              Final result                 Please view results for these tests on the individual orders.    Respiratory Panel PCR w/COVID-19(SARS-CoV-2) STEVAN/ALEXANDRO/BOGDAN/PAD/COR/MAD/LONG In-House, NP Swab in [facility], 3-4 HR TAT - Swab, Nasopharynx [[id number]]  (Normal) Collected: 11/01/20 0044    Specimen: Swab from Nasopharynx Updated: 11/01/20 0145     ADENOVIRUS, PCR Not Detected     Coronavirus 229E Not Detected     Coronavirus HKU1 Not Detected     Coronavirus NL63 Not Detected     Coronavirus OC43 Not Detected     COVID19 Not Detected     Human Metapneumovirus Not Detected     Human Rhinovirus/Enterovirus Not Detected     Influenza A PCR Not Detected     Influenza B PCR Not Detected     Parainfluenza  Virus 1 Not Detected     Parainfluenza Virus 2 Not Detected     Parainfluenza Virus 3 Not Detected     Parainfluenza Virus 4 Not Detected     RSV, PCR Not Detected     Bordetella pertussis pcr Not Detected     Bordetella parapertussis PCR Not Detected     Chlamydophila pneumoniae PCR Not Detected     Mycoplasma pneumo by PCR Not Detected    Narrative:      Fact sheet for providers: https://docs.[employer]/wp-content/uploads/AZE2876-3881-TH4.1-EUA-Provider-Fact-Sheet-3.pdf    Fact sheet for patients: https://docs.[employer]/wp-content/uploads/CPB4977-9718-WZ3.1-EUA-Patient-Fact-Sheet-1.pdf    Basic Metabolic Panel [[id number]]  (Abnormal) Collected: 11/01/20 0413    Specimen: Blood Updated: 11/01/20 0630     Glucose 110 mg/dL      BUN 8 mg/dL      Creatinine 0.74 mg/dL      Sodium 140 mmol/L      Potassium 3.6 mmol/L      Chloride 105 mmol/L      CO2 25.8 mmol/L      Calcium 9.0 mg/dL      eGFR Non African Amer 100 mL/min/1.73      BUN/Creatinine Ratio 10.8     Anion Gap 9.2 mmol/L     Narrative:      GFR Normal >60  Chronic Kidney Disease <60  Kidney Failure <15      CBC Auto Differential [[id number]]  (Normal) Collected: 11/01/20 0413    Specimen: Blood Updated: 11/01/20 0607     WBC 6.66 10*3/mm3      RBC 4.33 10*6/mm3      Hemoglobin 13.2 g/dL      Hematocrit 39.2 %      MCV 90.5 fL      MCH 30.5 pg      MCHC 33.7 g/dL      RDW 13.8 %      RDW-SD 45.6 fl      MPV 10.1 fL      Platelets 252 10*3/mm3      Neutrophil % 63.6 %      Lymphocyte % 23.1 %      Monocyte % 9.5 %      Eosinophil % 2.4 %      Basophil % 1.1 %      Immature Grans % 0.3 %      Neutrophils, Absolute 4.24 10*3/mm3      Lymphocytes, Absolute 1.54 10*3/mm3      Monocytes, Absolute 0.63 10*3/mm3      Eosinophils, Absolute 0.16 10*3/mm3      Basophils, Absolute 0.07 10*3/mm3      Immature Grans, Absolute 0.02 10*3/mm3      nRBC 0.0 /100 WBC     Protime-INR [[id number]]  (Abnormal) Collected: 11/01/20 0413    Specimen: Blood Updated: 11/01/20 0685      Protime 18.2 Seconds      INR 1.55    Wound Culture - Wound, Hip, Right [[id number]] Collected: 11/01/20 0425    Specimen: Wound from Hip, Right Updated: 11/01/20 0534          I ordered the above labs and reviewed the results    RADIOLOGY  No orders to display        I ordered the above noted radiological studies. Interpreted by radiologist.  Reviewed by me in PACS.       PROCEDURES  Procedures      PROGRESS AND CONSULTS     The patient was wearing a facemask upon entrance into the room and remained in such throughout their visit.  I was wearing PPE including a facemask, eye protection, as well as gloves at any point entering the room and throughout the visit    0015  Upon reevaluation, the patient is resting quite comfortably in the room.  I did inform the patient as well as the family member at bedside that we would admit him to the hospital to be evaluated by orthopedics and for further wound care.  The family member is requesting placement at a another rehabilitation facility as she is not happy with the care that her family member is currently receiving.  Obviously, that will be addressed as an inpatient prior to disposition.  The wound has been dressed appropriately and we are currently awaiting admission orders.    0050  Case discussed with JESUS Cunha for [facility], who agrees to admit the patient to the hospital for Dr. Cerda      MEDICAL DECISION MAKING  Results were reviewed/discussed with the patient and they were also made aware of online access. Pt also made aware that some labs, such as cultures, will not be resulted during ER visit and follow up with PMD is necessary.     MDM  Number of Diagnoses or Management Options  Open wound of right hip, initial encounter:      Amount and/or Complexity of Data Reviewed  Clinical lab tests: ordered and reviewed  Review and summarize past medical records: yes (Upon medical records review, the patient was last seen and evaluated on 10/2/2020 secondary  to a fall with a femoral neck fracture.)  Discuss the patient with other providers: yes (JESUS Cunha for [facility], who will admit the patient for Dr. Cerda)           DIAGNOSIS  Final diagnoses:   Open wound of right hip, initial encounter       DISPOSITION  ADMISSION    Discussed treatment plan and reason for admission with pt/family and admitting physician.  Pt/family voiced understanding of the plan for admission for further testing/treatment as needed.         Latest Documented Vital Signs:  As of 07:02 EST  BP- 127/68 HR- 92 Temp- 97.5 °F (36.4 °C) (Skin) O2 sat- 99%         Praveen Paz MD  11/01/20 0702

## 2020-11-01 NOTE — ED NOTES
Wound on R hip noted. Dsg removed. Approx 5cm wide x 9.5cm long pink, fleshy wound, no bad odor or drainage present. Wound dressed with gauze.        Pao Andujar RN  10/31/20 2925

## 2020-11-01 NOTE — ED TRIAGE NOTES
Pt to ED from [facility] with report from family pt has wound to right hip that looks infected. Family not happy with care at NH. Pt at facility for PT.    Pt masked upon arrival. This nurse wearing mask and goggles during entire encounter.

## 2020-11-01 NOTE — ED NOTES
"pt reports \"when I went to the bath room, i knocked the bandaged off my hip a and i called the nurse she said she would be right back and never came back.\" pt states that she left him sitting on the toilet. pt reports having to get back into bed himself. pt reports that no told him or showed him his hip wound. Pt family reports that no one told them about the hip wound and they called the director of nursing and asked them for photos of the wound. \"when we call and ask for photos of the wound the director of nursing says that she is on the COVID unit and hasn't been able to get over there to take the picture. He as been at this facility 3 weeks and on the first day there he fell. We have been asking for him to have a soft diet due to difficulty with dentures and they bring him all the foods he can't eat and has lost weight. Today he called the family to come help him and they called the ambulance. The wound obviously looks like they went in there and cleaned the wound when they heard that we called the ambulance. They told him that he didn't need the ambulance and he was fine. The way we found out about the wound is because the therapist said he was doing ok with therapy but that she was more concerned with the wound that no one had ever told us about.\"     Leonila Krause RN  10/31/20 6375    "

## 2020-11-01 NOTE — H&P
Patient Name:  Vish Morin  YOB: 1931  MRN:  [id number]  Admit Date:  10/31/2020  Patient Care Team:  Nick Chawla MD as PCP - General (Internal Medicine)  Deja Camacho [facility] as Pharmacist  Archie Alba [facility] as Pharmacist (Pharmacy)  Tori Ren, GRACE as Ambulatory  ([facility])  Ritu Doss, GRACE as Ambulatory  ([facility])      Subjective   History Present Illness     Chief Complaint   Patient presents with   • Wound Infection     History of Present Illness   Mr. Morin is a 89-year-old male with history of A. fib on chronic anticoagulation, hypertension, hypothyroidism, GERD, hyperlipidemia who presents to the emergency room with a wound on his right hip.  Patient had right total hip arthroplasty on 10/4/2020, he did have some postop confusion, likely due to pain medication, when that was stopped patient confusion improved.  He was sent to a rehab facility for physical therapy.  Patient states he does not think that the incision on his hip has ever healed completely, he is always noticed little drainage, however over the last few days he thinks that is getting worse and heavy and his daughter felt like he was not getting appropriate care at the rehab center, so was brought to the emergency room for evaluation of this right hip wound.  He denies having any fever, shortness of breath, increased weakness or confusion.  At this time in the emergency room he is oriented x3 and able to give me a pretty good history, he acknowledges that he did have lots of confusion initially and he has fell multiple times prior to his surgery.  He denies any falls at the nursing facility.  Patient states he has noted some drainage from his right hip occasionally, he cannot recall what color it is.  He denies any significant pain in the area, there is no redness, no tenderness to touch.  No swelling in his leg.  In the emergency alesia patient white blood  cell count 6.9, hemoglobin 13.3, hematocrit 38.3, glucose 129, sodium 141, potassium 3.6, creatinine 0.71, BUN 11.  INR was not obtained in the emergency room, will add INR for a.m. labs, patient states he is still taking his Coumadin.  Respiratory viral panel including COVID-19 was negative.    Review of Systems   Constitutional: Negative for appetite change and fever.   HENT: Negative for nosebleeds and trouble swallowing.    Eyes: Negative for photophobia, redness and visual disturbance.   Respiratory: Negative for cough, chest tightness, shortness of breath and wheezing.    Cardiovascular: Negative for chest pain, palpitations and leg swelling.   Gastrointestinal: Negative for abdominal distention, abdominal pain, nausea and vomiting.   Endocrine: Negative.    Genitourinary: Negative.    Musculoskeletal: Positive for gait problem (walks with walker but no pain with weight bearing on right leg). Negative for joint swelling.   Skin: Negative.         Patient felt that the incision on his hip was worsening instead of getting better   Neurological: Negative for dizziness, seizures, speech difficulty, light-headedness and headaches.   Hematological: Negative.    Psychiatric/Behavioral: Negative for behavioral problems and confusion.        Personal History     Past Medical History:   Diagnosis Date   • Atrial fibrillation (CMS/HCC)    • CAD (coronary artery disease)    • Constipation    • Diabetes mellitus (CMS/HCC)    • MOURA (dyspnea on exertion)    • GERD (gastroesophageal reflux disease)    • High risk medication use    • Hyperlipidemia    • Hypertension    • Hypothyroidism    • IFG (impaired fasting glucose)    • Lower back pain    • Muscle pain, thigh    • Muscle weakness    • Nausea    • KENNETH (obstructive sleep apnea)    • Permanent atrial fibrillation (CMS/HCC)    • Postural imbalance    • Sick sinus syndrome (CMS/HCC)    • Sinus bradycardia    • Syncope      Past Surgical History:   Procedure Laterality Date    • CARDIAC CATHETERIZATION  10/10/2008    diagnostic   • CARDIAC ELECTROPHYSIOLOGY MAPPING AND ABLATION  10/17/2013    [facility] Dr. Tessie Figueroa   • CARDIAC ELECTROPHYSIOLOGY MAPPING AND ABLATION  09/25/2012    [facility] Dr. Tessie Figueroa   • CARDIOVERSION  02/12/2013    [facility] Dr. Tessie Figueroa   • COLONOSCOPY     • CORONARY ANGIOPLASTY WITH STENT PLACEMENT  12/17/2004    Drug-eluting Sirolimus.  3.5 x 33mm Cypher stent to LAD.  3.0 x 13mm Cypher stent to ROSALIA.   • TOTAL HIP ARTHROPLASTY Right 10/4/2020    Procedure: TOTAL HIP ARTHROPLASTY ANTERIOR WITH HANA TABLE;  Surgeon: Genna Zepeda MD;  Location: [facility] OR;  Service: Orthopedics;  Laterality: Right;     Family History   Problem Relation Age of Onset   • Heart disease Other    • Malig Hyperthermia Neg Hx      Social History     Tobacco Use   • Smoking status: Former Smoker     Types: Cigars   • Smokeless tobacco: Never Used   Substance Use Topics   • Alcohol use: No     Comment: CAFFEINE USE   • Drug use: No     No current facility-administered medications on file prior to encounter.      Current Outpatient Medications on File Prior to Encounter   Medication Sig Dispense Refill   • warfarin (COUMADIN) 5 MG tablet Take one-half tablet (2.5 mg) by mouth Tues, Thurs, and Sat, and take one tablet (5 mg) by mouth all other days or as directed. (Patient taking differently: Take one-half tablet (2.5 mg) by mouth Tues and Sat, and take one tablet (5 mg) by mouth all other days or as directed.) 75 tablet 1   • acetaminophen (TYLENOL) 325 MG tablet Take 2 tablets by mouth Every 4 (Four) Hours As Needed for Mild Pain .     • amLODIPine (NORVASC) 5 MG tablet Take 1 tablet by mouth Daily.     • benazepril (LOTENSIN) 40 MG tablet TAKE 1 TABLET BY MOUTH EVERY DAY 90 tablet 1   • calcium carbonate (TUMS) 500 MG chewable tablet Chew 1 tablet As Needed for Indigestion or Heartburn.     • levothyroxine (SYNTHROID,  LEVOTHROID) 50 MCG tablet TAKE 1 TABLET BY MOUTH EVERY DAY 90 tablet 1   • magnesium oxide (MAG-OX) 400 MG tablet TAKE 1 TABLET BY MOUTH EVERY 12 HOURS 60 tablet 5   • meclizine (ANTIVERT) 25 MG tablet Take 1 tablet by mouth 3 times daily as needed for dizziness or nausea 30 tablet 0   • metoprolol tartrate (LOPRESSOR) 50 MG tablet TAKE 1 TABLET BY MOUTH EVERY 12 HOURS 180 tablet 1   • polyethylene glycol (polyethylene glycol) 17 g packet Take 17 g by mouth 3 (Three) Times a Day.       No Known Allergies    Objective    Objective     Vital Signs  Temp:  [97.5 °F (36.4 °C)-98.3 °F (36.8 °C)] 97.5 °F (36.4 °C)  Heart Rate:  [88-96] 93  Resp:  [13-18] 18  BP: (139-168)/() 164/87  SpO2:  [97 %-99 %] 99 %  on   ;   Device (Oxygen Therapy): room air  Body mass index is 20.24 kg/m².    Physical Exam  Vitals signs and nursing note reviewed.   Constitutional:       General: He is not in acute distress.     Appearance: He is well-developed.   HENT:      Head: Normocephalic.   Neck:      Musculoskeletal: Normal range of motion.      Vascular: No JVD.   Cardiovascular:      Rate and Rhythm: Normal rate. Rhythm irregular.      Comments: Patient heart rate irregular, rate 82 during my exam.  He has no peripheral edema, no shortness of breath or chest pain  Pulmonary:      Effort: Pulmonary effort is normal.      Breath sounds: Normal breath sounds.      Comments: Lung sounds clear, sats 97% on room air during my exam.  Patient denies any shortness of breath or cough  Abdominal:      General: Bowel sounds are normal. There is no distension.      Palpations: Abdomen is soft.      Tenderness: There is no abdominal tenderness.   Musculoskeletal: Normal range of motion.        Legs:    Skin:     General: Skin is warm and dry.      Capillary Refill: Capillary refill takes less than 2 seconds.   Neurological:      General: No focal deficit present.      Mental Status: He is alert and oriented to person, place, and time.       Comments: No focal neuro deficits, patient is some generalized weakness from surgery and being in rehab, however he is able to ambulate with walker with minimal assist according to patient, I did not attempt to ambulate him.   Psychiatric:         Attention and Perception: Attention normal.         Mood and Affect: Mood normal.         Speech: Speech normal.         Behavior: Behavior normal.         Thought Content: Thought content normal.         Cognition and Memory: Cognition normal.         Judgment: Judgment normal.         Results Review:  I reviewed the patient's new clinical results.  I reviewed the patient's new imaging results and agree with the interpretation.  I reviewed the patient's other test results and agree with the interpretation  I personally viewed and interpreted the patient's EKG/Telemetry data  Discussed with ED provider.    Lab Results (last 24 hours)     Procedure Component Value Units Date/Time    CBC & Differential [[id number]]  (Abnormal) Collected: 10/31/20 2338    Specimen: Blood from Arm, Right Updated: 10/31/20 2347    Narrative:      The following orders were created for panel order CBC & Differential.  Procedure                               Abnormality         Status                     ---------                               -----------         ------                     CBC Auto Differential[[id number]]        Abnormal            Final result                 Please view results for these tests on the individual orders.    Comprehensive Metabolic Panel [[id number]]  (Abnormal) Collected: 10/31/20 2338    Specimen: Blood from Arm, Right Updated: 11/01/20 0009     Glucose 129 mg/dL      BUN 11 mg/dL      Creatinine 0.71 mg/dL      Sodium 141 mmol/L      Potassium 3.6 mmol/L      Chloride 106 mmol/L      CO2 25.6 mmol/L      Calcium 8.8 mg/dL      Total Protein 6.5 g/dL      Albumin 3.40 g/dL      ALT (SGPT) 26 U/L      AST (SGOT) 28 U/L      Alkaline Phosphatase 110 U/L      Total  Bilirubin 0.4 mg/dL      eGFR Non African Amer 104 mL/min/1.73      Globulin 3.1 gm/dL      A/G Ratio 1.1 g/dL      BUN/Creatinine Ratio 15.5     Anion Gap 9.4 mmol/L     Narrative:      GFR Normal >60  Chronic Kidney Disease <60  Kidney Failure <15      CBC Auto Differential [[id number]]  (Abnormal) Collected: 10/31/20 2338    Specimen: Blood from Arm, Right Updated: 10/31/20 2347     WBC 6.90 10*3/mm3      RBC 4.32 10*6/mm3      Hemoglobin 13.3 g/dL      Hematocrit 38.3 %      MCV 88.7 fL      MCH 30.8 pg      MCHC 34.7 g/dL      RDW 13.9 %      RDW-SD 44.4 fl      MPV 9.6 fL      Platelets 239 10*3/mm3      Neutrophil % 64.6 %      Lymphocyte % 22.3 %      Monocyte % 9.6 %      Eosinophil % 2.0 %      Basophil % 0.9 %      Immature Grans % 0.6 %      Neutrophils, Absolute 4.46 10*3/mm3      Lymphocytes, Absolute 1.54 10*3/mm3      Monocytes, Absolute 0.66 10*3/mm3      Eosinophils, Absolute 0.14 10*3/mm3      Basophils, Absolute 0.06 10*3/mm3      Immature Grans, Absolute 0.04 10*3/mm3      nRBC 0.0 /100 WBC     COVID PRE-OP / PRE-PROCEDURE SCREENING ORDER (NO ISOLATION) - Swab, Nasopharynx [[id number]]  (Normal) Collected: 11/01/20 0044    Specimen: Swab from Nasopharynx Updated: 11/01/20 0145    Narrative:      The following orders were created for panel order COVID PRE-OP / PRE-PROCEDURE SCREENING ORDER (NO ISOLATION) - Swab, Nasopharynx.  Procedure                               Abnormality         Status                     ---------                               -----------         ------                     Respiratory Panel PCR w/...[[id number]]  Normal              Final result                 Please view results for these tests on the individual orders.    Respiratory Panel PCR w/COVID-19(SARS-CoV-2) STEVAN/ALEXANDRO/BOGDAN/PAD/COR/MAD/LONG In-House, NP Swab in UTM/[facility], 3-4 HR TAT - Swab, Nasopharynx [[id number]]  (Normal) Collected: 11/01/20 0044    Specimen: Swab from Nasopharynx Updated: 11/01/20 0145     ADENOVIRUS,  PCR Not Detected     Coronavirus 229E Not Detected     Coronavirus HKU1 Not Detected     Coronavirus NL63 Not Detected     Coronavirus OC43 Not Detected     COVID19 Not Detected     Human Metapneumovirus Not Detected     Human Rhinovirus/Enterovirus Not Detected     Influenza A PCR Not Detected     Influenza B PCR Not Detected     Parainfluenza Virus 1 Not Detected     Parainfluenza Virus 2 Not Detected     Parainfluenza Virus 3 Not Detected     Parainfluenza Virus 4 Not Detected     RSV, PCR Not Detected     Bordetella pertussis pcr Not Detected     Bordetella parapertussis PCR Not Detected     Chlamydophila pneumoniae PCR Not Detected     Mycoplasma pneumo by PCR Not Detected    Narrative:      Fact sheet for providers: https://docs.Lessonwriter/wp-content/uploads/GFK8855-5038-WJ9.1-EUA-Provider-Fact-Sheet-3.pdf    Fact sheet for patients: https://docs.Lessonwriter/wp-content/uploads/XIO4828-7418-RQ1.1-EUA-Patient-Fact-Sheet-1.pdf          Imaging Results (Last 24 Hours)     ** No results found for the last 24 hours. **               No orders to display        Assessment/Plan     Active Hospital Problems    Diagnosis POA   • **Open wound of right hip [S71.001A] Yes   • Chronic anticoagulation [Z79.01] Not Applicable   • Permanent atrial fibrillation (CMS/HCC) [I48.21] Yes   • Gastroesophageal reflux disease [K21.9] Yes   • Hyperlipidemia [E78.5] Yes   • Hypertension [I10] Yes   • Hypothyroidism [E03.9] Yes     Mr. Morin is a 89-year-old male with history of A. fib on chronic anticoagulation, hypertension, hypothyroidism, GERD, hyperlipidemia who presents to the emergency room with a wound on his right hip.     Open wound of right hip  -Consult orthopedic surgery, Dr. Alvarado did surgery about a month ago  -Obtain wound culture if there is any drainage, there is no redness or significant tenderness at site, white blood cell count normal, lactic acid normal  -Wound care nurse to see    A. fib/chronic  anticoagulation/hypertension  -We will check INR in a.m., chronic conditions stable at this time, will continue to monitor labs and vital signs  -Continue Coumadin, pharmacy to dose    GERD/hyperlipidemia/hypothyroidism  -Chronic conditions stable, will continue to monitor      I discussed the patient's findings and my recommendations with patient and ED provider.    VTE Prophylaxis - SCDs.  Code Status - Full code.       JESUS Macdonald  [facility]  11/01/20  04:01 EST

## 2020-11-02 NOTE — DISCHARGE PLACEMENT REQUEST
"Vish Hearn (89 y.o. Male)     Date of Birth Social Security Number Address Home Phone MRN    05/19/1931  [id number] [address] [phone] [id number]    Mandaeism Marital Status          Caodaism Single       Admission Date Admission Type Admitting Provider Attending Provider Department, Room/Bed    10/31/20 Emergency Go Cerda MD Edling, Stephen A, MD [address], P395/1    Discharge Date Discharge Disposition Discharge Destination                       Attending Provider: Wilson Schmidt MD    Allergies: No Known Allergies    Isolation: None   Infection: None   Code Status: CPR    Ht: 177.8 cm (70\")   Wt: 64 kg (141 lb 1.5 oz)    Admission Cmt: None   Principal Problem: Open wound of right hip [S71.001A]                 Active Insurance as of 10/31/2020     Primary Coverage     Payor Plan Insurance Group Employer/Plan Group    MEDICARE MEDICARE A & B      Payor Plan Address Payor Plan Phone Number Payor Plan Fax Number Effective Dates    [address] [phone]  5/1/1996 - None Entered    [facility] [id number]       Subscriber Name Subscriber Birth Date Member ID       VISH HEARN 5/19/1931 [id number]           Secondary Coverage     Payor Plan Insurance Group Employer/Plan Group    AARP MC SUP AARP HEALTH CARE OPTIONS      Payor Plan Address Payor Plan Phone Number Payor Plan Fax Number Effective Dates    [facility] [phone]  1/1/2017 - None Entered    [address]       [facility] [id number]       Subscriber Name Subscriber Birth Date Member ID       VISH HEARN 5/19/1931 [id number]                 Emergency Contacts      (Rel.) Home Phone Work Phone Mobile Phone    Lynsey Maya (Daughter) [phone] -- [phone]            "

## 2020-11-02 NOTE — PROGRESS NOTES
Orthopedic Progress Note      Patient: Vish Morin  YOB: 1931     Date of Admission: 10/31/2020  9:53 PM Medical Record Number: [id number]     Attending Physician: Wilson Schmidt MD    Planned Procedure:  Procedure(s):  RT HIP INCISION AND DRAINAGE AND WOUND CLOSURE (Awaiting Surgery)    Subjective : No new orthopaedic complaints     Pain Relief: some relief with present medication.     Systemic Complaints: No Complaints  Vitals:    11/01/20 1708 11/01/20 1925 11/02/20 0354 11/02/20 0716   BP: 134/88 133/77 129/76 146/81   BP Location: Left arm Left arm Left arm Left arm   Patient Position: Lying Lying Lying Lying   Pulse: 95 94 87 79   Resp:  16 16 18   Temp:  97.8 °F (36.6 °C) 97.9 °F (36.6 °C) 98.4 °F (36.9 °C)   TempSrc:  Oral Oral Oral   SpO2:  96% 96% 98%   Weight:       Height:           Physical Exam: 89 y.o. male    General Appearance:       Alert, cooperative, in no acute distress                  Extremities:             No clinical sign of DVT        Able to do good movements of digits    Pulses:   Pulses palpable and equal bilaterally           Diagnostic Tests:    Results from last 7 days   Lab Units 11/01/20  0413 10/31/20  2338   WBC 10*3/mm3 6.66 6.90   HEMOGLOBIN g/dL 13.2 13.3   HEMATOCRIT % 39.2 38.3   PLATELETS 10*3/mm3 252 239     Results from last 7 days   Lab Units 11/01/20 0413 10/31/20  2338   SODIUM mmol/L 140 141   POTASSIUM mmol/L 3.6 3.6   CHLORIDE mmol/L 105 106   CO2 mmol/L 25.8 25.6   BUN mg/dL 8 11   CREATININE mg/dL 0.74* 0.71*   GLUCOSE mg/dL 110* 129*   CALCIUM mg/dL 9.0 8.8     Results from last 7 days   Lab Units 11/01/20 0413   INR  1.55*     No results found for: URICACID  No results found for: CRYSTAL  Microbiology Results (last 10 days)     Procedure Component Value - Date/Time    Wound Culture - Wound, Hip, Right [[id number]] Collected: 11/01/20 0425    Lab Status: Preliminary result Specimen: Wound from Hip, Right Updated: 11/01/20 0916      Gram Stain Moderate (3+) WBCs seen      No organisms seen    COVID PRE-OP / PRE-PROCEDURE SCREENING ORDER (NO ISOLATION) - Swab, Nasopharynx [[id number]]  (Normal) Collected: 11/01/20 0044    Lab Status: Final result Specimen: Swab from Nasopharynx Updated: 11/01/20 0145    Narrative:      The following orders were created for panel order COVID PRE-OP / PRE-PROCEDURE SCREENING ORDER (NO ISOLATION) - Swab, Nasopharynx.  Procedure                               Abnormality         Status                     ---------                               -----------         ------                     Respiratory Panel PCR w/...[[id number]]  Normal              Final result                 Please view results for these tests on the individual orders.    Respiratory Panel PCR w/COVID-19(SARS-CoV-2) STEVAN/ALEXANDRO/BOGDAN/PAD/COR/MAD/LONG In-House, NP Swab in UTM/VTM, 3-4 HR TAT - Swab, Nasopharynx [[id number]]  (Normal) Collected: 11/01/20 0044    Lab Status: Final result Specimen: Swab from Nasopharynx Updated: 11/01/20 0145     ADENOVIRUS, PCR Not Detected     Coronavirus 229E Not Detected     Coronavirus HKU1 Not Detected     Coronavirus NL63 Not Detected     Coronavirus OC43 Not Detected     COVID19 Not Detected     Human Metapneumovirus Not Detected     Human Rhinovirus/Enterovirus Not Detected     Influenza A PCR Not Detected     Influenza B PCR Not Detected     Parainfluenza Virus 1 Not Detected     Parainfluenza Virus 2 Not Detected     Parainfluenza Virus 3 Not Detected     Parainfluenza Virus 4 Not Detected     RSV, PCR Not Detected     Bordetella pertussis pcr Not Detected     Bordetella parapertussis PCR Not Detected     Chlamydophila pneumoniae PCR Not Detected     Mycoplasma pneumo by PCR Not Detected    Narrative:      Fact sheet for providers: https://docs.Gongpingjia/wp-content/uploads/LIG6760-7103-EI5.1-EUA-Provider-Fact-Sheet-3.pdf    Fact sheet for patients:  https://docs.shopp/wp-content/uploads/KWA7405-6048-ZG6.1-EUA-Patient-Fact-Sheet-1.pdf        No radiology results for the last 7 days          Current Medications:  Scheduled Meds:amLODIPine, 5 mg, Oral, Q24H  levothyroxine, 50 mcg, Oral, Q AM  metoprolol tartrate, 50 mg, Oral, Q12H  sodium chloride, 10 mL, Intravenous, Q12H      Continuous Infusions:Pharmacy to dose warfarin,       PRN Meds:.•  acetaminophen **OR** acetaminophen **OR** acetaminophen  •  ondansetron  •  Pharmacy to dose warfarin  •  [COMPLETED] Insert peripheral IV **AND** sodium chloride  •  sodium chloride    Assessment:   Procedure(s):  RT HIP INCISION AND DRAINAGE AND WOUND CLOSURE (Awaiting Surgery)    Patient Active Problem List   Diagnosis   • Permanent atrial fibrillation (CMS/HCC)   • Constipation   • Gastroesophageal reflux disease   • Hyperlipidemia   • Hypertension   • Hypothyroidism   • Impaired fasting glucose   • Low back pain   • Pain in the muscles   • Muscle weakness   • Nausea   • Poor posture   • Mineral deficiency   • Rash   • Weight loss   • Dizziness   • Essential (primary) hypertension   • Chronic anticoagulation   • Closed fracture of right hip (CMS/HCC)   • Status post right hip replacement   • Encephalopathy NOS   • Leukocytosis   • Repeated falls   • Open wound of right hip       PLAN:   Wound RN to see patient this am to assess for possible wound vac placement.  Plan for OR for formal I&D and wound vac placement.  Cultures pending.   ID has been consulted. Appreciate recommendations.  NPO  To OR on call    JESUS Rincon    Date: 11/2/2020    Time: 09:28 EST

## 2020-11-02 NOTE — PROGRESS NOTES
Continued Stay Note  [facility]     Patient Name: Vish Morin  MRN: [id number]  Today's Date: 11/2/2020    Admit Date: 10/31/2020    Discharge Plan     Row Name 11/02/20 5544       Plan    Plan Comments  Wound vac placed per wound/ostomy RN. Confirmed patient will need Santyl and wound vac Placed [employer] wound vac form on chart and sent secure message for MD to sign. Left voice message for Marguerite/DAWNA to inform of new order. Await call back. Continue to follow.....[facility]        Discharge Codes    No documentation.             Yennifer Ames RN

## 2020-11-02 NOTE — DISCHARGE PLACEMENT REQUEST
"Vish Hearn (89 y.o. Male)     Date of Birth Social Security Number Address Home Phone MRN    05/19/1931  [id number] [address] [phone] [id number]    Orthodoxy Marital Status          Yarsanism Single       Admission Date Admission Type Admitting Provider Attending Provider Department, Room/Bed    10/31/20 Emergency Go Cerda MD Edling, Stephen A, MD [address], P395/1    Discharge Date Discharge Disposition Discharge Destination                       Attending Provider: Wilson Schmidt MD    Allergies: No Known Allergies    Isolation: None   Infection: None   Code Status: CPR    Ht: 177.8 cm (70\")   Wt: 64 kg (141 lb 1.5 oz)    Admission Cmt: None   Principal Problem: Open wound of right hip [S71.001A]                 Active Insurance as of 10/31/2020     Primary Coverage     Payor Plan Insurance Group Employer/Plan Group    MEDICARE MEDICARE A & B      Payor Plan Address Payor Plan Phone Number Payor Plan Fax Number Effective Dates    [address] [phone]  5/1/1996 - None Entered    [facility] [address]       Subscriber Name Subscriber Birth Date Member ID       VISH HEARN 5/19/1931 [id number]           Secondary Coverage     Payor Plan Insurance Group Employer/Plan Group    AARP MC SUP AAR HEALTH CARE OPTIONS      Payor Plan Address Payor Plan Phone Number Payor Plan Fax Number Effective Dates    [facility] [phone]  1/1/2017 - None Entered    [address]       [facility] [address]       Subscriber Name Subscriber Birth Date Member ID       VISH HEARN 5/19/1931 [id number]                 Emergency Contacts      (Rel.) Home Phone Work Phone Mobile Phone    FrancescoLynsey (Daughter) [phone] -- [phone]    SteveTruong garcia (Son) [phone] -- --    Awa Alexander (Relative) [phone] -- --            Emergency Contact Information     Name Relation Home Work Mobile    Lynsey Maya " Daughter [phone]    PatienceTruong Son [phone]      Awa Alexander Relative [phone]            Insurance Information                MEDICARE/MEDICARE A & B Phone: [phone]    Subscriber: Vish Morin Subscriber#: [id number]    Group#:  Precert#:         AFIA [facility] HEALTH CARE OPTIONS Phone: [phone]    Subscriber: Vish Morin Subscriber#: [id number]    Group#:  Precert#:

## 2020-11-02 NOTE — PROGRESS NOTES
Continued Stay Note  [facility]     Patient Name: Vish Morin  MRN: [id number]  Today's Date: 11/2/2020    Admit Date: 10/31/2020    Discharge Plan     Row Name 11/02/20 1618       Plan    Plan Comments  CCP spoke with [facility] hotline/Ana; report filed by ER (reference #[id number]) was accepted for investigation. Per Ana; they are not allowed to provide CCP with 's contact information. Per Ana; they can send a message to worker with CCP's contact information. CCP provided Ana with 3P CCP contact information. Deirdre SINGH    Row Name 11/02/20 1550       Plan    Plan Comments  Wound vac placed per wound/ostomy RN. Placed KCSHEEBA wound vac form on chart and sent secure message for MD to sign. Left voice message for Marguerite/DAWNA to inform of new order. Await call back. Continue to follow.....[facility]        Discharge Codes    No documentation.             SAMANTHA Prieto

## 2020-11-02 NOTE — PROGRESS NOTES
Pharmacy Consult: Warfarin Dosing/ Monitoring    Vish Morin is a 89 y.o. male, estimated creatinine clearance is 56.7 mL/min (A) (by C-G formula based on SCr of 0.74 mg/dL (L)). weighing 64 kg (141 lb 1.5 oz).     has a past medical history of Atrial fibrillation (CMS/HCC), CAD (coronary artery disease), Constipation, Diabetes mellitus (CMS/HCC), MOURA (dyspnea on exertion), GERD (gastroesophageal reflux disease), High risk medication use, Hyperlipidemia, Hypertension, Hypothyroidism, IFG (impaired fasting glucose), Lower back pain, Muscle pain, thigh, Muscle weakness, Nausea, KENNETH (obstructive sleep apnea), Permanent atrial fibrillation (CMS/HCC), Postural imbalance, Sick sinus syndrome (CMS/HCC), Sinus bradycardia, and Syncope.    Social History     Tobacco Use    Smoking status: Former Smoker     Types: Cigars    Smokeless tobacco: Never Used   Substance Use Topics    Alcohol use: No     Comment: CAFFEINE USE    Drug use: No       Results from last 7 days   Lab Units 11/01/20  0413 10/31/20  2338   INR  1.55*  --    HEMOGLOBIN g/dL 13.2 13.3   HEMATOCRIT % 39.2 38.3   PLATELETS 10*3/mm3 252 239     Results from last 7 days   Lab Units 11/01/20 0413 10/31/20  2338   SODIUM mmol/L 140 141   POTASSIUM mmol/L 3.6 3.6   CHLORIDE mmol/L 105 106   CO2 mmol/L 25.8 25.6   BUN mg/dL 8 11   CREATININE mg/dL 0.74* 0.71*   CALCIUM mg/dL 9.0 8.8   BILIRUBIN mg/dL  --  0.4   ALK PHOS U/L  --  110   ALT (SGPT) U/L  --  26   AST (SGOT) U/L  --  28   GLUCOSE mg/dL 110* 129*     Anticoagulation history:   Take one-half tablet (2.5 mg) by mouth Tues and Sat, and take one tablet (5 mg) by mouth all other days or as directed.            Hospital Anticoagulation:  Consulting provider: Tenisha LEE  Start date: 11/1/20  Indication: AFIB requiring full anticoagulation  Target INR: 2-3  Expected duration: indefinite  Bridge Therapy: none                Date 11/1 11/2           INR 1.55 1.48           Warfarin dose 7.5mg 7.5  mg             Potential drug interactions: none    Relevant nutrition status: Restarted regular diet    Education complete?/ Date: pending    Assessment/Plan:  Dose: INR continues to be below goal; surgery canceled today so will continue with higher 7.5 mg dosex 1  Monitor INR daily  Follow up tomorrow    Pharmacy will continue to follow until discharge or discontinuation of warfarin.     Hannah Simmons, Pharm.D., [facility]  Oncology Pharmacy Specialist  [phone]

## 2020-11-02 NOTE — NURSING NOTE
CWOCN- consult received and spoke with RN regarding plan. We assessed the right hip wound- the wound bed is red, moist, edematous. There is a thin ridge of slough/black necrotic tissue along the lateral edge. There is no purulence in the wound bed. The periwound is swollen and red. Difficult to tell if there is any abscess beneath the tissue but the open wound itself is 85% healthy appearing tissue. I think we could definitely place a wound vac to this wound for healing. Recommend Santyl to the necrotic tissue with the wound vac or conservative sharps wound debridement could even be used to trim this off. Unless MD believes there is a fluid pocket under the exposed tissue, I believe a wound vac will assist with closing this without taking patient to the OR.   Spoke with RN who paged MD.   Notified ID Dr Walsh as well- no other cultures required if planning for VAC.   Spoke with OR desk and they advised that Dr Zepeda notified them to cancel OR.   We will place the VAC once the Santyl has arrived.

## 2020-11-02 NOTE — CONSULTS
Referring Provider: Devin Schmidt MD    Reason for Consultation: concern for wound infection    History of present illness:  Vish Morin is a 89 y.o. who I am asked to evaluate and give opinion for concern for wound infection. History is obtained from the patient and review of the old medical records which I summarize/synthesize as follows: he was recently admitted 10/2-10/12 for closed fracture of the R hip for which he underwent operative repair by orthopedics. He returned to the ER yesterday with a poorly healing wound. No associated fevers, chills, or sweats. No alleviating factors. There has been some thick matted material present but no surrounding erythema. WBC was normal. Ortho has seen and plans to take to the OR today for wound revision. A superficial wound culture is pending. He is not currently on any antibiotics.     Past Medical History:   Diagnosis Date   • Atrial fibrillation (CMS/HCC)    • CAD (coronary artery disease)    • Constipation    • Diabetes mellitus (CMS/HCC)    • MOURA (dyspnea on exertion)    • GERD (gastroesophageal reflux disease)    • High risk medication use    • Hyperlipidemia    • Hypertension    • Hypothyroidism    • IFG (impaired fasting glucose)    • Lower back pain    • Muscle pain, thigh    • Muscle weakness    • Nausea    • KENNETH (obstructive sleep apnea)    • Permanent atrial fibrillation (CMS/HCC)    • Postural imbalance    • Sick sinus syndrome (CMS/HCC)    • Sinus bradycardia    • Syncope        Past Surgical History:   Procedure Laterality Date   • CARDIAC CATHETERIZATION  10/10/2008    diagnostic   • [facility]   [facility]AC [facility]   • CARDIOVERSION  02/12/2013    [facility] Dr. Tessie Figueroa   • COLONOSCOPY     • CORONARY ANGIOPLASTY WITH STENT PLACEMENT  12/17/2004    Drug-eluting  Sirolimus.  3.5 x 33mm Cypher stent to LAD.  3.0 x 13mm Cypher stent to ROSALIA.   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    Family History:  No 1st degree relatives w/ wound infections    Antibiotic allergies and intolerances:  None    Medications:    Current Facility-Administered Medications:   •  acetaminophen (TYLENOL) tablet 650 mg, 650 mg, Oral, Q4H PRN **OR** acetaminophen (TYLENOL) 160 MG/5ML solution 650 mg, 650 mg, Oral, Q4H PRN **OR** acetaminophen (TYLENOL) suppository 650 mg, 650 mg, Rectal, Q4H PRN, Tenisha Chandler APRN  •  amLODIPine (NORVASC) tablet 5 mg, 5 mg, Oral, Q24H, Wilson Schmidt MD, 5 mg at 11/02/20 0903  •  levothyroxine (SYNTHROID, LEVOTHROID) tablet 50 mcg, 50 mcg, Oral, Q AM, Wilson Schmidt MD, 50 mcg at 11/02/20 0607  •  metoprolol tartrate (LOPRESSOR) tablet 50 mg, 50 mg, Oral, Q12H, Wilson Schmidt MD, 50 mg at 11/02/20 0607  •  ondansetron (ZOFRAN) injection 4 mg, 4 mg, Intravenous, Q6H PRN, Tenisha Chandler APRN  •  Pharmacy to dose warfarin, , Does not apply, Continuous PRN, Tenisha Chandler APRN  •  [COMPLETED] Insert peripheral IV, , , Once **AND** sodium chloride 0.9 % flush 10 mL, 10 mL, Intravenous, PRN, Praveen Paz MD  •  sodium chloride 0.9 % flush 10 mL, 10 mL, Intravenous, Q12H, Tenisha Chandler APRN, 10 mL at 11/02/20 0903  •  sodium chloride 0.9 % flush 10 mL, 10 mL, Intravenous, PRN, Ramesh, Tenisha M, APRN    Review of Systems  All systems were reviewed and are negative unless otherwise stated above in the HPI    Objective   Vital Signs   Temp:  [97 °F (36.1 °C)-98.4 °F (36.9 °C)] 98.4 °F (36.9 °C)  Heart Rate:  [79-95] 79  Resp:  [14-20] 18  BP: (129-146)/(76-88) 146/81    Physical Exam:   General:  awake, alert  Head: atraumatic  Eyes: Pupils equal, no scleral icterus  ENT: wearing mask  Neck: Supple  Cardiovascular: NR, no murmurs, no LE edema  Respiratory: Lungs are clear to auscultation bilaterally, no rales or wheezing; normal work of breathing on ambient air  GI: Abdomen is soft, non-tender, non-distended, normal bowel sounds in all four quadrants  :  no Brown catheter  Musculoskeletal: open R hip incision with some thick, yellowish drainage; no surrounding heat or erythema  Skin: No rashes, lesions, or embolic phenomenon  Neurological: Alert and oriented x 3  Psychiatric: Normal mood and affect   Vasc: no cyanosis    Labs:     Lab Results   Component Value Date    WBC 6.66 11/01/2020    HGB 13.2 11/01/2020    HCT 39.2 11/01/2020    MCV 90.5 11/01/2020     11/01/2020       Lab Results   Component Value Date    GLUCOSE 110 (H) 11/01/2020    BUN 8 11/01/2020    CREATININE 0.74 (L) 11/01/2020    EGFRIFNONA 100 11/01/2020    EGFRIFAFRI 71 06/01/2020    BCR 10.8 11/01/2020    CO2 25.8 11/01/2020    CALCIUM 9.0 11/01/2020    PROTENTOTREF 7.1 06/01/2020    ALBUMIN 3.40 (L) 10/31/2020    LABIL2 1.6 06/01/2020    AST 28 10/31/2020    ALT 26 10/31/2020     No results found for: CRP  Lab Results   Component Value Date    HGBA1C 6.30 (H) 06/01/2020     Lab Results   Component Value Date    INR 1.55 (H) 11/01/2020    INR 2.61 (H) 10/12/2020    INR 2.64 (H) 10/11/2020    PROTIME 18.2 (H) 11/01/2020    PROTIME 27.1 (H) 10/12/2020    PROTIME 27.4 (H) 10/11/2020       Microbiology:  11/1 RPP: negative  11/1 Wound Cx: pending    Radiology (personally reviewed images and report):  none    Assessment/Plan   1. Non-healing wound of right hip  2. 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 Controlled DM2  4. Afib on coumadin    No surrounding erythema, heat, or systemic symptoms but the visual inspection is concerning for infection with the thick yellowish material. A superficial wound culture is pending.  More importantly, he is going to the OR today for wound washout and revision. Please send operative cultures. Depending on the operative findings and cultures, I will decide which antibiotics to start and for how long. Hopefully this is just a superficial wound infection. He is stable. No need to start them now. This will help us get more trustworthy OR cultures.     ID will follow.

## 2020-11-02 NOTE — PROGRESS NOTES
Discharge Planning Assessment  [facility]     Patient Name: Vish Morin  MRN: [id number]  Today's Date: 11/2/2020    Admit Date: 10/31/2020    Discharge Needs Assessment     Row Name 11/02/20 0934       Living Environment    Lives With  child(erum), adult    Current Living Arrangements  home/apartment/condo    Potentially Unsafe Housing Conditions  other (see comments) no concerns    Primary Care Provided by  self    Provides Primary Care For  no one    Family Caregiver if Needed  child(erum), adult    Quality of Family Relationships  helpful;involved;supportive       Resource/Environmental Concerns    Resource/Environmental Concerns  none       Transition Planning    Patient/Family Anticipates Transition to  inpatient rehabilitation facility    Patient/Family Anticipated Services at Transition  rehabilitation services       Discharge Needs Assessment    Current Outpatient/Agency/Support Group  skilled nursing facility    Equipment Currently Used at Home  walker, rolling    Concerns to be Addressed  denies needs/concerns at this time;no discharge needs identified    Anticipated Changes Related to Illness  none    Outpatient/Agency/Support Group Needs  skilled nursing facility    Discharge Facility/Level of Care Needs  nursing facility, skilled        Discharge Plan     Row Name 11/02/20 0935       Plan    Plan  Pending referrals to [facility]    Provided Post Acute Provider Quality & Resource List?  Refused    Patient/Family in Agreement with Plan  yes    Plan Comments  CCP met with patient at bedside. CCP role explained and discharge planning discussed. Face sheet verified and SAL noted. Patient’s PCP is Dr. Chawla. Patient lives with his daughter, Lynsey. Patient’s bedroom and bathroom are on the main level. Patient has been at [facility] for rehab since last admission. Patient states he was using a walker at the facility but is normally independent with his ADLs. Patient states his  daughter is looking into other SNF facilities at this time. Patient gave CCP permission to contact his daughter, Lynsey. CCP made outbound call to Lynsey [phone] and discussed discharge planning. Patient’s daughter states [facility] is the first choice for SNF and requested additional referral to [facility] as second choice. CCP offered Medicare ratings; patient’s daughter declined. CCP will follow for pending SNF referrals. Deirdre GRAYW        Continued Care and Services - Admitted Since 10/31/2020    Coordination has not been started for this encounter.     Selected Continued Care - Prior Encounters Includes selections from prior encounters from 8/2/2020 to 11/2/2020    Discharged on 10/12/2020 Admission date: 10/2/2020 - Discharge disposition: Skilled Nursing Facility (DC - External)    Destination     Service Provider Selected Services Address Phone Fax    [facility]  Skilled Nursing [address] [facility] [address] [phone]                      Demographic Summary     Row Name 11/02/20 0933       General Information    Admission Type  observation    Arrived From  emergency department    Required Notices Provided  Observation Status Notice    Referral Source  admission list    Reason for Consult  discharge planning    Preferred Language  English     Used During This Interaction  no        Functional Status     Row Name 11/02/20 0934       Functional Status    Usual Activity Tolerance  good    Current Activity Tolerance  moderate       Functional Status, IADL    Medications  assistive equipment    Meal Preparation  assistive equipment    Housekeeping  assistive equipment    Laundry  assistive equipment    Shopping  assistive equipment       Mental Status    General Appearance WDL  WDL       Mental Status Summary    Recent Changes in Mental Status/Cognitive Functioning  no changes        Psychosocial    No documentation.       Abuse/Neglect     No documentation.       Legal    No documentation.       Substance Abuse    No documentation.       Patient Forms    No documentation.           SAMANTHA Prieto

## 2020-11-02 NOTE — NURSING NOTE
11/02/20 1415   Wound 10/04/20 Right anterior hip   Placement Date: 10/04/20   Side: Right  Orientation: anterior  Location: hip  Additional Comments: surgical incision   Dressing Appearance dry;intact;moist drainage   Base moist;necrotic;pink;slough  (wound bed pink/slough, eschar to edge 6-10 oclock)   Red (%), Wound Tissue Color   (90)   Yellow (%), Wound Tissue Color 10   Periwound intact;dry;pink   Periwound Temperature warm   Periwound Skin Turgor soft   Edges open   Wound Length (cm) 9 cm   Wound Width (cm) 4 cm   Wound Depth (cm) 1 cm   Drainage Characteristics/Odor yellow   Drainage Amount scant   Care, Wound cleansed with;sterile normal saline;collagenase agent applied;negative pressure wound therapy   Dressing Care dressing changed   Periwound Care barrier film applied;dry periwound area maintained   NPWT (Negative Pressure Wound Therapy) 11/02/20 1415 R anterior hip   Placement Date/Time: 11/02/20 1415   Location: R anterior hip   Therapy Setting continuous therapy   Dressing foam, black   Pressure Setting 125 mmHg   Sponges Inserted 1       WOCN dept - initiated wound vac therapy to patient's R anterior hip wound. Patient tolerated well. Santyl applied to wound bed and to necrotic tissue to wound edge. Will change dressing M-W-F. Therapy explained to patient and schedule for changes.

## 2020-11-02 NOTE — SIGNIFICANT NOTE
11/02/20 1512   OTHER   Discipline occupational therapist   Rehab Time/Intention   Session Not Performed other (see comments)  (Pt going to surgery this afternoon for I&D of hip. Will see post op tomorrow.)   Recommendation   OT - Next Appointment 11/03/20

## 2020-11-02 NOTE — PROGRESS NOTES
[facility]     LOS: 0 days     Subjective:    CC:Wound Infection    DIET:  Diet Order   Procedures   • NPO Diet     No cp  No soa  No bm    Objective:    Vital Signs:  Temp:  [97 °F (36.1 °C)-98.4 °F (36.9 °C)] 98.4 °F (36.9 °C)  Heart Rate:  [79-95] 79  Resp:  [14-18] 18  BP: (129-146)/(76-88) 146/81    SpO2:  [96 %-100 %] 98 %  on   ;   Device (Oxygen Therapy): room air  Body mass index is 20.24 kg/m².    Physical Exam  Constitutional:       Appearance: He is well-developed.   HENT:      Head: Normocephalic and atraumatic.   Cardiovascular:      Rate and Rhythm: Normal rate and regular rhythm.      Heart sounds: No murmur. No friction rub.   Pulmonary:      Effort: Pulmonary effort is normal.      Breath sounds: Normal breath sounds.   Abdominal:      General: Bowel sounds are normal. There is no distension.      Palpations: Abdomen is soft.      Tenderness: There is no abdominal tenderness.   Musculoskeletal:      Comments: Wound on right hip   Skin:     General: Skin is warm and dry.   Neurological:      Mental Status: He is alert.   Psychiatric:         Mood and Affect: Mood normal.         Behavior: Behavior normal.         Results Review:    Glucose   Date Value Ref Range Status   11/01/2020 110 (H) 65 - 99 mg/dL Final   10/31/2020 129 (H) 65 - 99 mg/dL Final     Results from last 7 days   Lab Units 11/01/20  0413   WBC 10*3/mm3 6.66   HEMOGLOBIN g/dL 13.2   HEMATOCRIT % 39.2   PLATELETS 10*3/mm3 252     Results from last 7 days   Lab Units 11/01/20  0413 10/31/20  2338   SODIUM mmol/L 140 141   POTASSIUM mmol/L 3.6 3.6   CHLORIDE mmol/L 105 106   CO2 mmol/L 25.8 25.6   BUN mg/dL 8 11   CREATININE mg/dL 0.74* 0.71*   CALCIUM mg/dL 9.0 8.8   BILIRUBIN mg/dL  --  0.4   ALK PHOS U/L  --  110   ALT (SGPT) U/L  --  26   AST (SGOT) U/L  --  28   GLUCOSE mg/dL 110* 129*     Results from last 7 days   Lab Units 11/02/20  0834   INR  1.48*             Cultures:  No results found for:  BLOODCX, URINECX, WOUNDCX, MRSACX, RESPCX, STOOLCX    I have reviewed daily medications and changes in CPOE    Scheduled meds  amLODIPine, 5 mg, Oral, Q24H  collagenase, , Topical, 3x Weekly  levothyroxine, 50 mcg, Oral, Q AM  metoprolol tartrate, 50 mg, Oral, Q12H  sodium chloride, 10 mL, Intravenous, Q12H        Pharmacy to dose warfarin,       PRN meds  •  acetaminophen **OR** acetaminophen **OR** acetaminophen  •  ondansetron  •  Pharmacy to dose warfarin  •  [COMPLETED] Insert peripheral IV **AND** sodium chloride  •  sodium chloride        Open wound of right hip    Permanent atrial fibrillation (CMS/HCC)    Gastroesophageal reflux disease    Hyperlipidemia    Hypertension    Hypothyroidism    Chronic anticoagulation        Assessment/Plan:    Open wound of right hip  -going to surgery today  -Obtain wound culture if there is any drainage, there is no redness or significant tenderness at site, white blood cell count normal, lactic acid normal, no evidence of active infection  -Wound care nurse to see  -Id consult     A. fib/chronic anticoagulation/hypertension  -INR 1.55  -hold coumadin     GERD/hyperlipidemia/hypothyroidism  -levothyroxine    Wound VAC versus OR today  Discussed with nurse  Hold on blood thinners until final determination of plan is made      Wilson Schmidt MD  11/02/20  13:06 EST

## 2020-11-02 NOTE — PLAN OF CARE
Problem: Adult Inpatient Plan of Care  Goal: Plan of Care Review  Outcome: Ongoing, Progressing  Flowsheets  Taken 11/2/2020 0329 by Dana Marroquin RN  Progress: no change  Outcome Summary: A&Ox4 but forgeful, VSS, no complaints of pain, encouraged to use call light, bed alarm on, up with rolling walker, wound to see this am, possible surgery today, coumadin being held, right hip has dressing intact no drainage noted, will continue to monitor.

## 2020-11-03 NOTE — PLAN OF CARE
Goal Outcome Evaluation:  Plan of Care Reviewed With: patient  Progress: improving  Outcome Summary: A&Ox4. Up with assist x1 with walker. No c/o pain, n/v/d. Wound vac in place to right hip. Oral antibiotics started. Potassium replaced with oral. VSS. Will continue to monitor.

## 2020-11-03 NOTE — PLAN OF CARE
Goal Outcome Evaluation:  Plan of Care Reviewed With: patient  Progress: no change  Outcome Summary: Rt hip has wound vac in place, pt does not like tubing and constantly asks for it to be moved, bed alarm on, BSC, no c/o pain

## 2020-11-03 NOTE — PROGRESS NOTES
Continued Stay Note  [facility]     Patient Name: Vish Morin  MRN: [id number]  Today's Date: 11/3/2020    Admit Date: 10/31/2020    Discharge Plan     Row Name 11/03/20 1335       Plan    Plan  [facility]---SNF. Packet in office.    Patient/Family in Agreement with Plan  yes    Plan Comments  Call placed to Lashay/Joselito. States able to accept patient at [facility]. States he has a qualifying stay. Await MD to sign form for KCSHEEBA wound vac. Marguerite/DAWNA informed of new order. Await PT to see. CCP may need to arrange transportation. Continue to follow....[facility]        Discharge Codes    No documentation.             Yennifer Ames RN

## 2020-11-03 NOTE — PLAN OF CARE
Problem: Adult Inpatient Plan of Care  Goal: Plan of Care Review  Recent Flowsheet Documentation  Taken 11/3/2020 1310 by Марина Barney OT  Progress: improving  Plan of Care Reviewed With: patient  Outcome Summary: Pt is an 89 year old male admitted from rehab d/t open wound of R hip w/ drainage. Pt now s/p wound vac placement. Pt had R GIL on 10/4/2020. Pt lives w/ daughter and reports indep w/ ADLs prior to hip surgery in Oct. Pt presents to OT eval w/ decreased balance, endurance, and overall decreased indep/safety w/ ADLs and transfers. Pt able to ambulate to bathroom using RW w/ CGA. Pt completes toileting tasks w/ min A and able to stand at sink to complete grooming w/ SBA/CGA for balance. Pt may benefit from skilled OT services to address deficits and maximize indep/safety w/ ADLs and transfers. Pt and OT wore standard mask during session and OT also wore gloves, glasses, and performed thorough hand hygiene before and after session.

## 2020-11-03 NOTE — PLAN OF CARE
Problem: Adult Inpatient Plan of Care  Goal: Plan of Care Review  Recent Flowsheet Documentation  Taken 11/3/2020 1620 by Justina Paz PTA  Progress: improving  Plan of Care Reviewed With: patient  Outcome Summary:   Pt requires extra time, agreed to amb   pt assisted to BR , but unable to go , then assisted back to bed per his request as he had been in chair most of day(per pt)   plans home w/HH, but unsure if that is best plan due to care of incision w/wound vac   pt states he will not be alone   fam plans SNU , another choice than where he came from;   Pt did have post lean and req 3 attempts to stand from bsc in BR after amb completed-possibly just fatigued but still safety risk  Patient was wearing a face mask during this therapy encounter. Therapist used appropriate personal protective equipment including eye protection, mask, and gloves.  Mask used was standard procedure mask. Appropriate PPE was worn during the entire therapy session. Hand hygiene was completed before and after therapy session. Patient is not in enhanced droplet precautions.

## 2020-11-03 NOTE — PROGRESS NOTES
Pharmacy Consult: Warfarin Dosing/ Monitoring    Vish Morin is a 89 y.o. male, estimated creatinine clearance is 56.7 mL/min (A) (by C-G formula based on SCr of 0.74 mg/dL (L)). weighing 64 kg (141 lb 1.5 oz).     has a past medical history of Atrial fibrillation (CMS/HCC), CAD (coronary artery disease), Constipation, Diabetes mellitus (CMS/HCC), MOURA (dyspnea on exertion), GERD (gastroesophageal reflux disease), High risk medication use, Hyperlipidemia, Hypertension, Hypothyroidism, IFG (impaired fasting glucose), Lower back pain, Muscle pain, thigh, Muscle weakness, Nausea, KENNETH (obstructive sleep apnea), Permanent atrial fibrillation (CMS/HCC), Postural imbalance, Sick sinus syndrome (CMS/HCC), Sinus bradycardia, and Syncope.    Social History     Tobacco Use    Smoking status: Former Smoker     Types: Cigars    Smokeless tobacco: Never Used   Substance Use Topics    Alcohol use: No     Comment: CAFFEINE USE    Drug use: No       Results from last 7 days   Lab Units 11/01/20  0413 10/31/20  2338   INR  1.55*  --    HEMOGLOBIN g/dL 13.2 13.3   HEMATOCRIT % 39.2 38.3   PLATELETS 10*3/mm3 252 239     Results from last 7 days   Lab Units 11/01/20 0413 10/31/20  2338   SODIUM mmol/L 140 141   POTASSIUM mmol/L 3.6 3.6   CHLORIDE mmol/L 105 106   CO2 mmol/L 25.8 25.6   BUN mg/dL 8 11   CREATININE mg/dL 0.74* 0.71*   CALCIUM mg/dL 9.0 8.8   BILIRUBIN mg/dL  --  0.4   ALK PHOS U/L  --  110   ALT (SGPT) U/L  --  26   AST (SGOT) U/L  --  28   GLUCOSE mg/dL 110* 129*     Anticoagulation history:   Take one-half tablet (2.5 mg) by mouth Tues and Sat, and take one tablet (5 mg) by mouth all other days or as directed.            Hospital Anticoagulation:  Consulting provider: Tenisha LEE  Start date: 11/1/20  Indication: AFIB requiring full anticoagulation  Target INR: 2-3  Expected duration: indefinite  Bridge Therapy: none                Date 11/1 11/2 11/3          INR 1.55 1.48 1.39          Warfarin dose  7.5mg 7.5 mg 9 mg            Potential drug interactions: Augmentin 875 - may increase risk of bleeding     Relevant nutrition status: Restarted regular diet    Education complete?/ Date: pending    Assessment/Plan:  Dose: INR continues to be below goal and is trending down. Will increase dose today to 9 mg (20% increase) to try to get patient back to goal. Will watch closely given addition of Augmentin by ID today. Continue with daily INRs - pharmacy to continue follow.    Pharmacy will continue to follow until discharge or discontinuation of warfarin.     Hannah Simmons, Pharm.D., [facility]  Oncology Pharmacy Specialist  [phone]

## 2020-11-03 NOTE — PROGRESS NOTES
LOS: 0 days     Chief Complaint:  Follow-up wound infection    Interval History:  Wound vac placed yesterday. Did not go to the OR. No new fevers. Feels about the same.     ROS: no CP or SOA    Vital Signs  Temp:  [96.8 °F (36 °C)-97.8 °F (36.6 °C)] 96.9 °F (36.1 °C)  Heart Rate:  [75-85] 75  Resp:  [14-16] 14  BP: (105-142)/(61-92) 142/92    Physical Exam:  General: awake, alert, eating breakfast in chair  Eyes: no scleral icterus  Cardiovascular: NR  Respiratory:  normal work of breathing on ambient air  GI: Abdomen is soft, non-tender, non-distended  :  no Brown catheter  Musculoskeletal: wound vac on R hip wound  Skin: No rashes  Neurological: Alert and oriented x 3  Psychiatric: Normal mood and affect     Meds:    Current Facility-Administered Medications:   •  acetaminophen (TYLENOL) tablet 650 mg, 650 mg, Oral, Q4H PRN **OR** acetaminophen (TYLENOL) 160 MG/5ML solution 650 mg, 650 mg, Oral, Q4H PRN **OR** acetaminophen (TYLENOL) suppository 650 mg, 650 mg, Rectal, Q4H PRN, Tenisha Chandler APRN  •  amLODIPine (NORVASC) tablet 5 mg, 5 mg, Oral, Q24H, Wilson Schmidt MD, 5 mg at 11/02/20 0903  •  collagenase ointment, , Topical, 3x Weekly, Genna Zepeda MD  •  levothyroxine (SYNTHROID, LEVOTHROID) tablet 50 mcg, 50 mcg, Oral, Q AM, Wilson Schmidt MD, 50 mcg at 11/03/20 0615  •  metoprolol tartrate (LOPRESSOR) tablet 50 mg, 50 mg, Oral, Q12H, Wilson Schmidt MD, 50 mg at 11/03/20 0615  •  ondansetron (ZOFRAN) injection 4 mg, 4 mg, Intravenous, Q6H PRN, Tenisha Chandler APRN  •  Pharmacy to dose warfarin, , Does not apply, Continuous PRN, Ramesh, Tenisha M, APRN  •  [COMPLETED] Insert peripheral IV, , , Once **AND** sodium chloride 0.9 % flush 10 mL, 10 mL, Intravenous, PRN, Praveen Paz MD  •  sodium chloride 0.9 % flush 10 mL, 10 mL, Intravenous, Q12H, Tenisha Chandler, APRN, 10 mL at 11/02/20 2011  •  sodium chloride 0.9 % flush 10 mL, 10 mL, Intravenous, PRN,  "eTnisha Chandler, JESUS    LABS:  CBC, BMP, micro reviewed today  Lab Results   Component Value Date    WBC 7.62 11/03/2020    HGB 12.8 (L) 11/03/2020    HCT 37.6 11/03/2020    MCV 89.3 11/03/2020     11/03/2020     Lab Results   Component Value Date    GLUCOSE 110 (H) 11/03/2020    BUN 13 11/03/2020    CREATININE 0.73 (L) 11/03/2020    EGFRIFNONA 101 11/03/2020    EGFRIFAFRI 71 06/01/2020    BCR 17.8 11/03/2020    CO2 24.8 11/03/2020    CALCIUM 8.9 11/03/2020    PROTENTOTREF 7.1 06/01/2020    ALBUMIN 3.40 (L) 10/31/2020    LABIL2 1.6 06/01/2020    AST 28 10/31/2020    ALT 26 10/31/2020    CRP 0.46 11/02/2020     Lab Results   Component Value Date    HGBA1C 6.30 (H) 06/01/2020     Lab Results   Component Value Date    INR 1.39 (H) 11/03/2020    INR 1.48 (H) 11/02/2020    INR 1.55 (H) 11/01/2020    PROTIME 16.8 (H) 11/03/2020    PROTIME 17.6 (H) 11/02/2020    PROTIME 18.2 (H) 11/01/2020       Microbiology:  11/1 RPP: negative  11/1 Wound Cx: \"in progress\"    Radiology (personally reviewed report):   None    Assessment/Plan   1. Non-healing wound of right hip  2. 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 Controlled DM2  4. Afib on coumadin     Wound vac placed on R hip wound. No OR intervention required. I spoke to the micro lab and there are a few different bacteria growing including a Strep and a gram negative. Therefore I am going to start empiric Augmentin 875-125 mg PO q12h x 7 days while awaiting the result. Continue local wound care.      ID will follow.       "

## 2020-11-03 NOTE — PROGRESS NOTES
[facility]     LOS: 0 days     Subjective:    CC:Wound Infection    DIET:  Diet Order   Procedures   • Diet Soft Texture; Ground; Thin     No cp  No soa  Last BM yesterday moderate in size  Wound VAC is now in place      Objective:    Vital Signs:  Temp:  [96.8 °F (36 °C)-97.8 °F (36.6 °C)] 96.9 °F (36.1 °C)  Heart Rate:  [75-85] 75  Resp:  [14-16] 14  BP: (105-142)/(61-92) 142/92    SpO2:  [98 %-100 %] 98 %  on   ;   Device (Oxygen Therapy): room air  Body mass index is 20.24 kg/m².    Physical Exam  Constitutional:       Appearance: He is well-developed.   HENT:      Head: Normocephalic and atraumatic.   Cardiovascular:      Rate and Rhythm: Normal rate and regular rhythm.      Heart sounds: No murmur. No friction rub.   Pulmonary:      Effort: Pulmonary effort is normal.      Breath sounds: Normal breath sounds.   Abdominal:      General: Bowel sounds are normal. There is no distension.      Palpations: Abdomen is soft.      Tenderness: There is no abdominal tenderness.   Musculoskeletal:      Comments: Wound VAC in place   Skin:     General: Skin is warm and dry.   Neurological:      Mental Status: He is alert.   Psychiatric:         Mood and Affect: Mood normal.         Behavior: Behavior normal.         Results Review:    Glucose   Date Value Ref Range Status   11/03/2020 110 (H) 65 - 99 mg/dL Final   11/01/2020 110 (H) 65 - 99 mg/dL Final   10/31/2020 129 (H) 65 - 99 mg/dL Final     Results from last 7 days   Lab Units 11/03/20  0653   WBC 10*3/mm3 7.62   HEMOGLOBIN g/dL 12.8*   HEMATOCRIT % 37.6   PLATELETS 10*3/mm3 233     Results from last 7 days   Lab Units 11/03/20  0653  10/31/20  2338   SODIUM mmol/L 136   < > 141   POTASSIUM mmol/L 3.4*   < > 3.6   CHLORIDE mmol/L 104   < > 106   CO2 mmol/L 24.8   < > 25.6   BUN mg/dL 13   < > 11   CREATININE mg/dL 0.73*   < > 0.71*   CALCIUM mg/dL 8.9   < > 8.8   BILIRUBIN mg/dL  --   --  0.4   ALK PHOS U/L  --   --  110   ALT (SGPT) U/L   --   --  26   AST (SGOT) U/L  --   --  28   GLUCOSE mg/dL 110*   < > 129*    < > = values in this interval not displayed.     Results from last 7 days   Lab Units 11/03/20  0653   INR  1.39*             Cultures:  No results found for: BLOODCX, URINECX, WOUNDCX, MRSACX, RESPCX, STOOLCX    I have reviewed daily medications and changes in CPOE    Scheduled meds  amLODIPine, 5 mg, Oral, Q24H  amoxicillin-clavulanate, 1 tablet, Oral, Q12H  collagenase, , Topical, 3x Weekly  levothyroxine, 50 mcg, Oral, Q AM  metoprolol tartrate, 50 mg, Oral, Q12H  sodium chloride, 10 mL, Intravenous, Q12H        Pharmacy to dose warfarin,       PRN meds  •  acetaminophen **OR** acetaminophen **OR** acetaminophen  •  ondansetron  •  Pharmacy to dose warfarin  •  [COMPLETED] Insert peripheral IV **AND** sodium chloride  •  sodium chloride        Open wound of right hip    Permanent atrial fibrillation (CMS/HCC)    Gastroesophageal reflux disease    Hyperlipidemia    Hypertension    Hypothyroidism    Chronic anticoagulation        Assessment/Plan:    Open wound of right hip  -Wound VAC now in place, did not require surgery  -Wound culture obtained and they show gram-negative and gram-positive for which infectious disease has a patient on oral Augmentin  -Wound care nurse to see  -Id consult input greatly appreciated  -Discussed with Dr. Zepeda today     A. fib/chronic anticoagulation/hypertension  -INR 1.39  -Continue Coumadin  -BP is stable and the patient remains on Norvasc and metoprolol     GERD/hyperlipidemia/hypothyroidism  -levothyroxine  -Last TSH was 2.3 in June    Hypokalemia -40 mEq KCl now and recheck in a.m.    Wound VAC in place  Likely going home with wound VAC when stable from Ortho and ID standpoint      Wilson Schmidt MD  11/03/20  14:33 EST

## 2020-11-03 NOTE — PROGRESS NOTES
Patient: Vish Morin  YOB: 1931     Date of Admission: 10/31/2020  9:53 PM Medical Record Number: [id number]     Attending Physician: Wilson Schmidt MD    Procedure(s): S/p right total hip arthroplasty with wound dehiscence.  S/p wound VAC placement by wound nurse on the floor     Subjective : No new orthopaedic complaints     Pain Relief: complete resolution with present medication.     Systemic Complaints: No Complaints  Vitals:    11/02/20 1805 11/02/20 1933 11/03/20 0351 11/03/20 0730   BP: 139/86 105/61 110/63 142/92   BP Location: Left arm Right arm Left arm Left arm   Patient Position: Sitting Lying Lying Lying   Pulse: 85 76 78 75   Resp: 16 16 16 14   Temp:  96.8 °F (36 °C) 97 °F (36.1 °C) 96.9 °F (36.1 °C)   TempSrc:  Oral Oral Oral   SpO2:  100% 98% 98%   Weight:       Height:           Physical Exam: 89 y.o. male    General Appearance:       Alert, cooperative, in no acute distress                  Extremities:    Dressing Clean, Dry and Intact         Incision healthy without signs or symptoms of infections         No clinical sign of DVT        Able to do good movements of digits    Pulses:   Pulses palpable and equal bilaterally           Diagnostic Tests:     Results from last 7 days   Lab Units 11/03/20  0653 11/01/20  0413 10/31/20  2338   WBC 10*3/mm3 7.62 6.66 6.90   HEMOGLOBIN g/dL 12.8* 13.2 13.3   HEMATOCRIT % 37.6 39.2 38.3   PLATELETS 10*3/mm3 233 252 239     Results from last 7 days   Lab Units 11/03/20 0653 11/01/20  0413 10/31/20  2338   SODIUM mmol/L 136 140 141   POTASSIUM mmol/L 3.4* 3.6 3.6   CHLORIDE mmol/L 104 105 106   CO2 mmol/L 24.8 25.8 25.6   BUN mg/dL 13 8 11   CREATININE mg/dL 0.73* 0.74* 0.71*   GLUCOSE mg/dL 110* 110* 129*   CALCIUM mg/dL 8.9 9.0 8.8     Results from last 7 days   Lab Units 11/03/20  0653 11/02/20  0834 11/01/20 0413   INR  1.39* 1.48* 1.55*     Lab Results   Component Value Date    CRP 0.46 11/02/2020     No results found  for: SEDRATE  No results found for: URICACID  No results found for: CRYSTAL  Microbiology Results (last 10 days)     Procedure Component Value - Date/Time    Wound Culture - Wound, Hip, Right [[id number]]  (Abnormal) Collected: 11/01/20 0425    Lab Status: Preliminary result Specimen: Wound from Hip, Right Updated: 11/03/20 1000     Wound Culture Scant growth (1+) Gram Negative Bacilli      Scant growth (1+) Gram Positive Cocci      Scant growth (1+) Normal Skin Giovana     Gram Stain Moderate (3+) WBCs seen      No organisms seen    COVID PRE-OP / PRE-PROCEDURE SCREENING ORDER (NO ISOLATION) - Swab, Nasopharynx [[id number]]  (Normal) Collected: 11/01/20 0044    Lab Status: Final result Specimen: Swab from Nasopharynx Updated: 11/01/20 0145    Narrative:      The following orders were created for panel order COVID PRE-OP / PRE-PROCEDURE SCREENING ORDER (NO ISOLATION) - Swab, Nasopharynx.  Procedure                               Abnormality         Status                     ---------                               -----------         ------                     Respiratory Panel PCR w/...[[id number]]  Normal              Final result                 Please view results for these tests on the individual orders.    Respiratory Panel PCR w/COVID-19(SARS-CoV-2) STEVAN/ALEXANDRO/BOGDAN/PAD/COR/MAD/LONG In-House, NP Swab in UTM/VTM, 3-4 HR TAT - Swab, Nasopharynx [[id number]]  (Normal) Collected: 11/01/20 0044    Lab Status: Final result Specimen: Swab from Nasopharynx Updated: 11/01/20 0145     ADENOVIRUS, PCR Not Detected     Coronavirus 229E Not Detected     Coronavirus HKU1 Not Detected     Coronavirus NL63 Not Detected     Coronavirus OC43 Not Detected     COVID19 Not Detected     Human Metapneumovirus Not Detected     Human Rhinovirus/Enterovirus Not Detected     Influenza A PCR Not Detected     Influenza B PCR Not Detected     Parainfluenza Virus 1 Not Detected     Parainfluenza Virus 2 Not Detected     Parainfluenza Virus 3 Not  Detected     Parainfluenza Virus 4 Not Detected     RSV, PCR Not Detected     Bordetella pertussis pcr Not Detected     Bordetella parapertussis PCR Not Detected     Chlamydophila pneumoniae PCR Not Detected     Mycoplasma pneumo by PCR Not Detected    Narrative:      Fact sheet for providers: https://docs.[employer]/wp-content/uploads/DOG2083-3385-UX9.1-EUA-Provider-Fact-Sheet-3.pdf    Fact sheet for patients: https://docs.[employer]/wp-content/uploads/MOV8146-2697-PM8.1-EUA-Patient-Fact-Sheet-1.pdf        No radiology results for the last 7 days          Current Medications:  Scheduled Meds:amLODIPine, 5 mg, Oral, Q24H  amoxicillin-clavulanate, 1 tablet, Oral, Q12H  collagenase, , Topical, 3x Weekly  levothyroxine, 50 mcg, Oral, Q AM  metoprolol tartrate, 50 mg, Oral, Q12H  sodium chloride, 10 mL, Intravenous, Q12H      Continuous Infusions:Pharmacy to dose warfarin,       PRN Meds:.•  acetaminophen **OR** acetaminophen **OR** acetaminophen  •  ondansetron  •  Pharmacy to dose warfarin  •  [COMPLETED] Insert peripheral IV **AND** sodium chloride  •  sodium chloride    Assessment:    Procedure(s):  RT HIP INCISION AND DRAINAGE AND WOUND CLOSURE    Patient Active Problem List   Diagnosis   • Permanent atrial fibrillation (CMS/HCC)   • Constipation   • Gastroesophageal reflux disease   • Hyperlipidemia   • Hypertension   • Hypothyroidism   • Impaired fasting glucose   • Low back pain   • Pain in the muscles   • Muscle weakness   • Nausea   • Poor posture   • Mineral deficiency   • Rash   • Weight loss   • Dizziness   • Essential (primary) hypertension   • Chronic anticoagulation   • Closed fracture of right hip (CMS/HCC)   • Status post right hip replacement   • Encephalopathy NOS   • Leukocytosis   • Repeated falls   • Open wound of right hip       PLAN:   Continues current post-op course  Anticoagulation: Okay to resume Coumadin  wound cultures were positive for gram-positive and gram-negative bacteria  possible skin contaminants.  Okay for a short course of oral antibiotics.  His WBC count is normal.  His hip does not hurt.  I do not suspect any deep prosthetic joint infection.  Infectious disease service is on board.  Appreciate their input.    Weight Bearing: WBAT  Discharge Plan: OK to plan for discharge in  tomorrow to home, home health /  SNF  from orthopadic perspective.  If wound VAC can be arranged for home use he will be better off at home.      Genna Zepeda MD    Date: 11/3/2020    Time: 12:32 EST

## 2020-11-03 NOTE — THERAPY TREATMENT NOTE
Patient Name: Vish Morin  : 1931    MRN: [id number]                              Today's Date: 11/3/2020       Admit Date: 10/31/2020    Visit Dx:     ICD-10-CM ICD-9-CM   1. Open wound of right hip, initial encounter  S71.001A 890.0     Patient Active Problem List   Diagnosis   • Permanent atrial fibrillation (CMS/HCC)   • Constipation   • Gastroesophageal reflux disease   • Hyperlipidemia   • Hypertension   • Hypothyroidism   • Impaired fasting glucose   • Low back pain   • Pain in the muscles   • Muscle weakness   • Nausea   • Poor posture   • Mineral deficiency   • Rash   • Weight loss   • Dizziness   • Essential (primary) hypertension   • Chronic anticoagulation   • Closed fracture of right hip (CMS/HCC)   • Status post right hip replacement   • Encephalopathy NOS   • Leukocytosis   • Repeated falls   • Open wound of right hip     Past Medical History:   Diagnosis Date   • Atrial fibrillation (CMS/HCC)    • CAD (coronary artery disease)    • Constipation    • Diabetes mellitus (CMS/HCC)    • MOURA (dyspnea on exertion)    • GERD (gastroesophageal reflux disease)    • High risk medication use    • Hyperlipidemia    • Hypertension    • Hypothyroidism    • IFG (impaired fasting glucose)    • Lower back pain    • Muscle pain, thigh    • Muscle weakness    • Nausea    • KENNETH (obstructive sleep apnea)    • Permanent atrial fibrillation (CMS/HCC)    • Postural imbalance    • Sick sinus syndrome (CMS/HCC)    • Sinus bradycardia    • Syncope      Past Surgical History:   Procedure Laterality Date   • CARDIAC CATHETERIZATION  10/10/2008    diagnostic   • [facility]   [facility] CARDIAC ELECTROPHYSIOLOGY MAPPING AND ABLATION  2012    [facility] Dr. Tessie Figueroa   • CARDIOVERSION  2013    [facility] Dr. Tessie Figueroa   • COLONOSCOPY     • CORONARY ANGIOPLASTY WITH STENT PLACEMENT  2004     Drug-eluting Sirolimus.  3.5 x 33mm Cypher stent to LAD.  3.0 x 13mm Cypher stent to ROSALIA.   • TOTAL HIP ARTHROPLASTY Right 10/4/2020    Procedure: TOTAL HIP ARTHROPLASTY ANTERIOR WITH HANA TABLE;  Surgeon: Genna Zepeda MD;  Location: [facility];  Service: Orthopedics;  Laterality: Right;     General Information     Row Name 11/03/20 1618          Physical Therapy Time and Intention    Document Type  therapy note (daily note)  -     Mode of Treatment  individual therapy;physical therapy  -     Row Name 11/03/20 1618          General Information    Patient Profile Reviewed  yes  -     Existing Precautions/Restrictions  fall;hip, anterior;right  -     Row Name 11/03/20 1618          Safety Issues, Functional Mobility    Impairments Affecting Function (Mobility)  balance;endurance/activity tolerance;strength  -       User Key  (r) = Recorded By, (t) = Taken By, (c) = Cosigned By    Initials Name Provider Type     Justina Paz PTA Physical Therapy Assistant        Mobility     Row Name 11/03/20 1618          Bed Mobility    Bed Mobility  supine-sit;sit-supine  -     Supine-Sit [place] (Bed Mobility)  contact guard;verbal cues  -     Sit-Supine [place] (Bed Mobility)  contact guard  -     Assistive Device (Bed Mobility)  bed rails;head of bed elevated  -     Row Name 11/03/20 1618          Sit-Stand Transfer    Sit-Stand [place] (Transfers)  contact guard  -     Assistive Device (Sit-Stand Transfers)  walker, front-wheeled  -     Row Name 11/03/20 1618          Gait/Stairs (Locomotion)    [place] Level (Gait)  contact guard  -     Assistive Device (Gait)  walker, front-wheeled  -     Distance in Feet (Gait)  70ft x2, req standing rest to complete  -     Deviations/Abnormal Patterns (Gait)  татьяна decreased;base of support, narrow;stride length decreased  -     Bilateral Gait Deviations  forward flexed posture  -       User Key  (r) = Recorded By,  (t) = Taken By, (c) = Cosigned By    Initials Name Provider Type    Justina Mojica PTA Physical Therapy Assistant        Obj/Interventions    No documentation.       Goals/Plan    No documentation.       Clinical Impression     Row Name 11/03/20 1620          Pain Scale: Numbers Pre/Post-Treatment    Pretreatment Pain Rating  0/10 - no pain  -     Posttreatment Pain Rating  0/10 - no pain  -     Row Name 11/03/20 1620          Plan of Care Review    Plan of Care Reviewed With  patient  -NANI     Progress  improving  -     Outcome Summary  Pt requires extra time, agreed to amb; pt assisted to BR , but unable to go , then assisted back to bed per his request as he had been in chair most of day(per pt); plans home w/HH, but unsure if that is best plan due to care of incision w/wound vac; pt states he will not be alone; fam plans SNU , another choice than where he came from  -     Row Name 11/03/20 1620          Therapy Assessment/Plan (PT)    Rehab Potential (PT)  good, to achieve stated therapy goals  -     Criteria for Skilled Interventions Met (PT)  yes  -[facility] Name 11/03/20 1620          Positioning and Restraints    Pre-Treatment Position  in bed  -     Post Treatment Position  bed  -JM     In Bed  supine;call light within reach;exit alarm on;encouraged to call for assist;notified nsg;side rails up x3  -       User Key  (r) = Recorded By, (t) = Taken By, (c) = Cosigned By    Initials Name Provider Type    Justina Mojica PTA Physical Therapy Assistant        Outcome Measures     Row Name 11/03/20 1624          How much help from another person do you currently need...    Turning from your back to your side while in flat bed without using bedrails?  3  -JM     Moving from lying on back to sitting on the side of a flat bed without bedrails?  3  -JM     Moving to and from a bed to a chair (including a wheelchair)?  3  -JM     Standing up from a chair using your arms (e.g., wheelchair,  bedside chair)?  3  -JM     Climbing 3-5 steps with a railing?  2  -NANI     To walk in hospital room?  3  -JM     AM-PAC 6 Clicks Score (PT)  17  -       User Key  (r) = Recorded By, (t) = Taken By, (c) = Cosigned By    Initials Name Provider Type    Justina Mojica PTA Physical Therapy Assistant        Physical Therapy Education                 Title: PT OT SLP Therapies (In Progress)     Topic: Physical Therapy (Done)     Point: Mobility training (Done)     Learning Progress Summary           Patient Acceptance, E,TB,D, VU,NR by  at 11/3/2020 1624    Acceptance, E,D, VU,NR by  at 11/1/2020 1021                   Point: Home exercise program (Done)     Learning Progress Summary           Patient Acceptance, E,TB,D, VU,NR by  at 11/3/2020 1624    Acceptance, E,D, VU,NR by  at 11/1/2020 1021                   Point: Body mechanics (Done)     Learning Progress Summary           Patient Acceptance, E,TB,D, VU,NR by  at 11/3/2020 1624    Acceptance, E,D, VU,NR by  at 11/1/2020 1021                   Point: Precautions (Done)     Learning Progress Summary           Patient Acceptance, E,TB,D, VU,NR by  at 11/3/2020 1624    Acceptance, E,D, VU,NR by  at 11/1/2020 1021                               User Key     Initials Effective Dates Name Provider Type Discipline     02/05/19 -  Rosmery Knowles, PT Physical Therapist PT     03/07/18 -  Justina Paz PTA Physical Therapy Assistant PT              PT Recommendation and Plan     Plan of Care Reviewed With: patient  Progress: improving  Outcome Summary: Pt requires extra time, agreed to amb; pt assisted to BR , but unable to go , then assisted back to bed per his request as he had been in chair most of day(per pt); plans home w/HH, but unsure if that is best plan due to care of incision w/wound vac; pt states he will not be alone; fam plans SNU , another choice than where he came from     Time Calculation:   PT Charges     Row Name  11/03/20 1617             Time Calculation    Start Time  1443  -      Stop Time  1507  -      Time Calculation (min)  24 min  -        User Key  (r) = Recorded By, (t) = Taken By, (c) = Cosigned By    Initials Name Provider Type    Justina Mojica PTA Physical Therapy Assistant        Therapy Charges for Today     Code Description Service Date Service Provider Modifiers Qty    46290364374 HC PT THER PROC EA 15 MIN 11/3/2020 Justina Paz PTA GP 2          PT G-Codes  Outcome Measure Options: AM-PAC 6 Clicks Daily Activity (OT)  AM-PAC 6 Clicks Score (PT): 17  AM-PAC 6 Clicks Score (OT): 17    Justina Paz PTA  11/3/2020

## 2020-11-03 NOTE — THERAPY EVALUATION
Patient Name: Vish Morin  : 1931    MRN: [id number]                              Today's Date: 11/3/2020       Admit Date: 10/31/2020    Visit Dx:     ICD-10-CM ICD-9-CM   1. Open wound of right hip, initial encounter  S71.001A 890.0     Patient Active Problem List   Diagnosis   • Permanent atrial fibrillation (CMS/HCC)   • Constipation   • Gastroesophageal reflux disease   • Hyperlipidemia   • Hypertension   • Hypothyroidism   • Impaired fasting glucose   • Low back pain   • Pain in the muscles   • Muscle weakness   • Nausea   • Poor posture   • Mineral deficiency   • Rash   • Weight loss   • Dizziness   • Essential (primary) hypertension   • Chronic anticoagulation   • Closed fracture of right hip (CMS/HCC)   • Status post right hip replacement   • Encephalopathy NOS   • Leukocytosis   • Repeated falls   • Open wound of right hip     Past Medical History:   Diagnosis Date   • Atrial fibrillation (CMS/HCC)    • CAD (coronary artery disease)    • Constipation    • Diabetes mellitus (CMS/HCC)    • MOURA (dyspnea on exertion)    • GERD (gastroesophageal reflux disease)    • High risk medication use    • Hyperlipidemia    • Hypertension    • Hypothyroidism    • IFG (impaired fasting glucose)    • Lower back pain    • Muscle pain, thigh    • Muscle weakness    • Nausea    • KENNETH (obstructive sleep apnea)    • Permanent atrial fibrillation (CMS/HCC)    • Postural imbalance    • Sick sinus syndrome (CMS/HCC)    • Sinus bradycardia    • Syncope      Past Surgical History:   Procedure Laterality Date   • CARDIAC CATHETERIZATION  10/10/2008    diagnostic   • CARDIAC ELECTROP[facility]   • CARDIAC ELECTROPHYSIOLOGY MAPPING AND ABLATION  2012    [facility] Dr. Tessie Figueroa   • CARDIOVERSION  2013    [facility] Dr. Tessie Figueroa   • COLONOSCOPY     • CORONARY ANGIOPLASTY WITH STENT PLACEMENT  2004     Drug-eluting Sirolimus.  3.5 x 33mm Cypher stent to LAD.  3.0 x 13mm Cypher stent to ROSALIA.   • TOTAL HIP ARTHROPLASTY Right 10/4/2020    Procedure: TOTAL HIP ARTHROPLASTY ANTERIOR WITH HANA TABLE;  Surgeon: Genna Zepeda MD;  Location: [facility];  Service: Orthopedics;  Laterality: Right;     General Information     Row Name 11/03/20 1300          OT Time and Intention    Document Type  evaluation  -RD     Mode of Treatment  occupational therapy  -RD     Row Name 11/03/20 1300          General Information    Patient Profile Reviewed  yes  -RD     Prior Level of Function  independent:;ADL's prior to R GIL in October  -RD     Existing Precautions/Restrictions  fall  -RD     Row Name 11/03/20 1300          Occupational Profile    Reason for Services/Referral (Occupational Profile)  decreased balance, endurance, and overall decreased indep/safety w/ ADLs and transfers  -RD     Row Name 11/03/20 1300          Living Environment    Lives With  child(erum), adult  -RD     Row Name 11/03/20 1300          Cognition    Orientation Status (Cognition)  oriented x 4  -RD     Row Name 11/03/20 1300          Safety Issues, Functional Mobility    Impairments Affecting Function (Mobility)  balance;strength;endurance/activity tolerance  -RD       User Key  (r) = Recorded By, (t) = Taken By, (c) = Cosigned By    Initials Name Provider Type    RD Марина Barney, NADIA Occupational Therapist        Mobility/ADL's     Row Name 11/03/20 1302          Bed Mobility    Comment (Bed Mobility)  NT- up in chair  -RD     Row Name 11/03/20 1302          Transfers    Transfers  sit-stand transfer;toilet transfer  -RD     Sit-Stand [place] (Transfers)  contact guard;verbal cues  -RD     [place] Level (Toilet Transfer)  contact guard;verbal cues  -RD     Assistive Device (Toilet Transfer)  grab bars/safety frame;walker, front-wheeled  -RD     Row Name 11/03/20 1302          Sit-Stand Transfer    Assistive Device (Sit-Stand  Transfers)  walker, front-wheeled  -RD     Row Name 11/03/20 1302          Toilet Transfer    Type (Toilet Transfer)  sit-stand;stand-sit  -RD     Row Name 11/03/20 1302          Functional Mobility    Functional Mobility- Ind. Level  contact guard assist;verbal cues required  -RD     Functional Mobility- Device  rolling walker  -RD     Functional Mobility- Comment  pt ambulates from EOB <> bathroom using RW w/ CGA- no LOB noted  -RD     Row Name 11/03/20 1302          Activities of Daily Living    BADL Assessment/Intervention  lower body dressing;grooming;toileting  -RD     Row Name 11/03/20 1302          Lower Body Dressing Assessment/Training    Comment (Lower Body Dressing)  Pt reports use of AE at rehab for LB dressing  -RD     Row Name 11/03/20 1302          Grooming Assessment/Training    [place] Level (Grooming)  grooming skills;standby assist;contact guard assist  -RD     Position (Grooming)  sink side;supported standing  -RD     Comment (Grooming)  pt stands at sink to complete grooming w/ CGA/SBA for balance  -RD     Row Name 11/03/20 1302          Toileting Assessment/Training    [place] Level (Toileting)  toileting skills;adjust/manage clothing;perform perineal hygiene;minimum assist (75% patient effort);verbal cues  -RD     Assistive Devices (Toileting)  grab bar/safety frame RW  -RD     Position (Toileting)  supported sitting;supported standing  -RD     Comment (Toileting)  min A for pulling up brief  -RD       User Key  (r) = Recorded By, (t) = Taken By, (c) = Cosigned By    Initials Name Provider Type    Марина Miller OT Occupational Therapist        Obj/Interventions     Row Name 11/03/20 1306          Sensory Assessment (Somatosensory)    Sensory Assessment (Somatosensory)  UE sensation intact  -RD     Row Name 11/03/20 1306          Range of Motion Comprehensive    General Range of Motion  bilateral upper extremity ROM WFL  -RD     Row Name 11/03/20 1306          Strength  Comprehensive (MMT)    Comment, General Manual Muscle Testing (MMT) Assessment  B UE MMT: grossly approx. 3/5  -RD     Row Name 11/03/20 1306          Balance    Balance Assessment  sitting static balance;standing static balance  -RD     Static Sitting Balance  WFL  -RD     Static Standing Balance  mild impairment CGA for static standing balance  -RD       User Key  (r) = Recorded By, (t) = Taken By, (c) = Cosigned By    Initials Name Provider Type    RD Марина Barney, OT Occupational Therapist        Goals/Plan     Row Name 11/03/20 1307          Transfer Goal 1 (OT)    Activity/Assistive Device (Transfer Goal 1, OT)  toilet  -RD     [place] Level/Cues Needed (Transfer Goal 1, OT)  supervision required  -RD     Time Frame (Transfer Goal 1, OT)  long term goal (LTG)  -RD     Progress/Outcome (Transfer Goal 1, OT)  goal ongoing  -RD     Row Name 11/03/20 1307          Dressing Goal 1 (OT)    Activity/Device (Dressing Goal 1, OT)  lower body dressing using AE as appropriate/necessary  -RD     [place]/Cues Needed (Dressing Goal 1, OT)  supervision required  -RD     Time Frame (Dressing Goal 1, OT)  long term goal (LTG)  -RD     Progress/Outcome (Dressing Goal 1, OT)  goal ongoing  -RD     Row Name 11/03/20 1307          Toileting Goal 1 (OT)    Activity/Device (Toileting Goal 1, OT)  toileting skills, all  -RD     [place] Level/Cues Needed (Toileting Goal 1, OT)  supervision required  -RD     Time Frame (Toileting Goal 1, OT)  long term goal (LTG)  -RD     Progress/Outcome (Toileting Goal 1, OT)  goal ongoing  -RD     Row Name 11/03/20 1307          Therapy Assessment/Plan (OT)    Planned Therapy Interventions (OT)  activity tolerance training;BADL retraining;functional balance retraining;patient/caregiver education/training;ROM/therapeutic exercise;strengthening exercise;transfer/mobility retraining  -RD       User Key  (r) = Recorded By, (t) = Taken By, (c) = Cosigned By    Initials Name Provider  Type    RD Марина Barney, OT Occupational Therapist        Clinical Impression     Row Name 11/03/20 1310          Pain Scale: Numbers Pre/Post-Treatment    Pretreatment Pain Rating  0/10 - no pain  -RD     Posttreatment Pain Rating  0/10 - no pain  -RD     Row Name 11/03/20 1310          Plan of Care Review    Plan of Care Reviewed With  patient  -RD     Progress  improving  -RD     Outcome Summary  Pt is an 89 year old male admitted from rehab d/t open wound of R hip w/ drainage. Pt now s/p wound vac placement. Pt had R GIL on 10/4/2020. Pt lives w/ daughter and reports indep w/ ADLs prior to hip surgery in Oct. Pt presents to OT eval w/ decreased balance, endurance, and overall decreased indep/safety w/ ADLs and transfers. Pt able to ambulate to bathroom using RW w/ CGA. Pt completes toileting tasks w/ min A and able to stand at sink to complete grooming w/ SBA/CGA for balance. Pt may benefit from skilled OT services to address deficits and maximize indep/safety w/ ADLs and transfers. Pt and OT wore standard mask during session and OT also wore gloves, glasses, and performed thorough hand hygiene before and after session.  -RD     Row Name 11/03/20 1310          Therapy Assessment/Plan (OT)    Rehab Potential (OT)  good, to achieve stated therapy goals  -RD     Criteria for Skilled Therapeutic Interventions Met (OT)  yes;skilled treatment is necessary  -RD     Therapy Frequency (OT)  3 times/wk  -RD     Row Name 11/03/20 1310          Therapy Plan Review/Discharge Plan (OT)    Anticipated Discharge Disposition (OT)  home with assist;home with home health;skilled nursing facility pending pt progress and support at discharge  -RD     Row Name 11/03/20 1310          Vital Signs    O2 Delivery Pre Treatment  room air  -RD     Row Name 11/03/20 1310          Positioning and Restraints    Pre-Treatment Position  sitting in chair/recliner  -RD     Post Treatment Position  chair  -RD     In Chair  reclined;call  light within reach;encouraged to call for assist;exit alarm on;notified nsg  -RD       User Key  (r) = Recorded By, (t) = Taken By, (c) = Cosigned By    Initials Name Provider Type    Марина Miller OT Occupational Therapist        Outcome Measures     Row Name 11/03/20 1308          How much help from another is currently needed...    Putting on and taking off regular lower body clothing?  2  -RD     Bathing (including washing, rinsing, and drying)  3  -RD     Toileting (which includes using toilet bed pan or urinal)  3  -RD     Putting on and taking off regular upper body clothing  3  -RD     Taking care of personal grooming (such as brushing teeth)  3  -RD     Eating meals  3  -RD     AM-PAC 6 Clicks Score (OT)  17  -RD     Row Name 11/03/20 1308          Functional Assessment    Outcome Measure Options  AM-PAC 6 Clicks Daily Activity (OT)  -RD       User Key  (r) = Recorded By, (t) = Taken By, (c) = Cosigned By    Initials Name Provider Type    Марина Miller OT Occupational Therapist        Occupational Therapy Education                 Title: PT OT SLP Therapies (In Progress)     Topic: Occupational Therapy (In Progress)     Point: ADL training (Done)     Description:   Instruct learner(s) on proper safety adaptation and remediation techniques during self care or transfers.   Instruct in proper use of assistive devices.              Learning Progress Summary           Patient Acceptance, E, VU by DANE at 11/3/2020 1308    Comment: OT educ on OT role in therapeutic process and pt's POC.                   Point: Home exercise program (Not Started)     Description:   Instruct learner(s) on appropriate technique for monitoring, assisting and/or progressing therapeutic exercises/activities.              Learner Progress:  Not documented in this visit.          Point: Precautions (Not Started)     Description:   Instruct learner(s) on prescribed precautions during self-care and functional transfers.               Learner Progress:  Not documented in this visit.          Point: Body mechanics (Not Started)     Description:   Instruct learner(s) on proper positioning and spine alignment during self-care, functional mobility activities and/or exercises.              Learner Progress:  Not documented in this visit.                      User Key     Initials Effective Dates Name Provider Type Discipline    RD 10/14/19 -  Марина Barney, OT Occupational Therapist OT              OT Recommendation and Plan  Planned Therapy Interventions (OT): activity tolerance training, BADL retraining, functional balance retraining, patient/caregiver education/training, ROM/therapeutic exercise, strengthening exercise, transfer/mobility retraining  Therapy Frequency (OT): 3 times/wk  Plan of Care Review  Plan of Care Reviewed With: patient  Progress: improving  Outcome Summary: Pt is an 89 year old male admitted from rehab d/t open wound of R hip w/ drainage. Pt now s/p wound vac placement. Pt had R GIL on 10/4/2020. Pt lives w/ daughter and reports indep w/ ADLs prior to hip surgery in Oct. Pt presents to OT eval w/ decreased balance, endurance, and overall decreased indep/safety w/ ADLs and transfers. Pt able to ambulate to bathroom using RW w/ CGA. Pt completes toileting tasks w/ min A and able to stand at sink to complete grooming w/ SBA/CGA for balance. Pt may benefit from skilled OT services to address deficits and maximize indep/safety w/ ADLs and transfers. Pt and OT wore standard mask during session and OT also wore gloves, glasses, and performed thorough hand hygiene before and after session.     Time Calculation:   Time Calculation- OT     Row Name 11/03/20 1316             Time Calculation- OT    OT Start Time  0902  -RD      OT Stop Time  0932  -RD      OT Time Calculation (min)  30 min  -RD      Total Timed Code Minutes- OT  24 minute(s)  -RD      OT Received On  11/03/20  -RD      OT - Next Appointment  11/05/20   -RD      OT Goal Re-Cert Due Date  11/17/20  -DANE        User Key  (r) = Recorded By, (t) = Taken By, (c) = Cosigned By    Initials Name Provider Type    Марина Miller, OT Occupational Therapist        Therapy Charges for Today     Code Description Service Date Service Provider Modifiers Qty    40348444576  OT EVAL MOD COMPLEXITY 2 11/3/2020 Марина Barney, OT GO 1    [id number]  OT SELF CARE/MGMT/TRAIN EA 15 MIN 11/3/2020 Марина Barney OT GO 2               Марина Barney OT  11/3/2020

## 2020-11-03 NOTE — PROGRESS NOTES
"Adult Nutrition  Assessment/PES    Patient Name:  Vish Morin  YOB: 1931  MRN: [id number]  Admit Date:  10/31/2020    Assessment Date:  11/3/2020    Comments:  Nutrition assessment complete due to open wound with VAC. Spoke with pt, he c/o not being able to chew his food well  (only has upper denture). He also looks to have lost about 20 lbs this year.  Offered Boost, pt declined but willing to drink Alexander mixed in lemonade to support wound healing. Will also change to [facility] soft diet. Encouraged po, Will follow nutrition needs.    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, GERD, HTN, Afib         Nutrition/Diet History     Row Name 11/03/20 1420          Nutrition/Diet History    Factors Affecting Nutritional Intake  chewing difficulties/inability to chew food         Anthropometrics     Row Name 11/03/20 1420          Anthropometrics    Height  177.8 cm (70\")        Admit Weight    Admit Weight  64 kg (141 lb 1.5 oz)        Ideal Body Weight (IBW)    Ideal Body Weight (IBW) (kg)  76.48     % of Ideal Body Weight Assessment  80-90%: mild deficit 83% IBW        Usual Body Weight (UBW)    Weight Loss  unintentional     Weight Loss Time Frame  20lb  (difficult to determine timeframe)        Body Mass Index (BMI)    BMI Assessment  BMI 18.5-24.9: normal BMI 20         Labs/Tests/Procedures/Meds     Row Name 11/03/20 1421          Labs/Procedures/Meds    Lab Results Reviewed  reviewed, pertinent     Lab Results Comments  K+, Cr        Diagnostic Tests/Procedures    Diagnostic Test/Procedure Reviewed  reviewed, pertinent     Diagnostic Test/Procedures Comments  woujnd VAC placed        Medications    Pertinent Medications Reviewed  reviewed, pertinent     Pertinent Medications Comments  Abx, synthroid,     " "    Physical Findings     Row Name 11/03/20 1422          Physical Findings    Overall Physical Appearance  generalized wasting     Oral/Mouth Cavity  edentulous;poor dentition only has 1 upper denture     Skin  other (see comments) open hip wound (9x4x1)with VAC, b=20         Estimated/Assessed Needs     Row Name 11/03/20 1424 11/03/20 1420       Calculation Measurements    Weight Used For Calculations  64 kg (141 lb 1.5 oz)  --    Height  --  177.8 cm (70\")       Estimated/Assessed Needs    Additional Documentation  KCAL/KG (Group);Protein Requirements (Group);Fluid Requirements (Group)  --       KCAL/KG    KCAL/KG  30 Kcal/Kg (kcal)  --    30 Kcal/Kg (kcal)  1920  --       Protein Requirements    Weight Used For Protein Calculations  64 kg (141 lb 1.5 oz)  --    Est Protein Requirement Amount (gms/kg)  1.5 gm protein  --    Estimated Protein Requirements (gms/day)  96  --       Fluid Requirements    Fluid Requirements (mL/day)  1900  --    RDA Method (mL)  1900  --        Nutrition Prescription Ordered     Row Name 11/03/20 1424          Nutrition Prescription PO    Current PO Diet  Regular         Evaluation of Received Nutrient/Fluid Intake     Row Name 11/03/20 1425          PO Evaluation    Number of Meals  3     % PO Intake  %         Problem/Interventions:  Problem 1     Row Name 11/03/20 1425          Nutrition Diagnoses Problem 1    Problem 1  Increased Nutrient Needs     Macronutrient  Kcal;Protein     Etiology (related to)  Medical Diagnosis     Skin  Surgical wound;Non healing wound         Intervention Goal     Row Name 11/03/20 1425          Intervention Goal    General  Maintain nutrition;Reduce/improve symptoms;Meet nutritional needs for age/condition;Improved nutrition related lab(s);Disease management/therapy     PO  Tolerate PO;Increase intake;PO intake (%)     PO Intake %  90 %     Weight  Maintain weight         Nutrition Intervention     Row Name 11/03/20 1425          Nutrition " Intervention    RD/Tech Action  Follow Tx progress;Care plan reviewd;Encourage intake;Interview for preference;Advise available snack;Advise alternate selection;Recommend/ordered;Supplement offered/refused     Recommended/Ordered  Supplement;Diet         Nutrition Prescription     Row Name 11/03/20 1426          Nutrition Prescription PO    PO Prescription  Begin/change diet;Begin/change supplement     Begin/Change Diet to  Soft Texture     Texture  Ground     Fluid Consistency  Thin     Supplement  Alexander     Supplement Frequency  2 times a day     New PO Prescription Ordered?  Yes         Education/Evaluation     Row Name 11/03/20 1426          Education    Education  Will Instruct as appropriate        Monitor/Evaluation    Monitor  Per protocol;Skin status;Symptoms;I&O;PO intake;Supplement intake;Pertinent labs;Weight           Electronically signed by:  Brenda Felix RD  11/03/20 14:26 EST

## 2020-11-04 NOTE — DISCHARGE SUMMARY
[facility]    Date of Discharge:  11/4/2020    PCP: Nick Chawla MD    Discharge Diagnosis:   Active Hospital Problems    Diagnosis  POA   • **Open wound of right hip [S71.001A]  Yes   • Chronic anticoagulation [Z79.01]  Not Applicable   • Permanent atrial fibrillation (CMS/HCC) [I48.21]  Yes   • Gastroesophageal reflux disease [K21.9]  Yes   • Hyperlipidemia [E78.5]  Yes   • Hypertension [I10]  Yes   • Hypothyroidism [E03.9]  Yes      Resolved Hospital Problems   No resolved problems to display.     Procedure(s):  RT HIP INCISION AND DRAINAGE AND WOUND CLOSURE     Consults     Date and Time Order Name Status Description    11/1/2020 1615 Inpatient Infectious Diseases Consult Completed     11/1/2020 0141 Inpatient Orthopedic Surgery Consult Completed     11/1/2020 0020 [facility] (on-call MD unless specified) Details Completed     10/2/2020 2104 Inpatient Orthopedic Surgery Consult Completed     10/2/2020 2026 Ortho (on-call MD unless specified) Completed     10/2/2020 2026 [facility] (on-call MD unless specified) Details Completed         Hospital Course  Please see history and physical for details. Patient is a 89 y.o. male admitted 10/2-10/12 for closed fracture right hip requiring operative repair.  He was discharged to [facility] then readmitted on 1/31 for nonhealing wound with dehiscence.  Patient is now status post wound VAC placement.  Wound cultures obtained growing GPC and GNR and he is currently on Augmentin per infectious disease.  ID still awaiting final cultures to make final recommendations regarding antibiotics.  Wound care has been following patient and wound VAC will be continued at nursing facility.  He also has a history of atrial fibrillation on chronic anticoagulation with Coumadin.  INR is currently subtherapeutic and will need continued tailoring of anticoagulation after discharge to INR goal of 2-3.  BP has been stable on Norvasc and  metoprolol will hold ACE inhibitor at discharge to facility.  Labs this morning reviewed and overall he is stable for discharge to nursing facility for further care with wound VAC.  Follow-up with wound care, orthopedic surgery, and await final input on antibiotics from infectious disease based on culture results.        I discussed the patient's findings and my recommendations with patient, nursing staff and consulting provider.    Condition on Discharge: Improved.     Temp:  [97.1 °F (36.2 °C)-98.2 °F (36.8 °C)] 98.2 °F (36.8 °C)  Heart Rate:  [56-60] 60  Resp:  [14-18] 16  BP: (107-124)/(64-75) 124/71  Body mass index is 20.24 kg/m².    Physical Exam  Vitals signs reviewed.   Constitutional:       Appearance: Normal appearance. He is well-developed.   HENT:      Head: Normocephalic and atraumatic.   Eyes:      Extraocular Movements: Extraocular movements intact.   Neck:      Musculoskeletal: Normal range of motion.   Cardiovascular:      Comments: Rate controlled afib   Pulmonary:      Effort: Pulmonary effort is normal. No respiratory distress.      Breath sounds: Normal breath sounds.   Abdominal:      General: Bowel sounds are normal. There is no distension.      Palpations: Abdomen is soft. There is no mass.      Tenderness: There is no abdominal tenderness.      Hernia: No hernia is present.   Musculoskeletal:      Comments: Neurovascular status intact right leg, dressing intact   Skin:     General: Skin is warm and dry.   Neurological:      Mental Status: He is alert and oriented to person, place, and time.   Psychiatric:         Mood and Affect: Mood normal.         Behavior: Behavior normal.         Thought Content: Thought content normal.           Disposition: Rehab Facility or Unit (DC - External)       Discharge Medications      New Medications      Instructions Start Date   amoxicillin-clavulanate 875-125 MG per tablet  Commonly known as: AUGMENTIN   1 tablet, Oral, Every 12 Hours Scheduled       collagenase 250 UNIT/GM ointment   Topical, 3 Times Weekly         Changes to Medications      Instructions Start Date   warfarin 5 MG tablet  Commonly known as: COUMADIN  What changed: additional instructions   Take one-half tablet (2.5 mg) by mouth Tues, Thurs, and Sat, and take one tablet (5 mg) by mouth all other days or as directed.      PHARMACY TO DOSE WARFARIN  What changed: You were already taking a medication with the same name, and this prescription was added. Make sure you understand how and when to take each.   Does not apply, Continuous PRN, Daily INR         Continue These Medications      Instructions Start Date   acetaminophen 325 MG tablet  Commonly known as: TYLENOL   650 mg, Oral, Every 4 Hours PRN      amLODIPine 5 MG tablet  Commonly known as: NORVASC   5 mg, Oral, Every 24 Hours Scheduled      calcium carbonate 500 MG chewable tablet  Commonly known as: TUMS   1 tablet, Oral, As Needed      levothyroxine 50 MCG tablet  Commonly known as: SYNTHROID, LEVOTHROID   TAKE 1 TABLET BY MOUTH EVERY DAY      magnesium oxide 400 MG tablet  Commonly known as: MAG-OX   TAKE 1 TABLET BY MOUTH EVERY 12 HOURS      meclizine 25 MG tablet  Commonly known as: ANTIVERT   Take 1 tablet by mouth 3 times daily as needed for dizziness or nausea      metoprolol tartrate 50 MG tablet  Commonly known as: LOPRESSOR   TAKE 1 TABLET BY MOUTH EVERY 12 HOURS      polyethylene glycol 17 g packet  Commonly known as: MIRALAX   17 g, Oral, 3 Times Daily         Stop These Medications    benazepril 40 MG tablet  Commonly known as: LOTENSIN           Diet Instructions     Diet: Regular, Cardiac      Discharge Diet:  Regular  Cardiac            Activity Instructions     Activity as Tolerated           Additional Instructions for the Follow-ups that You Need to Schedule     Ambulatory Referral to Physical Therapy   As directed      Referral to Wound Clinic   As directed         Contact information for follow-up providers      Nick Chawla MD .    Specialty: Internal Medicine  Contact information:  [address] [facility] [address]  [phone]                   Contact information for after-discharge care     Destination     [facility] .    Service: Skilled Nursing  Contact information:  [address]   [place] [address] [phone]                               Key Paz, APRN  11/04/20  10:43 EST    Discharge time spent greater than 37 minutes.

## 2020-11-04 NOTE — PROGRESS NOTES
Case Management Discharge Note      Final Note: [facility] notified of dc today. States bed available. Wound vac to be ordered once patient arrives to facility. [facility] arranged for pick-up at 1100. Packet given to RN to complete. No additional needs noted.    Provided Post Acute Provider Quality & Resource List?: Refused    Selected Continued Care - Admitted Since 10/31/2020     Destination Coordination complete    Service Provider Selected Services Address Phone Fax    [facility]  Skilled [facility] [address] , [facility] [address] [phone]          Durable Medical Equipment    No services have been selected for the patient.              Dialysis/Infusion    No services have been selected for the patient.              Home Medical Care    No services have been selected for the patient.              Therapy    No services have been selected for the patient.              Community Resources    No services have been selected for the patient.                Selected Continued Care - Prior Encounters Includes selections from prior encounters from 8/2/2020 to 11/4/2020    Discharged on 10/12/2020 Admission date: 10/2/2020 - Discharge disposition: Skilled Nursing Facility (DC - External)    Destination     Service Provider Selected Services Address Phone Fax    [facility]  [facility] [address] [facility] [address] [phone]                         Final Discharge Disposition Code: 03 - skilled nursing facility (SNF)

## 2020-11-04 NOTE — PLAN OF CARE
Goal Outcome Evaluation:  Plan of Care Reviewed With: patient  Progress: improving  Outcome Summary: Rt hip has wound vac in place, pt does not like tubing and constantly asks for it to be moved, bed alarm on, BSC, no c/o pain

## 2020-11-04 NOTE — NURSING NOTE
11/04/20 0920   Wound 10/04/20 Right anterior hip   Placement Date: 10/04/20   Side: Right  Orientation: anterior  Location: hip  Additional Comments: surgical incision   Dressing Appearance dry;intact   Base moist;necrotic;red;slough  (0.5 cm necrotoc at wound edge 6-9 oclock)   Periwound intact;dry   Periwound Temperature warm   Periwound Skin Turgor soft   Edges open   Drainage Characteristics/Odor serosanguineous   Drainage Amount scant   Care, Wound cleansed with;sterile normal saline;collagenase agent applied;negative pressure wound therapy   Dressing Care dressing changed;skin barrier agent applied   Periwound Care dry periwound area maintained   NPWT (Negative Pressure Wound Therapy) 11/02/20 1415 R anterior hip   Placement Date/Time: 11/02/20 1415   Location: R anterior hip   Therapy Setting continuous therapy   Dressing foam, black   Pressure Setting 125 mmHg   Sponges Inserted 1   Sponges Removed 1   Finger sweep complete Yes       WOCN dept - wound vac dressing changed today. Seal was intact and adequate prior to dressing removal. Necrotic area at edge 6-9 oclock is softer and loose. A mildly detached piece was trimmed away. Area was approx 1 cm at edge 2 days ago, today area is about 0.5 cm at edge. Less slough to wound bed and wound bed is more beefy red today. Mild odor when dressing was removed but odor did not linger. Patient tolerates dressing change well. Periwound skin no complications.

## 2020-11-04 NOTE — PROGRESS NOTES
LOS: 1 day     Chief Complaint:  Follow-up wound infection    Interval History:  Wound vac in place. No fever. Minimal pain. Tolerating Augmentin.    ROS: no CP or SOA; no headache    Vital Signs  Temp:  [97.1 °F (36.2 °C)-98.2 °F (36.8 °C)] 98.2 °F (36.8 °C)  Heart Rate:  [56-60] 60  Resp:  [14-18] 16  BP: (107-124)/(64-75) 124/71    Physical Exam:  General: awake, alert, no distress  Eyes: no scleral icterus  Cardiovascular: NR  Respiratory:  normal work of breathing on ambient air  GI: Abdomen is non-distended  :  no Brown catheter  Musculoskeletal: wound vac on R hip wound; no surrounding erythema  Skin: No rashes  Neurological: Alert and oriented x 3  Psychiatric: Normal mood and affect     Meds:  •  amoxicillin-clavulanate (AUGMENTIN) 875-125 MG per tablet 1 tablet, 1 tablet, Oral, Q12H    LABS:  CBC, BMP, micro reviewed today  Lab Results   Component Value Date    WBC 8.44 11/04/2020    HGB 12.4 (L) 11/04/2020    HCT 37.6 11/04/2020    MCV 91.7 11/04/2020     11/04/2020     Lab Results   Component Value Date    GLUCOSE 105 (H) 11/04/2020    BUN 14 11/04/2020    CREATININE 0.85 11/04/2020    EGFRIFNONA 85 11/04/2020    EGFRIFAFRI 71 06/01/2020    BCR 16.5 11/04/2020    CO2 24.7 11/04/2020    CALCIUM 8.6 11/04/2020    PROTENTOTREF 7.1 06/01/2020    ALBUMIN 3.40 (L) 10/31/2020    LABIL2 1.6 06/01/2020    AST 28 10/31/2020    ALT 26 10/31/2020    CRP 0.28 11/04/2020     Lab Results   Component Value Date    HGBA1C 6.30 (H) 06/01/2020     Lab Results   Component Value Date    INR 1.67 (H) 11/04/2020    INR 1.39 (H) 11/03/2020    INR 1.48 (H) 11/02/2020    PROTIME 19.3 (H) 11/04/2020    PROTIME 16.8 (H) 11/03/2020    PROTIME 17.6 (H) 11/02/2020     Microbiology:  11/1 RPP: negative  11/1 Wound Cx: scant GPC and GNR    Radiology (personally reviewed report):   None    Assessment/Plan   1. Non-healing wound of right hip with superficial infection  2. 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  Controlled DM2  4. Afib on coumadin     Wound vac placed on R hip wound. No OR intervention required. Superficial wound culture with GPC and GNR. Continue empiric Augmentin 875-125 mg PO q12h x 7 days while awaiting the result. Continue local wound care. I'll provide final recs once cultures finalize, possibly even with an addendum to this note later today. Ortho note reviewed.

## 2020-11-04 NOTE — NURSING NOTE
11/04/20 1020   Wound 10/04/20 Right anterior hip   Placement Date: 10/04/20   Side: Right  Orientation: anterior  Location: hip  Additional Comments: surgical incision   Dressing Care dressing changed  (NS moist gauze to wound, cover with dry ABD/tape)   NPWT (Negative Pressure Wound Therapy) 11/02/20 1415 R anterior hip   Placement Date/Time: 11/02/20 1415   Location: R anterior hip   Sponges Removed 1   Finger sweep complete Yes       WOCN dept - received call from unit stating patient will be discharged today and will need vac dressing removed and dressing applied. Vac machine removed from room. Patient being discharged to SNF

## 2020-11-17 PROBLEM — S06.5XAA TRAUMATIC SUBDURAL HEMATOMA (HCC): Status: ACTIVE | Noted: 2020-01-01

## 2020-11-17 PROBLEM — S06.5XAA SDH (SUBDURAL HEMATOMA) (HCC): Status: ACTIVE | Noted: 2020-01-01

## 2020-11-24 PROBLEM — G31.1 SENILE DEGENERATION OF BRAIN (HCC): Status: ACTIVE | Noted: 2020-01-01

## 2025-01-21 NOTE — PLAN OF CARE
Goal Outcome Evaluation:  Plan of Care Reviewed With: patient, family  Progress: no change  Outcome Summary: Pt is confused and remains in posey vest. Pt has pulled off heart monitor, gown, pulse ox, and linens this shift. Pt denies pain and states he wants to go home. He remains disoriented to place/time/situation. Family aware of confusion. Pt repositioned and items replaced. VSS   Refill Routing Note   Medication(s) are not appropriate for processing by [facility] for the following reason(s):        Non-participating provider    [facility] action(s):  Route               Appointments  past 12m or future 3m with PCP    Date Provider   Last Visit   12/20/2024 Marin Nettles MD   Next Visit   3/19/2025 Marin Nettles MD   ED visits in past 90 days: 0        Note composed:4:30 PM 01/21/2025

## (undated) DEVICE — SUT MNCRYL 3/0 PS2 18IN MCP497G

## (undated) DEVICE — DRSNG WND GEL FIBR OPTICELL AG PLS W/SLV LF 4X5IN  STRL

## (undated) DEVICE — GLV SURG PREMIERPRO ORTHO LTX PF SZ8 BRN

## (undated) DEVICE — APPL CHLORAPREP HI/LITE 26ML ORNG

## (undated) DEVICE — 3M™ STERI-STRIP™ REINFORCED ADHESIVE SKIN CLOSURES, R1547, 1/2 IN X 4 IN (12 MM X 100 MM), 6 STRIPS/ENVELOPE: Brand: 3M™ STERI-STRIP™

## (undated) DEVICE — ANTIBACTERIAL UNDYED BRAIDED (POLYGLACTIN 910), SYNTHETIC ABSORBABLE SUTURE: Brand: COATED VICRYL

## (undated) DEVICE — SHEET, DRAPE, SPLIT, STERILE: Brand: MEDLINE

## (undated) DEVICE — SOL ISO/ALC RUB 70PCT 4OZ

## (undated) DEVICE — PENCL E/S ULTRAVAC TELESCP NOSE HOLSTR 10FT

## (undated) DEVICE — MAT FLR ABSORBENT LG 4FT 10 2.5FT

## (undated) DEVICE — PK ANT HIP 40

## (undated) DEVICE — HANDPIECE SET WITH COAXIAL HIGH FLOW TIP AND SUCTION TUBE: Brand: INTERPULSE

## (undated) DEVICE — TRAP FLD MINIVAC MEGADYNE 100ML

## (undated) DEVICE — DECANT BG O JET

## (undated) DEVICE — SUT VIC 3/0 CTI 36IN J944H

## (undated) DEVICE — GLV SURG BIOGEL LTX PF 8

## (undated) DEVICE — GLV SURG SIGNATURE ESSENTIAL PF LTX SZ8

## (undated) DEVICE — 1010 S-DRAPE TOWEL DRAPE 10/BX: Brand: STERI-DRAPE™

## (undated) DEVICE — SKIN PREP TRAY W/CHG: Brand: MEDLINE INDUSTRIES, INC.

## (undated) DEVICE — SYR CONTRL LUERLOK 10CC

## (undated) DEVICE — OPTIFOAM GENTLE SA, POSTOP, 4X8: Brand: MEDLINE